# Patient Record
Sex: MALE | Race: BLACK OR AFRICAN AMERICAN | NOT HISPANIC OR LATINO | Employment: OTHER | URBAN - METROPOLITAN AREA
[De-identification: names, ages, dates, MRNs, and addresses within clinical notes are randomized per-mention and may not be internally consistent; named-entity substitution may affect disease eponyms.]

---

## 2020-04-08 RX ORDER — CLONIDINE HYDROCHLORIDE 0.1 MG/1
TABLET ORAL 2 TIMES DAILY
Status: ON HOLD | COMMUNITY
Start: 2020-03-18 | End: 2020-08-13

## 2020-04-08 RX ORDER — BUTYROSPERMUM PARKII(SHEA BUTTER), SIMMONDSIA CHINENSIS (JOJOBA) SEED OIL, ALOE BARBADENSIS LEAF EXTRACT .01; 1; 3.5 G/100G; G/100G; G/100G
250 LIQUID TOPICAL DAILY
Status: ON HOLD | COMMUNITY
End: 2020-08-13

## 2020-04-08 RX ORDER — DILTIAZEM HYDROCHLORIDE 240 MG/1
240 CAPSULE, COATED, EXTENDED RELEASE ORAL DAILY
COMMUNITY
Start: 2020-03-18

## 2020-04-08 RX ORDER — FUROSEMIDE 20 MG/1
20 TABLET ORAL DAILY PRN
Status: ON HOLD | COMMUNITY
Start: 2020-02-12 | End: 2020-08-13

## 2020-04-08 RX ORDER — MULTIVITAMIN
1 TABLET ORAL DAILY
COMMUNITY

## 2020-04-08 RX ORDER — LANOLIN ALCOHOL/MO/W.PET/CERES
100 CREAM (GRAM) TOPICAL
COMMUNITY

## 2020-04-08 RX ORDER — ASCORBIC ACID 125 MG
TABLET,CHEWABLE ORAL DAILY
COMMUNITY
Start: 2016-10-25

## 2020-04-08 RX ORDER — LEVOTHYROXINE SODIUM 50 UG/1
50 TABLET ORAL DAILY
COMMUNITY
Start: 2020-03-18 | End: 2020-10-20

## 2020-05-19 ENCOUNTER — OFFICE VISIT (OUTPATIENT)
Dept: CARDIOLOGY | Facility: CLINIC | Age: 65
End: 2020-05-19
Payer: MEDICARE

## 2020-05-19 VITALS — SYSTOLIC BLOOD PRESSURE: 124 MMHG | WEIGHT: 246.94 LBS | HEART RATE: 75 BPM | DIASTOLIC BLOOD PRESSURE: 76 MMHG

## 2020-05-19 DIAGNOSIS — I10 ESSENTIAL (PRIMARY) HYPERTENSION: ICD-10-CM

## 2020-05-19 DIAGNOSIS — I25.10 CORONARY ARTERY DISEASE INVOLVING NATIVE CORONARY ARTERY OF NATIVE HEART WITHOUT ANGINA PECTORIS: ICD-10-CM

## 2020-05-19 DIAGNOSIS — I25.10 ATHEROSCLEROSIS OF NATIVE CORONARY ARTERY OF NATIVE HEART WITHOUT ANGINA PECTORIS: ICD-10-CM

## 2020-05-19 DIAGNOSIS — I48.0 PAROXYSMAL ATRIAL FIBRILLATION: Primary | ICD-10-CM

## 2020-05-19 PROBLEM — I63.9 CVA (CEREBRAL VASCULAR ACCIDENT): Status: ACTIVE | Noted: 2018-10-30

## 2020-05-19 PROCEDURE — 93005 ELECTROCARDIOGRAM TRACING: CPT

## 2020-05-19 PROCEDURE — 93000 PR ELECTROCARDIOGRAM, COMPLETE: ICD-10-PCS | Mod: ,,, | Performed by: INTERNAL MEDICINE

## 2020-05-19 PROCEDURE — 99213 OFFICE O/P EST LOW 20 MIN: CPT | Mod: PBBFAC | Performed by: INTERNAL MEDICINE

## 2020-05-19 PROCEDURE — 99999 PR PBB SHADOW E&M-EST. PATIENT-LVL III: CPT | Mod: PBBFAC,,, | Performed by: INTERNAL MEDICINE

## 2020-05-19 PROCEDURE — 93010 ELECTROCARDIOGRAM REPORT: CPT | Mod: ,,, | Performed by: INTERNAL MEDICINE

## 2020-05-19 PROCEDURE — 99214 PR OFFICE/OUTPT VISIT, EST, LEVL IV, 30-39 MIN: ICD-10-PCS | Mod: 25,,, | Performed by: INTERNAL MEDICINE

## 2020-05-19 PROCEDURE — 93000 ELECTROCARDIOGRAM COMPLETE: CPT | Mod: ,,, | Performed by: INTERNAL MEDICINE

## 2020-05-19 PROCEDURE — 93010 EKG 12-LEAD: ICD-10-PCS | Mod: ,,, | Performed by: INTERNAL MEDICINE

## 2020-05-19 PROCEDURE — 99999 PR PBB SHADOW E&M-EST. PATIENT-LVL III: ICD-10-PCS | Mod: PBBFAC,,, | Performed by: INTERNAL MEDICINE

## 2020-05-19 PROCEDURE — 99214 OFFICE O/P EST MOD 30 MIN: CPT | Mod: 25,,, | Performed by: INTERNAL MEDICINE

## 2020-05-19 RX ORDER — FERROUS SULFATE 325(65) MG
325 TABLET ORAL
COMMUNITY

## 2020-05-19 NOTE — PROGRESS NOTES
Cardiology    Problem list  Patient Active Problem List   Diagnosis    Atherosclerotic heart disease of native coronary artery without angina pectoris    CVA (cerebral vascular accident)    Essential (primary) hypertension    Paroxysmal atrial fibrillation    Pure hypercholesterolemia       CC:  HTN, PAF    HPI:  He is doing very well.  He lost 15 lb.  Use last seen September 2019.  Since that visit his Xarelto was stopped by his oncologist.  He cannot remember dates Xarelto was stopped.  He denies any chest pain, shortness of breath, TIA.    Medications  Current Outpatient Medications   Medication Sig Dispense Refill    cholecalciferol, vitamin D3, 25 mcg (1,000 unit) Chew once daily.      cloNIDine (CATAPRES) 0.1 MG tablet 2 (two) times daily.      cyanocobalamin (VITAMIN B-12) 1000 MCG tablet Take 100 mcg by mouth.      diltiaZEM (CARDIZEM CD) 240 MG 24 hr capsule Take 240 mg by mouth once daily.      ferrous sulfate (FEOSOL) 325 mg (65 mg iron) Tab tablet Take 325 mg by mouth daily with breakfast.      furosemide (LASIX) 20 MG tablet Take 20 mg by mouth daily as needed.      levothyroxine (SYNTHROID) 50 MCG tablet Take 50 mcg by mouth once daily.      multivitamin (THERAGRAN) per tablet Take 1 tablet by mouth once daily.      Saccharomyces boulardii (FLORASTOR) 250 mg capsule Take 250 mg by mouth once daily.       No current facility-administered medications for this visit.       Prior to Admission medications    Medication Sig Start Date End Date Taking? Authorizing Provider   cholecalciferol, vitamin D3, 25 mcg (1,000 unit) Chew once daily. 10/25/16  Yes Historical Provider, MD   cloNIDine (CATAPRES) 0.1 MG tablet 2 (two) times daily. 3/18/20  Yes Historical Provider, MD   cyanocobalamin (VITAMIN B-12) 1000 MCG tablet Take 100 mcg by mouth.   Yes Historical Provider, MD   diltiaZEM (CARDIZEM CD) 240 MG 24 hr capsule Take 240 mg by mouth once daily. 3/18/20  Yes Historical Provider, MD    ferrous sulfate (FEOSOL) 325 mg (65 mg iron) Tab tablet Take 325 mg by mouth daily with breakfast.   Yes Historical Provider, MD   furosemide (LASIX) 20 MG tablet Take 20 mg by mouth daily as needed. 2/12/20 2/11/21 Yes Historical Provider, MD   levothyroxine (SYNTHROID) 50 MCG tablet Take 50 mcg by mouth once daily. 3/18/20 3/18/21 Yes Historical Provider, MD   multivitamin (THERAGRAN) per tablet Take 1 tablet by mouth once daily.   Yes Historical Provider, MD   Saccharomyces boulardii (FLORASTOR) 250 mg capsule Take 250 mg by mouth once daily.   Yes Historical Provider, MD   rivaroxaban (XARELTO) 15 mg Tab Take 15 mg by mouth once daily. 3/27/19 5/19/20  Historical Provider, MD         History  No past medical history on file.  No past surgical history on file.  Social History     Socioeconomic History    Marital status:      Spouse name: Not on file    Number of children: Not on file    Years of education: Not on file    Highest education level: Not on file   Occupational History    Not on file   Social Needs    Financial resource strain: Not on file    Food insecurity:     Worry: Not on file     Inability: Not on file    Transportation needs:     Medical: Not on file     Non-medical: Not on file   Tobacco Use    Smoking status: Former Smoker     Types: Cigarettes   Substance and Sexual Activity    Alcohol use: Yes     Frequency: Monthly or less    Drug use: Not on file    Sexual activity: Not on file   Lifestyle    Physical activity:     Days per week: Not on file     Minutes per session: Not on file    Stress: Not on file   Relationships    Social connections:     Talks on phone: Not on file     Gets together: Not on file     Attends Gnosticism service: Not on file     Active member of club or organization: Not on file     Attends meetings of clubs or organizations: Not on file     Relationship status: Not on file   Other Topics Concern    Not on file   Social History Narrative    Not  on file         Allergies  Review of patient's allergies indicates:   Allergen Reactions    Zolpidem Other (See Comments)     Causes pt to hallucinate           Review of Systems   Review of Systems   Constitution: Negative for decreased appetite, fever and weight loss.   HENT: Negative for congestion and nosebleeds.    Eyes: Negative for double vision, vision loss in left eye, vision loss in right eye and visual disturbance.   Cardiovascular: Negative for chest pain, claudication, cyanosis, dyspnea on exertion, irregular heartbeat, leg swelling, near-syncope, orthopnea, palpitations, paroxysmal nocturnal dyspnea and syncope.   Respiratory: Negative for cough, hemoptysis, shortness of breath, sleep disturbances due to breathing, snoring, sputum production and wheezing.    Endocrine: Negative for cold intolerance and heat intolerance.   Skin: Negative for nail changes and rash.   Musculoskeletal: Negative for joint pain, muscle cramps, muscle weakness and myalgias.   Gastrointestinal: Negative for change in bowel habit, heartburn, hematemesis, hematochezia, hemorrhoids and melena.   Neurological: Negative for dizziness, focal weakness and headaches.         Physical Exam  Wt Readings from Last 1 Encounters:   05/19/20 112 kg (246 lb 14.6 oz)     BP Readings from Last 3 Encounters:   05/19/20 124/76     Pulse Readings from Last 1 Encounters:   05/19/20 75     Physical Exam   Constitutional: He is oriented to person, place, and time. He appears well-developed and well-nourished.   HENT:   Head: Atraumatic.   Eyes: EOM are normal. No scleral icterus.   Neck: Neck supple. Normal carotid pulses, no hepatojugular reflux and no JVD present. Carotid bruit is not present. No edema present.   Cardiovascular: Normal rate, regular rhythm, S1 normal, S2 normal, normal heart sounds and intact distal pulses. PMI is not displaced. Exam reveals no friction rub.   No murmur heard.  Pulses:       Carotid pulses are 2+ on the right  side, and 2+ on the left side.       Radial pulses are 2+ on the right side, and 2+ on the left side.        Dorsalis pedis pulses are 2+ on the right side, and 2+ on the left side.   Pulmonary/Chest: Effort normal and breath sounds normal. No stridor. No respiratory distress. He has no wheezes. He has no rales. He exhibits no tenderness.   Abdominal: Soft. Normal aorta and bowel sounds are normal.   Musculoskeletal: He exhibits no edema.   Neurological: He is alert and oriented to person, place, and time.   Skin: Skin is warm and dry. No cyanosis. Nails show no clubbing.   Psychiatric: He has a normal mood and affect. His behavior is normal. Thought content normal.   Vitals reviewed.                Assessment  HTN-well controlled    PAF- in sinus, OAC stopped by heme/onc.    CAD- stable      Plan and Discussion  Continue current meds.    Follow Up  3 months    Time spent evaluating and treating patient 25 minutes with >50% of this time being face-to-face.     Jarad Greene MD, F.A.C.C, F.S.C.A.I.

## 2020-08-13 ENCOUNTER — HOSPITAL ENCOUNTER (OUTPATIENT)
Facility: OTHER | Age: 65
Discharge: HOME | End: 2020-08-13
Attending: RADIOLOGY | Admitting: RADIOLOGY
Payer: MEDICARE

## 2020-08-13 VITALS
RESPIRATION RATE: 16 BRPM | TEMPERATURE: 98 F | DIASTOLIC BLOOD PRESSURE: 97 MMHG | HEIGHT: 71 IN | OXYGEN SATURATION: 100 % | WEIGHT: 260 LBS | HEART RATE: 66 BPM | BODY MASS INDEX: 36.4 KG/M2 | SYSTOLIC BLOOD PRESSURE: 154 MMHG

## 2020-08-13 DIAGNOSIS — N28.9 RENAL INSUFFICIENCY: ICD-10-CM

## 2020-08-13 DIAGNOSIS — N18.30 CHRONIC KIDNEY DISEASE, STAGE III (MODERATE): ICD-10-CM

## 2020-08-13 LAB
BASOPHILS # BLD AUTO: 0.03 K/UL (ref 0–0.2)
BASOPHILS NFR BLD: 0.7 % (ref 0–1.9)
DIFFERENTIAL METHOD: ABNORMAL
EOSINOPHIL # BLD AUTO: 0.1 K/UL (ref 0–0.5)
EOSINOPHIL NFR BLD: 1.6 % (ref 0–8)
ERYTHROCYTE [DISTWIDTH] IN BLOOD BY AUTOMATED COUNT: 14 % (ref 11.5–14.5)
HCT VFR BLD AUTO: 37.3 % (ref 40–54)
HGB BLD-MCNC: 11.7 G/DL (ref 14–18)
IMM GRANULOCYTES # BLD AUTO: 0.06 K/UL (ref 0–0.04)
IMM GRANULOCYTES NFR BLD AUTO: 1.4 % (ref 0–0.5)
INR PPP: 1 (ref 0.8–1.2)
LYMPHOCYTES # BLD AUTO: 1.4 K/UL (ref 1–4.8)
LYMPHOCYTES NFR BLD: 31.1 % (ref 18–48)
MCH RBC QN AUTO: 32.1 PG (ref 27–31)
MCHC RBC AUTO-ENTMCNC: 31.4 G/DL (ref 32–36)
MCV RBC AUTO: 102 FL (ref 82–98)
MONOCYTES # BLD AUTO: 0.3 K/UL (ref 0.3–1)
MONOCYTES NFR BLD: 7.7 % (ref 4–15)
NEUTROPHILS # BLD AUTO: 2.5 K/UL (ref 1.8–7.7)
NEUTROPHILS NFR BLD: 57.5 % (ref 38–73)
NRBC BLD-RTO: 0 /100 WBC
PLATELET # BLD AUTO: 134 K/UL (ref 150–350)
PMV BLD AUTO: 9.4 FL (ref 9.2–12.9)
POCT GLUCOSE: 264 MG/DL (ref 70–110)
PROTHROMBIN TIME: 10.6 SEC (ref 9–12.5)
RBC # BLD AUTO: 3.65 M/UL (ref 4.6–6.2)
WBC # BLD AUTO: 4.41 K/UL (ref 3.9–12.7)

## 2020-08-13 PROCEDURE — 63600175 PHARM REV CODE 636 W HCPCS: Performed by: RADIOLOGY

## 2020-08-13 PROCEDURE — 85610 PROTHROMBIN TIME: CPT

## 2020-08-13 PROCEDURE — 82962 GLUCOSE BLOOD TEST: CPT

## 2020-08-13 PROCEDURE — 99152 MOD SED SAME PHYS/QHP 5/>YRS: CPT | Performed by: RADIOLOGY

## 2020-08-13 PROCEDURE — 85025 COMPLETE CBC W/AUTO DIFF WBC: CPT

## 2020-08-13 PROCEDURE — 36415 COLL VENOUS BLD VENIPUNCTURE: CPT

## 2020-08-13 RX ORDER — CARVEDILOL 3.12 MG/1
3.12 TABLET ORAL 2 TIMES DAILY WITH MEALS
COMMUNITY
End: 2021-08-17

## 2020-08-13 RX ORDER — FENTANYL CITRATE 50 UG/ML
INJECTION, SOLUTION INTRAMUSCULAR; INTRAVENOUS
Status: DISCONTINUED | OUTPATIENT
Start: 2020-08-13 | End: 2020-08-13 | Stop reason: HOSPADM

## 2020-08-13 RX ORDER — MIDAZOLAM HYDROCHLORIDE 1 MG/ML
INJECTION INTRAMUSCULAR; INTRAVENOUS
Status: DISCONTINUED | OUTPATIENT
Start: 2020-08-13 | End: 2020-08-13 | Stop reason: HOSPADM

## 2020-08-13 RX ORDER — HYDRALAZINE HYDROCHLORIDE 20 MG/ML
INJECTION INTRAMUSCULAR; INTRAVENOUS
Status: DISCONTINUED | OUTPATIENT
Start: 2020-08-13 | End: 2020-08-13 | Stop reason: HOSPADM

## 2020-08-13 RX ORDER — HYDROCODONE BITARTRATE AND ACETAMINOPHEN 5; 325 MG/1; MG/1
1 TABLET ORAL EVERY 4 HOURS PRN
Status: DISCONTINUED | OUTPATIENT
Start: 2020-08-13 | End: 2020-08-13 | Stop reason: HOSPADM

## 2020-08-13 NOTE — PLAN OF CARE
Arthur ALDO Menjivar has met all discharge criteria from Phase II. Vital Signs are stable, ambulating  without difficulty. Discharge instructions given, patient verbalized understanding. Discharged from facility via wheelchair in stable condition.

## 2020-08-13 NOTE — DISCHARGE SUMMARY
Radiology Discharge Summary      Hospital Course: No complications    Admit Date: 8/13/2020  Discharge Date: 08/13/2020     Instructions Given to Patient: Yes  Diet: Resume prior diet  Activity: activity as tolerated and no driving for today    Description of Condition on Discharge: Stable  Vital Signs (Most Recent): Temp: 98.4 °F (36.9 °C) (08/13/20 0955)  Pulse: 79 (08/13/20 1140)  Resp: (!) 24 (08/13/20 1140)  BP: (!) 161/90 (08/13/20 1140)  SpO2: 98 % (08/13/20 1140)    Discharge Disposition: Home    Discharge Diagnosis: renal insufficiency     Follow-up: per nephrology    @SIG@

## 2020-08-13 NOTE — H&P
Consult/H&P Note  Interventional Radiology    Consult Requested By: nephrology    Reason for Consult: kidney dysfunction    SUBJECTIVE:     Chief Complaint: CKD    History of Present Illness: 66 yo M with renal insufficiency.    Past Medical History:   Diagnosis Date    Cancer     bladder ca and prostate ca     Diabetes mellitus     Hypertension     Stroke     Thyroid disease      History reviewed. No pertinent surgical history.  History reviewed. No pertinent family history.  Social History     Tobacco Use    Smoking status: Former Smoker     Types: Cigarettes   Substance Use Topics    Alcohol use: Yes     Frequency: Monthly or less    Drug use: Not on file       Review of Systems:  Constitutional/General:No fever, chills, change in appetite or weight loss.  Hematological/Immuno: no known coagulopathies  Respiratory: no shortness of breath  Cardiovascular: no chest pain  Gastrointestinal: no abdominal pain  Genito-Urinary: no dysuria  Musculoskeletal: negative  Skin: Negative for rash, itching, pigmentation changes, nail or hair changes.  Neurological: no TIA or stroke symptoms  Psychiatric: normal mood/affect, good insight/judgement      OBJECTIVE:     Vital Signs Range (Last 24H):  Temp:  [98.4 °F (36.9 °C)]   Pulse:  [85]   Resp:  [18]   BP: (158-163)/()   SpO2:  [98 %]     Physical Exam:  General- Patient alert and oriented x3 in NAD  ENT- PERRLA,  Neck- No masses  CV- Regular rate and rhythm  Resp-  No increased WOB  GI- Non tender/non-distended  Extrem- No cyanosis, clubbing, edema.   Derm- No rashes, masses, or lesions noted  Neuro-  No focal deficits noted.     Physical Exam  Body mass index is 36.26 kg/m².    Scheduled Meds:   Continuous Infusions:   PRN Meds:    Allergies:   Review of patient's allergies indicates:   Allergen Reactions    Zolpidem Other (See Comments)     Causes pt to hallucinate         Labs:  Recent Labs   Lab 08/13/20  1015   INR 1.0       Recent Labs   Lab  08/13/20  1015   WBC 4.41   HGB 11.7*   HCT 37.3*   *   *    No results for input(s): GLU, NA, K, CL, CO2, BUN, CREATININE, CALCIUM, MG, ALT, AST, ALBUMIN, BILITOT, BILIDIR in the last 168 hours.    Vitals (Most Recent):  Temp: 98.4 °F (36.9 °C) (08/13/20 0955)  Pulse: 85 (08/13/20 0955)  Resp: 18 (08/13/20 0955)  BP: (!) 163/92 (08/13/20 1041)  SpO2: 98 % (08/13/20 0955)    ASA: 2  Mallampati: 2    Consent obtained    ASSESSMENT/PLAN:     Kidney biopsy.  Moderate sedation.    He should see his primary care physician in regards to his elevated blood sugars.    Active Hospital Problems    Diagnosis  POA    Renal insufficiency [N28.9]  Yes      Resolved Hospital Problems   No resolved problems to display.           Arthur Coronado MD

## 2020-08-13 NOTE — DISCHARGE INSTRUCTIONS
Discharge Instructions for Kidney Biopsy  You had a procedure called a kidney biopsy. Your healthcare provider used a special needle to remove a small piece of tissue from your kidney to examine it for signs of damage and disease. A kidney biopsy is ordered after other tests have shown that there may be a problem with your kidney. Kidney biopsies are also performed when kidney disease is suspected and to rule out cancer.  Home care  · Rest for 24 hours to 48 hours. Get up only to use the bathroom.  · Dont drive for 24 hours to 48 hours after the procedure.  · Dont shower for 24 hours after the biopsy. If you wish, you may wash yourself with a sponge or washcloth. When you are able to shower, dont scrub the site. Gently wash the area and pat it dry.  · Remove the bandage covering the biopsy site 24 hours to 48 hours after the procedure.  · Dont lift anything heavier than 10 pounds for 3 days to 4 days after the procedure.  · Ask your healthcare provider when you can return to work. Be sure to tell your healthcare provider if your job involves heavy lifting.  · If you normally take blood thinner medicines (anticoagulants or antiplatelet medicines) and you stopped taking them a few days before your procedure, ask your healthcare provider when to start taking them again.  When to seek medical care  Call your healthcare provider right away if you have any of the following:  · Blood in your urine  · Exhaustion or extreme weakness  · Dizziness or lightheadedness  · Sudden or increased shortness of breath  · Sudden chest pain  · Fever of 100.4°F (38°C) or higher, or chills  · Increasing redness, tenderness, or swelling at the biopsy site  · Opening of or drainage or bleeding from the biopsy site  · Increasing pain, with or without activity   Date Last Reviewed: 2/1/2017  © 9409-7037 [employer]. [address] [place] [address], [place] [address]. All rights reserved. This information is not intended as a  substitute for professional medical care. Always follow your healthcare professional's instructions.      Anesthesia: Monitored Anesthesia Care (MAC)    Anesthesia Safety  · Have an adult family member or friend drive you home after the procedure.  · For the first 24 hours after your surgery:  ¨ Do not drive or use heavy equipment.  ¨ Do not make important decisions or sign documents.  ¨ Avoid alcohol.  ¨ Have someone stay with you, if possible. They can watch for problems and help keep you safe.    Please follow any additional instructions from Dr. Coronado

## 2020-08-13 NOTE — PROCEDURES
Radiology Post-Procedure Note    Pre Op Diagnosis: renal insufficiency  Post Op Diagnosis: Same    Procedure: kidney biopsy    Procedure performed by: Arthur Coronado MD    Written Informed Consent Obtained: Yes  Specimen Removed: YES 3 18 g cores  Estimated Blood Loss: Minimal    Findings:   Successful kidney biopsy.    Patient tolerated procedure well.    @SIG@

## 2020-08-18 LAB
ADEQUACY: NORMAL
FINAL PATHOLOGIC DIAGNOSIS: NORMAL
GROSS: NORMAL

## 2020-10-20 ENCOUNTER — OFFICE VISIT (OUTPATIENT)
Dept: CARDIOLOGY | Facility: CLINIC | Age: 65
End: 2020-10-20
Payer: MEDICARE

## 2020-10-20 VITALS
BODY MASS INDEX: 36.11 KG/M2 | DIASTOLIC BLOOD PRESSURE: 74 MMHG | SYSTOLIC BLOOD PRESSURE: 116 MMHG | WEIGHT: 257.94 LBS | OXYGEN SATURATION: 99 % | HEART RATE: 72 BPM | HEIGHT: 71 IN

## 2020-10-20 DIAGNOSIS — I48.0 PAROXYSMAL ATRIAL FIBRILLATION: Primary | ICD-10-CM

## 2020-10-20 DIAGNOSIS — I63.9 CEREBROVASCULAR ACCIDENT (CVA), UNSPECIFIED MECHANISM: ICD-10-CM

## 2020-10-20 DIAGNOSIS — I48.0 PAF (PAROXYSMAL ATRIAL FIBRILLATION): ICD-10-CM

## 2020-10-20 DIAGNOSIS — I25.10 ATHEROSCLEROSIS OF NATIVE CORONARY ARTERY OF NATIVE HEART WITHOUT ANGINA PECTORIS: ICD-10-CM

## 2020-10-20 DIAGNOSIS — I10 ESSENTIAL (PRIMARY) HYPERTENSION: ICD-10-CM

## 2020-10-20 PROCEDURE — 99999 PR PBB SHADOW E&M-EST. PATIENT-LVL III: ICD-10-PCS | Mod: PBBFAC,,, | Performed by: INTERNAL MEDICINE

## 2020-10-20 PROCEDURE — 99214 PR OFFICE/OUTPT VISIT, EST, LEVL IV, 30-39 MIN: ICD-10-PCS | Mod: S$PBB,,, | Performed by: INTERNAL MEDICINE

## 2020-10-20 PROCEDURE — 99999 PR PBB SHADOW E&M-EST. PATIENT-LVL III: CPT | Mod: PBBFAC,,, | Performed by: INTERNAL MEDICINE

## 2020-10-20 PROCEDURE — 99214 OFFICE O/P EST MOD 30 MIN: CPT | Mod: S$PBB,,, | Performed by: INTERNAL MEDICINE

## 2020-10-20 PROCEDURE — 99213 OFFICE O/P EST LOW 20 MIN: CPT | Mod: PBBFAC,PN | Performed by: INTERNAL MEDICINE

## 2020-10-20 RX ORDER — LEVOTHYROXINE SODIUM 75 UG/1
75 TABLET ORAL DAILY
COMMUNITY
Start: 2020-08-31 | End: 2021-01-19 | Stop reason: DRUGHIGH

## 2020-10-20 RX ORDER — OMEGA-3 FATTY ACIDS 1000 MG
2 CAPSULE ORAL
COMMUNITY
End: 2020-11-03

## 2020-10-20 RX ORDER — INSULIN GLARGINE 300 U/ML
INJECTION, SOLUTION SUBCUTANEOUS
COMMUNITY
Start: 2020-10-07 | End: 2020-11-03 | Stop reason: ALTCHOICE

## 2020-10-20 RX ORDER — GARLIC 100 MG
TABLET ORAL
COMMUNITY
End: 2023-07-14 | Stop reason: CLARIF

## 2020-10-20 NOTE — PROGRESS NOTES
Cardiology    10/20/2020  9:35 AM    Problem list  Patient Active Problem List   Diagnosis    Atherosclerotic heart disease of native coronary artery without angina pectoris    CVA (cerebral vascular accident)    Essential (primary) hypertension    Pure hypercholesterolemia    Renal insufficiency    PAF (paroxysmal atrial fibrillation)       CC:  Shortness of breath    HPI:  He complains of more shortness of breath and dyspnea on exertion with minimal activity.  He denies any chest pain.  He denies any bleeding.    Medications  Current Outpatient Medications   Medication Sig Dispense Refill    carvediloL (COREG) 3.125 MG tablet Take 3.125 mg by mouth 2 (two) times daily with meals.      cholecalciferol, vitamin D3, 25 mcg (1,000 unit) Chew once daily.      cyanocobalamin (VITAMIN B-12) 1000 MCG tablet Take 100 mcg by mouth.      diltiaZEM (CARDIZEM CD) 240 MG 24 hr capsule Take 240 mg by mouth once daily.      ferrous sulfate (FEOSOL) 325 mg (65 mg iron) Tab tablet Take 325 mg by mouth daily with breakfast.      garlic 100 mg Tab Take by mouth.      levothyroxine (SYNTHROID) 75 MCG tablet 75 mcg once daily.      multivitamin (THERAGRAN) per tablet Take 1 tablet by mouth once daily.      omega-3 fatty acids 1,000 mg Cap Take 2 g by mouth.      TOUJEO SOLOSTAR U-300 INSULIN 300 unit/mL (1.5 mL) InPn pen INJECT 20 UNITS SC D       No current facility-administered medications for this visit.       Prior to Admission medications    Medication Sig Start Date End Date Taking? Authorizing Provider   carvediloL (COREG) 3.125 MG tablet Take 3.125 mg by mouth 2 (two) times daily with meals.   Yes Historical Provider   cholecalciferol, vitamin D3, 25 mcg (1,000 unit) Chew once daily. 10/25/16  Yes Historical Provider   cyanocobalamin (VITAMIN B-12) 1000 MCG tablet Take 100 mcg by mouth.   Yes Historical Provider   diltiaZEM (CARDIZEM CD) 240 MG 24 hr capsule Take 240 mg by mouth once daily. 3/18/20  Yes  Historical Provider   ferrous sulfate (FEOSOL) 325 mg (65 mg iron) Tab tablet Take 325 mg by mouth daily with breakfast.   Yes Historical Provider   garlic 100 mg Tab Take by mouth.   Yes Historical Provider   levothyroxine (SYNTHROID) 75 MCG tablet 75 mcg once daily. 8/31/20  Yes Historical Provider   multivitamin (THERAGRAN) per tablet Take 1 tablet by mouth once daily.   Yes Historical Provider   omega-3 fatty acids 1,000 mg Cap Take 2 g by mouth.   Yes Historical Provider   TOUJEO SOLOSTAR U-300 INSULIN 300 unit/mL (1.5 mL) InPn pen INJECT 20 UNITS SC D 10/7/20  Yes Historical Provider   levothyroxine (SYNTHROID) 50 MCG tablet Take 50 mcg by mouth once daily. 3/18/20 10/20/20  Historical Provider         History  Past Medical History:   Diagnosis Date    Cancer     bladder ca and prostate ca     Diabetes mellitus     Hypertension     Stroke     Thyroid disease      No past surgical history on file.  Social History     Socioeconomic History    Marital status:      Spouse name: Not on file    Number of children: Not on file    Years of education: Not on file    Highest education level: Not on file   Occupational History    Not on file   Social Needs    Financial resource strain: Not on file    Food insecurity     Worry: Not on file     Inability: Not on file    Transportation needs     Medical: Not on file     Non-medical: Not on file   Tobacco Use    Smoking status: Former Smoker     Types: Cigarettes   Substance and Sexual Activity    Alcohol use: Yes     Frequency: Monthly or less    Drug use: Not on file    Sexual activity: Not on file   Lifestyle    Physical activity     Days per week: Not on file     Minutes per session: Not on file    Stress: Not on file   Relationships    Social connections     Talks on phone: Not on file     Gets together: Not on file     Attends Jain service: Not on file     Active member of club or organization: Not on file     Attends meetings of clubs  or organizations: Not on file     Relationship status: Not on file   Other Topics Concern    Not on file   Social History Narrative    Not on file         Allergies  Review of patient's allergies indicates:   Allergen Reactions    Irbesartan      hyperkalemia    Zolpidem Other (See Comments)     Causes pt to hallucinate           Review of Systems   Review of Systems   Constitution: Negative for decreased appetite, fever and weight loss.   HENT: Negative for congestion and nosebleeds.    Eyes: Negative for double vision, vision loss in left eye, vision loss in right eye and visual disturbance.   Cardiovascular: Positive for dyspnea on exertion. Negative for chest pain, claudication, cyanosis, irregular heartbeat, leg swelling, near-syncope, orthopnea, palpitations, paroxysmal nocturnal dyspnea and syncope.   Respiratory: Positive for shortness of breath. Negative for cough, hemoptysis, sleep disturbances due to breathing, snoring, sputum production and wheezing.    Endocrine: Negative for cold intolerance and heat intolerance.   Skin: Negative for nail changes and rash.   Musculoskeletal: Negative for joint pain, muscle cramps, muscle weakness and myalgias.   Gastrointestinal: Negative for change in bowel habit, heartburn, hematemesis, hematochezia, hemorrhoids and melena.   Neurological: Negative for dizziness, focal weakness and headaches.         Physical Exam  Wt Readings from Last 1 Encounters:   10/20/20 117 kg (257 lb 15 oz)     BP Readings from Last 3 Encounters:   10/20/20 116/74   08/13/20 (!) 154/97   05/19/20 124/76     Pulse Readings from Last 1 Encounters:   10/20/20 72     Body mass index is 35.98 kg/m².    Physical Exam   Constitutional: He is oriented to person, place, and time. He appears well-developed and well-nourished.   HENT:   Head: Atraumatic.   Eyes: EOM are normal. No scleral icterus.   Neck: Neck supple. Normal carotid pulses, no hepatojugular reflux and no JVD present. Carotid bruit  is not present. No edema present.   Cardiovascular: Normal rate, regular rhythm, S1 normal, S2 normal, normal heart sounds and intact distal pulses. PMI is not displaced. Exam reveals no friction rub.   No murmur heard.  Pulses:       Carotid pulses are 2+ on the right side and 2+ on the left side.       Radial pulses are 2+ on the right side and 2+ on the left side.        Dorsalis pedis pulses are 2+ on the right side and 2+ on the left side.   Pulmonary/Chest: Effort normal and breath sounds normal. No stridor. No respiratory distress. He has no wheezes. He has no rales. He exhibits no tenderness.   Abdominal: Soft. Normal aorta and bowel sounds are normal.   Musculoskeletal:         General: No edema.   Neurological: He is alert and oriented to person, place, and time.   Skin: Skin is warm and dry. No cyanosis. Nails show no clubbing.   Psychiatric: He has a normal mood and affect. His behavior is normal. Thought content normal.   Vitals reviewed.                Assessment    1. Essential (primary) hypertension  Stable    2. Atherosclerosis of native coronary artery of native heart without angina pectoris  Being evaluated    3. Cerebrovascular accident (CVA), unspecified mechanism  Stable    4. DVT  -completed Xarelto treatment    5.  PAF  -CHADSVasc = 4        Plan and Discussion  Restart NOAC.  Patient will let us know if Xarelto or Eliquis is on his formulary.  Will get a Lexiscan nuclear stress test, echocardiogram Holter monitoring to monitor for AFib.    Follow Up  1 month.    Time spent evaluating and treating patient 20 minutes with >50% of this time being face-to-face.     Jarad Greene MD, F.A.C.C, F.S.C.A.I.

## 2020-10-22 ENCOUNTER — HOSPITAL ENCOUNTER (OUTPATIENT)
Dept: CARDIOLOGY | Facility: OTHER | Age: 65
Discharge: HOME | End: 2020-10-22
Attending: INTERNAL MEDICINE
Payer: MEDICARE

## 2020-10-22 ENCOUNTER — HOSPITAL ENCOUNTER (OUTPATIENT)
Dept: RADIOLOGY | Facility: OTHER | Age: 65
Discharge: HOME | End: 2020-10-22
Attending: INTERNAL MEDICINE
Payer: MEDICARE

## 2020-10-22 VITALS
BODY MASS INDEX: 35.98 KG/M2 | HEART RATE: 58 BPM | SYSTOLIC BLOOD PRESSURE: 124 MMHG | HEIGHT: 71 IN | WEIGHT: 257 LBS | DIASTOLIC BLOOD PRESSURE: 81 MMHG

## 2020-10-22 DIAGNOSIS — I10 ESSENTIAL (PRIMARY) HYPERTENSION: ICD-10-CM

## 2020-10-22 DIAGNOSIS — I48.0 PAROXYSMAL ATRIAL FIBRILLATION: ICD-10-CM

## 2020-10-22 DIAGNOSIS — I25.10 ATHEROSCLEROSIS OF NATIVE CORONARY ARTERY OF NATIVE HEART WITHOUT ANGINA PECTORIS: ICD-10-CM

## 2020-10-22 DIAGNOSIS — I63.9 CEREBROVASCULAR ACCIDENT (CVA), UNSPECIFIED MECHANISM: ICD-10-CM

## 2020-10-22 LAB
ASCENDING AORTA: 3.51 CM
AV INDEX (PROSTH): 1.1
AV MEAN GRADIENT: 5 MMHG
AV PEAK GRADIENT: 11 MMHG
AV VALVE AREA: 4.5 CM2
AV VELOCITY RATIO: 0.88
BSA FOR ECHO PROCEDURE: 2.42 M2
CV ECHO LV RWT: 0.58 CM
CV STRESS BASE HR: 58 BPM
DIASTOLIC BLOOD PRESSURE: 81 MMHG
DOP CALC AO PEAK VEL: 1.66 M/S
DOP CALC AO VTI: 30.32 CM
DOP CALC LVOT AREA: 4.1 CM2
DOP CALC LVOT DIAMETER: 2.28 CM
DOP CALC LVOT PEAK VEL: 1.46 M/S
DOP CALC LVOT STROKE VOLUME: 136.58 CM3
DOP CALCLVOT PEAK VEL VTI: 33.47 CM
E WAVE DECELERATION TIME: 292.99 MSEC
E/A RATIO: 1.09
E/E' RATIO: 9.76 M/S
ECHO LV POSTERIOR WALL: 1.33 CM (ref 0.6–1.1)
FRACTIONAL SHORTENING: 35 % (ref 28–44)
INTERVENTRICULAR SEPTUM: 1.27 CM (ref 0.6–1.1)
IVRT: 92.27 MSEC
LA MAJOR: 6.22 CM
LA MINOR: 5.83 CM
LA WIDTH: 4.43 CM
LEFT ATRIUM SIZE: 3.43 CM
LEFT ATRIUM VOLUME INDEX MOD: 32.4 ML/M2
LEFT ATRIUM VOLUME INDEX: 33.1 ML/M2
LEFT ATRIUM VOLUME MOD: 76 CM3
LEFT ATRIUM VOLUME: 77.74 CM3
LEFT INTERNAL DIMENSION IN SYSTOLE: 3.02 CM (ref 2.1–4)
LEFT VENTRICLE DIASTOLIC VOLUME INDEX: 42 ML/M2
LEFT VENTRICLE DIASTOLIC VOLUME: 98.53 ML
LEFT VENTRICLE MASS INDEX: 99 G/M2
LEFT VENTRICLE SYSTOLIC VOLUME INDEX: 15.1 ML/M2
LEFT VENTRICLE SYSTOLIC VOLUME: 35.48 ML
LEFT VENTRICULAR INTERNAL DIMENSION IN DIASTOLE: 4.62 CM (ref 3.5–6)
LEFT VENTRICULAR MASS: 231.69 G
LV LATERAL E/E' RATIO: 10.38 M/S
LV SEPTAL E/E' RATIO: 9.22 M/S
MV PEAK A VEL: 0.76 M/S
MV PEAK E VEL: 0.83 M/S
MV STENOSIS PRESSURE HALF TIME: 84.97 MS
MV VALVE AREA P 1/2 METHOD: 2.59 CM2
NUC STRESS EJECTION FRACTION: 58 %
OHS CV CPX 85 PERCENT MAX PREDICTED HEART RATE MALE: 131
OHS CV CPX MAX PREDICTED HEART RATE: 154
OHS CV CPX PATIENT IS FEMALE: 0
OHS CV CPX PATIENT IS MALE: 1
OHS CV CPX PEAK DIASTOLIC BLOOD PRESSURE: 83 MMHG
OHS CV CPX PEAK HEAR RATE: 60 BPM
OHS CV CPX PEAK RATE PRESSURE PRODUCT: 8100
OHS CV CPX PEAK SYSTOLIC BLOOD PRESSURE: 135 MMHG
OHS CV CPX PERCENT MAX PREDICTED HEART RATE ACHIEVED: 39
OHS CV CPX RATE PRESSURE PRODUCT PRESENTING: 7192
PISA MRMAX VEL: 0.02 M/S
PISA TR MAX VEL: 2.03 M/S
PULM VEIN S/D RATIO: 1.19
PV PEAK D VEL: 0.43 M/S
PV PEAK S VEL: 0.51 M/S
PV PEAK VELOCITY: 1.27 CM/S
RA MAJOR: 4.99 CM
RIGHT VENTRICULAR END-DIASTOLIC DIMENSION: 4.17 CM
SINUS: 3.75 CM
STJ: 3.13 CM
SYSTOLIC BLOOD PRESSURE: 124 MMHG
TDI LATERAL: 0.08 M/S
TDI SEPTAL: 0.09 M/S
TDI: 0.09 M/S
TR MAX PG: 16 MMHG
TRICUSPID ANNULAR PLANE SYSTOLIC EXCURSION: 2.04 CM

## 2020-10-22 PROCEDURE — 93016 STRESS TEST WITH MYOCARDIAL PERFUSION (CUPID ONLY): ICD-10-PCS | Mod: ,,, | Performed by: INTERNAL MEDICINE

## 2020-10-22 PROCEDURE — 78452 STRESS TEST WITH MYOCARDIAL PERFUSION (CUPID ONLY): ICD-10-PCS | Mod: 26,,, | Performed by: INTERNAL MEDICINE

## 2020-10-22 PROCEDURE — 93016 CV STRESS TEST SUPVJ ONLY: CPT | Mod: ,,, | Performed by: INTERNAL MEDICINE

## 2020-10-22 PROCEDURE — 93018 STRESS TEST WITH MYOCARDIAL PERFUSION (CUPID ONLY): ICD-10-PCS | Mod: ,,, | Performed by: INTERNAL MEDICINE

## 2020-10-22 PROCEDURE — 93306 ECHO (CUPID ONLY): ICD-10-PCS | Mod: 26,,, | Performed by: INTERNAL MEDICINE

## 2020-10-22 PROCEDURE — 93227 XTRNL ECG REC<48 HR R&I: CPT | Mod: ,,, | Performed by: INTERNAL MEDICINE

## 2020-10-22 PROCEDURE — 78452 HT MUSCLE IMAGE SPECT MULT: CPT | Mod: 26,,, | Performed by: INTERNAL MEDICINE

## 2020-10-22 PROCEDURE — 93227 HOLTER MONITOR - 24 HOUR (CUPID ONLY): ICD-10-PCS | Mod: ,,, | Performed by: INTERNAL MEDICINE

## 2020-10-22 PROCEDURE — 93306 TTE W/DOPPLER COMPLETE: CPT | Mod: 26,,, | Performed by: INTERNAL MEDICINE

## 2020-10-22 PROCEDURE — 93226 XTRNL ECG REC<48 HR SCAN A/R: CPT

## 2020-10-22 PROCEDURE — 93018 CV STRESS TEST I&R ONLY: CPT | Mod: ,,, | Performed by: INTERNAL MEDICINE

## 2020-10-22 PROCEDURE — 93306 TTE W/DOPPLER COMPLETE: CPT

## 2020-10-26 ENCOUNTER — TELEPHONE (OUTPATIENT)
Dept: CARDIOLOGY | Facility: CLINIC | Age: 65
End: 2020-10-26

## 2020-10-26 NOTE — TELEPHONE ENCOUNTER
----- Message from Makenna Valdez sent at 10/26/2020 12:19 PM CDT -----  Type: Patient Call Back    Who called: spouse/ Betzy    What is the request in detail: spouse asking for a call back to f/u on test results he completed on Thursday.    Can the clinic reply by [facility]? No     Would the patient rather a call back or a response via [facility]? Call back     Best call back number: [phone]  or [phone]    Additional Information:

## 2020-11-03 ENCOUNTER — OFFICE VISIT (OUTPATIENT)
Dept: CARDIOLOGY | Facility: CLINIC | Age: 65
End: 2020-11-03
Payer: MEDICARE

## 2020-11-03 VITALS
HEIGHT: 71 IN | SYSTOLIC BLOOD PRESSURE: 118 MMHG | HEART RATE: 66 BPM | BODY MASS INDEX: 35.88 KG/M2 | DIASTOLIC BLOOD PRESSURE: 70 MMHG | WEIGHT: 256.31 LBS

## 2020-11-03 DIAGNOSIS — I48.0 PAROXYSMAL ATRIAL FIBRILLATION: Primary | ICD-10-CM

## 2020-11-03 PROCEDURE — 99999 PR PBB SHADOW E&M-EST. PATIENT-LVL III: ICD-10-PCS | Mod: PBBFAC,,, | Performed by: INTERNAL MEDICINE

## 2020-11-03 PROCEDURE — 99213 OFFICE O/P EST LOW 20 MIN: CPT | Mod: PBBFAC,PN | Performed by: INTERNAL MEDICINE

## 2020-11-03 PROCEDURE — 99999 PR PBB SHADOW E&M-EST. PATIENT-LVL III: CPT | Mod: PBBFAC,,, | Performed by: INTERNAL MEDICINE

## 2020-11-03 PROCEDURE — 99214 PR OFFICE/OUTPT VISIT, EST, LEVL IV, 30-39 MIN: ICD-10-PCS | Mod: S$PBB,,, | Performed by: INTERNAL MEDICINE

## 2020-11-03 PROCEDURE — 99214 OFFICE O/P EST MOD 30 MIN: CPT | Mod: S$PBB,,, | Performed by: INTERNAL MEDICINE

## 2020-11-03 RX ORDER — SODIUM BICARBONATE 650 MG/1
650 TABLET ORAL 3 TIMES DAILY
COMMUNITY
Start: 2020-08-31

## 2020-11-03 NOTE — PROGRESS NOTES
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 Baker yesterday and told her that he has been having diarrhea for the past 6 weeks which he has not told anybody.  We discussed his echo and Lexiscan stress results which show good.  His Holter monitor showed 3 episodes of atrial fibrillation which he did not feel.    Medications  Current Outpatient Medications   Medication Sig Dispense Refill    carvediloL (COREG) 3.125 MG tablet Take 3.125 mg by mouth 2 (two) times daily with meals.      cholecalciferol, vitamin D3, 25 mcg (1,000 unit) Chew once daily.      cyanocobalamin (VITAMIN B-12) 1000 MCG tablet Take 100 mcg by mouth.      diltiaZEM (CARDIZEM CD) 240 MG 24 hr capsule Take 240 mg by mouth once daily.      ferrous sulfate (FEOSOL) 325 mg (65 mg iron) Tab tablet Take 325 mg by mouth daily with breakfast.      garlic 100 mg Tab Take by mouth.      insulin glargine-lixisenatide (SOLIQUA 100/33) 100 unit-33 mcg/mL InPn Inject 60 Units into the skin.      levothyroxine (SYNTHROID) 75 MCG tablet 75 mcg once daily.      multivitamin (THERAGRAN) per tablet Take 1 tablet by mouth once daily.      sodium bicarbonate 650 MG tablet 1,300 mg 2 (two) times a day.       No current facility-administered medications for this visit.       Prior to Admission medications    Medication Sig Start Date End Date Taking? Authorizing Provider   carvediloL (COREG) 3.125 MG tablet Take 3.125 mg by mouth 2 (two) times daily with meals.   Yes Historical Provider   cholecalciferol, vitamin D3, 25 mcg (1,000 unit) Chew once daily. 10/25/16  Yes Historical Provider   cyanocobalamin (VITAMIN B-12)  1000 MCG tablet Take 100 mcg by mouth.   Yes Historical Provider   diltiaZEM (CARDIZEM CD) 240 MG 24 hr capsule Take 240 mg by mouth once daily. 3/18/20  Yes Historical Provider   ferrous sulfate (FEOSOL) 325 mg (65 mg iron) Tab tablet Take 325 mg by mouth daily with breakfast.   Yes Historical Provider   garlic 100 mg Tab Take by mouth.   Yes Historical Provider   insulin glargine-lixisenatide (SOLIQUA 100/33) 100 unit-33 mcg/mL InPn Inject 60 Units into the skin. 10/27/20  Yes Historical Provider   levothyroxine (SYNTHROID) 75 MCG tablet 75 mcg once daily. 8/31/20  Yes Historical Provider   multivitamin (THERAGRAN) per tablet Take 1 tablet by mouth once daily.   Yes Historical Provider   sodium bicarbonate 650 MG tablet 1,300 mg 2 (two) times a day. 8/31/20  Yes Historical Provider   TOUJEO SOLOSTAR U-300 INSULIN 300 unit/mL (1.5 mL) InPn pen INJECT 20 UNITS SC D 10/7/20 11/3/20 Yes Historical Provider   omega-3 fatty acids 1,000 mg Cap Take 2 g by mouth.  11/3/20  Historical Provider         History  Past Medical History:   Diagnosis Date    Cancer     bladder ca and prostate ca     Diabetes mellitus     Hypertension     Stroke     Thyroid disease      No past surgical history on file.  Social History     Socioeconomic History    Marital status:      Spouse name: Not on file    Number of children: Not on file    Years of education: Not on file    Highest education level: Not on file   Occupational History    Not on file   Social Needs    Financial resource strain: Not on file    Food insecurity     Worry: Not on file     Inability: Not on file    Transportation needs     Medical: Not on file     Non-medical: Not on file   Tobacco Use    Smoking status: Former Smoker     Types: Cigarettes   Substance and Sexual Activity    Alcohol use: Yes     Frequency: Monthly or less    Drug use: Not on file    Sexual activity: Not on file   Lifestyle    Physical activity     Days per week: Not on file      Minutes per session: Not on file    Stress: Not on file   Relationships    Social connections     Talks on phone: Not on file     Gets together: Not on file     Attends Confucianist service: Not on file     Active member of club or organization: Not on file     Attends meetings of clubs or organizations: Not on file     Relationship status: Not on file   Other Topics Concern    Not on file   Social History Narrative    Not on file         Allergies  Review of patient's allergies indicates:   Allergen Reactions    Irbesartan      hyperkalemia    Zolpidem Other (See Comments)     Causes pt to hallucinate           Review of Systems   Review of Systems   Constitution: Negative for decreased appetite, fever and weight loss.   HENT: Negative for congestion and nosebleeds.    Eyes: Negative for double vision, vision loss in left eye, vision loss in right eye and visual disturbance.   Cardiovascular: Positive for dyspnea on exertion. Negative for chest pain, claudication, cyanosis, irregular heartbeat, leg swelling, near-syncope, orthopnea, palpitations, paroxysmal nocturnal dyspnea and syncope.   Respiratory: Positive for shortness of breath. Negative for cough, hemoptysis, sleep disturbances due to breathing, snoring, sputum production and wheezing.    Endocrine: Negative for cold intolerance and heat intolerance.   Skin: Negative for nail changes and rash.   Musculoskeletal: Negative for joint pain, muscle cramps, muscle weakness and myalgias.   Gastrointestinal: Negative for change in bowel habit, heartburn, hematemesis, hematochezia, hemorrhoids and melena.   Neurological: Negative for dizziness, focal weakness and headaches.         Physical Exam  Wt Readings from Last 1 Encounters:   11/03/20 116.3 kg (256 lb 4.6 oz)     BP Readings from Last 3 Encounters:   11/03/20 118/70   10/22/20 124/81   10/20/20 116/74     Pulse Readings from Last 1 Encounters:   11/03/20 66     Body mass index is 35.74  kg/m².    Physical Exam   Constitutional: He is oriented to person, place, and time. He appears well-developed and well-nourished.   HENT:   Head: Atraumatic.   Eyes: EOM are normal. No scleral icterus.   Neck: Neck supple. Normal carotid pulses, no hepatojugular reflux and no JVD present. Carotid bruit is not present. No edema present.   Cardiovascular: Normal rate, regular rhythm, S1 normal, S2 normal, normal heart sounds and intact distal pulses. PMI is not displaced. Exam reveals no friction rub.   No murmur heard.  Pulses:       Carotid pulses are 2+ on the right side and 2+ on the left side.       Radial pulses are 2+ on the right side and 2+ on the left side.        Dorsalis pedis pulses are 2+ on the right side and 2+ on the left side.   Pulmonary/Chest: Effort normal and breath sounds normal. No stridor. No respiratory distress. He has no wheezes. He has no rales. He exhibits no tenderness.   Abdominal: Soft. Normal aorta and bowel sounds are normal.   Musculoskeletal:         General: No edema.   Neurological: He is alert and oriented to person, place, and time.   Skin: Skin is warm and dry. No cyanosis. Nails show no clubbing.   Psychiatric: He has a normal mood and affect. His behavior is normal. Thought content normal.   Vitals reviewed.                Assessment  1. Paroxysmal atrial fibrillation  3 episodes seen on Holter monitor.  CHADSVAsc = 5.     2. H/o CVA    3.  Diarrhea  Being evaluated by Dr Bakre    4.  HTN  stable        Plan and Discussion  Discussed his echocardiogram, Lexiscan and Holter monitor.  Discussed that his Holter monitor showed 3 episodes of atrial fibrillation.  Discussed OAC and they agreed.  Estimated CrCl 50 so will start Xarelto 15mg qd w meals.    Follow Up  1 month    Time spent evaluating and treating patient 20 minutes with >50% of this time being face-to-face.     Jarad Greene MD, F.A.C.C, F.S.C.A.I.

## 2020-12-01 ENCOUNTER — OFFICE VISIT (OUTPATIENT)
Dept: CARDIOLOGY | Facility: CLINIC | Age: 65
End: 2020-12-01
Payer: MEDICARE

## 2020-12-01 VITALS
BODY MASS INDEX: 36.42 KG/M2 | OXYGEN SATURATION: 98 % | DIASTOLIC BLOOD PRESSURE: 80 MMHG | HEIGHT: 71 IN | WEIGHT: 260.13 LBS | HEART RATE: 60 BPM | SYSTOLIC BLOOD PRESSURE: 130 MMHG

## 2020-12-01 DIAGNOSIS — I25.10 ATHEROSCLEROSIS OF NATIVE CORONARY ARTERY OF NATIVE HEART WITHOUT ANGINA PECTORIS: Primary | ICD-10-CM

## 2020-12-01 DIAGNOSIS — E78.2 MIXED HYPERLIPIDEMIA: ICD-10-CM

## 2020-12-01 PROCEDURE — 99999 PR PBB SHADOW E&M-EST. PATIENT-LVL IV: CPT | Mod: PBBFAC,,, | Performed by: INTERNAL MEDICINE

## 2020-12-01 PROCEDURE — 99214 OFFICE O/P EST MOD 30 MIN: CPT | Mod: S$PBB,,, | Performed by: INTERNAL MEDICINE

## 2020-12-01 PROCEDURE — 99214 PR OFFICE/OUTPT VISIT, EST, LEVL IV, 30-39 MIN: ICD-10-PCS | Mod: S$PBB,,, | Performed by: INTERNAL MEDICINE

## 2020-12-01 PROCEDURE — 99214 OFFICE O/P EST MOD 30 MIN: CPT | Mod: PBBFAC,PN | Performed by: INTERNAL MEDICINE

## 2020-12-01 PROCEDURE — 99999 PR PBB SHADOW E&M-EST. PATIENT-LVL IV: ICD-10-PCS | Mod: PBBFAC,,, | Performed by: INTERNAL MEDICINE

## 2020-12-01 RX ORDER — ATORVASTATIN CALCIUM 40 MG/1
40 TABLET, FILM COATED ORAL DAILY
Qty: 90 TABLET | Refills: 3 | Status: SHIPPED | OUTPATIENT
Start: 2020-12-01 | End: 2021-08-17

## 2020-12-01 RX ORDER — FUROSEMIDE 20 MG/1
20 TABLET ORAL DAILY PRN
COMMUNITY

## 2020-12-01 NOTE — PROGRESS NOTES
Cardiology    12/1/2020  2:11 PM    Problem list  Patient Active Problem List   Diagnosis    Atherosclerotic heart disease of native coronary artery without angina pectoris    CVA (cerebral vascular accident)    Essential (primary) hypertension    Pure hypercholesterolemia    Renal insufficiency    PAF (paroxysmal atrial fibrillation)       CC:  afib    HPI:  At last visit, Xarelto 15mg qd (renal dosed) was started for Afib.  He is doing fine.  His diarrhea has improved since stopping the MCT oil and started probiotics.  He denies any chest pain.  His shortness of breath has improved with Lasix which he was cleared by his nephrologist to take 3 times a week.  He denies any bleeding.    Medications  Current Outpatient Medications   Medication Sig Dispense Refill    carvediloL (COREG) 3.125 MG tablet Take 3.125 mg by mouth 2 (two) times daily with meals.      cholecalciferol, vitamin D3, 25 mcg (1,000 unit) Chew once daily.      cyanocobalamin (VITAMIN B-12) 1000 MCG tablet Take 100 mcg by mouth.      diltiaZEM (CARDIZEM CD) 240 MG 24 hr capsule Take 240 mg by mouth once daily.      ferrous sulfate (FEOSOL) 325 mg (65 mg iron) Tab tablet Take 325 mg by mouth daily with breakfast.      furosemide (LASIX) 20 MG tablet Take 20 mg by mouth 3 (three) times a week. 3 times per week      garlic 100 mg Tab Take by mouth.      insulin glargine-lixisenatide (SOLIQUA 100/33) 100 unit-33 mcg/mL InPn Inject 60 Units into the skin.      Lactobacillus rhamnosus GG (CULTURELLE) 10 billion cell capsule Take 1 capsule by mouth once daily.      levothyroxine (SYNTHROID) 75 MCG tablet 75 mcg once daily.      multivitamin (THERAGRAN) per tablet Take 1 tablet by mouth once daily.      rivaroxaban (XARELTO) 15 mg Tab Take 1 tablet (15 mg total) by mouth daily with dinner or evening meal. 90 tablet 3    sodium bicarbonate 650 MG tablet 1,300 mg 2 (two) times a day.       No current facility-administered medications  for this visit.       Prior to Admission medications    Medication Sig Start Date End Date Taking? Authorizing Provider   carvediloL (COREG) 3.125 MG tablet Take 3.125 mg by mouth 2 (two) times daily with meals.   Yes Historical Provider   cholecalciferol, vitamin D3, 25 mcg (1,000 unit) Chew once daily. 10/25/16  Yes Historical Provider   cyanocobalamin (VITAMIN B-12) 1000 MCG tablet Take 100 mcg by mouth.   Yes Historical Provider   diltiaZEM (CARDIZEM CD) 240 MG 24 hr capsule Take 240 mg by mouth once daily. 3/18/20  Yes Historical Provider   ferrous sulfate (FEOSOL) 325 mg (65 mg iron) Tab tablet Take 325 mg by mouth daily with breakfast.   Yes Historical Provider   furosemide (LASIX) 20 MG tablet Take 20 mg by mouth 3 (three) times a week. 3 times per week   Yes Historical Provider   garlic 100 mg Tab Take by mouth.   Yes Historical Provider   insulin glargine-lixisenatide (SOLIQUA 100/33) 100 unit-33 mcg/mL InPn Inject 60 Units into the skin. 10/27/20  Yes Historical Provider   Lactobacillus rhamnosus GG (CULTURELLE) 10 billion cell capsule Take 1 capsule by mouth once daily.   Yes Historical Provider   levothyroxine (SYNTHROID) 75 MCG tablet 75 mcg once daily. 8/31/20  Yes Historical Provider   multivitamin (THERAGRAN) per tablet Take 1 tablet by mouth once daily.   Yes Historical Provider   rivaroxaban (XARELTO) 15 mg Tab Take 1 tablet (15 mg total) by mouth daily with dinner or evening meal. 11/3/20  Yes Jarad Greene MD   sodium bicarbonate 650 MG tablet 1,300 mg 2 (two) times a day. 8/31/20  Yes Historical Provider         History  Past Medical History:   Diagnosis Date    Cancer     bladder ca and prostate ca     Diabetes mellitus     Hypertension     Stroke     Thyroid disease      No past surgical history on file.  Social History     Socioeconomic History    Marital status:      Spouse name: Not on file    Number of children: Not on file    Years of education: Not on file    Highest  education level: Not on file   Occupational History    Not on file   Social Needs    Financial resource strain: Not on file    Food insecurity     Worry: Not on file     Inability: Not on file    Transportation needs     Medical: Not on file     Non-medical: Not on file   Tobacco Use    Smoking status: Former Smoker     Types: Cigarettes   Substance and Sexual Activity    Alcohol use: Yes     Frequency: Monthly or less    Drug use: Not on file    Sexual activity: Not on file   Lifestyle    Physical activity     Days per week: Not on file     Minutes per session: Not on file    Stress: Not on file   Relationships    Social connections     Talks on phone: Not on file     Gets together: Not on file     Attends Worship service: Not on file     Active member of club or organization: Not on file     Attends meetings of clubs or organizations: Not on file     Relationship status: Not on file   Other Topics Concern    Not on file   Social History Narrative    Not on file         Allergies  Review of patient's allergies indicates:   Allergen Reactions    Irbesartan      hyperkalemia    Zolpidem Other (See Comments)     Causes pt to hallucinate           Review of Systems   Review of Systems   Constitution: Negative for decreased appetite, fever and weight loss.   HENT: Negative for congestion and nosebleeds.    Eyes: Negative for double vision, vision loss in left eye, vision loss in right eye and visual disturbance.   Cardiovascular: Negative for chest pain, claudication, cyanosis, dyspnea on exertion, irregular heartbeat, leg swelling, near-syncope, orthopnea, palpitations, paroxysmal nocturnal dyspnea and syncope.   Respiratory: Positive for shortness of breath. Negative for cough, hemoptysis, sleep disturbances due to breathing, snoring, sputum production and wheezing.    Endocrine: Negative for cold intolerance and heat intolerance.   Skin: Negative for nail changes and rash.   Musculoskeletal: Negative  for joint pain, muscle cramps, muscle weakness and myalgias.   Gastrointestinal: Negative for change in bowel habit, heartburn, hematemesis, hematochezia, hemorrhoids and melena.   Neurological: Negative for dizziness, focal weakness and headaches.         Physical Exam  Wt Readings from Last 1 Encounters:   12/01/20 118 kg (260 lb 2.3 oz)     BP Readings from Last 3 Encounters:   12/01/20 130/80   11/03/20 118/70   10/22/20 124/81     Pulse Readings from Last 1 Encounters:   12/01/20 60     Body mass index is 36.28 kg/m².    Physical Exam   Constitutional: He is oriented to person, place, and time. He appears well-developed and well-nourished.   HENT:   Head: Atraumatic.   Eyes: EOM are normal. No scleral icterus.   Neck: Neck supple. Normal carotid pulses, no hepatojugular reflux and no JVD present. Carotid bruit is not present. No edema present.   Cardiovascular: Normal rate, regular rhythm, S1 normal, S2 normal, normal heart sounds and intact distal pulses. PMI is not displaced. Exam reveals no friction rub.   No murmur heard.  Pulses:       Carotid pulses are 2+ on the right side and 2+ on the left side.       Radial pulses are 2+ on the right side and 2+ on the left side.        Dorsalis pedis pulses are 2+ on the right side and 2+ on the left side.   Pulmonary/Chest: Effort normal and breath sounds normal. No stridor. No respiratory distress. He has no wheezes. He has no rales. He exhibits no tenderness.   Abdominal: Soft. Normal aorta and bowel sounds are normal.   Musculoskeletal:         General: No edema.   Neurological: He is alert and oriented to person, place, and time.   Skin: Skin is warm and dry. No cyanosis. Nails show no clubbing.   Psychiatric: He has a normal mood and affect. His behavior is normal. Thought content normal.   Vitals reviewed.                Assessment  1.  PAF  -on Xarelto    2. HTN  -stable    3.  HLP  -.  Not on statin      Plan and Discussion  Continue current  medications.  Start atorvastatin 40mg qd.    Follow Up  3 months with CMP and lipids.    Time spent evaluating and treating patient 20 minutes with >50% of this time being face-to-face.     Jarad Greene MD, F.A.C.C, F.S.C.A.I.

## 2021-01-19 ENCOUNTER — OFFICE VISIT (OUTPATIENT)
Dept: CARDIOLOGY | Facility: CLINIC | Age: 66
End: 2021-01-19
Payer: MEDICARE

## 2021-01-19 VITALS
OXYGEN SATURATION: 98 % | DIASTOLIC BLOOD PRESSURE: 74 MMHG | HEIGHT: 71 IN | WEIGHT: 255.75 LBS | HEART RATE: 75 BPM | BODY MASS INDEX: 35.81 KG/M2 | SYSTOLIC BLOOD PRESSURE: 126 MMHG

## 2021-01-19 DIAGNOSIS — I63.9 CEREBROVASCULAR ACCIDENT (CVA), UNSPECIFIED MECHANISM: ICD-10-CM

## 2021-01-19 DIAGNOSIS — E78.00 PURE HYPERCHOLESTEROLEMIA: ICD-10-CM

## 2021-01-19 DIAGNOSIS — I10 ESSENTIAL (PRIMARY) HYPERTENSION: ICD-10-CM

## 2021-01-19 DIAGNOSIS — E78.00 PURE HYPERCHOLESTEROLEMIA: Primary | ICD-10-CM

## 2021-01-19 DIAGNOSIS — I25.10 ATHEROSCLEROSIS OF NATIVE CORONARY ARTERY OF NATIVE HEART WITHOUT ANGINA PECTORIS: ICD-10-CM

## 2021-01-19 DIAGNOSIS — I63.9 CEREBROVASCULAR ACCIDENT (CVA), UNSPECIFIED MECHANISM: Primary | ICD-10-CM

## 2021-01-19 DIAGNOSIS — I48.0 PAF (PAROXYSMAL ATRIAL FIBRILLATION): ICD-10-CM

## 2021-01-19 PROCEDURE — 99214 OFFICE O/P EST MOD 30 MIN: CPT | Mod: S$PBB,,, | Performed by: INTERNAL MEDICINE

## 2021-01-19 PROCEDURE — 99999 PR PBB SHADOW E&M-EST. PATIENT-LVL III: ICD-10-PCS | Mod: PBBFAC,,, | Performed by: INTERNAL MEDICINE

## 2021-01-19 PROCEDURE — 99214 PR OFFICE/OUTPT VISIT, EST, LEVL IV, 30-39 MIN: ICD-10-PCS | Mod: S$PBB,,, | Performed by: INTERNAL MEDICINE

## 2021-01-19 PROCEDURE — 99213 OFFICE O/P EST LOW 20 MIN: CPT | Mod: PBBFAC,PN | Performed by: INTERNAL MEDICINE

## 2021-01-19 PROCEDURE — 99999 PR PBB SHADOW E&M-EST. PATIENT-LVL III: CPT | Mod: PBBFAC,,, | Performed by: INTERNAL MEDICINE

## 2021-01-19 RX ORDER — LEVOTHYROXINE SODIUM 50 UG/1
50 TABLET ORAL DAILY
COMMUNITY
Start: 2018-03-31 | End: 2021-08-17

## 2021-01-22 LAB
CHOLEST SERPL-MCNC: 192 MG/DL
CHOLEST/HDLC SERPL: 3.6 (CALC)
HDLC SERPL-MCNC: 53 MG/DL
LDLC SERPL CALC-MCNC: 119 MG/DL (CALC)
NONHDLC SERPL-MCNC: 139 MG/DL (CALC)
TRIGL SERPL-MCNC: 95 MG/DL

## 2021-01-29 ENCOUNTER — TELEPHONE (OUTPATIENT)
Dept: CARDIOLOGY | Facility: CLINIC | Age: 66
End: 2021-01-29

## 2021-01-29 DIAGNOSIS — E78.00 PURE HYPERCHOLESTEROLEMIA: Primary | ICD-10-CM

## 2021-04-16 LAB
CHOLEST SERPL-MCNC: 182 MG/DL
CHOLEST/HDLC SERPL: 3.7 (CALC)
HDLC SERPL-MCNC: 49 MG/DL
LDLC SERPL CALC-MCNC: 111 MG/DL (CALC)
NONHDLC SERPL-MCNC: 133 MG/DL (CALC)
TRIGL SERPL-MCNC: 114 MG/DL

## 2021-04-20 ENCOUNTER — OFFICE VISIT (OUTPATIENT)
Dept: CARDIOLOGY | Facility: CLINIC | Age: 66
End: 2021-04-20
Payer: MEDICARE

## 2021-04-20 VITALS
SYSTOLIC BLOOD PRESSURE: 106 MMHG | WEIGHT: 236.56 LBS | DIASTOLIC BLOOD PRESSURE: 70 MMHG | HEIGHT: 71 IN | OXYGEN SATURATION: 97 % | HEART RATE: 65 BPM | BODY MASS INDEX: 33.12 KG/M2

## 2021-04-20 DIAGNOSIS — E78.00 PURE HYPERCHOLESTEROLEMIA: ICD-10-CM

## 2021-04-20 DIAGNOSIS — I10 ESSENTIAL (PRIMARY) HYPERTENSION: ICD-10-CM

## 2021-04-20 DIAGNOSIS — I48.0 PAF (PAROXYSMAL ATRIAL FIBRILLATION): Primary | ICD-10-CM

## 2021-04-20 DIAGNOSIS — I25.10 ATHEROSCLEROSIS OF NATIVE CORONARY ARTERY OF NATIVE HEART WITHOUT ANGINA PECTORIS: ICD-10-CM

## 2021-04-20 PROCEDURE — 99999 PR PBB SHADOW E&M-EST. PATIENT-LVL III: ICD-10-PCS | Mod: PBBFAC,,, | Performed by: INTERNAL MEDICINE

## 2021-04-20 PROCEDURE — 99214 PR OFFICE/OUTPT VISIT, EST, LEVL IV, 30-39 MIN: ICD-10-PCS | Mod: S$PBB,,, | Performed by: INTERNAL MEDICINE

## 2021-04-20 PROCEDURE — 99999 PR PBB SHADOW E&M-EST. PATIENT-LVL III: CPT | Mod: PBBFAC,,, | Performed by: INTERNAL MEDICINE

## 2021-04-20 PROCEDURE — 99213 OFFICE O/P EST LOW 20 MIN: CPT | Mod: PBBFAC,PN | Performed by: INTERNAL MEDICINE

## 2021-04-20 PROCEDURE — 99214 OFFICE O/P EST MOD 30 MIN: CPT | Mod: S$PBB,,, | Performed by: INTERNAL MEDICINE

## 2021-04-20 RX ORDER — DAPAGLIFLOZIN 5 MG/1
5 TABLET, FILM COATED ORAL DAILY
COMMUNITY
Start: 2021-04-12 | End: 2022-04-12

## 2021-04-20 RX ORDER — ALLOPURINOL 100 MG/1
100 TABLET ORAL DAILY
COMMUNITY
Start: 2021-03-23 | End: 2021-08-17

## 2021-08-17 ENCOUNTER — OFFICE VISIT (OUTPATIENT)
Dept: CARDIOLOGY | Facility: CLINIC | Age: 66
End: 2021-08-17
Payer: MEDICARE

## 2021-08-17 VITALS
OXYGEN SATURATION: 97 % | DIASTOLIC BLOOD PRESSURE: 100 MMHG | BODY MASS INDEX: 30.92 KG/M2 | HEART RATE: 79 BPM | HEIGHT: 71 IN | WEIGHT: 220.88 LBS | SYSTOLIC BLOOD PRESSURE: 156 MMHG

## 2021-08-17 DIAGNOSIS — I10 ESSENTIAL (PRIMARY) HYPERTENSION: ICD-10-CM

## 2021-08-17 DIAGNOSIS — I25.10 ATHEROSCLEROSIS OF NATIVE CORONARY ARTERY OF NATIVE HEART WITHOUT ANGINA PECTORIS: ICD-10-CM

## 2021-08-17 DIAGNOSIS — I48.0 PAF (PAROXYSMAL ATRIAL FIBRILLATION): Primary | ICD-10-CM

## 2021-08-17 PROCEDURE — 99214 PR OFFICE/OUTPT VISIT, EST, LEVL IV, 30-39 MIN: ICD-10-PCS | Mod: S$PBB,,, | Performed by: INTERNAL MEDICINE

## 2021-08-17 PROCEDURE — 99999 PR PBB SHADOW E&M-EST. PATIENT-LVL III: CPT | Mod: PBBFAC,,, | Performed by: INTERNAL MEDICINE

## 2021-08-17 PROCEDURE — 99214 OFFICE O/P EST MOD 30 MIN: CPT | Mod: S$PBB,,, | Performed by: INTERNAL MEDICINE

## 2021-08-17 PROCEDURE — 99213 OFFICE O/P EST LOW 20 MIN: CPT | Mod: PBBFAC,PN | Performed by: INTERNAL MEDICINE

## 2021-08-17 PROCEDURE — 99999 PR PBB SHADOW E&M-EST. PATIENT-LVL III: ICD-10-PCS | Mod: PBBFAC,,, | Performed by: INTERNAL MEDICINE

## 2021-08-17 RX ORDER — ROSUVASTATIN CALCIUM 40 MG/1
40 TABLET, COATED ORAL DAILY
COMMUNITY
Start: 2021-08-12 | End: 2022-08-02

## 2021-08-17 RX ORDER — HYDRALAZINE HYDROCHLORIDE 50 MG/1
50 TABLET, FILM COATED ORAL 3 TIMES DAILY
COMMUNITY
Start: 2021-08-13 | End: 2023-10-31

## 2021-08-17 RX ORDER — LEVOTHYROXINE SODIUM 75 UG/1
75 TABLET ORAL DAILY
COMMUNITY
Start: 2021-08-12

## 2022-02-08 ENCOUNTER — TELEPHONE (OUTPATIENT)
Dept: CARDIOLOGY | Facility: CLINIC | Age: 67
End: 2022-02-08
Payer: MEDICARE

## 2022-02-08 NOTE — TELEPHONE ENCOUNTER
----- Message from Kim Altamirano sent at 2/8/2022 11:27 AM CST -----  Regarding: questions  Name of Who is Calling: Pat           What is the request in detail: Patient is requesting a call back to find out if he need blood work done before his appointment.           Can the clinic reply by [facility]: No           What Number to Call Back if not in [facility]: [phone]

## 2022-02-15 ENCOUNTER — OFFICE VISIT (OUTPATIENT)
Dept: CARDIOLOGY | Facility: CLINIC | Age: 67
End: 2022-02-15
Payer: MEDICARE

## 2022-02-15 VITALS
SYSTOLIC BLOOD PRESSURE: 144 MMHG | BODY MASS INDEX: 31.67 KG/M2 | HEIGHT: 71 IN | OXYGEN SATURATION: 99 % | HEART RATE: 79 BPM | DIASTOLIC BLOOD PRESSURE: 82 MMHG | WEIGHT: 226.19 LBS

## 2022-02-15 DIAGNOSIS — I10 ESSENTIAL (PRIMARY) HYPERTENSION: ICD-10-CM

## 2022-02-15 DIAGNOSIS — I48.0 PAF (PAROXYSMAL ATRIAL FIBRILLATION): Primary | ICD-10-CM

## 2022-02-15 DIAGNOSIS — I25.10 ATHEROSCLEROSIS OF NATIVE CORONARY ARTERY OF NATIVE HEART WITHOUT ANGINA PECTORIS: ICD-10-CM

## 2022-02-15 PROCEDURE — 93010 ELECTROCARDIOGRAM REPORT: CPT | Mod: S$PBB,,, | Performed by: INTERNAL MEDICINE

## 2022-02-15 PROCEDURE — 93005 ELECTROCARDIOGRAM TRACING: CPT | Mod: PBBFAC,PN | Performed by: INTERNAL MEDICINE

## 2022-02-15 PROCEDURE — 99999 PR PBB SHADOW E&M-EST. PATIENT-LVL III: CPT | Mod: PBBFAC,,, | Performed by: INTERNAL MEDICINE

## 2022-02-15 PROCEDURE — 99999 PR PBB SHADOW E&M-EST. PATIENT-LVL III: ICD-10-PCS | Mod: PBBFAC,,, | Performed by: INTERNAL MEDICINE

## 2022-02-15 PROCEDURE — 99213 OFFICE O/P EST LOW 20 MIN: CPT | Mod: PBBFAC,PN | Performed by: INTERNAL MEDICINE

## 2022-02-15 PROCEDURE — 99214 PR OFFICE/OUTPT VISIT, EST, LEVL IV, 30-39 MIN: ICD-10-PCS | Mod: S$PBB,25,, | Performed by: INTERNAL MEDICINE

## 2022-02-15 PROCEDURE — 99214 OFFICE O/P EST MOD 30 MIN: CPT | Mod: S$PBB,25,, | Performed by: INTERNAL MEDICINE

## 2022-02-15 PROCEDURE — 93010 EKG 12-LEAD: ICD-10-PCS | Mod: S$PBB,,, | Performed by: INTERNAL MEDICINE

## 2022-02-15 RX ORDER — EZETIMIBE 10 MG/1
10 TABLET ORAL
COMMUNITY
Start: 2021-12-07 | End: 2022-08-02

## 2022-02-15 NOTE — PROGRESS NOTES
Cardiology    2/15/2022  9:30 AM    Problem list  Patient Active Problem List   Diagnosis    Atherosclerotic heart disease of native coronary artery without angina pectoris    CVA (cerebral vascular accident)    Essential (primary) hypertension    Pure hypercholesterolemia    Renal insufficiency    PAF (paroxysmal atrial fibrillation)       CC:  No complaints    HPI:  Is doing well.  He denies any chest pain shortness of breath.  He has occasional palpitation which she states last for a few seconds but it is different than when he has his atrial fibrillation.  He came in a a little weight during the holidays but lost it again.  His weight is the same today as it was 6 months ago.    Medications  Current Outpatient Medications   Medication Sig Dispense Refill    cholecalciferol, vitamin D3, 25 mcg (1,000 unit) Chew once daily.      cyanocobalamin (VITAMIN B-12) 1000 MCG tablet Take 100 mcg by mouth.      dapagliflozin (FARXIGA) 5 mg Tab tablet Take 5 mg by mouth once daily.      diltiaZEM (CARDIZEM CD) 240 MG 24 hr capsule Take 240 mg by mouth once daily.      ezetimibe (ZETIA) 10 mg tablet Take 10 mg by mouth once daily.      ferrous sulfate (FEOSOL) 325 mg (65 mg iron) Tab tablet Take 325 mg by mouth daily with breakfast.      furosemide (LASIX) 20 MG tablet Take 20 mg by mouth daily as needed. 1/2 tab twice/week      garlic 100 mg Tab Take by mouth.      hydrALAZINE (APRESOLINE) 50 MG tablet Take 50 mg by mouth 3 (three) times daily.      levothyroxine (SYNTHROID) 75 MCG tablet Take 75 mcg by mouth once daily.      multivitamin (THERAGRAN) per tablet Take 1 tablet by mouth once daily.      rosuvastatin (CRESTOR) 40 MG Tab Take 40 mg by mouth once daily.      sodium bicarbonate 650 MG tablet 650 mg 2 (two) times a day.        No current facility-administered medications for this visit.      Prior to Admission medications    Medication Sig Start Date End Date Taking? Authorizing Provider    cholecalciferol, vitamin D3, 25 mcg (1,000 unit) Chew once daily. 10/25/16   Historical Provider   cyanocobalamin (VITAMIN B-12) 1000 MCG tablet Take 100 mcg by mouth.    Historical Provider   dapagliflozin (FARXIGA) 5 mg Tab tablet Take 5 mg by mouth once daily. 4/12/21 4/12/22  Historical Provider   diltiaZEM (CARDIZEM CD) 240 MG 24 hr capsule Take 240 mg by mouth once daily. 3/18/20   Historical Provider   ferrous sulfate (FEOSOL) 325 mg (65 mg iron) Tab tablet Take 325 mg by mouth daily with breakfast.    Historical Provider   furosemide (LASIX) 20 MG tablet Take 20 mg by mouth daily as needed. 1/2 tab twice/week    Historical Provider   garlic 100 mg Tab Take by mouth.    Historical Provider   hydrALAZINE (APRESOLINE) 50 MG tablet Take 50 mg by mouth 3 (three) times daily. 8/13/21   Historical Provider   levothyroxine (SYNTHROID) 75 MCG tablet Take 75 mcg by mouth once daily. 8/12/21   Historical Provider   multivitamin (THERAGRAN) per tablet Take 1 tablet by mouth once daily.    Historical Provider   rosuvastatin (CRESTOR) 40 MG Tab Take 40 mg by mouth once daily. 8/12/21   Historical Provider   sodium bicarbonate 650 MG tablet 650 mg 2 (two) times a day.  8/31/20   Historical Provider         History  Past Medical History:   Diagnosis Date    Cancer     bladder ca and prostate ca     Diabetes mellitus     Hypertension     Stroke     Thyroid disease      No past surgical history on file.  Social History     Socioeconomic History    Marital status:    Tobacco Use    Smoking status: Former Smoker     Types: Cigarettes    Smokeless tobacco: Never Used   Substance and Sexual Activity    Alcohol use: Yes         Allergies  Review of patient's allergies indicates:   Allergen Reactions    Irbesartan      hyperkalemia    Zolpidem Other (See Comments)     Causes pt to hallucinate           Review of Systems   Review of Systems   Constitutional: Negative for decreased appetite, fever and weight loss.    HENT: Negative for congestion and nosebleeds.    Eyes: Negative for double vision, vision loss in left eye, vision loss in right eye and visual disturbance.   Cardiovascular: Negative for chest pain, claudication, cyanosis, dyspnea on exertion, irregular heartbeat, leg swelling, near-syncope, orthopnea, palpitations, paroxysmal nocturnal dyspnea and syncope.   Respiratory: Negative for cough, hemoptysis, shortness of breath, sleep disturbances due to breathing, snoring, sputum production and wheezing.    Endocrine: Negative for cold intolerance and heat intolerance.   Skin: Negative for nail changes and rash.   Musculoskeletal: Negative for joint pain, muscle cramps, muscle weakness and myalgias.   Gastrointestinal: Negative for change in bowel habit, heartburn, hematemesis, hematochezia, hemorrhoids and melena.   Neurological: Negative for dizziness, focal weakness and headaches.         Physical Exam  Wt Readings from Last 1 Encounters:   02/15/22 102.6 kg (226 lb 3.1 oz)     BP Readings from Last 3 Encounters:   02/15/22 (!) 144/82   08/17/21 (!) 156/100   04/20/21 106/70     Pulse Readings from Last 1 Encounters:   02/15/22 79     Body mass index is 31.55 kg/m².    Physical Exam  Vitals reviewed.   Constitutional:       Appearance: He is well-developed.   HENT:      Head: Atraumatic.   Eyes:      General: No scleral icterus.  Neck:      Vascular: Normal carotid pulses. No carotid bruit, hepatojugular reflux or JVD.   Cardiovascular:      Rate and Rhythm: Normal rate and regular rhythm.      Chest Wall: PMI is not displaced.      Pulses: Intact distal pulses.           Carotid pulses are 2+ on the right side and 2+ on the left side.       Radial pulses are 2+ on the right side and 2+ on the left side.        Dorsalis pedis pulses are 2+ on the right side and 2+ on the left side.      Heart sounds: Normal heart sounds, S1 normal and S2 normal. No murmur heard.  No friction rub.   Pulmonary:      Effort:  Pulmonary effort is normal. No respiratory distress.      Breath sounds: Normal breath sounds. No stridor. No wheezing or rales.   Chest:      Chest wall: No tenderness.   Abdominal:      General: Bowel sounds are normal.      Palpations: Abdomen is soft.   Musculoskeletal:      Cervical back: Neck supple. No edema.   Skin:     General: Skin is warm and dry.      Nails: There is no clubbing.   Neurological:      Mental Status: He is alert and oriented to person, place, and time.   Psychiatric:         Behavior: Behavior normal.         Thought Content: Thought content normal.                   Assessment  1. PAF (paroxysmal atrial fibrillation)  In sinus rhythm today    2. Essential (primary) hypertension  Control on medications    3. Atherosclerosis of native coronary artery of native heart without angina pectoris  Stable        Plan and Discussion  Continue current medications.  Instructed him to let us know if his palpitations occur longer and more frequent and will proceed with a Holter monitor.    Follow Up  Six months      Jarad Greene MD, F.A.C.C, F.S.C.A.I.

## 2022-08-02 ENCOUNTER — OFFICE VISIT (OUTPATIENT)
Dept: CARDIOLOGY | Facility: CLINIC | Age: 67
End: 2022-08-02
Payer: MEDICARE

## 2022-08-02 VITALS
WEIGHT: 234.81 LBS | HEART RATE: 84 BPM | SYSTOLIC BLOOD PRESSURE: 132 MMHG | HEIGHT: 71 IN | BODY MASS INDEX: 32.87 KG/M2 | OXYGEN SATURATION: 98 % | DIASTOLIC BLOOD PRESSURE: 84 MMHG

## 2022-08-02 DIAGNOSIS — E78.00 PURE HYPERCHOLESTEROLEMIA: ICD-10-CM

## 2022-08-02 DIAGNOSIS — I48.0 PAF (PAROXYSMAL ATRIAL FIBRILLATION): ICD-10-CM

## 2022-08-02 DIAGNOSIS — I10 ESSENTIAL (PRIMARY) HYPERTENSION: ICD-10-CM

## 2022-08-02 DIAGNOSIS — I63.9 CEREBROVASCULAR ACCIDENT (CVA), UNSPECIFIED MECHANISM: Primary | ICD-10-CM

## 2022-08-02 DIAGNOSIS — I25.10 ATHEROSCLEROSIS OF NATIVE CORONARY ARTERY OF NATIVE HEART WITHOUT ANGINA PECTORIS: ICD-10-CM

## 2022-08-02 PROCEDURE — 99214 PR OFFICE/OUTPT VISIT, EST, LEVL IV, 30-39 MIN: ICD-10-PCS | Mod: S$PBB,,, | Performed by: INTERNAL MEDICINE

## 2022-08-02 PROCEDURE — 99999 PR PBB SHADOW E&M-EST. PATIENT-LVL III: ICD-10-PCS | Mod: PBBFAC,,, | Performed by: INTERNAL MEDICINE

## 2022-08-02 PROCEDURE — 99214 OFFICE O/P EST MOD 30 MIN: CPT | Mod: S$PBB,,, | Performed by: INTERNAL MEDICINE

## 2022-08-02 PROCEDURE — 93010 ELECTROCARDIOGRAM REPORT: CPT | Mod: S$PBB,,, | Performed by: INTERNAL MEDICINE

## 2022-08-02 PROCEDURE — 99213 OFFICE O/P EST LOW 20 MIN: CPT | Mod: PBBFAC,PN | Performed by: INTERNAL MEDICINE

## 2022-08-02 PROCEDURE — 93010 EKG 12-LEAD: ICD-10-PCS | Mod: S$PBB,,, | Performed by: INTERNAL MEDICINE

## 2022-08-02 PROCEDURE — 93005 ELECTROCARDIOGRAM TRACING: CPT | Mod: PBBFAC,PN | Performed by: INTERNAL MEDICINE

## 2022-08-02 PROCEDURE — 99999 PR PBB SHADOW E&M-EST. PATIENT-LVL III: CPT | Mod: PBBFAC,,, | Performed by: INTERNAL MEDICINE

## 2022-08-02 RX ORDER — EVOLOCUMAB 140 MG/ML
140 INJECTION, SOLUTION SUBCUTANEOUS
COMMUNITY
Start: 2022-06-23 | End: 2024-01-30 | Stop reason: SDUPTHER

## 2022-08-02 RX ORDER — ROSUVASTATIN CALCIUM 40 MG/1
10 TABLET, COATED ORAL NIGHTLY
COMMUNITY

## 2022-08-02 NOTE — PROGRESS NOTES
Cardiology    8/2/2022  10:06 AM    Problem list  Patient Active Problem List   Diagnosis    Atherosclerotic heart disease of native coronary artery without angina pectoris    CVA (cerebral vascular accident)    Essential (primary) hypertension    Pure hypercholesterolemia    Renal insufficiency    PAF (paroxysmal atrial fibrillation)       CC:  No complaints    HPI:  He is doing well.  He denies any angina, shortness of breath, dyspnea exertion, TIA or CVA.  He exercises on a stationary bike 40 minutes each time.  His nephrologist started Repatha and his last cholesterol done in June showed improvement in his cholesterol profile.    Medications  Current Outpatient Medications   Medication Sig Dispense Refill    cholecalciferol, vitamin D3, 25 mcg (1,000 unit) Chew once daily.      cyanocobalamin (VITAMIN B-12) 1000 MCG tablet Take 100 mcg by mouth.      diltiaZEM (CARDIZEM CD) 240 MG 24 hr capsule Take 240 mg by mouth once daily.      ferrous sulfate (FEOSOL) 325 mg (65 mg iron) Tab tablet Take 325 mg by mouth daily with breakfast.      furosemide (LASIX) 20 MG tablet Take 20 mg by mouth daily as needed. 1 tablet 3x/ week      garlic 100 mg Tab Take by mouth.      hydrALAZINE (APRESOLINE) 50 MG tablet Take 50 mg by mouth 3 (three) times daily. 50mg qam , at noon and 100mg at bedtime      levothyroxine (SYNTHROID) 75 MCG tablet Take 75 mcg by mouth once daily.      multivitamin (THERAGRAN) per tablet Take 1 tablet by mouth once daily.      REPATHA SYRINGE 140 mg/mL Syrg Inject 140 mg into the skin.      rosuvastatin (CRESTOR) 40 MG Tab Take 10 mg by mouth every evening.      sodium bicarbonate 650 MG tablet 650 mg 3 (three) times daily.       No current facility-administered medications for this visit.      Prior to Admission medications    Medication Sig Start Date End Date Taking? Authorizing Provider   cholecalciferol, vitamin D3, 25 mcg (1,000 unit) Chew once daily. 10/25/16  Yes  Historical Provider   cyanocobalamin (VITAMIN B-12) 1000 MCG tablet Take 100 mcg by mouth.   Yes Historical Provider   diltiaZEM (CARDIZEM CD) 240 MG 24 hr capsule Take 240 mg by mouth once daily. 3/18/20  Yes Historical Provider   ferrous sulfate (FEOSOL) 325 mg (65 mg iron) Tab tablet Take 325 mg by mouth daily with breakfast.   Yes Historical Provider   furosemide (LASIX) 20 MG tablet Take 20 mg by mouth daily as needed. 1 tablet 3x/ week   Yes Historical Provider   garlic 100 mg Tab Take by mouth.   Yes Historical Provider   hydrALAZINE (APRESOLINE) 50 MG tablet Take 50 mg by mouth 3 (three) times daily. 50mg qam , at noon and 100mg at bedtime 8/13/21  Yes Historical Provider   levothyroxine (SYNTHROID) 75 MCG tablet Take 75 mcg by mouth once daily. 8/12/21  Yes Historical Provider   multivitamin (THERAGRAN) per tablet Take 1 tablet by mouth once daily.   Yes Historical Provider   REPATHA SYRINGE 140 mg/mL Syrg Inject 140 mg into the skin. 6/23/22  Yes Historical Provider   rosuvastatin (CRESTOR) 40 MG Tab Take 10 mg by mouth every evening.   Yes Historical Provider   sodium bicarbonate 650 MG tablet 650 mg 3 (three) times daily. 8/31/20  Yes Historical Provider   ezetimibe (ZETIA) 10 mg tablet Take 10 mg by mouth. 12/7/21 8/2/22  Historical Provider   rosuvastatin (CRESTOR) 40 MG Tab Take 40 mg by mouth once daily. 8/12/21 8/2/22  Historical Provider         History  Past Medical History:   Diagnosis Date    Cancer     bladder ca and prostate ca     Diabetes mellitus     Hypertension     Stroke     Thyroid disease      No past surgical history on file.  Social History     Socioeconomic History    Marital status:    Tobacco Use    Smoking status: Former Smoker     Types: Cigarettes    Smokeless tobacco: Never Used   Substance and Sexual Activity    Alcohol use: Yes         Allergies  Review of patient's allergies indicates:   Allergen Reactions    Irbesartan      hyperkalemia    Zolpidem Other  (See Comments)     Causes pt to hallucinate           Review of Systems   Review of Systems   Constitutional: Negative for decreased appetite, fever and weight loss.   HENT: Negative for congestion and nosebleeds.    Eyes: Negative for double vision, vision loss in left eye, vision loss in right eye and visual disturbance.   Cardiovascular: Negative for chest pain, claudication, cyanosis, dyspnea on exertion, irregular heartbeat, leg swelling, near-syncope, orthopnea, palpitations, paroxysmal nocturnal dyspnea and syncope.   Respiratory: Negative for cough, hemoptysis, shortness of breath, sleep disturbances due to breathing, snoring, sputum production and wheezing.    Endocrine: Negative for cold intolerance and heat intolerance.   Skin: Negative for nail changes and rash.   Musculoskeletal: Negative for joint pain, muscle cramps, muscle weakness and myalgias.   Gastrointestinal: Negative for change in bowel habit, heartburn, hematemesis, hematochezia, hemorrhoids and melena.   Neurological: Negative for dizziness, focal weakness and headaches.         Physical Exam  Wt Readings from Last 1 Encounters:   08/02/22 106.5 kg (234 lb 12.6 oz)     BP Readings from Last 3 Encounters:   08/02/22 132/84   02/15/22 (!) 144/82   08/17/21 (!) 156/100     Pulse Readings from Last 1 Encounters:   08/02/22 84     Body mass index is 32.75 kg/m².    Physical Exam  Vitals reviewed.   Constitutional:       Appearance: He is well-developed.   HENT:      Head: Atraumatic.   Eyes:      General: No scleral icterus.  Neck:      Vascular: Normal carotid pulses. No carotid bruit, hepatojugular reflux or JVD.   Cardiovascular:      Rate and Rhythm: Normal rate and regular rhythm.      Chest Wall: PMI is not displaced.      Pulses: Intact distal pulses.           Carotid pulses are 2+ on the right side and 2+ on the left side.       Radial pulses are 2+ on the right side and 2+ on the left side.        Dorsalis pedis pulses are 2+ on the  right side and 2+ on the left side.      Heart sounds: Normal heart sounds, S1 normal and S2 normal. No murmur heard.    No friction rub.   Pulmonary:      Effort: Pulmonary effort is normal. No respiratory distress.      Breath sounds: Normal breath sounds. No stridor. No wheezing or rales.   Chest:      Chest wall: No tenderness.   Abdominal:      General: Bowel sounds are normal.      Palpations: Abdomen is soft.   Musculoskeletal:      Cervical back: Neck supple. No edema.   Skin:     General: Skin is warm and dry.      Nails: There is no clubbing.   Neurological:      Mental Status: He is alert and oriented to person, place, and time.   Psychiatric:         Behavior: Behavior normal.         Thought Content: Thought content normal.         EKG sinus rhythm rate of 80    Assessment  1. Cerebrovascular accident (CVA), unspecified mechanism  Stable    2. PAF (paroxysmal atrial fibrillation)  Sinus    3. Essential (primary) hypertension  Controlled    4. Atherosclerosis of native coronary artery of native heart without angina pectoris  Stable    5. Pure hypercholesterolemia  Stable        Plan and Discussion  Continue current medication.  Continue exercise regimen.    Follow Up  6 months      Jarad Greene MD, F.A.C.C, F.S.C.A.I.

## 2023-02-01 ENCOUNTER — OFFICE VISIT (OUTPATIENT)
Dept: CARDIOLOGY | Facility: CLINIC | Age: 68
End: 2023-02-01
Payer: MEDICARE

## 2023-02-01 VITALS
SYSTOLIC BLOOD PRESSURE: 134 MMHG | HEART RATE: 75 BPM | BODY MASS INDEX: 33.78 KG/M2 | WEIGHT: 241.31 LBS | OXYGEN SATURATION: 98 % | DIASTOLIC BLOOD PRESSURE: 86 MMHG | HEIGHT: 71 IN

## 2023-02-01 DIAGNOSIS — I48.0 PAF (PAROXYSMAL ATRIAL FIBRILLATION): ICD-10-CM

## 2023-02-01 DIAGNOSIS — I45.10 RBBB: ICD-10-CM

## 2023-02-01 DIAGNOSIS — I25.10 ATHEROSCLEROSIS OF NATIVE CORONARY ARTERY OF NATIVE HEART WITHOUT ANGINA PECTORIS: ICD-10-CM

## 2023-02-01 DIAGNOSIS — I10 ESSENTIAL (PRIMARY) HYPERTENSION: Primary | ICD-10-CM

## 2023-02-01 PROCEDURE — 93010 ELECTROCARDIOGRAM REPORT: CPT | Mod: S$PBB,,, | Performed by: INTERNAL MEDICINE

## 2023-02-01 PROCEDURE — 99213 OFFICE O/P EST LOW 20 MIN: CPT | Mod: PBBFAC,PN | Performed by: INTERNAL MEDICINE

## 2023-02-01 PROCEDURE — 99214 OFFICE O/P EST MOD 30 MIN: CPT | Mod: S$PBB,,, | Performed by: INTERNAL MEDICINE

## 2023-02-01 PROCEDURE — 99999 PR PBB SHADOW E&M-EST. PATIENT-LVL III: ICD-10-PCS | Mod: PBBFAC,,, | Performed by: INTERNAL MEDICINE

## 2023-02-01 PROCEDURE — 99214 PR OFFICE/OUTPT VISIT, EST, LEVL IV, 30-39 MIN: ICD-10-PCS | Mod: S$PBB,,, | Performed by: INTERNAL MEDICINE

## 2023-02-01 PROCEDURE — 93010 EKG 12-LEAD: ICD-10-PCS | Mod: S$PBB,,, | Performed by: INTERNAL MEDICINE

## 2023-02-01 PROCEDURE — 93005 ELECTROCARDIOGRAM TRACING: CPT | Mod: PBBFAC,PN | Performed by: INTERNAL MEDICINE

## 2023-02-01 PROCEDURE — 99999 PR PBB SHADOW E&M-EST. PATIENT-LVL III: CPT | Mod: PBBFAC,,, | Performed by: INTERNAL MEDICINE

## 2023-02-01 RX ORDER — CARVEDILOL 3.12 MG/1
3.12 TABLET ORAL 2 TIMES DAILY
COMMUNITY
Start: 2022-12-13

## 2023-02-01 RX ORDER — DAPAGLIFLOZIN 10 MG/1
10 TABLET, FILM COATED ORAL
COMMUNITY
Start: 2023-01-07 | End: 2023-07-14 | Stop reason: CLARIF

## 2023-02-01 NOTE — PROGRESS NOTES
Cardiology    2/1/2023  10:01 AM    Problem list  Patient Active Problem List   Diagnosis    Atherosclerotic heart disease of native coronary artery without angina pectoris    CVA (cerebral vascular accident)    Essential (primary) hypertension    Pure hypercholesterolemia    Renal insufficiency    PAF (paroxysmal atrial fibrillation)    RBBB       CC:  F/u    HPI:  He is doing well.  He denies any chest pain or shortness of breath.  He gained some weight.  He is trying to exercise more.    Medications  Current Outpatient Medications   Medication Sig Dispense Refill    carvediloL (COREG) 3.125 MG tablet Take 3.125 mg by mouth 2 (two) times daily.      cholecalciferol, vitamin D3, 25 mcg (1,000 unit) Chew once daily.      cyanocobalamin (VITAMIN B-12) 1000 MCG tablet Take 100 mcg by mouth.      diltiaZEM (CARDIZEM CD) 240 MG 24 hr capsule Take 240 mg by mouth once daily.      FARXIGA 10 mg tablet Take 10 mg by mouth.      ferrous sulfate (FEOSOL) 325 mg (65 mg iron) Tab tablet Take 325 mg by mouth daily with breakfast.      furosemide (LASIX) 20 MG tablet Take 20 mg by mouth daily as needed. 1 tablet 3x/ week      garlic 100 mg Tab Take by mouth.      hydrALAZINE (APRESOLINE) 50 MG tablet Take 50 mg by mouth 3 (three) times daily. 50mg qam , at noon and 100mg at bedtime      levothyroxine (SYNTHROID) 75 MCG tablet Take 75 mcg by mouth once daily.      multivitamin (THERAGRAN) per tablet Take 1 tablet by mouth once daily.      REPATHA SYRINGE 140 mg/mL Syrg Inject 140 mg into the skin.      rosuvastatin (CRESTOR) 40 MG Tab Take 10 mg by mouth every evening.      sodium bicarbonate 650 MG tablet 650 mg 3 (three) times daily.       No current facility-administered medications for this visit.      Prior to Admission medications    Medication Sig Start Date End Date Taking? Authorizing Provider   carvediloL (COREG) 3.125 MG tablet Take 3.125 mg by mouth 2 (two) times daily. 12/13/22  Yes Historical Provider    cholecalciferol, vitamin D3, 25 mcg (1,000 unit) Chew once daily. 10/25/16  Yes Historical Provider   cyanocobalamin (VITAMIN B-12) 1000 MCG tablet Take 100 mcg by mouth.   Yes Historical Provider   diltiaZEM (CARDIZEM CD) 240 MG 24 hr capsule Take 240 mg by mouth once daily. 3/18/20  Yes Historical Provider   FARXIGA 10 mg tablet Take 10 mg by mouth. 1/7/23  Yes Historical Provider   ferrous sulfate (FEOSOL) 325 mg (65 mg iron) Tab tablet Take 325 mg by mouth daily with breakfast.   Yes Historical Provider   furosemide (LASIX) 20 MG tablet Take 20 mg by mouth daily as needed. 1 tablet 3x/ week   Yes Historical Provider   garlic 100 mg Tab Take by mouth.   Yes Historical Provider   hydrALAZINE (APRESOLINE) 50 MG tablet Take 50 mg by mouth 3 (three) times daily. 50mg qam , at noon and 100mg at bedtime 8/13/21  Yes Historical Provider   levothyroxine (SYNTHROID) 75 MCG tablet Take 75 mcg by mouth once daily. 8/12/21  Yes Historical Provider   multivitamin (THERAGRAN) per tablet Take 1 tablet by mouth once daily.   Yes Historical Provider   REPATHA SYRINGE 140 mg/mL Syrg Inject 140 mg into the skin. 6/23/22  Yes Historical Provider   rosuvastatin (CRESTOR) 40 MG Tab Take 10 mg by mouth every evening.   Yes Historical Provider   sodium bicarbonate 650 MG tablet 650 mg 3 (three) times daily. 8/31/20  Yes Historical Provider         History  Past Medical History:   Diagnosis Date    Cancer     bladder ca and prostate ca     Diabetes mellitus     Hypertension     Stroke     Thyroid disease      No past surgical history on file.  Social History     Socioeconomic History    Marital status:    Tobacco Use    Smoking status: Former     Types: Cigarettes    Smokeless tobacco: Never   Substance and Sexual Activity    Alcohol use: Yes         Allergies  Review of patient's allergies indicates:   Allergen Reactions    Irbesartan      hyperkalemia    Zolpidem Other (See Comments)     Causes pt to hallucinate            Review of Systems   Review of Systems   Constitutional: Negative for decreased appetite, fever and weight loss.   HENT:  Negative for congestion and nosebleeds.    Eyes:  Negative for double vision, vision loss in left eye, vision loss in right eye and visual disturbance.   Cardiovascular:  Negative for chest pain, claudication, cyanosis, dyspnea on exertion, irregular heartbeat, leg swelling, near-syncope, orthopnea, palpitations, paroxysmal nocturnal dyspnea and syncope.   Respiratory:  Negative for cough, hemoptysis, shortness of breath, sleep disturbances due to breathing, snoring, sputum production and wheezing.    Endocrine: Negative for cold intolerance and heat intolerance.   Skin:  Negative for nail changes and rash.   Musculoskeletal:  Negative for joint pain, muscle cramps, muscle weakness and myalgias.   Gastrointestinal:  Negative for change in bowel habit, heartburn, hematemesis, hematochezia, hemorrhoids and melena.   Neurological:  Negative for dizziness, focal weakness and headaches.       Physical Exam  Wt Readings from Last 1 Encounters:   02/01/23 109.5 kg (241 lb 4.7 oz)     BP Readings from Last 3 Encounters:   02/01/23 134/86   08/02/22 132/84   02/15/22 (!) 144/82     Pulse Readings from Last 1 Encounters:   02/01/23 75     Body mass index is 33.65 kg/m².    Physical Exam  Vitals reviewed.   Constitutional:       Appearance: He is well-developed.   HENT:      Head: Atraumatic.   Eyes:      General: No scleral icterus.  Neck:      Vascular: Normal carotid pulses. No carotid bruit, hepatojugular reflux or JVD.   Cardiovascular:      Rate and Rhythm: Normal rate and regular rhythm.      Chest Wall: PMI is not displaced.      Pulses: Intact distal pulses.           Carotid pulses are 2+ on the right side and 2+ on the left side.       Radial pulses are 2+ on the right side and 2+ on the left side.        Dorsalis pedis pulses are 2+ on the right side and 2+ on the left side.      Heart  sounds: Normal heart sounds, S1 normal and S2 normal. No murmur heard.    No friction rub.   Pulmonary:      Effort: Pulmonary effort is normal. No respiratory distress.      Breath sounds: Normal breath sounds. No stridor. No wheezing or rales.   Chest:      Chest wall: No tenderness.   Abdominal:      General: Bowel sounds are normal.      Palpations: Abdomen is soft.   Musculoskeletal:      Cervical back: Neck supple. No edema.   Skin:     General: Skin is warm and dry.      Nails: There is no clubbing.   Neurological:      Mental Status: He is alert and oriented to person, place, and time.   Psychiatric:         Behavior: Behavior normal.         Thought Content: Thought content normal.           Assessment  1. PAF (paroxysmal atrial fibrillation)  stable    2. Essential (primary) hypertension  Well controlled on meds, continue to monitor    3. Atherosclerosis of native coronary artery of native heart without angina pectoris  Stable, no angina.  - EKG 12-lead  - Lipid Panel; Future    4. RBBB  stable        Plan and Discussion  Discussed his EKG shows sinus rhythm rate of 70 with right bundle-branch block. Continue current medications.  Will get lipid profile since his last lipid profile was 8 months ago.    Follow Up  6 months      Jarad Greene MD, F.A.C.C, F.S.C.A.I.        35 minutes were spent in chart review, documentation and review of results, and evaluation, treatment, and counseling of patient on the same day of service.    Disclaimer: This document was created using voice recognition software (Simplify). Although it may be edited, this document may contain errors related to incorrect recognition of the spoken word. Please call the physician if clarification is needed.

## 2023-06-15 ENCOUNTER — TELEPHONE (OUTPATIENT)
Dept: TRANSPLANT | Facility: CLINIC | Age: 68
End: 2023-06-15
Payer: MEDICARE

## 2023-06-15 NOTE — LETTER
Juli 15, 2023    Gosia Baker  [address] [address]  Phone: [phone]  Fax: [phone]    Dear Dr. Gosia Baker:    Patient: Arthur Menjivar  MR Number [id number]  Date of Birth 1955    Thank you for your referral of Arthur Menjivar to our transplant program.      Your patient's information will be screened by our Referral Nurse Coordinators to determine initial medical candidacy and if any further records are required. We will contact you if further information is needed to process the referral.     Once all needed information is received it will be forwarded to our Transplant Financial Coordinators who will obtain insurance authorization. Upon insurance approval, your patient will be contacted by our  to schedule their appointments with the transplant team. When appointments are made you will receive a copy of the appointment letter that your patient will receive.     This process may take two to six weeks to complete.     In the event your patient is not a candidate a copy of our transplant programs opinion including reasons will be returned to you to comply with your yearly review regulations. A copy of that letter will also be sent to the patient.     The following information is needed to process a referral:  Medicare form 2728 for all patients on dialysis.  Dental clearance is required if your patient has primary Medicaid.  Current immunization record.  This information can be faxed to [phone].  Current Dietician evaluation can be faxed to [phone].    Thank you again for your trust in our program and the care of your patient.  If there is anything we can do for you or your staff, please feel free to contact us at [phone].    Sincerely,   [facility]  [address] [place], [place] [id number] [phone]

## 2023-06-15 NOTE — TELEPHONE ENCOUNTER
Contacted patient to review initial intake information. Patient reports the followin. Can you walk up a flight of stairs without getting short of breath or stopping? Yes   2. Can you walk one block without getting short of breath or having to stop? No   3. Do you use oxygen? No   4. Do you use a cane, walker, or wheel chair to assist in mobility? No   5. Have you been hospitalized or had recent surgery in the last 6 months? No    A. Stroke   B. Heart surgery or heart catheterization   C. Broken bone  6. Do you have any cuts, open sores (ulcers), or wounds anywhere on your body? Yes foot wound on left foot size 1.5  7. Do you go to dialysis? No   8. Preferred appointment day? Anyday   9. Caregiver? Wife (Betzy)    Patient is aware the next steps will include completing records and compliance verification. Patient is aware once provider review and insurance authorization is received we will contact patient to schedule initial visit. Patient is aware that initial visit will begin prior to / at 7 am and will conclude at approximately 3 pm on date of appointment. All questions answered at this time.

## 2023-06-16 ENCOUNTER — TELEPHONE (OUTPATIENT)
Dept: CARDIOLOGY | Facility: CLINIC | Age: 68
End: 2023-06-16
Payer: MEDICARE

## 2023-06-16 NOTE — TELEPHONE ENCOUNTER
Spoke to pt wife. Scheduled to see Dr. Arcos on June 26 to schedule AVF creation. Mrs. Menjivar will call the office once the pt is scheduled for surgery to schedule a cardiac clearance appointment.

## 2023-06-16 NOTE — TELEPHONE ENCOUNTER
----- Message from Elizabet Lopez sent at 6/16/2023 11:16 AM CDT -----  Name of Who is Calling:  Zander on behalf of pt  BUZZ CAVAZOS [[id number]]                What is the request in detail: Pt wife is calling because pt may need dialysis and need to apeak with about some thing.Please call back to further assist.                 Can the clinic reply by [facility]: No                What Number to [facility] Back [facility] not [facility]: [phone]

## 2023-06-20 ENCOUNTER — DOCUMENTATION ONLY (OUTPATIENT)
Dept: TRANSPLANT | Facility: CLINIC | Age: 68
End: 2023-06-20
Payer: MEDICARE

## 2023-06-26 ENCOUNTER — OFFICE VISIT (OUTPATIENT)
Dept: SURGERY | Facility: CLINIC | Age: 68
End: 2023-06-26
Attending: SPECIALIST
Payer: MEDICARE

## 2023-06-26 VITALS
OXYGEN SATURATION: 99 % | HEIGHT: 71 IN | DIASTOLIC BLOOD PRESSURE: 82 MMHG | WEIGHT: 220 LBS | BODY MASS INDEX: 30.8 KG/M2 | SYSTOLIC BLOOD PRESSURE: 148 MMHG | HEART RATE: 77 BPM

## 2023-06-26 DIAGNOSIS — N28.9 RENAL INSUFFICIENCY: Primary | ICD-10-CM

## 2023-06-26 PROCEDURE — 99204 PR OFFICE/OUTPT VISIT, NEW, LEVL IV, 45-59 MIN: ICD-10-PCS | Mod: S$PBB,,, | Performed by: SPECIALIST

## 2023-06-26 PROCEDURE — 99999 PR PBB SHADOW E&M-EST. PATIENT-LVL III: ICD-10-PCS | Mod: PBBFAC,,, | Performed by: SPECIALIST

## 2023-06-26 PROCEDURE — 99213 OFFICE O/P EST LOW 20 MIN: CPT | Mod: PBBFAC | Performed by: SPECIALIST

## 2023-06-26 PROCEDURE — 99999 PR PBB SHADOW E&M-EST. PATIENT-LVL III: CPT | Mod: PBBFAC,,, | Performed by: SPECIALIST

## 2023-06-26 PROCEDURE — 99204 OFFICE O/P NEW MOD 45 MIN: CPT | Mod: S$PBB,,, | Performed by: SPECIALIST

## 2023-06-26 NOTE — PROGRESS NOTES
History & Physical    SUBJECTIVE:     History of Present Illness:  Patient is a 67 y.o. male presents with end-stage renal disease in need of permanent access.  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  Vascular intact.  History of diabetes mellitus and hypertension.  History of right-sided PowerPort x2.  Carcinoma of the bladder and prostate.  Right bundle branch block, atherosclerotic cardiovascular disease.  Paroxysmal atrial fibrillation    Chief Complaint   Patient presents with    Other     access       Review of patient's allergies indicates:   Allergen Reactions    Irbesartan      hyperkalemia    Zolpidem Other (See Comments)     Causes pt to hallucinate         Current Outpatient Medications   Medication Sig Dispense Refill    carvediloL (COREG) 3.125 MG tablet Take 3.125 mg by mouth 2 (two) times daily.      cholecalciferol, vitamin D3, 25 mcg (1,000 unit) Chew once daily.      cyanocobalamin (VITAMIN B-12) 1000 MCG tablet Take 100 mcg by mouth.      diltiaZEM (CARDIZEM CD) 240 MG 24 hr capsule Take 240 mg by mouth once daily.      FARXIGA 10 mg tablet Take 10 mg by mouth.      ferrous sulfate (FEOSOL) 325 mg (65 mg iron) Tab tablet Take 325 mg by mouth daily with breakfast.      furosemide (LASIX) 20 MG tablet Take 20 mg by mouth daily as needed. 1 tablet 3x/ week      garlic 100 mg Tab Take by mouth.      hydrALAZINE (APRESOLINE) 50 MG tablet Take 50 mg by mouth 3 (three) times daily. 50mg qam , at noon and 100mg at bedtime      levothyroxine (SYNTHROID) 75 MCG tablet Take 75 mcg by mouth once daily.      multivitamin (THERAGRAN) per tablet Take 1 tablet by mouth once daily.      REPATHA SYRINGE 140 mg/mL Syrg Inject 140 mg into the skin.      rosuvastatin (CRESTOR) 40 MG Tab Take 10 mg by mouth every evening.      sodium bicarbonate 650 MG tablet 650 mg 3 (three) times daily.       No current facility-administered medications for this visit.       Past Medical History:  "  Diagnosis Date    Cancer     bladder ca and prostate ca     Diabetes mellitus     Hypertension     Stroke     Thyroid disease      History reviewed. No pertinent surgical history.  History reviewed. No pertinent family history.  Social History     Tobacco Use    Smoking status: Former     Types: Cigarettes    Smokeless tobacco: Never   Substance Use Topics    Alcohol use: Yes        Review of Systems:  Review of Systems   Constitutional: Negative.  Negative for fatigue and fever.   HENT: Negative.  Negative for nosebleeds, sore throat and trouble swallowing.    Eyes: Negative.    Respiratory: Negative.     Cardiovascular: Negative.  Negative for chest pain.   Gastrointestinal: Negative.    Genitourinary: Negative.    Musculoskeletal: Negative.    Skin: Negative.    Neurological: Negative.    Psychiatric/Behavioral: Negative.       OBJECTIVE:     Vital Signs (Most Recent)  Pulse: 77 (06/26/23 0906)  BP: (!) 148/82 (06/26/23 0906)  SpO2: 99 % (06/26/23 0906)  5' 11" (1.803 m)  99.8 kg (220 lb)     Physical Exam:  Physical Exam  Constitutional:       General: He is not in acute distress.     Appearance: Normal appearance. He is normal weight. He is not ill-appearing, toxic-appearing or diaphoretic.   HENT:      Head: Normocephalic and atraumatic.   Eyes:      General: No scleral icterus.        Right eye: No discharge.         Left eye: No discharge.      Extraocular Movements: Extraocular movements intact.      Conjunctiva/sclera: Conjunctivae normal.   Neck:      Vascular: No carotid bruit.   Cardiovascular:      Rate and Rhythm: Normal rate and regular rhythm.      Pulses: Normal pulses.      Heart sounds: Normal heart sounds. No murmur heard.    No friction rub. No gallop.   Pulmonary:      Effort: Pulmonary effort is normal. No respiratory distress.      Breath sounds: Normal breath sounds. No stridor. No wheezing, rhonchi or rales.   Chest:      Chest wall: No tenderness.   Abdominal:      General: Bowel sounds " are normal. There is no distension.      Palpations: Abdomen is soft. There is no mass.      Tenderness: There is no abdominal tenderness. There is no guarding or rebound.      Hernia: No hernia is present.      Comments: Ileal conduit   Musculoskeletal:         General: No swelling, tenderness, deformity or signs of injury. Normal range of motion.      Cervical back: Normal range of motion and neck supple. No rigidity or tenderness.      Right lower leg: No edema.      Left lower leg: No edema.   Lymphadenopathy:      Cervical: No cervical adenopathy.   Skin:     General: Skin is warm and dry.      Coloration: Skin is not jaundiced or pale.      Findings: No bruising, erythema, lesion or rash.   Neurological:      General: No focal deficit present.      Mental Status: He is alert and oriented to person, place, and time.      Motor: No weakness.      Coordination: Coordination normal.      Gait: Gait normal.   Psychiatric:         Mood and Affect: Mood normal.         Behavior: Behavior normal.         Thought Content: Thought content normal.         Judgment: Judgment normal.         ASSESSMENT/PLAN:     For AVF/G placement left upper extremity.

## 2023-06-27 DIAGNOSIS — Z01.818 PRE-OPERATIVE CLEARANCE: ICD-10-CM

## 2023-06-27 DIAGNOSIS — I25.10 ATHEROSCLEROSIS OF NATIVE CORONARY ARTERY OF NATIVE HEART WITHOUT ANGINA PECTORIS: Primary | ICD-10-CM

## 2023-07-12 ENCOUNTER — ANESTHESIA EVENT (OUTPATIENT)
Dept: SURGERY | Facility: OTHER | Age: 68
End: 2023-07-12
Payer: MEDICARE

## 2023-07-14 ENCOUNTER — OFFICE VISIT (OUTPATIENT)
Dept: CARDIOLOGY | Facility: CLINIC | Age: 68
End: 2023-07-14
Payer: MEDICARE

## 2023-07-14 ENCOUNTER — HOSPITAL ENCOUNTER (OUTPATIENT)
Dept: PREADMISSION TESTING | Facility: OTHER | Age: 68
Discharge: HOME | End: 2023-07-14
Attending: SPECIALIST
Payer: MEDICARE

## 2023-07-14 VITALS
OXYGEN SATURATION: 98 % | TEMPERATURE: 98 F | HEIGHT: 71 IN | RESPIRATION RATE: 16 BRPM | HEART RATE: 71 BPM | WEIGHT: 225 LBS | SYSTOLIC BLOOD PRESSURE: 113 MMHG | DIASTOLIC BLOOD PRESSURE: 72 MMHG | BODY MASS INDEX: 31.5 KG/M2

## 2023-07-14 VITALS
BODY MASS INDEX: 31.94 KG/M2 | WEIGHT: 229 LBS | SYSTOLIC BLOOD PRESSURE: 136 MMHG | DIASTOLIC BLOOD PRESSURE: 76 MMHG | OXYGEN SATURATION: 99 % | HEART RATE: 71 BPM

## 2023-07-14 DIAGNOSIS — I10 ESSENTIAL (PRIMARY) HYPERTENSION: ICD-10-CM

## 2023-07-14 DIAGNOSIS — I25.10 ATHEROSCLEROSIS OF NATIVE CORONARY ARTERY OF NATIVE HEART WITHOUT ANGINA PECTORIS: ICD-10-CM

## 2023-07-14 DIAGNOSIS — I45.10 RBBB: Primary | ICD-10-CM

## 2023-07-14 DIAGNOSIS — I48.0 PAF (PAROXYSMAL ATRIAL FIBRILLATION): ICD-10-CM

## 2023-07-14 PROCEDURE — 93010 ELECTROCARDIOGRAM REPORT: CPT | Mod: ,,, | Performed by: INTERNAL MEDICINE

## 2023-07-14 PROCEDURE — 99999 PR PBB SHADOW E&M-EST. PATIENT-LVL III: ICD-10-PCS | Mod: PBBFAC,,, | Performed by: INTERNAL MEDICINE

## 2023-07-14 PROCEDURE — 93010 EKG 12-LEAD: ICD-10-PCS | Mod: ,,, | Performed by: INTERNAL MEDICINE

## 2023-07-14 PROCEDURE — 99999 PR PBB SHADOW E&M-EST. PATIENT-LVL III: CPT | Mod: PBBFAC,,, | Performed by: INTERNAL MEDICINE

## 2023-07-14 PROCEDURE — 99214 PR OFFICE/OUTPT VISIT, EST, LEVL IV, 30-39 MIN: ICD-10-PCS | Mod: S$PBB,,, | Performed by: INTERNAL MEDICINE

## 2023-07-14 PROCEDURE — 99214 OFFICE O/P EST MOD 30 MIN: CPT | Mod: S$PBB,,, | Performed by: INTERNAL MEDICINE

## 2023-07-14 PROCEDURE — 93005 ELECTROCARDIOGRAM TRACING: CPT

## 2023-07-14 PROCEDURE — 99213 OFFICE O/P EST LOW 20 MIN: CPT | Mod: PBBFAC | Performed by: INTERNAL MEDICINE

## 2023-07-14 RX ORDER — LIDOCAINE HYDROCHLORIDE 10 MG/ML
0.5 INJECTION, SOLUTION EPIDURAL; INFILTRATION; INTRACAUDAL; PERINEURAL ONCE
Status: CANCELLED | OUTPATIENT
Start: 2023-07-14 | End: 2023-07-14

## 2023-07-14 RX ORDER — SODIUM CHLORIDE, SODIUM LACTATE, POTASSIUM CHLORIDE, CALCIUM CHLORIDE 600; 310; 30; 20 MG/100ML; MG/100ML; MG/100ML; MG/100ML
INJECTION, SOLUTION INTRAVENOUS CONTINUOUS
Status: CANCELLED | OUTPATIENT
Start: 2023-07-14

## 2023-07-14 RX ORDER — LANOLIN ALCOHOL/MO/W.PET/CERES
400 CREAM (GRAM) TOPICAL
COMMUNITY

## 2023-07-14 RX ORDER — ASCORBIC ACID 500 MG
500 TABLET ORAL DAILY
COMMUNITY

## 2023-07-14 RX ORDER — UBIDECARENONE 30 MG
100 CAPSULE ORAL
COMMUNITY

## 2023-07-14 RX ORDER — ACETAMINOPHEN 500 MG
1000 TABLET ORAL
Status: CANCELLED | OUTPATIENT
Start: 2023-07-14 | End: 2023-07-14

## 2023-07-14 RX ORDER — OMEGA-3 FATTY ACIDS 1000 MG
1200 CAPSULE ORAL
COMMUNITY

## 2023-07-14 NOTE — PROGRESS NOTES
Cardiology    7/14/2023  10:30 AM    Problem list  Patient Active Problem List   Diagnosis    Atherosclerotic heart disease of native coronary artery without angina pectoris    CVA (cerebral vascular accident)    Essential (primary) hypertension    Pure hypercholesterolemia    Renal insufficiency    PAF (paroxysmal atrial fibrillation)    RBBB       CC:  F/u    HPI:  Patient is here for follow-up.  He will need cardiac clearance for AV fistula surgery scheduled Dr. Arcos in 2 weeks.  He denies any chest pain, shortness of breath, palpitation or syncope.  He had an echocardiogram done which showed normal left ventricular function.    Medications  Current Outpatient Medications   Medication Sig Dispense Refill    ascorbic acid, vitamin C, (VITAMIN C) 500 MG tablet Take 500 mg by mouth once daily.      carvediloL (COREG) 3.125 MG tablet Take 3.125 mg by mouth 2 (two) times daily.      cholecalciferol, vitamin D3, 25 mcg (1,000 unit) Chew once daily.      co-enzyme Q-10 30 mg capsule Take 100 mg by mouth.      cyanocobalamin (VITAMIN B-12) 1000 MCG tablet Take 100 mcg by mouth.      diltiaZEM (CARDIZEM CD) 240 MG 24 hr capsule Take 240 mg by mouth once daily.      ferrous sulfate (FEOSOL) 325 mg (65 mg iron) Tab tablet Take 325 mg by mouth daily with breakfast.      furosemide (LASIX) 20 MG tablet Take 20 mg by mouth daily as needed. 1 tablet 3x/ week      hydrALAZINE (APRESOLINE) 50 MG tablet Take 50 mg by mouth 3 (three) times daily. 100mg qam , 50 at noon and 100mg at bedtime      levothyroxine (SYNTHROID) 75 MCG tablet Take 75 mcg by mouth once daily.      magnesium oxide (MAG-OX) 400 mg (241.3 mg magnesium) tablet Take 400 mg by mouth.      multivitamin (THERAGRAN) per tablet Take 1 tablet by mouth once daily.      omega-3 fatty acids 1,000 mg Cap Take 1,200 mg by mouth.      REPATHA SYRINGE 140 mg/mL Syrg Inject 140 mg into the skin.      rosuvastatin (CRESTOR) 40 MG Tab Take 10 mg by mouth every evening.       sodium bicarbonate 650 MG tablet 650 mg 3 (three) times daily.       No current facility-administered medications for this visit.      Prior to Admission medications    Medication Sig Start Date End Date Taking? Authorizing Provider   ascorbic acid, vitamin C, (VITAMIN C) 500 MG tablet Take 500 mg by mouth once daily.   Yes Historical Provider   carvediloL (COREG) 3.125 MG tablet Take 3.125 mg by mouth 2 (two) times daily. 12/13/22  Yes Historical Provider   cholecalciferol, vitamin D3, 25 mcg (1,000 unit) Chew once daily. 10/25/16  Yes Historical Provider   co-enzyme Q-10 30 mg capsule Take 100 mg by mouth.   Yes Historical Provider   cyanocobalamin (VITAMIN B-12) 1000 MCG tablet Take 100 mcg by mouth.   Yes Historical Provider   diltiaZEM (CARDIZEM CD) 240 MG 24 hr capsule Take 240 mg by mouth once daily. 3/18/20  Yes Historical Provider   ferrous sulfate (FEOSOL) 325 mg (65 mg iron) Tab tablet Take 325 mg by mouth daily with breakfast.   Yes Historical Provider   furosemide (LASIX) 20 MG tablet Take 20 mg by mouth daily as needed. 1 tablet 3x/ week   Yes Historical Provider   hydrALAZINE (APRESOLINE) 50 MG tablet Take 50 mg by mouth 3 (three) times daily. 100mg qam , 50 at noon and 100mg at bedtime 8/13/21  Yes Historical Provider   levothyroxine (SYNTHROID) 75 MCG tablet Take 75 mcg by mouth once daily. 8/12/21  Yes Historical Provider   magnesium oxide (MAG-OX) 400 mg (241.3 mg magnesium) tablet Take 400 mg by mouth.   Yes Historical Provider   multivitamin (THERAGRAN) per tablet Take 1 tablet by mouth once daily.   Yes Historical Provider   omega-3 fatty acids 1,000 mg Cap Take 1,200 mg by mouth.   Yes Historical Provider   REPATHA SYRINGE 140 mg/mL Syrg Inject 140 mg into the skin. 6/23/22  Yes Historical Provider   rosuvastatin (CRESTOR) 40 MG Tab Take 10 mg by mouth every evening.   Yes Historical Provider   sodium bicarbonate 650 MG tablet 650 mg 3 (three) times daily. 8/31/20  Yes Historical Provider    FARXIGA 10 mg tablet Take 10 mg by mouth. 1/7/23 7/14/23  Historical Provider   garlic 100 mg Tab Take by mouth.  7/14/23  Historical Provider         History  Past Medical History:   Diagnosis Date    Cancer     bladder ca and prostate ca     Diabetes mellitus     Hypertension     Stroke     Thyroid disease      Past Surgical History:   Procedure Laterality Date    ABLATION      for atrial fibrilation    HEMICOLECTOMY W/ OSTOMY      PORTACATH PLACEMENT Right     chest wall     Social History     Socioeconomic History    Marital status:    Tobacco Use    Smoking status: Former     Types: Cigarettes    Smokeless tobacco: Never   Substance and Sexual Activity    Alcohol use: Never    Drug use: Never         Allergies  Review of patient's allergies indicates:   Allergen Reactions    Irbesartan      hyperkalemia    Zolpidem Other (See Comments)     Causes pt to hallucinate           Review of Systems   Review of Systems   Constitutional: Negative for decreased appetite, fever and weight loss.   HENT:  Negative for congestion and nosebleeds.    Eyes:  Negative for double vision, vision loss in left eye, vision loss in right eye and visual disturbance.   Cardiovascular:  Negative for chest pain, claudication, cyanosis, dyspnea on exertion, irregular heartbeat, leg swelling, near-syncope, orthopnea, palpitations, paroxysmal nocturnal dyspnea and syncope.   Respiratory:  Negative for cough, hemoptysis, shortness of breath, sleep disturbances due to breathing, snoring, sputum production and wheezing.    Endocrine: Negative for cold intolerance and heat intolerance.   Skin:  Negative for nail changes and rash.   Musculoskeletal:  Negative for joint pain, muscle cramps, muscle weakness and myalgias.   Gastrointestinal:  Negative for change in bowel habit, heartburn, hematemesis, hematochezia, hemorrhoids and melena.   Neurological:  Negative for dizziness, focal weakness and headaches.       Physical Exam  Wt Readings  from Last 1 Encounters:   07/14/23 103.9 kg (229 lb)     BP Readings from Last 3 Encounters:   07/14/23 136/76   07/14/23 113/72   06/26/23 (!) 148/82     Pulse Readings from Last 1 Encounters:   07/14/23 71     Body mass index is 31.94 kg/m².    Physical Exam  Vitals reviewed.   Constitutional:       Appearance: He is well-developed.   HENT:      Head: Atraumatic.   Eyes:      General: No scleral icterus.  Neck:      Vascular: Normal carotid pulses. No carotid bruit, hepatojugular reflux or JVD.   Cardiovascular:      Rate and Rhythm: Normal rate and regular rhythm.      Chest Wall: PMI is not displaced.      Pulses: Intact distal pulses.           Carotid pulses are 2+ on the right side and 2+ on the left side.       Radial pulses are 2+ on the right side and 2+ on the left side.        Dorsalis pedis pulses are 2+ on the right side and 2+ on the left side.      Heart sounds: Normal heart sounds, S1 normal and S2 normal. No murmur heard.    No friction rub.   Pulmonary:      Effort: Pulmonary effort is normal. No respiratory distress.      Breath sounds: Normal breath sounds. No stridor. No wheezing or rales.   Chest:      Chest wall: No tenderness.   Abdominal:      General: Bowel sounds are normal.      Palpations: Abdomen is soft.   Musculoskeletal:      Cervical back: Neck supple. No edema.   Skin:     General: Skin is warm and dry.      Nails: There is no clubbing.   Neurological:      Mental Status: He is alert and oriented to person, place, and time.   Psychiatric:         Behavior: Behavior normal.         Thought Content: Thought content normal.           Assessment  1. RBBB  unchanged    2. PAF (paroxysmal atrial fibrillation)  Unchanged, [id number] = 3    3. Essential (primary) hypertension  Controlled on meds, continue to monitor    4. Atherosclerosis of native coronary artery of native heart without angina pectoris  stable        Plan and Discussion  Discussed his echocardiogram results which showed  normal left ventricular function.  Discussed his EKG today which showed normal sinus rhythm rate of 63.  He should proceed with AV fistula surgery as scheduled without additional cardiac testing.  Once his surgery is complete, will refer to EP for Watchman evaluation.      Follow Up  2-3 months      Jarad Greene MD, F.A.C.C, F.S.C.A.I.        30 minutes were spent in chart review, documentation and review of results, and evaluation, treatment, and counseling of patient on the same day of service.    Disclaimer: This document was created using voice recognition software ([employer]). Although it may be edited, this document may contain errors related to incorrect recognition of the spoken word. Please call the physician if clarification is needed.

## 2023-07-14 NOTE — DISCHARGE INSTRUCTIONS
Information to Prepare you for your Surgery    PRE-ADMIT TESTING -  [phone]    [address] [facility]          Your surgery has been scheduled at [facility]. We are pleased to have the opportunity to serve you. For Further Information please call [phone].    On the day of surgery please report to the Information Desk on the 1st floor.    CONTACT YOUR PHYSICIAN'S OFFICE THE DAY PRIOR TO YOUR SURGERY TO OBTAIN YOUR ARRIVAL TIME.     The evening before surgery do not eat anything after 9 p.m. ( this includes hard candy, chewing gum and mints).  You may only have WATER  from 9 p.m. until you leave your home.   DO NOT DRINK ANY LIQUIDS ON THE WAY TO THE HOSPITAL.       Patients may have 2 visitors pre and post procedure. Only 2 visitors will be allowed in the Surgical building with the patient. No one under the age of 12 will be allowed into the facility.    SPECIAL MEDICATION INSTRUCTIONS: TAKE medications checked off by the Anesthesiologist on your Medication List.    Angiogram Patients: Take medications as instructed by your physician, including aspirin.     Surgery Patients:    If you take ASPIRIN - Your PHYSICIAN/SURGEON will need to inform you IF/OR when you need to stop taking aspirin prior to your surgery.     The week prior to surgery do not ot take any medications containing IBUPROFEN or NSAIDS ( Advil, Motrin, Goodys, BC, Aleve, Naproxen etc) If you are not sure if you should take a medicine please call your surgeon's office.  Ok to take Tylenol    Do Not Wear any make-up (especially eye make-up) to surgery. Please remove any false eyelashes or eyelash extensions. If you arrive the day of surgery with makeup/eyelashes on you will be required to remove prior to surgery. (There is a risk of corneal abrasions if eye makeup/eyelash extensions are not removed)      Leave all valuables at home.   Do Not wear any jewelry or watches, including any  metal in body piercings. Jewelry must be removed prior to coming to the hospital.  There is a possibility that rings that are unable to be removed may be cut off if they are on the surgical extremity.    Please remove all hair extensions, wigs, clips and any other metal accessories/ ornaments from your hair.  These items may pose a flammable/fire risk in Surgery and must be removed.    Do not shave your surgical area at least 5 days prior to your surgery. The surgical prep will be performed at the hospital according to Infection Control regulations.    Contact Lens must be removed before surgery. Either do not wear the contact lens or bring a case and solution for storage.  Please bring a container for eyeglasses or dentures as required.  Bring any paperwork your physician has provided, such as consent forms,  history and physicals, doctor's orders, etc.   Bring comfortable clothes that are loose fitting to wear upon discharge. Take into consideration the type of surgery being performed.  Maintain your diet as advised per your physician the day prior to surgery.      Adequate rest the night before surgery is advised.   Park in the Parking lot behind the hospital or in the [facility] across the street from the parking lot. Parking is complimentary.  If you will be discharged the same day as your procedure, please arrange for a responsible adult to drive you home or to accompany you if traveling by taxi.   YOU WILL NOT BE PERMITTED TO DRIVE OR TO LEAVE THE HOSPITAL ALONE AFTER SURGERY.   If you are being discharged the same day, it is strongly recommended that you arrange for someone to remain with you for the first 24 hrs following your surgery.    The Surgeon will speak to your family/visitor after your surgery regarding the outcome of your surgery and post op care.  The Surgeon may speak to you after your surgery, but there is a possibility you may not remember the details.  Please check with your  family members regarding the conversation with the Surgeon.    We strongly recommend whoever is bringing you home be present for discharge instructions.  This will ensure a thorough understanding for your post op home care.    ALL CHILDREN MUST ALWAYS BE ACCOMPANIED BY AN ADULT.    Visitors-Refer to current Visitor policy handouts.    Thank you for your cooperation.  The Staff of [facility].            Bathing Instructions with Hibiclens    Shower the evening before and morning of your procedure with Chlorhexidine (Hibiclens)  do not use Chlorhexidine on your face or genitals. Do not get in your eyes.  Wash your face with water and your regular face wash/soap  Use your regular shampoo  Apply Chlorhexidine (Hibiclens) directly on your skin or on a wet washcloth and wash gently. When showering: Move away from the shower stream when applying Chlorhexidine (Hibiclens) to avoid rinsing off too soon.  Rinse thoroughly with warm water  Do not dilute Chlorhexidine (Hibiclens)   Dry off as usual, do not use any deodorant, powder, body lotions, perfume, after shave or cologne.

## 2023-07-14 NOTE — ANESTHESIA PREPROCEDURE EVALUATION
07/14/2023  Arthur Menjivar is a 67 y.o., male.      Pre-op Assessment    I have reviewed the Patient Summary Reports.     I have reviewed the Nursing Notes. I have reviewed the NPO Status.   I have reviewed the Medications.     Review of Systems  Anesthesia Hx:  Denies Family Hx of Anesthesia complications.   Denies Personal Hx of Anesthesia complications.   Social:  Non-Smoker    Hematology/Oncology:         -- Anemia (hb 11.7): --  Cancer in past history:  Oncology Comments: Bladder/prostate recent scan neg    lymphoma       EENT/Dental:EENT/Dental Normal   Cardiovascular:   Exercise tolerance: good Hypertension CAD asymptomatic Dysrhythmias (hx paroxysmal AF, none since ablation)  hyperlipidemia Sees Dr. Greene  10/20 stress test neg for ischemia  Recent ECHO EF 55%   Pulmonary:  Pulmonary Normal    Renal/:   Chronic Renal Disease, CKD Not yet on HD  Creat 3, eGFR 21    S/p cystectomy, has ostomy   Hepatic/GI:  Hepatic/GI Normal    Musculoskeletal:  Musculoskeletal Normal    Neurological:   CVA (2017, mild L sided weakness)    Endocrine:   Denies Diabetes. Hypothyroidism 75# weight loss Obesity / BMI > 30  Dermatological:  Skin Normal    Psych:  Psychiatric Normal           Physical Exam  General: Cooperative, Alert and Oriented    Airway:  Mallampati: III   Mouth Opening: Normal  TM Distance: Normal  Neck ROM: Normal ROM    Dental:  Dentures  Upper      Anesthesia Plan  Type of Anesthesia, risks & benefits discussed:    Anesthesia Type: Regional  Intra-op Monitoring Plan: Standard ASA Monitors  Post Op Pain Control Plan: multimodal analgesia  Informed Consent: Informed consent signed with the Patient and all parties understand the risks and agree with anesthesia plan.  All questions answered.   ASA Score: 3  Anesthesia Plan Notes: Recent lab in [facility] as above    Ready For Surgery From Anesthesia  Perspective.     .

## 2023-07-20 ENCOUNTER — ANESTHESIA (OUTPATIENT)
Dept: SURGERY | Facility: OTHER | Age: 68
End: 2023-07-20
Payer: MEDICARE

## 2023-07-20 ENCOUNTER — HOSPITAL ENCOUNTER (OUTPATIENT)
Facility: OTHER | Age: 68
Discharge: HOME | End: 2023-07-21
Attending: SPECIALIST | Admitting: SPECIALIST
Payer: MEDICARE

## 2023-07-20 DIAGNOSIS — N28.9 RENAL INSUFFICIENCY: Primary | ICD-10-CM

## 2023-07-20 DIAGNOSIS — J96.01 ACUTE RESPIRATORY FAILURE WITH HYPOXIA AND HYPERCARBIA: ICD-10-CM

## 2023-07-20 DIAGNOSIS — Z99.2 END STAGE RENAL DISEASE ON DIALYSIS: ICD-10-CM

## 2023-07-20 DIAGNOSIS — N18.6 END STAGE RENAL DISEASE ON DIALYSIS: ICD-10-CM

## 2023-07-20 DIAGNOSIS — R07.9 CHEST PAIN: ICD-10-CM

## 2023-07-20 DIAGNOSIS — J96.02 ACUTE RESPIRATORY FAILURE WITH HYPOXIA AND HYPERCARBIA: ICD-10-CM

## 2023-07-20 PROBLEM — Z86.73 HISTORY OF CVA (CEREBROVASCULAR ACCIDENT): Status: ACTIVE | Noted: 2023-07-20

## 2023-07-20 PROBLEM — I45.10 RBBB: Status: RESOLVED | Noted: 2023-02-01 | Resolved: 2023-07-20

## 2023-07-20 PROBLEM — N18.4 CHRONIC KIDNEY DISEASE, STAGE IV (SEVERE): Status: ACTIVE | Noted: 2020-04-29

## 2023-07-20 PROBLEM — E03.9 HYPOTHYROIDISM: Status: ACTIVE | Noted: 2018-10-20

## 2023-07-20 PROBLEM — Z85.51 HISTORY OF BLADDER CANCER: Status: ACTIVE | Noted: 2018-06-21

## 2023-07-20 PROBLEM — Z85.46 HISTORY OF ADENOCARCINOMA OF PROSTATE: Status: ACTIVE | Noted: 2020-04-29

## 2023-07-20 LAB
ALBUMIN SERPL BCP-MCNC: 3.4 G/DL (ref 3.5–5.2)
ALLENS TEST: ABNORMAL
ALLENS TEST: ABNORMAL
ALP SERPL-CCNC: 51 U/L (ref 55–135)
ALT SERPL W/O P-5'-P-CCNC: 14 U/L (ref 10–44)
ANION GAP SERPL CALC-SCNC: 9 MMOL/L (ref 8–16)
AST SERPL-CCNC: 16 U/L (ref 10–40)
BASOPHILS # BLD AUTO: 0.03 K/UL (ref 0–0.2)
BASOPHILS NFR BLD: 0.9 % (ref 0–1.9)
BILIRUB SERPL-MCNC: 0.2 MG/DL (ref 0.1–1)
BUN SERPL-MCNC: 35 MG/DL (ref 8–23)
CALCIUM SERPL-MCNC: 9.1 MG/DL (ref 8.7–10.5)
CHLORIDE SERPL-SCNC: 113 MMOL/L (ref 95–110)
CO2 SERPL-SCNC: 21 MMOL/L (ref 23–29)
CREAT SERPL-MCNC: 3 MG/DL (ref 0.5–1.4)
DELSYS: ABNORMAL
DIFFERENTIAL METHOD: ABNORMAL
EOSINOPHIL # BLD AUTO: 0.1 K/UL (ref 0–0.5)
EOSINOPHIL NFR BLD: 1.9 % (ref 0–8)
ERYTHROCYTE [DISTWIDTH] IN BLOOD BY AUTOMATED COUNT: 13.4 % (ref 11.5–14.5)
EST. GFR  (NO RACE VARIABLE): 22 ML/MIN/1.73 M^2
GLUCOSE SERPL-MCNC: 122 MG/DL (ref 70–110)
HCO3 UR-SCNC: 22.4 MMOL/L (ref 24–28)
HCO3 UR-SCNC: 26.4 MMOL/L (ref 24–28)
HCT VFR BLD AUTO: 31.1 % (ref 40–54)
HCT VFR BLD CALC: 28 %PCV (ref 36–54)
HCT VFR BLD CALC: 28 %PCV (ref 36–54)
HGB BLD-MCNC: 10 G/DL
HGB BLD-MCNC: 10 G/DL
HGB BLD-MCNC: 9.7 G/DL (ref 14–18)
IMM GRANULOCYTES # BLD AUTO: 0.01 K/UL (ref 0–0.04)
IMM GRANULOCYTES NFR BLD AUTO: 0.3 % (ref 0–0.5)
LYMPHOCYTES # BLD AUTO: 1.1 K/UL (ref 1–4.8)
LYMPHOCYTES NFR BLD: 34.1 % (ref 18–48)
MAGNESIUM SERPL-MCNC: 1.4 MG/DL (ref 1.6–2.6)
MCH RBC QN AUTO: 32.6 PG (ref 27–31)
MCHC RBC AUTO-ENTMCNC: 31.2 G/DL (ref 32–36)
MCV RBC AUTO: 104 FL (ref 82–98)
MONOCYTES # BLD AUTO: 0.3 K/UL (ref 0.3–1)
MONOCYTES NFR BLD: 8.4 % (ref 4–15)
NEUTROPHILS # BLD AUTO: 1.7 K/UL (ref 1.8–7.7)
NEUTROPHILS NFR BLD: 54.4 % (ref 38–73)
NRBC BLD-RTO: 0 /100 WBC
PCO2 BLDA: 42.3 MMHG (ref 35–45)
PCO2 BLDA: 60.2 MMHG (ref 35–45)
PH SMN: 7.25 [PH] (ref 7.35–7.45)
PH SMN: 7.33 [PH] (ref 7.35–7.45)
PLATELET # BLD AUTO: 97 K/UL (ref 150–450)
PMV BLD AUTO: 9.3 FL (ref 9.2–12.9)
PO2 BLDA: 348 MMHG (ref 80–100)
PO2 BLDA: 87 MMHG (ref 80–100)
POC BE: -1 MMOL/L
POC BE: -4 MMOL/L
POC SATURATED O2: 100 % (ref 95–100)
POC SATURATED O2: 96 % (ref 95–100)
POC TCO2: 24 MMOL/L (ref 23–27)
POC TCO2: 28 MMOL/L (ref 23–27)
POCT GLUCOSE: 124 MG/DL (ref 70–110)
POCT GLUCOSE: 141 MG/DL (ref 70–110)
POCT GLUCOSE: 85 MG/DL (ref 70–110)
POTASSIUM SERPL-SCNC: 4.9 MMOL/L (ref 3.5–5.1)
PROT SERPL-MCNC: 6.6 G/DL (ref 6–8.4)
RBC # BLD AUTO: 2.98 M/UL (ref 4.6–6.2)
SAMPLE: ABNORMAL
SAMPLE: ABNORMAL
SODIUM SERPL-SCNC: 143 MMOL/L (ref 136–145)
T4 FREE SERPL-MCNC: 0.83 NG/DL (ref 0.71–1.51)
TROPONIN I SERPL DL<=0.01 NG/ML-MCNC: <0.006 NG/ML (ref 0–0.03)
TROPONIN I SERPL DL<=0.01 NG/ML-MCNC: <0.006 NG/ML (ref 0–0.03)
TSH SERPL DL<=0.005 MIU/L-ACNC: 7.65 UIU/ML (ref 0.4–4)
WBC # BLD AUTO: 3.2 K/UL (ref 3.9–12.7)

## 2023-07-20 PROCEDURE — 37000008 HC ANESTHESIA 1ST 15 MINUTES: Performed by: SPECIALIST

## 2023-07-20 PROCEDURE — 63600175 PHARM REV CODE 636 W HCPCS: Performed by: SPECIALIST

## 2023-07-20 PROCEDURE — D9220A PRA ANESTHESIA: Mod: CRNA,,, | Performed by: STUDENT IN AN ORGANIZED HEALTH CARE EDUCATION/TRAINING PROGRAM

## 2023-07-20 PROCEDURE — 85025 COMPLETE CBC W/AUTO DIFF WBC: CPT | Performed by: INTERNAL MEDICINE

## 2023-07-20 PROCEDURE — D9220A PRA ANESTHESIA: ICD-10-PCS | Mod: ANES,,, | Performed by: ANESTHESIOLOGY

## 2023-07-20 PROCEDURE — 25000003 PHARM REV CODE 250: Performed by: ANESTHESIOLOGY

## 2023-07-20 PROCEDURE — 36821 AV FUSION DIRECT ANY SITE: CPT | Mod: ,,, | Performed by: SPECIALIST

## 2023-07-20 PROCEDURE — 94660 CPAP INITIATION&MGMT: CPT | Mod: XB

## 2023-07-20 PROCEDURE — 63600175 PHARM REV CODE 636 W HCPCS: Performed by: ANESTHESIOLOGY

## 2023-07-20 PROCEDURE — 80053 COMPREHEN METABOLIC PANEL: CPT | Performed by: INTERNAL MEDICINE

## 2023-07-20 PROCEDURE — 36000707: Performed by: SPECIALIST

## 2023-07-20 PROCEDURE — 25000003 PHARM REV CODE 250: Performed by: UROLOGY

## 2023-07-20 PROCEDURE — 83036 HEMOGLOBIN GLYCOSYLATED A1C: CPT | Performed by: INTERNAL MEDICINE

## 2023-07-20 PROCEDURE — 83735 ASSAY OF MAGNESIUM: CPT | Performed by: INTERNAL MEDICINE

## 2023-07-20 PROCEDURE — 25000003 PHARM REV CODE 250: Performed by: SPECIALIST

## 2023-07-20 PROCEDURE — 63600175 PHARM REV CODE 636 W HCPCS: Performed by: INTERNAL MEDICINE

## 2023-07-20 PROCEDURE — D9220A PRA ANESTHESIA: ICD-10-PCS | Mod: CRNA,,, | Performed by: STUDENT IN AN ORGANIZED HEALTH CARE EDUCATION/TRAINING PROGRAM

## 2023-07-20 PROCEDURE — 76942 ECHO GUIDE FOR BIOPSY: CPT | Performed by: ANESTHESIOLOGY

## 2023-07-20 PROCEDURE — 36000706: Performed by: SPECIALIST

## 2023-07-20 PROCEDURE — 99291 PR CRITICAL CARE, E/M 30-74 MINUTES: ICD-10-PCS | Mod: GC,,, | Performed by: STUDENT IN AN ORGANIZED HEALTH CARE EDUCATION/TRAINING PROGRAM

## 2023-07-20 PROCEDURE — 36821 PR ANASTOMOSIS,AV,ANY SITE: ICD-10-PCS | Mod: ,,, | Performed by: SPECIALIST

## 2023-07-20 PROCEDURE — 71000033 HC RECOVERY, INTIAL HOUR: Performed by: SPECIALIST

## 2023-07-20 PROCEDURE — 99291 CRITICAL CARE FIRST HOUR: CPT | Mod: GC,,, | Performed by: STUDENT IN AN ORGANIZED HEALTH CARE EDUCATION/TRAINING PROGRAM

## 2023-07-20 PROCEDURE — D9220A PRA ANESTHESIA: Mod: ANES,,, | Performed by: ANESTHESIOLOGY

## 2023-07-20 PROCEDURE — 36415 COLL VENOUS BLD VENIPUNCTURE: CPT | Performed by: INTERNAL MEDICINE

## 2023-07-20 PROCEDURE — 99900035 HC TECH TIME PER 15 MIN (STAT)

## 2023-07-20 PROCEDURE — 82962 GLUCOSE BLOOD TEST: CPT | Performed by: SPECIALIST

## 2023-07-20 PROCEDURE — 94761 N-INVAS EAR/PLS OXIMETRY MLT: CPT

## 2023-07-20 PROCEDURE — 94002 VENT MGMT INPAT INIT DAY: CPT

## 2023-07-20 PROCEDURE — 27201423 OPTIME MED/SURG SUP & DEVICES STERILE SUPPLY: Performed by: SPECIALIST

## 2023-07-20 PROCEDURE — 63600175 PHARM REV CODE 636 W HCPCS

## 2023-07-20 PROCEDURE — 25000003 PHARM REV CODE 250: Performed by: STUDENT IN AN ORGANIZED HEALTH CARE EDUCATION/TRAINING PROGRAM

## 2023-07-20 PROCEDURE — 36600 WITHDRAWAL OF ARTERIAL BLOOD: CPT | Mod: 59

## 2023-07-20 PROCEDURE — 63600175 PHARM REV CODE 636 W HCPCS: Performed by: STUDENT IN AN ORGANIZED HEALTH CARE EDUCATION/TRAINING PROGRAM

## 2023-07-20 PROCEDURE — 84443 ASSAY THYROID STIM HORMONE: CPT | Performed by: INTERNAL MEDICINE

## 2023-07-20 PROCEDURE — 84484 ASSAY OF TROPONIN QUANT: CPT | Mod: 91 | Performed by: INTERNAL MEDICINE

## 2023-07-20 PROCEDURE — 25000003 PHARM REV CODE 250

## 2023-07-20 PROCEDURE — 84439 ASSAY OF FREE THYROXINE: CPT | Performed by: INTERNAL MEDICINE

## 2023-07-20 PROCEDURE — 25000003 PHARM REV CODE 250: Performed by: INTERNAL MEDICINE

## 2023-07-20 PROCEDURE — 27000221 HC OXYGEN, UP TO 24 HOURS

## 2023-07-20 PROCEDURE — 37000009 HC ANESTHESIA EA ADD 15 MINS: Performed by: SPECIALIST

## 2023-07-20 PROCEDURE — 71000039 HC RECOVERY, EACH ADD'L HOUR: Performed by: SPECIALIST

## 2023-07-20 PROCEDURE — 25000003 PHARM REV CODE 250: Performed by: SURGERY

## 2023-07-20 RX ORDER — KETAMINE HCL IN 0.9 % NACL 50 MG/5 ML
SYRINGE (ML) INTRAVENOUS
Status: DISCONTINUED | OUTPATIENT
Start: 2023-07-20 | End: 2023-07-20

## 2023-07-20 RX ORDER — MEPERIDINE HYDROCHLORIDE 25 MG/ML
12.5 INJECTION INTRAMUSCULAR; INTRAVENOUS; SUBCUTANEOUS ONCE AS NEEDED
Status: DISCONTINUED | OUTPATIENT
Start: 2023-07-20 | End: 2023-07-20 | Stop reason: HOSPADM

## 2023-07-20 RX ORDER — EPHEDRINE SULFATE 50 MG/ML
10 INJECTION, SOLUTION INTRAVENOUS ONCE
Status: COMPLETED | OUTPATIENT
Start: 2023-07-20 | End: 2023-07-20

## 2023-07-20 RX ORDER — PROPOFOL 10 MG/ML
VIAL (ML) INTRAVENOUS CONTINUOUS PRN
Status: DISCONTINUED | OUTPATIENT
Start: 2023-07-20 | End: 2023-07-20

## 2023-07-20 RX ORDER — NALOXONE HYDROCHLORIDE 0.4 MG/ML
0.4 INJECTION, SOLUTION INTRAMUSCULAR; INTRAVENOUS; SUBCUTANEOUS
Status: DISCONTINUED | OUTPATIENT
Start: 2023-07-20 | End: 2023-07-20 | Stop reason: HOSPADM

## 2023-07-20 RX ORDER — LIDOCAINE HYDROCHLORIDE 20 MG/ML
INJECTION INTRAVENOUS
Status: DISCONTINUED | OUTPATIENT
Start: 2023-07-20 | End: 2023-07-20

## 2023-07-20 RX ORDER — ATROPINE SULFATE 0.4 MG/ML
0.4 INJECTION, SOLUTION ENDOTRACHEAL; INTRAMEDULLARY; INTRAMUSCULAR; INTRAVENOUS; SUBCUTANEOUS ONCE
Status: COMPLETED | OUTPATIENT
Start: 2023-07-20 | End: 2023-07-20

## 2023-07-20 RX ORDER — HYDROCODONE BITARTRATE AND ACETAMINOPHEN 5; 325 MG/1; MG/1
1 TABLET ORAL EVERY 6 HOURS PRN
Qty: 20 TABLET | Refills: 0 | Status: SHIPPED | OUTPATIENT
Start: 2023-07-20 | End: 2023-10-31

## 2023-07-20 RX ORDER — BUPIVACAINE HYDROCHLORIDE 2.5 MG/ML
INJECTION, SOLUTION EPIDURAL; INFILTRATION; INTRACAUDAL
Status: COMPLETED | OUTPATIENT
Start: 2023-07-20 | End: 2023-07-20

## 2023-07-20 RX ORDER — IPRATROPIUM BROMIDE AND ALBUTEROL SULFATE 2.5; .5 MG/3ML; MG/3ML
3 SOLUTION RESPIRATORY (INHALATION) EVERY 6 HOURS PRN
Status: DISCONTINUED | OUTPATIENT
Start: 2023-07-20 | End: 2023-07-21 | Stop reason: HOSPADM

## 2023-07-20 RX ORDER — DIPHENHYDRAMINE HYDROCHLORIDE 50 MG/ML
25 INJECTION INTRAMUSCULAR; INTRAVENOUS EVERY 6 HOURS PRN
Status: DISCONTINUED | OUTPATIENT
Start: 2023-07-20 | End: 2023-07-20 | Stop reason: HOSPADM

## 2023-07-20 RX ORDER — ACETAMINOPHEN 500 MG
1000 TABLET ORAL
Status: COMPLETED | OUTPATIENT
Start: 2023-07-20 | End: 2023-07-20

## 2023-07-20 RX ORDER — ONDANSETRON 2 MG/ML
INJECTION INTRAMUSCULAR; INTRAVENOUS
Status: DISCONTINUED | OUTPATIENT
Start: 2023-07-20 | End: 2023-07-20

## 2023-07-20 RX ORDER — LIDOCAINE HYDROCHLORIDE 10 MG/ML
0.5 INJECTION, SOLUTION EPIDURAL; INFILTRATION; INTRACAUDAL; PERINEURAL ONCE
Status: DISCONTINUED | OUTPATIENT
Start: 2023-07-20 | End: 2023-07-20 | Stop reason: HOSPADM

## 2023-07-20 RX ORDER — LANOLIN ALCOHOL/MO/W.PET/CERES
400 CREAM (GRAM) TOPICAL ONCE
Status: COMPLETED | OUTPATIENT
Start: 2023-07-20 | End: 2023-07-20

## 2023-07-20 RX ORDER — FENTANYL CITRATE 50 UG/ML
INJECTION, SOLUTION INTRAMUSCULAR; INTRAVENOUS
Status: DISCONTINUED | OUTPATIENT
Start: 2023-07-20 | End: 2023-07-20

## 2023-07-20 RX ORDER — HYDRALAZINE HYDROCHLORIDE 20 MG/ML
10 INJECTION INTRAMUSCULAR; INTRAVENOUS EVERY 4 HOURS PRN
Status: DISCONTINUED | OUTPATIENT
Start: 2023-07-20 | End: 2023-07-21 | Stop reason: HOSPADM

## 2023-07-20 RX ORDER — IBUPROFEN 200 MG
24 TABLET ORAL
Status: DISCONTINUED | OUTPATIENT
Start: 2023-07-20 | End: 2023-07-21 | Stop reason: HOSPADM

## 2023-07-20 RX ORDER — HEPARIN SODIUM 5000 [USP'U]/ML
5000 INJECTION, SOLUTION INTRAVENOUS; SUBCUTANEOUS EVERY 8 HOURS
Status: DISCONTINUED | OUTPATIENT
Start: 2023-07-20 | End: 2023-07-21 | Stop reason: HOSPADM

## 2023-07-20 RX ORDER — FLUMAZENIL 0.1 MG/ML
0.2 INJECTION INTRAVENOUS ONCE
Status: COMPLETED | OUTPATIENT
Start: 2023-07-20 | End: 2023-07-20

## 2023-07-20 RX ORDER — LABETALOL HYDROCHLORIDE 5 MG/ML
20 INJECTION, SOLUTION INTRAVENOUS EVERY 4 HOURS PRN
Status: DISCONTINUED | OUTPATIENT
Start: 2023-07-20 | End: 2023-07-20

## 2023-07-20 RX ORDER — NALOXONE HCL 0.4 MG/ML
VIAL (ML) INJECTION
Status: COMPLETED
Start: 2023-07-20 | End: 2023-07-20

## 2023-07-20 RX ORDER — ROPIVACAINE HYDROCHLORIDE 5 MG/ML
INJECTION, SOLUTION EPIDURAL; INFILTRATION; PERINEURAL
Status: COMPLETED | OUTPATIENT
Start: 2023-07-20 | End: 2023-07-20

## 2023-07-20 RX ORDER — HEPARIN SODIUM 10000 [USP'U]/ML
INJECTION, SOLUTION INTRAVENOUS; SUBCUTANEOUS
Status: DISCONTINUED | OUTPATIENT
Start: 2023-07-20 | End: 2023-07-20 | Stop reason: HOSPADM

## 2023-07-20 RX ORDER — NALOXONE HYDROCHLORIDE 0.4 MG/ML
0.4 INJECTION, SOLUTION INTRAMUSCULAR; INTRAVENOUS; SUBCUTANEOUS ONCE
Status: COMPLETED | OUTPATIENT
Start: 2023-07-20 | End: 2023-07-20

## 2023-07-20 RX ORDER — LEVOTHYROXINE SODIUM 75 UG/1
75 TABLET ORAL DAILY
Status: DISCONTINUED | OUTPATIENT
Start: 2023-07-20 | End: 2023-07-21 | Stop reason: HOSPADM

## 2023-07-20 RX ORDER — FLUMAZENIL 0.1 MG/ML
0.1 INJECTION INTRAVENOUS ONCE
Status: DISCONTINUED | OUTPATIENT
Start: 2023-07-20 | End: 2023-07-20 | Stop reason: HOSPADM

## 2023-07-20 RX ORDER — ONDANSETRON 2 MG/ML
4 INJECTION INTRAMUSCULAR; INTRAVENOUS EVERY 8 HOURS PRN
Status: DISCONTINUED | OUTPATIENT
Start: 2023-07-20 | End: 2023-07-21 | Stop reason: HOSPADM

## 2023-07-20 RX ORDER — LIDOCAINE HYDROCHLORIDE 10 MG/ML
1 INJECTION, SOLUTION EPIDURAL; INFILTRATION; INTRACAUDAL; PERINEURAL ONCE
Status: DISCONTINUED | OUTPATIENT
Start: 2023-07-20 | End: 2023-07-21 | Stop reason: HOSPADM

## 2023-07-20 RX ORDER — ACETAMINOPHEN 325 MG/1
650 TABLET ORAL EVERY 4 HOURS PRN
Status: DISCONTINUED | OUTPATIENT
Start: 2023-07-20 | End: 2023-07-21 | Stop reason: HOSPADM

## 2023-07-20 RX ORDER — HYDROMORPHONE HYDROCHLORIDE 2 MG/ML
0.4 INJECTION, SOLUTION INTRAMUSCULAR; INTRAVENOUS; SUBCUTANEOUS EVERY 5 MIN PRN
Status: DISCONTINUED | OUTPATIENT
Start: 2023-07-20 | End: 2023-07-20 | Stop reason: HOSPADM

## 2023-07-20 RX ORDER — SODIUM CHLORIDE 0.9 % (FLUSH) 0.9 %
10 SYRINGE (ML) INJECTION EVERY 8 HOURS PRN
Status: DISCONTINUED | OUTPATIENT
Start: 2023-07-20 | End: 2023-07-21 | Stop reason: HOSPADM

## 2023-07-20 RX ORDER — INSULIN ASPART 100 [IU]/ML
0-5 INJECTION, SOLUTION INTRAVENOUS; SUBCUTANEOUS EVERY 6 HOURS PRN
Status: DISCONTINUED | OUTPATIENT
Start: 2023-07-20 | End: 2023-07-21 | Stop reason: HOSPADM

## 2023-07-20 RX ORDER — SODIUM BICARBONATE 650 MG/1
650 TABLET ORAL 3 TIMES DAILY
Status: DISCONTINUED | OUTPATIENT
Start: 2023-07-20 | End: 2023-07-21 | Stop reason: HOSPADM

## 2023-07-20 RX ORDER — OXYCODONE HYDROCHLORIDE 5 MG/1
5 TABLET ORAL
Status: DISCONTINUED | OUTPATIENT
Start: 2023-07-20 | End: 2023-07-20 | Stop reason: HOSPADM

## 2023-07-20 RX ORDER — SODIUM CHLORIDE 9 MG/ML
INJECTION, SOLUTION INTRAVENOUS CONTINUOUS
Status: DISCONTINUED | OUTPATIENT
Start: 2023-07-20 | End: 2023-07-21 | Stop reason: HOSPADM

## 2023-07-20 RX ORDER — ATORVASTATIN CALCIUM 20 MG/1
80 TABLET, FILM COATED ORAL DAILY
Status: DISCONTINUED | OUTPATIENT
Start: 2023-07-20 | End: 2023-07-21 | Stop reason: HOSPADM

## 2023-07-20 RX ORDER — PROPOFOL 10 MG/ML
VIAL (ML) INTRAVENOUS
Status: DISCONTINUED | OUTPATIENT
Start: 2023-07-20 | End: 2023-07-20

## 2023-07-20 RX ORDER — EPHEDRINE SULFATE 50 MG/ML
10 INJECTION, SOLUTION INTRAVENOUS ONCE
Status: DISCONTINUED | OUTPATIENT
Start: 2023-07-20 | End: 2023-07-20

## 2023-07-20 RX ORDER — PROCHLORPERAZINE EDISYLATE 5 MG/ML
5 INJECTION INTRAMUSCULAR; INTRAVENOUS EVERY 30 MIN PRN
Status: DISCONTINUED | OUTPATIENT
Start: 2023-07-20 | End: 2023-07-20 | Stop reason: HOSPADM

## 2023-07-20 RX ORDER — CEFAZOLIN SODIUM 1 G/3ML
2 INJECTION, POWDER, FOR SOLUTION INTRAMUSCULAR; INTRAVENOUS
Status: COMPLETED | OUTPATIENT
Start: 2023-07-20 | End: 2023-07-20

## 2023-07-20 RX ORDER — SODIUM CHLORIDE 0.9 % (FLUSH) 0.9 %
3 SYRINGE (ML) INJECTION
Status: DISCONTINUED | OUTPATIENT
Start: 2023-07-20 | End: 2023-07-20 | Stop reason: HOSPADM

## 2023-07-20 RX ORDER — LANOLIN ALCOHOL/MO/W.PET/CERES
400 CREAM (GRAM) TOPICAL ONCE
Status: DISCONTINUED | OUTPATIENT
Start: 2023-07-20 | End: 2023-07-20

## 2023-07-20 RX ORDER — SODIUM CHLORIDE, SODIUM LACTATE, POTASSIUM CHLORIDE, CALCIUM CHLORIDE 600; 310; 30; 20 MG/100ML; MG/100ML; MG/100ML; MG/100ML
INJECTION, SOLUTION INTRAVENOUS CONTINUOUS
Status: DISCONTINUED | OUTPATIENT
Start: 2023-07-20 | End: 2023-07-21 | Stop reason: HOSPADM

## 2023-07-20 RX ORDER — IBUPROFEN 200 MG
16 TABLET ORAL
Status: DISCONTINUED | OUTPATIENT
Start: 2023-07-20 | End: 2023-07-21 | Stop reason: HOSPADM

## 2023-07-20 RX ORDER — FLUMAZENIL 0.1 MG/ML
INJECTION INTRAVENOUS
Status: COMPLETED
Start: 2023-07-20 | End: 2023-07-20

## 2023-07-20 RX ORDER — GLUCAGON 1 MG
1 KIT INJECTION
Status: DISCONTINUED | OUTPATIENT
Start: 2023-07-20 | End: 2023-07-21 | Stop reason: HOSPADM

## 2023-07-20 RX ADMIN — EPHEDRINE SULFATE 10 MG: 50 INJECTION INTRAVENOUS at 11:07

## 2023-07-20 RX ADMIN — GLYCOPYRROLATE 0.2 MG: 0.2 INJECTION, SOLUTION INTRAMUSCULAR; INTRAVITREAL at 10:07

## 2023-07-20 RX ADMIN — FLUMAZENIL 0.2 MG: 0.1 INJECTION, SOLUTION INTRAVENOUS at 11:07

## 2023-07-20 RX ADMIN — PROPOFOL 50 MG: 10 INJECTION, EMULSION INTRAVENOUS at 08:07

## 2023-07-20 RX ADMIN — FLUMAZENIL 0.1 MG: 0.1 INJECTION, SOLUTION INTRAVENOUS at 11:07

## 2023-07-20 RX ADMIN — LIDOCAINE HYDROCHLORIDE 100 MG: 20 INJECTION, SOLUTION INTRAVENOUS at 08:07

## 2023-07-20 RX ADMIN — Medication 400 MG: at 08:07

## 2023-07-20 RX ADMIN — Medication 30 MG: at 09:07

## 2023-07-20 RX ADMIN — NALXONE HYDROCHLORIDE 0.4 MG: 0.4 INJECTION INTRAMUSCULAR; INTRAVENOUS; SUBCUTANEOUS at 10:07

## 2023-07-20 RX ADMIN — ACETAMINOPHEN 1000 MG: 500 TABLET ORAL at 07:07

## 2023-07-20 RX ADMIN — NALOXONE HYDROCHLORIDE 0.4 MG: 0.4 INJECTION, SOLUTION INTRAMUSCULAR; INTRAVENOUS; SUBCUTANEOUS at 10:07

## 2023-07-20 RX ADMIN — CEFAZOLIN 2 G: 330 INJECTION, POWDER, FOR SOLUTION INTRAMUSCULAR; INTRAVENOUS at 08:07

## 2023-07-20 RX ADMIN — ATROPINE SULFATE 0.4 MG: 0.4 INJECTION, SOLUTION INTRAMUSCULAR; INTRAVENOUS; SUBCUTANEOUS at 11:07

## 2023-07-20 RX ADMIN — PROPOFOL 150 MCG/KG/MIN: 10 INJECTION, EMULSION INTRAVENOUS at 08:07

## 2023-07-20 RX ADMIN — BUPIVACAINE HYDROCHLORIDE 20 ML: 2.5 INJECTION, SOLUTION EPIDURAL; INFILTRATION; INTRACAUDAL; PERINEURAL at 08:07

## 2023-07-20 RX ADMIN — FLUMAZENIL 0.2 MG: 0.1 INJECTION INTRAVENOUS at 11:07

## 2023-07-20 RX ADMIN — GLYCOPYRROLATE 0.1 MG: 0.2 INJECTION, SOLUTION INTRAMUSCULAR; INTRAVITREAL at 09:07

## 2023-07-20 RX ADMIN — FENTANYL CITRATE 100 MCG: 50 INJECTION, SOLUTION INTRAMUSCULAR; INTRAVENOUS at 07:07

## 2023-07-20 RX ADMIN — ROPIVACAINE HYDROCHLORIDE 30 ML: 5 INJECTION, SOLUTION EPIDURAL; INFILTRATION; PERINEURAL at 08:07

## 2023-07-20 RX ADMIN — SODIUM CHLORIDE: 0.9 INJECTION, SOLUTION INTRAVENOUS at 07:07

## 2023-07-20 RX ADMIN — Medication 40 MG: at 08:07

## 2023-07-20 RX ADMIN — HEPARIN SODIUM 5000 UNITS: 5000 INJECTION INTRAVENOUS; SUBCUTANEOUS at 09:07

## 2023-07-20 RX ADMIN — SODIUM BICARBONATE 650 MG TABLET 650 MG: at 08:07

## 2023-07-20 RX ADMIN — ONDANSETRON HYDROCHLORIDE 4 MG: 2 INJECTION INTRAMUSCULAR; INTRAVENOUS at 08:07

## 2023-07-20 NOTE — ASSESSMENT & PLAN NOTE
Followed by [facility], Dr. Baker.  Admitted for vascular access given progression to ESRD.  -Consult Dr. Baker  -Continue with Na bicarb  -Renal dose medications and avoid nephrotoxins

## 2023-07-20 NOTE — ASSESSMENT & PLAN NOTE
Mildly hypotensive post-op  Home Meds:  Diltiazem CD 240mg, Hydralazine 50mg tid, Carvedilol 3.125mg bid  -Hold home meds for now to allow for permissive hypertension until stroke ruled out  -IV Hydralazine prn SBP >220 - HR too low for Labetalol

## 2023-07-20 NOTE — SUBJECTIVE & OBJECTIVE
Past Medical History:   Diagnosis Date    Cancer     bladder ca and prostate ca     Diabetes mellitus     Hypertension     Stroke     Thyroid disease        Past Surgical History:   Procedure Laterality Date    ABLATION      for atrial fibrilation    HEMICOLECTOMY W/ OSTOMY      PORTACATH PLACEMENT Right     chest wall       Review of patient's allergies indicates:   Allergen Reactions    Irbesartan      hyperkalemia    Zolpidem Other (See Comments)     Causes pt to hallucinate         No current facility-administered medications on file prior to encounter.     Current Outpatient Medications on File Prior to Encounter   Medication Sig    carvediloL (COREG) 3.125 MG tablet Take 3.125 mg by mouth 2 (two) times daily.    cholecalciferol, vitamin D3, 25 mcg (1,000 unit) Chew once daily.    cyanocobalamin (VITAMIN B-12) 1000 MCG tablet Take 100 mcg by mouth.    diltiaZEM (CARDIZEM CD) 240 MG 24 hr capsule Take 240 mg by mouth once daily.    ferrous sulfate (FEOSOL) 325 mg (65 mg iron) Tab tablet Take 325 mg by mouth daily with breakfast.    furosemide (LASIX) 20 MG tablet Take 20 mg by mouth daily as needed. 1 tablet 3x/ week    hydrALAZINE (APRESOLINE) 50 MG tablet Take 50 mg by mouth 3 (three) times daily. 100mg qam , 50 at noon and 100mg at bedtime    levothyroxine (SYNTHROID) 75 MCG tablet Take 75 mcg by mouth once daily.    multivitamin (THERAGRAN) per tablet Take 1 tablet by mouth once daily.    REPATHA SYRINGE 140 mg/mL Syrg Inject 140 mg into the skin.    rosuvastatin (CRESTOR) 40 MG Tab Take 10 mg by mouth every evening.    sodium bicarbonate 650 MG tablet 650 mg 3 (three) times daily.     Family History    None       Tobacco Use    Smoking status: Former     Types: Cigarettes    Smokeless tobacco: Never   Substance and Sexual Activity    Alcohol use: Never    Drug use: Never    Sexual activity: Not on file     Review of Systems   Unable to perform ROS: Intubated   Objective:     Vital Signs (Most  Recent):  Temp: 97 °F (36.1 °C) (07/20/23 1037)  Pulse: (!) 59 (07/20/23 1312)  Resp: 10 (07/20/23 1312)  BP: 134/61 (07/20/23 1312)  SpO2: 98 % (07/20/23 1312) Vital Signs (24h Range):  Temp:  [97 °F (36.1 °C)-98.1 °F (36.7 °C)] 97 °F (36.1 °C)  Pulse:  [41-69] 59  Resp:  [10-16] 10  SpO2:  [87 %-100 %] 98 %  BP: ()/(49-79) 134/61        There is no height or weight on file to calculate BMI.     Physical Exam  Constitutional:       General: He is not in acute distress.     Appearance: He is obese. He is not ill-appearing or toxic-appearing.   HENT:      Head: Normocephalic and atraumatic.      Right Ear: External ear normal.      Left Ear: External ear normal.      Nose: Nose normal.      Mouth/Throat:      Mouth: Mucous membranes are moist.      Pharynx: Oropharynx is clear.   Eyes:      General: No scleral icterus.     Extraocular Movements: Extraocular movements intact.      Pupils: Pupils are equal, round, and reactive to light.   Cardiovascular:      Rate and Rhythm: Normal rate and regular rhythm.      Pulses: Normal pulses.      Heart sounds: Normal heart sounds. No murmur heard.  Pulmonary:      Effort: Pulmonary effort is normal.      Breath sounds: Normal breath sounds. No wheezing or rales.      Comments: Intubated  Abdominal:      General: Bowel sounds are normal. There is no distension.      Palpations: Abdomen is soft.      Tenderness: There is no abdominal tenderness.   Genitourinary:     Comments: Ileal Conduit  Musculoskeletal:      Cervical back: Normal range of motion and neck supple. No rigidity.      Right lower leg: No edema.      Left lower leg: No edema.   Skin:     General: Skin is warm and dry.      Findings: No rash.   Neurological:      Mental Status: He is alert.      Comments: Eyes open, follows commands, nods to questions   Psychiatric:      Comments: Unable to fully assess        Significant Labs: All pertinent labs within the past 24 hours have been reviewed.    Significant  Imaging: I have reviewed all pertinent imaging results/findings within the past 24 hours.

## 2023-07-20 NOTE — PROGRESS NOTES
Pt was placed on BiPAP per Dr Sharan Quick at 1135 . Pt was taken off BiPAP and intubated at 1155 with and 8.0 Ett tube secured at 22 cm at the lips. Pt was placed on ventilator  with alarms on and functioning. Ambu bag and mask at head of bed.Will continue to monitor.

## 2023-07-20 NOTE — HPI
Patient is a 67 year old male with a PMH significant for CVA with residual left weakness (2015), HTN, HLD, pAfib, RBBB, T2D with Neuropathy, CAD, Bladder Cancer s/p resection with Ileal Conduit, Diabetic Foot, CKD4 approaching ESRD, Hypothyroidism, LE DVT, Prostate Cancer s/p Prostatectomy, Obesity who was admitted for Left Isabell fistula, side of cephalic vein to radial artery at the wrist.  Patient stable initially post-op in PACU, but became unresponsive with periods of apnea and Pox dropping to 80s with bradycardia in 40s.  ABG done with pH of 7.250 with pCO2 of 60.2.  Patient placed initially on BiPAP, but ultimately intubate.  Code Stroke called and CTH showed no convincing acute intracranial abnormalities noting sequela of chronic microvascular ischemic change and senescent change.  Evaluated by Tele-Stroke and recommended for MRA of H/N.  Patient awoke about 2 hours post-event following commands and Vitals Stable.  Remains intubated.

## 2023-07-20 NOTE — ANESTHESIA POSTPROCEDURE EVALUATION
Anesthesia Post Evaluation    Patient: Arthur Menjivar    Procedure(s) Performed: Procedure(s) (LRB):  [id number] / LEFT UPPER EXTROMITY (Left)    Final Anesthesia Type: regional      Patient location during evaluation: ICU  Patient participation: No - Unable to Participate, Intubation  Level of consciousness: obtunded/minimal responses  Post-procedure vital signs: reviewed and stable  Pain management: adequate  Airway patency: patent    PONV status at discharge: No PONV  Anesthetic complications: yes  Perioperative Events: unplanned ICU admission      Yusra-operative Events Comments: Pt had to be intubated for unarousable state in PACU after a regional anesthetic for Isabell fistula. Reversal agents of Narcan and Romazicon unsuccessful- Placed on BiPap after ABG showed CO2 of 60- Pt remained obtunded-Intubated and placed on Vent- multiple brain/head scans all negative-unclear at this time etiology of lethargy. Will follow up in ICU.  Cardiovascular status: blood pressure returned to baseline  Respiratory status: intubated  Hydration status: euvolemic  Follow-up not needed.          Vitals Value Taken Time   /81 07/20/23 1615   Temp 36.1 °C (97 °F) 07/20/23 1037   Pulse 67 07/20/23 1712   Resp 18 07/20/23 1712   SpO2 100 % 07/20/23 1712   Vitals shown include unvalidated device data.      Event Time   Out of Recovery 12:50:00         Pain/Alverto Score: Pain Rating Prior to Med Admin: 0 (7/20/2023  7:15 AM)  Alverto Score: 4 (7/20/2023 12:30 PM)

## 2023-07-20 NOTE — ANESTHESIA PROCEDURE NOTES
Left costoclavicular    Patient location during procedure: holding area    Reason for block: primary anesthetic    Diagnosis: renal failure   Timeout: 7/20/2023 7:55 AM   End time: 7/20/2023 8:00 AM    Staffing  Authorizing Provider: Sharan Quick MD  Performing Provider: Sharan Quick MD    Staffing  Other anesthesia staff: Sharan Quick MD  Preanesthetic Checklist  Completed: patient identified, IV checked, site marked, risks and benefits discussed, surgical consent, monitors and equipment checked, pre-op evaluation and timeout performed  Peripheral Block  Patient position: supine  Prep: ChloraPrep  Patient monitoring: heart rate, cardiac monitor, continuous pulse ox and frequent blood pressure checks  Block type: infraclavicular  Laterality: left  Injection technique: single shot  Needle  Needle type: Echogenic   Needle gauge: 20 G  Needle length: 4 in  Needle localization: ultrasound guidance and anatomical landmarks   -ultrasound image captured on disc.  Assessment  Injection assessment: negative aspiration, negative parasthesia and local visualized surrounding nerve  Paresthesia pain: none  Heart rate change: no  Slow fractionated injection: yes  Pain Tolerance: comfortable throughout block and no complaints  Medications:    Medications: ropivacaine (NAROPIN) injection 0.5% - Perineural   30 mL - 7/20/2023 8:00:00 AM    Additional Notes  Costoclavicular approach to infraclavicular block

## 2023-07-20 NOTE — H&P
History & Physical     SUBJECTIVE:      History of Present Illness:  Patient is a 67 y.o. male presents with end-stage renal disease in need of permanent access.  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  Vascular intact.  History of diabetes mellitus and hypertension.  History of right-sided PowerPort x2.  Carcinoma of the bladder and prostate.  Right bundle branch block, atherosclerotic cardiovascular disease.  Paroxysmal atrial fibrillation          Chief Complaint   Patient presents with    Other       access               Review of patient's allergies indicates:   Allergen Reactions    Irbesartan         hyperkalemia    Zolpidem Other (See Comments)       Causes pt to hallucinate                   Current Outpatient Medications   Medication Sig Dispense Refill    carvediloL (COREG) 3.125 MG tablet Take 3.125 mg by mouth 2 (two) times daily.        cholecalciferol, vitamin D3, 25 mcg (1,000 unit) Chew once daily.        cyanocobalamin (VITAMIN B-12) 1000 MCG tablet Take 100 mcg by mouth.        diltiaZEM (CARDIZEM CD) 240 MG 24 hr capsule Take 240 mg by mouth once daily.        FARXIGA 10 mg tablet Take 10 mg by mouth.        ferrous sulfate (FEOSOL) 325 mg (65 mg iron) Tab tablet Take 325 mg by mouth daily with breakfast.        furosemide (LASIX) 20 MG tablet Take 20 mg by mouth daily as needed. 1 tablet 3x/ week        garlic 100 mg Tab Take by mouth.        hydrALAZINE (APRESOLINE) 50 MG tablet Take 50 mg by mouth 3 (three) times daily. 50mg qam , at noon and 100mg at bedtime        levothyroxine (SYNTHROID) 75 MCG tablet Take 75 mcg by mouth once daily.        multivitamin (THERAGRAN) per tablet Take 1 tablet by mouth once daily.        REPATHA SYRINGE 140 mg/mL Syrg Inject 140 mg into the skin.        rosuvastatin (CRESTOR) 40 MG Tab Take 10 mg by mouth every evening.        sodium bicarbonate 650 MG tablet 650 mg 3 (three) times daily.          No current  "facility-administered medications for this visit.              Past Medical History:   Diagnosis Date    Cancer       bladder ca and prostate ca     Diabetes mellitus      Hypertension      Stroke      Thyroid disease        History reviewed. No pertinent surgical history.  History reviewed. No pertinent family history.  Social History            Tobacco Use    Smoking status: Former       Types: Cigarettes    Smokeless tobacco: Never   Substance Use Topics    Alcohol use: Yes         Review of Systems:  Review of Systems   Constitutional: Negative.  Negative for fatigue and fever.   HENT: Negative.  Negative for nosebleeds, sore throat and trouble swallowing.    Eyes: Negative.    Respiratory: Negative.     Cardiovascular: Negative.  Negative for chest pain.   Gastrointestinal: Negative.    Genitourinary: Negative.    Musculoskeletal: Negative.    Skin: Negative.    Neurological: Negative.    Psychiatric/Behavioral: Negative.        OBJECTIVE:      Vital Signs (Most Recent)  Pulse: 77 (06/26/23 0906)  BP: (!) 148/82 (06/26/23 0906)  SpO2: 99 % (06/26/23 0906)  5' 11" (1.803 m)  99.8 kg (220 lb)      Physical Exam:  Physical Exam  Constitutional:       General: He is not in acute distress.     Appearance: Normal appearance. He is normal weight. He is not ill-appearing, toxic-appearing or diaphoretic.   HENT:      Head: Normocephalic and atraumatic.   Eyes:      General: No scleral icterus.        Right eye: No discharge.         Left eye: No discharge.      Extraocular Movements: Extraocular movements intact.      Conjunctiva/sclera: Conjunctivae normal.   Neck:      Vascular: No carotid bruit.   Cardiovascular:      Rate and Rhythm: Normal rate and regular rhythm.      Pulses: Normal pulses.      Heart sounds: Normal heart sounds. No murmur heard.    No friction rub. No gallop.   Pulmonary:      Effort: Pulmonary effort is normal. No respiratory distress.      Breath sounds: Normal breath sounds. No stridor. No " wheezing, rhonchi or rales.   Chest:      Chest wall: No tenderness.   Abdominal:      General: Bowel sounds are normal. There is no distension.      Palpations: Abdomen is soft. There is no mass.      Tenderness: There is no abdominal tenderness. There is no guarding or rebound.      Hernia: No hernia is present.      Comments: Ileal conduit   Musculoskeletal:         General: No swelling, tenderness, deformity or signs of injury. Normal range of motion.      Cervical back: Normal range of motion and neck supple. No rigidity or tenderness.      Right lower leg: No edema.      Left lower leg: No edema.   Lymphadenopathy:      Cervical: No cervical adenopathy.   Skin:     General: Skin is warm and dry.      Coloration: Skin is not jaundiced or pale.      Findings: No bruising, erythema, lesion or rash.   Neurological:      General: No focal deficit present.      Mental Status: He is alert and oriented to person, place, and time.      Motor: No weakness.      Coordination: Coordination normal.      Gait: Gait normal.   Psychiatric:         Mood and Affect: Mood normal.         Behavior: Behavior normal.         Thought Content: Thought content normal.         Judgment: Judgment normal.            ASSESSMENT/PLAN:      For AVF/G placement left upper extremity.

## 2023-07-20 NOTE — ASSESSMENT & PLAN NOTE
Patient with Hypoxic Respiratory failure which is Acute on chronic.  he is not on home oxygen. Supplemental oxygen was provided and noted- Vent Mode: A/C  Oxygen Concentration (%):  [30] 30  Resp Rate Total:  [14 br/min-19 br/min] 19 br/min  Vt Set:  [500 mL] 500 mL  PEEP/CPAP:  [5 cmH20] 5 cmH20  Mean Airway Pressure:  [8.4 cmH20] 8.4 cmH20    .   Signs/symptoms of respiratory failure include- lethargy. Contributing diagnoses includes - ESRD Labs and images were reviewed. Patient Has recent ABG, which has been reviewed. Will treat underlying causes and adjust management of respiratory failure as follows:    It appears patient may have inadvertently received a depot of anesthesia with a non-functioning IV after his surgery in the PACU area causing his altered mental status. The patient came back to his baseline within 2 hours and was successfully extubated around 1800. He will be watching in the ICU overnight for any further signs of altered mentation.  - close monitoring in the ICU

## 2023-07-20 NOTE — ANESTHESIA PROCEDURE NOTES
Left serratus plane block    Patient location during procedure: holding area    Reason for block: primary anesthetic    Diagnosis: renal failure   Start time: 7/20/2023 8:01 AM  Timeout: 7/20/2023 7:55 AM   End time: 7/20/2023 8:05 AM    Staffing  Authorizing Provider: Sharan Quick MD  Performing Provider: Sharan Quick MD    Preanesthetic Checklist  Completed: patient identified, IV checked, site marked, risks and benefits discussed, surgical consent, monitors and equipment checked, pre-op evaluation and timeout performed  Peripheral Block  Patient position: supine  Prep: ChloraPrep and site prepped and draped  Patient monitoring: heart rate and continuous pulse ox  Block type: Serratus Plane  Laterality: left  Injection technique: single shot  Needle  Needle type: Echogenic   Needle gauge: 20 G  Needle length: 4 in  Needle localization: anatomical landmarks and ultrasound guidance   -ultrasound image captured on disc.  Assessment  Injection assessment: negative aspiration, negative parasthesia and local visualized surrounding nerve  Paresthesia pain: none  Heart rate change: no  Slow fractionated injection: yes  Pain Tolerance: comfortable throughout block and no complaints  Medications:    Medications: bupivacaine (pf) (MARCAINE) injection 0.25% - Perineural   20 mL - 7/20/2023 8:05:00 AM    Additional Notes  Local visualized to spread between internal oblique and transversus abdominis  Each side injected with 20 cc 0.25% Marcaine plus 10 cc Exparel

## 2023-07-20 NOTE — ASSESSMENT & PLAN NOTE
Patient admitted for Left Isabell fistula, side of cephalic vein to radial artery at the wrist.  Patient stable initially post-op in PACU, but became unresponsive with periods of apnea and Pox dropping to 80s with bradycardia in 40s.  ABG done with pH of 7.250 with pCO2 of 60.2.  Patient placed initially on BiPAP, but ultimately intubate.  Code Stroke called and CTH showed no convincing acute intracranial abnormalities noting sequela of chronic microvascular ischemic change and senescent change.  Evaluated by Tele-Stroke and recommended for MRA of H/N.  Patient awoke about 2 hours post-event following commands and Vitals Stable.  Remains intubated.  Possibly over-sedated.      Plan:  Pulmonary-CC consulted for vent management.  Follow up with MRA of H/N  DuoNebs prn  Check CXR  CMP, CBC, TSH, Trend Trops

## 2023-07-20 NOTE — PLAN OF CARE
Problem: Adult Inpatient Plan of Care  Goal: Plan of Care Review  Outcome: Ongoing, Progressing  Flowsheets (Taken 7/20/2023 1733)  Plan of Care Reviewed With:   patient   spouse     Problem: Fall Injury Risk  Goal: Absence of Fall and Fall-Related Injury  Outcome: Ongoing, Progressing  Intervention: Identify and Manage Contributors  Flowsheets (Taken 7/20/2023 1733)  Medication Review/Management: medications reviewed     Problem: Restraint, Nonbehavioral (Nonviolent)  Goal: Absence of Harm or Injury  Outcome: Ongoing, Progressing  Intervention: Implement Least Restrictive Safety Strategies  Flowsheets (Taken 7/20/2023 1733)  Less Restrictive Alternative:   1:1 observation maintained   bed alarm in use   surveillance provided  De-Escalation Techniques: 1:1 observation initiated     Problem: Skin Injury Risk Increased  Goal: Skin Health and Integrity  Outcome: Ongoing, Progressing  Intervention: Optimize Skin Protection  Flowsheets (Taken 7/20/2023 1733)  Skin Protection:   adhesive use limited   incontinence pads utilized   tubing/devices free from skin contact   transparent dressing maintained  Head of Bed (HOB) Positioning: HOB elevated

## 2023-07-20 NOTE — OR NURSING
Patient arrived in PACU at 1037. Patient aroused to pain, O2 sats 99% on room air. Unable to answer questions. Patient IV infiltrated, new PIV started by RN.     Approximately 1045, patient became unresponsive, with periods of apnea and desaturations into low 80s, bradycardic (HR 43). Dr. Quick at bedside. New orders noted, see MAR for medications administered. Respiratory at bedside, ABG drawn, patient placed on BiPap.     1125 patient normotensive (118/56), HR 63, satting 100% on 30% Fi02 bipap. Still difficult to arouse. Dr. Arcos at bedside and updated family on plan of care.     Patient on bipap from 1125 to approximately 1150 with no change in neuro assessment. Decision made to intubate patient at 1152 per anesthesia. Dr. Arcos and Dr. Quick at bedside. ETT secured at 22cm at the lips.     1237 pulmonary critical care Dr. Bishop at bedside. Code stroke activated by RN due to patient condition not improving. Pupils responsive, gag reflex present, but unable to adequately assess for neuro deficits. Code stroke team at bedside, patient transported to CT.

## 2023-07-20 NOTE — CONSULTS
Telestroke Consultation converted to phone consult due to  patient intubated, unable to accurately assess neurologic exam . Discussed w/ Dr. Arcos @ 1302.    67M with a PMHx significant for ESRD, prior stroke with residual LUE weakness presenting for creation of LUE AV fistula. Was given local anesthesia. Arrived in PACU unresponsive, arousable to pain, unable to answer questions. Then developed apnea and desat to 80s. /56. Placed on BiPAP and later intubated. No focal findings, no unilateral weakness (other than LUE weakness from anesthetic block), no facial droop, gaze deviation. Brainstem reflexes intact per primary team.     NCCTH reviewed with no acute intracranial hemorrhage or evidence of new infarction. Plan to defer IV thrombolysis at this time given no lateralizing symptoms.    - Recommend an expedited CTA head/neck to evaluate for potential symptomatic stenosis/occlusive lesion that might significantly increase risk of recurrent or worsening signs/symptoms.   - If the CTA h/n is negative for large vessel occlusion, recommend follow-up non-urgent MRI brain without contrast to evaluate for acute ischemia.  - If above studies are abnormal, please load images to teleneurology imaging system and contact us to review.    Please contact us with any further questions or concerns or if patient has any acute neurological changes (new symptoms, worsening deficits).    Total time spent on encounter including chart review, discussing of imaging, triage & evaluation and planning for diagnostics and management ~ 10-15 minutes, >50% of this time was spent communicating with consulting provider.    Sandhya Mcfadden MD, PhD  Vascular Neurology

## 2023-07-20 NOTE — HPI
67 year old male with a PMH significant for CVA with residual left weakness (2015), HTN, HLD, pAfib, RBBB, T2D with Neuropathy, CAD, Bladder Cancer s/p resection with Ileal Conduit, Diabetic Foot, CKD4 approaching ESRD, Hypothyroidism, LE DVT, Prostate Cancer s/p Prostatectomy, Obesity who was admitted for Left Isabell fistula, side of cephalic vein to radial artery at the wrist.  Patient stable initially post-op in PACU, but became unresponsive with periods of apnea and Pox dropping to 80s with bradycardia in 40s.  ABG done with pH of 7.250 with pCO2 of 60.2.  Patient placed initially on BiPAP, but ultimately intubate.  Code Stroke called and CTH showed no convincing acute intracranial abnormalities noting sequela of chronic microvascular ischemic change and senescent change.  Evaluated by Tele-Stroke and recommended for MRA of H/N.      ICU was consulted for ventilator and airway management.

## 2023-07-20 NOTE — PROGRESS NOTES
67-year-old male with CKD/ESRD not yet on hemodialysis.  Past medical history of cerebrovascular accident and partial loss of use left hand.  History of diabetes mellitus, hypertension, carcinoma of the bladder and prostate.  Right bundle branch block and paroxysmal atrial fibrillation.  Patient underwent construction of a semi no fistula of the left wrist under regional anesthesia.  Patient with parent normal neurologic exam at the end of the procedure.  Talking and responding appropriately to questions.  In no apparent distress.  Patient became minimally responsive with apnea and desaturation resulting in use of BiPAP and subsequent intubation.  Hospital medicine/critical Care Medicine consulted and code stroke activation begun.  Patient to CT scan.  I have spoken to patient's family to update his status.

## 2023-07-20 NOTE — TRANSFER OF CARE
Anesthesia Transfer of Care Note    Patient: Arthur Menjivar    Procedure(s) Performed: Procedure(s) (LRB):  [id number] / LEFT UPPER EXTROMITY (Left)    Patient location: PACU    Anesthesia Type: general    Transport from OR: Transported from OR on room air with adequate spontaneous ventilation    Post pain: adequate analgesia    Post assessment: no apparent anesthetic complications and tolerated procedure well    Post vital signs: stable    Level of consciousness: awake and responds to stimulation    Nausea/Vomiting: no nausea/vomiting    Complications: none    Transfer of care protocol was followed      Last vitals:   Visit Vitals  BP (!) 148/79 (BP Location: Right arm, Patient Position: Sitting)   Pulse 69   Temp 36.1 °C (97 °F) (Temporal)   Resp 16   SpO2 97%

## 2023-07-20 NOTE — OP NOTE
[facility] - Surgery ([place])  Operative Note    SUMMARY     Surgery Date: 7/20/2023     Surgeon(s) and Role:     * Roque Arcos Jr., MD - Primary    Assisting Surgeon: None    Pre-op Diagnosis:  End stage renal disease [N18.6]  Encounter for extracorporeal dialysis [Z99.2]    Post-op Diagnosis:  Post-Op Diagnosis Codes:     * End stage renal disease [N18.6]     * Encounter for extracorporeal dialysis [Z99.2]    Procedure(s) (LRB):  [id number] / LEFT UPPER EXTROMITY (Left)Isabell fistula, side of cephalic vein to radial artery at the wrist    Anesthesia: Regional    Description of Procedure:  The patient was brought to the operating room and placed in supine position.  The left upper extremity prepped and draped in a sterile fashion.  A longitudinal incision made along the distal radius between the cephalic vein in the radial artery.  Using sharp dissection was carried down through the subcutaneous tissues and the radial artery and cephalic vein identified and isolated.  Branches ligated with 3-0 Vicryl ties and hemoclips.  The distal vein was ligated and then transected.  The vein irrigated without evidence of increased resistance.  A longitudinal incision made in the radial artery and the vein cut to an appropriate length and configuration.  The vein and sewn end-to-side to the artery with running 6. [place] sutures.  After hemostasis been obtained the wounds were closed in layers.  The subcutaneous tissue with interrupted 3-0 Vicryl and the skin with running 4-0 Monocryl.  The wound sterilely dressed.  The patient tolerated the procedure well and left the operating room in good condition.  At the end of the procedure all sponge lap and instrument counts were correct.  Estimated blood loss-minimal    Estimated Blood Loss:  Minimal         Specimens:   Specimen (24h ago, onward)      None                SUMMARY     Admit Date:     Discharge Date and Time:  07/20/2023 10:39 AM    Hospital Course (synopsis  of major diagnoses, care, treatment, and services provided during the course of the hospital stay):  Benign     Final Diagnosis: Post-Op Diagnosis Codes:     * End stage renal disease [N18.6]     * Encounter for extracorporeal dialysis [Z99.2]    Disposition: Home or Self Care    Follow Up/Patient Instructions:     Medications:  Reconciled Home Medications:      Medication List        START taking these medications      HYDROcodone-acetaminophen 5-325 mg per tablet  Commonly known as: NORCO  Take 1 tablet by mouth every 6 (six) hours as needed for Pain.            CONTINUE taking these medications      ascorbic acid (vitamin C) 500 MG tablet  Commonly known as: VITAMIN C  Take 500 mg by mouth once daily.     carvediloL 3.125 MG tablet  Commonly known as: COREG  Take 3.125 mg by mouth 2 (two) times daily.     cholecalciferol (vitamin D3) 25 mcg (1,000 unit) Chew  once daily.     co-enzyme Q-10 30 mg capsule  Take 100 mg by mouth.     cyanocobalamin 1000 MCG tablet  Commonly known as: VITAMIN B-12  Take 100 mcg by mouth.     diltiaZEM 240 MG 24 hr capsule  Commonly known as: CARDIZEM CD  Take 240 mg by mouth once daily.     ferrous sulfate 325 mg (65 mg iron) Tab tablet  Commonly known as: FEOSOL  Take 325 mg by mouth daily with breakfast.     furosemide 20 MG tablet  Commonly known as: LASIX  Take 20 mg by mouth daily as needed. 1 tablet 3x/ week     hydrALAZINE 50 MG tablet  Commonly known as: APRESOLINE  Take 50 mg by mouth 3 (three) times daily. 100mg qam , 50 at noon and 100mg at bedtime     levothyroxine 75 MCG tablet  Commonly known as: SYNTHROID  Take 75 mcg by mouth once daily.     magnesium oxide 400 mg (241.3 mg magnesium) tablet  Commonly known as: MAG-OX  Take 400 mg by mouth.     multivitamin per tablet  Commonly known as: THERAGRAN  Take 1 tablet by mouth once daily.     omega-3 fatty acids 1,000 mg Cap  Take 1,200 mg by mouth.     REPATHA SYRINGE 140 mg/mL Syrg  Generic drug: evolocumab  Inject 140 mg  into the skin.     rosuvastatin 40 MG Tab  Commonly known as: CRESTOR  Take 10 mg by mouth every evening.     sodium bicarbonate 650 MG tablet  650 mg 3 (three) times daily.            Discharge Procedure Orders   Diet general     Call MD for:  temperature >100.4     Call MD for:  persistent nausea and vomiting     Call MD for:  severe uncontrolled pain     Call MD for:  difficulty breathing, headache or visual disturbances     Call MD for:  redness, tenderness, or signs of infection (pain, swelling, redness, odor or green/yellow discharge around incision site)     Sponge bath only until clinic visit     Other restrictions (specify):   Order Comments: Use sling until block wears off     Leave dressing on - Keep it clean, dry, and intact until clinic visit   Order Comments: Leave steri strips intact.     Activity as tolerated

## 2023-07-20 NOTE — ASSESSMENT & PLAN NOTE
TSH on admission 7.647 with fT4 0.83   -Continue Levothyroxine  -Follow up with PCP to discuss dosing

## 2023-07-20 NOTE — PROGRESS NOTES
Afebrile, vital signs stable, patient extubated and breathing easily.  Moves all extremities and answers questions appropriately.

## 2023-07-20 NOTE — PLAN OF CARE
Nurses Note -- 4 Eyes      7/20/2023   6:44 PM      Skin assessed during: Admit      [x] No Altered Skin Integrity Present    [x]Prevention Measures Documented      [] Yes- Altered Skin Integrity Present or Discovered   [] LDA Added if Not in Epic (Describe Wound)   [] New Altered Skin Integrity was Present on Admit and Documented in LDA   [] Wound Image Taken    Wound Care Consulted? No    Attending Nurse:  Myalique S Collette, RN     Second RN/Staff Member:  Radha [facility] RN    +

## 2023-07-20 NOTE — CONSULTS
[facility] Intensive [facility] Medicine  Consult Note    Patient Name: Arthur Menjivar  MRN: [id number]  Admission Date: 7/20/2023  Hospital Length of Stay: 0 days  Attending Physician: Roque Arcos Jr., MD   Primary Care Provider: Demetri Whitley MD           Patient information was obtained from patient, relative(s), past medical records and ER records.     Consults  Subjective:     Principal Problem: Acute respiratory failure with hypoxia and hypercarbia    Chief Complaint: No chief complaint on file.       HPI: Patient is a 67 year old male with a PMH significant for CVA with residual left weakness (2015), HTN, HLD, pAfib, RBBB, T2D with Neuropathy, CAD, Bladder Cancer s/p resection with Ileal Conduit, Diabetic Foot, CKD4 approaching ESRD, Hypothyroidism, LE DVT, Prostate Cancer s/p Prostatectomy, Obesity who was admitted for Left Isabell fistula, side of cephalic vein to radial artery at the wrist.  Patient stable initially post-op in PACU, but became unresponsive with periods of apnea and Pox dropping to 80s with bradycardia in 40s.  ABG done with pH of 7.250 with pCO2 of 60.2.  Patient placed initially on BiPAP, but ultimately intubate.  Code Stroke called and CTH showed no convincing acute intracranial abnormalities noting sequela of chronic microvascular ischemic change and senescent change.  Evaluated by Tele-Stroke and recommended for MRA of H/N.  Patient awoke about 2 hours post-event following commands and Vitals Stable.  Remains intubated.      Past Medical History:   Diagnosis Date    Cancer     bladder ca and prostate ca     Diabetes mellitus     Hypertension     Stroke     Thyroid disease        Past Surgical History:   Procedure Laterality Date    ABLATION      for atrial fibrilation    HEMICOLECTOMY W/ OSTOMY      PORTACATH PLACEMENT Right     chest wall       Review of patient's allergies indicates:   Allergen Reactions    Irbesartan      hyperkalemia    Zolpidem Other  (See Comments)     Causes pt to hallucinate         No current facility-administered medications on file prior to encounter.     Current Outpatient Medications on File Prior to Encounter   Medication Sig    carvediloL (COREG) 3.125 MG tablet Take 3.125 mg by mouth 2 (two) times daily.    cholecalciferol, vitamin D3, 25 mcg (1,000 unit) Chew once daily.    cyanocobalamin (VITAMIN B-12) 1000 MCG tablet Take 100 mcg by mouth.    diltiaZEM (CARDIZEM CD) 240 MG 24 hr capsule Take 240 mg by mouth once daily.    ferrous sulfate (FEOSOL) 325 mg (65 mg iron) Tab tablet Take 325 mg by mouth daily with breakfast.    furosemide (LASIX) 20 MG tablet Take 20 mg by mouth daily as needed. 1 tablet 3x/ week    hydrALAZINE (APRESOLINE) 50 MG tablet Take 50 mg by mouth 3 (three) times daily. 100mg qam , 50 at noon and 100mg at bedtime    levothyroxine (SYNTHROID) 75 MCG tablet Take 75 mcg by mouth once daily.    multivitamin (THERAGRAN) per tablet Take 1 tablet by mouth once daily.    REPATHA SYRINGE 140 mg/mL Syrg Inject 140 mg into the skin.    rosuvastatin (CRESTOR) 40 MG Tab Take 10 mg by mouth every evening.    sodium bicarbonate 650 MG tablet 650 mg 3 (three) times daily.     Family History    None       Tobacco Use    Smoking status: Former     Types: Cigarettes    Smokeless tobacco: Never   Substance and Sexual Activity    Alcohol use: Never    Drug use: Never    Sexual activity: Not on file     Review of Systems   Unable to perform ROS: Intubated   Objective:     Vital Signs (Most Recent):  Temp: 97 °F (36.1 °C) (07/20/23 1037)  Pulse: (!) 59 (07/20/23 1312)  Resp: 10 (07/20/23 1312)  BP: 134/61 (07/20/23 1312)  SpO2: 98 % (07/20/23 1312) Vital Signs (24h Range):  Temp:  [97 °F (36.1 °C)-98.1 °F (36.7 °C)] 97 °F (36.1 °C)  Pulse:  [41-69] 59  Resp:  [10-16] 10  SpO2:  [87 %-100 %] 98 %  BP: ()/(49-79) 134/61        There is no height or weight on file to calculate BMI.     Physical Exam  Constitutional:        General: He is not in acute distress.     Appearance: He is obese. He is not ill-appearing or toxic-appearing.   HENT:      Head: Normocephalic and atraumatic.      Right Ear: External ear normal.      Left Ear: External ear normal.      Nose: Nose normal.      Mouth/Throat:      Mouth: Mucous membranes are moist.      Pharynx: Oropharynx is clear.   Eyes:      General: No scleral icterus.     Extraocular Movements: Extraocular movements intact.      Pupils: Pupils are equal, round, and reactive to light.   Cardiovascular:      Rate and Rhythm: Normal rate and regular rhythm.      Pulses: Normal pulses.      Heart sounds: Normal heart sounds. No murmur heard.  Pulmonary:      Effort: Pulmonary effort is normal.      Breath sounds: Normal breath sounds. No wheezing or rales.      Comments: Intubated  Abdominal:      General: Bowel sounds are normal. There is no distension.      Palpations: Abdomen is soft.      Tenderness: There is no abdominal tenderness.   Genitourinary:     Comments: Ileal Conduit  Musculoskeletal:      Cervical back: Normal range of motion and neck supple. No rigidity.      Right lower leg: No edema.      Left lower leg: No edema.   Skin:     General: Skin is warm and dry.      Findings: No rash.   Neurological:      Mental Status: He is alert.      Comments: Eyes open, follows commands, nods to questions   Psychiatric:      Comments: Unable to fully assess        Significant Labs: All pertinent labs within the past 24 hours have been reviewed.    Significant Imaging: I have reviewed all pertinent imaging results/findings within the past 24 hours.    Assessment/Plan:     * Acute respiratory failure with hypoxia and hypercarbia  Patient admitted for Left Isabell fistula, side of cephalic vein to radial artery at the wrist.  Patient stable initially post-op in PACU, but became unresponsive with periods of apnea and Pox dropping to 80s with bradycardia in 40s.  ABG done with pH of 7.250 with  pCO2 of 60.2.  Patient placed initially on BiPAP, but ultimately intubate.  Code Stroke called and CTH showed no convincing acute intracranial abnormalities noting sequela of chronic microvascular ischemic change and senescent change.  Evaluated by Tele-Stroke and recommended for MRA of H/N.  Patient awoke about 2 hours post-event following commands and Vitals Stable.  Remains intubated.  Possibly over-sedated.      Plan:  Pulmonary-CC consulted for vent management.  Follow up with MRA of H/N  DuoNebs prn  Check CXR  CMP, CBC, TSH, Trend Trops      Chronic kidney disease, stage IV (severe)  Followed by [facility], Dr. Baker.  Admitted for vascular access given progression to ESRD.  -Consult Dr. Baker  -Continue with Na bicarb  -Renal dose medications and avoid nephrotoxins      History of adenocarcinoma of prostate  S/p Prostatectomy      Hypothyroidism  TSH on admission 7.647 with fT4 0.83   -Continue Levothyroxine  -Follow up with PCP to discuss dosing      History of CVA (cerebrovascular accident)  2015 with left-sided weakness      History of bladder cancer  S/p Cystectomy in 10/2017 with ileal conduit.  Completed Chemotherapy 2018.      PAF (paroxysmal atrial fibrillation)  Now NSR on admission.  Not on anticoagulation  -Follow on Telemetry    Pure hypercholesterolemia  Repatha nonformulary  -Continue Statin      Essential (primary) hypertension  Mildly hypotensive post-op  Home Meds:  Diltiazem CD 240mg, Hydralazine 50mg tid, Carvedilol 3.125mg bid  -Hold home meds for now to allow for permissive hypertension until stroke ruled out  -IV Hydralazine prn SBP >220 - HR too low for Labetalol      Atherosclerotic heart disease of native coronary artery without angina pectoris  Followed by Dr. Greene.  Not on ASA or Plavix.  -Continue Statin  -Trend Trops as above          VTE Risk Mitigation (From admission, onward)         Ordered     heparin (porcine) injection 5,000 Units  Every 8 hours          07/20/23 1401     IP VTE HIGH RISK PATIENT  Once         07/20/23 1401     Place sequential compression device  Until discontinued         07/20/23 1401                    Thank you for your consult. I will follow-up with patient. Please contact us if you have any additional questions.    Romaine Ro MD  Department of Hospital Medicine   [facility] - Intensive Care ([place])

## 2023-07-20 NOTE — CONSULTS
[facility] - Intensive Care ([place])  Pulmonology  Consult Note    Patient Name: Arthur Menjivar  MRN: [id number]  Admission Date: 7/20/2023  Hospital Length of Stay: 0 days  Code Status: Full Code  Attending Physician: Roque Arcos Jr., MD  Primary Care Provider: Demetri Whitley MD   Principal Problem: Acute respiratory failure with hypoxia and hypercarbia    Inpatient consult to Pulmonary Critical Care  Consult performed by: Justin Ellerman, MD  Consult ordered by: Romaine Ro MD        Subjective:     HPI:  67 year old male with a PMH significant for CVA with residual left weakness (2015), HTN, HLD, pAfib, RBBB, T2D with Neuropathy, CAD, Bladder Cancer s/p resection with Ileal Conduit, Diabetic Foot, CKD4 approaching ESRD, Hypothyroidism, LE DVT, Prostate Cancer s/p Prostatectomy, Obesity who was admitted for Left Isabell fistula, side of cephalic vein to radial artery at the wrist.  Patient stable initially post-op in PACU, but became unresponsive with periods of apnea and Pox dropping to 80s with bradycardia in 40s.  ABG done with pH of 7.250 with pCO2 of 60.2.  Patient placed initially on BiPAP, but ultimately intubate.  Code Stroke called and CTH showed no convincing acute intracranial abnormalities noting sequela of chronic microvascular ischemic change and senescent change.  Evaluated by Tele-Stroke and recommended for MRA of H/N.      ICU was consulted for ventilator and airway management.       Past Medical History:   Diagnosis Date    Cancer     bladder ca and prostate ca     Diabetes mellitus     Hypertension     Stroke     Thyroid disease        Past Surgical History:   Procedure Laterality Date    ABLATION      for atrial fibrilation    HEMICOLECTOMY W/ OSTOMY      PORTACATH PLACEMENT Right     chest wall       Review of patient's allergies indicates:   Allergen Reactions    Irbesartan      hyperkalemia    Zolpidem Other (See Comments)     Causes pt to hallucinate         Family  History    None       Tobacco Use    Smoking status: Former     Types: Cigarettes    Smokeless tobacco: Never   Substance and Sexual Activity    Alcohol use: Never    Drug use: Never    Sexual activity: Not on file         Review of Systems   Unable to perform ROS: Intubated   Objective:     Vital Signs (Most Recent):  Temp: 97 °F (36.1 °C) (07/20/23 1037)  Pulse: 69 (07/20/23 1733)  Resp: 20 (07/20/23 1733)  BP: (!) 183/78 (07/20/23 1733)  SpO2: 99 % (07/20/23 1733) Vital Signs (24h Range):  Temp:  [97 °F (36.1 °C)-98.1 °F (36.7 °C)] 97 °F (36.1 °C)  Pulse:  [41-69] 69  Resp:  [10-20] 20  SpO2:  [87 %-100 %] 99 %  BP: ()/(49-79) 183/78        There is no height or weight on file to calculate BMI.      Intake/Output Summary (Last 24 hours) at 7/20/2023 1803  Last data filed at 7/20/2023 1730  Gross per 24 hour   Intake 100 ml   Output 550 ml   Net -450 ml        Physical Exam  Vitals and nursing note reviewed.   Constitutional:       General: He is not in acute distress.     Appearance: He is obese. He is not ill-appearing.      Comments: Intubated, not on sedation   HENT:      Head: Normocephalic.   Eyes:      General: No scleral icterus.     Conjunctiva/sclera: Conjunctivae normal.   Cardiovascular:      Rate and Rhythm: Regular rhythm. Tachycardia present.      Pulses: Normal pulses.      Heart sounds: No murmur heard.  Pulmonary:      Breath sounds: Normal breath sounds. No stridor. No wheezing or rales.   Abdominal:      General: Abdomen is flat.      Palpations: Abdomen is soft.   Musculoskeletal:         General: No swelling.      Comments: L arm in sling with dressings that are c/d/i   Skin:     General: Skin is warm and dry.      Capillary Refill: Capillary refill takes less than 2 seconds.   Neurological:      General: No focal deficit present.      Mental Status: He is alert.      Comments: Pt intubated, off sedation, but following all commands appropriately        Vents:  Vent Mode: A/C  (ac/vc) (07/20/23 1155)  Set Rate: 14 BPM (07/20/23 1155)  Vt Set: 500 mL (07/20/23 1155)  PEEP/CPAP: 5 cmH20 (07/20/23 1155)  Oxygen Concentration (%): 30 (07/20/23 1733)  Peak Airway Pressure: 22 cmH20 (07/20/23 1155)    Lines/Drains/Airways       Drain  Duration                  Urostomy 08/13/20 0955 ureterostomy, right RLQ 1071 days              Peripheral Intravenous Line  Duration                  External Jugular IV -- days                    Significant Labs:    CBC/Anemia Profile:  Recent Labs   Lab 07/20/23  1124 07/20/23  1233 07/20/23  1319   WBC  --   --  3.20*   HGB  --   --  9.7*   HCT 28* 28* 31.1*   PLT  --   --  97*   MCV  --   --  104*   RDW  --   --  13.4        Chemistries:  Recent Labs   Lab 07/20/23  1319      K 4.9   *   CO2 21*   BUN 35*   CREATININE 3.0*   CALCIUM 9.1   ALBUMIN 3.4*   PROT 6.6   BILITOT 0.2   ALKPHOS 51*   ALT 14   AST 16   MG 1.4*       All pertinent labs within the past 24 hours have been reviewed.    Significant Imaging:   I have reviewed all pertinent imaging results/findings within the past 24 hours.    Assessment/Plan:     Pulmonary  * Acute respiratory failure with hypoxia and hypercarbia  Patient with Hypoxic Respiratory failure which is Acute on chronic.  he is not on home oxygen. Supplemental oxygen was provided and noted- Vent Mode: A/C  Oxygen Concentration (%):  [30] 30  Resp Rate Total:  [14 br/min-19 br/min] 19 br/min  Vt Set:  [500 mL] 500 mL  PEEP/CPAP:  [5 cmH20] 5 cmH20  Mean Airway Pressure:  [8.4 cmH20] 8.4 cmH20    .   Signs/symptoms of respiratory failure include- lethargy. Contributing diagnoses includes - ESRD Labs and images were reviewed. Patient Has recent ABG, which has been reviewed. Will treat underlying causes and adjust management of respiratory failure as follows:    It appears patient may have inadvertently received a depot of anesthesia with a non-functioning IV after his surgery in the PACU area causing his altered mental  status. The patient came back to his baseline within 2 hours and was successfully extubated around 1800. He will be watching in the ICU overnight for any further signs of altered mentation.  - close monitoring in the ICU          Thank you for your consult. I will sign off. Please contact us if you have any additional questions.     Justin Ellerman, MD  Pulmonology  [facility] - Intensive Care ([place])

## 2023-07-20 NOTE — SUBJECTIVE & OBJECTIVE
Past Medical History:   Diagnosis Date    Cancer     bladder ca and prostate ca     Diabetes mellitus     Hypertension     Stroke     Thyroid disease        Past Surgical History:   Procedure Laterality Date    ABLATION      for atrial fibrilation    HEMICOLECTOMY W/ OSTOMY      PORTACATH PLACEMENT Right     chest wall       Review of patient's allergies indicates:   Allergen Reactions    Irbesartan      hyperkalemia    Zolpidem Other (See Comments)     Causes pt to hallucinate         Family History    None       Tobacco Use    Smoking status: Former     Types: Cigarettes    Smokeless tobacco: Never   Substance and Sexual Activity    Alcohol use: Never    Drug use: Never    Sexual activity: Not on file         Review of Systems   Unable to perform ROS: Intubated   Objective:     Vital Signs (Most Recent):  Temp: 97 °F (36.1 °C) (07/20/23 1037)  Pulse: 69 (07/20/23 1733)  Resp: 20 (07/20/23 1733)  BP: (!) 183/78 (07/20/23 1733)  SpO2: 99 % (07/20/23 1733) Vital Signs (24h Range):  Temp:  [97 °F (36.1 °C)-98.1 °F (36.7 °C)] 97 °F (36.1 °C)  Pulse:  [41-69] 69  Resp:  [10-20] 20  SpO2:  [87 %-100 %] 99 %  BP: ()/(49-79) 183/78        There is no height or weight on file to calculate BMI.      Intake/Output Summary (Last 24 hours) at 7/20/2023 1803  Last data filed at 7/20/2023 1730  Gross per 24 hour   Intake 100 ml   Output 550 ml   Net -450 ml        Physical Exam  Vitals and nursing note reviewed.   Constitutional:       General: He is not in acute distress.     Appearance: He is obese. He is not ill-appearing.      Comments: Intubated, not on sedation   HENT:      Head: Normocephalic.   Eyes:      General: No scleral icterus.     Conjunctiva/sclera: Conjunctivae normal.   Cardiovascular:      Rate and Rhythm: Regular rhythm. Tachycardia present.      Pulses: Normal pulses.      Heart sounds: No murmur heard.  Pulmonary:      Breath sounds: Normal breath sounds. No stridor. No wheezing or rales.   Abdominal:       General: Abdomen is flat.      Palpations: Abdomen is soft.   Musculoskeletal:         General: No swelling.      Comments: L arm in sling with dressings that are c/d/i   Skin:     General: Skin is warm and dry.      Capillary Refill: Capillary refill takes less than 2 seconds.   Neurological:      General: No focal deficit present.      Mental Status: He is alert.      Comments: Pt intubated, off sedation, but following all commands appropriately        Vents:  Vent Mode: A/C (ac/vc) (07/20/23 1155)  Set Rate: 14 BPM (07/20/23 1155)  Vt Set: 500 mL (07/20/23 1155)  PEEP/CPAP: 5 cmH20 (07/20/23 1155)  Oxygen Concentration (%): 30 (07/20/23 1733)  Peak Airway Pressure: 22 cmH20 (07/20/23 1155)    Lines/Drains/Airways       Drain  Duration                  Urostomy 08/13/20 0955 ureterostomy, right RLQ 1071 days              Peripheral Intravenous Line  Duration                  External Jugular IV -- days                    Significant Labs:    CBC/Anemia Profile:  Recent Labs   Lab 07/20/23  1124 07/20/23  1233 07/20/23  1319   WBC  --   --  3.20*   HGB  --   --  9.7*   HCT 28* 28* 31.1*   PLT  --   --  97*   MCV  --   --  104*   RDW  --   --  13.4        Chemistries:  Recent Labs   Lab 07/20/23  1319      K 4.9   *   CO2 21*   BUN 35*   CREATININE 3.0*   CALCIUM 9.1   ALBUMIN 3.4*   PROT 6.6   BILITOT 0.2   ALKPHOS 51*   ALT 14   AST 16   MG 1.4*       All pertinent labs within the past 24 hours have been reviewed.    Significant Imaging:   I have reviewed all pertinent imaging results/findings within the past 24 hours.

## 2023-07-21 VITALS
SYSTOLIC BLOOD PRESSURE: 127 MMHG | RESPIRATION RATE: 19 BRPM | OXYGEN SATURATION: 92 % | WEIGHT: 238.75 LBS | HEIGHT: 74 IN | DIASTOLIC BLOOD PRESSURE: 61 MMHG | HEART RATE: 74 BPM | BODY MASS INDEX: 30.64 KG/M2 | TEMPERATURE: 100 F

## 2023-07-21 LAB
ALBUMIN SERPL BCP-MCNC: 3.3 G/DL (ref 3.5–5.2)
ALP SERPL-CCNC: 48 U/L (ref 55–135)
ALT SERPL W/O P-5'-P-CCNC: 13 U/L (ref 10–44)
ANION GAP SERPL CALC-SCNC: 8 MMOL/L (ref 8–16)
AST SERPL-CCNC: 23 U/L (ref 10–40)
BASOPHILS # BLD AUTO: 0.01 K/UL (ref 0–0.2)
BASOPHILS NFR BLD: 0.2 % (ref 0–1.9)
BILIRUB SERPL-MCNC: 0.3 MG/DL (ref 0.1–1)
BUN SERPL-MCNC: 39 MG/DL (ref 8–23)
CALCIUM SERPL-MCNC: 9 MG/DL (ref 8.7–10.5)
CHLORIDE SERPL-SCNC: 113 MMOL/L (ref 95–110)
CO2 SERPL-SCNC: 21 MMOL/L (ref 23–29)
CREAT SERPL-MCNC: 3.2 MG/DL (ref 0.5–1.4)
DIFFERENTIAL METHOD: ABNORMAL
EOSINOPHIL # BLD AUTO: 0 K/UL (ref 0–0.5)
EOSINOPHIL NFR BLD: 0.6 % (ref 0–8)
ERYTHROCYTE [DISTWIDTH] IN BLOOD BY AUTOMATED COUNT: 13.5 % (ref 11.5–14.5)
EST. GFR  (NO RACE VARIABLE): 20 ML/MIN/1.73 M^2
ESTIMATED AVG GLUCOSE: 111 MG/DL (ref 68–131)
GLUCOSE SERPL-MCNC: 91 MG/DL (ref 70–110)
HBA1C MFR BLD: 5.5 % (ref 4–5.6)
HCT VFR BLD AUTO: 30.6 % (ref 40–54)
HGB BLD-MCNC: 9.7 G/DL (ref 14–18)
IMM GRANULOCYTES # BLD AUTO: 0.01 K/UL (ref 0–0.04)
IMM GRANULOCYTES NFR BLD AUTO: 0.2 % (ref 0–0.5)
LYMPHOCYTES # BLD AUTO: 1.5 K/UL (ref 1–4.8)
LYMPHOCYTES NFR BLD: 29.1 % (ref 18–48)
MCH RBC QN AUTO: 33 PG (ref 27–31)
MCHC RBC AUTO-ENTMCNC: 31.7 G/DL (ref 32–36)
MCV RBC AUTO: 104 FL (ref 82–98)
MONOCYTES # BLD AUTO: 0.5 K/UL (ref 0.3–1)
MONOCYTES NFR BLD: 9.1 % (ref 4–15)
NEUTROPHILS # BLD AUTO: 3.2 K/UL (ref 1.8–7.7)
NEUTROPHILS NFR BLD: 60.8 % (ref 38–73)
NRBC BLD-RTO: 0 /100 WBC
PHOSPHATE SERPL-MCNC: 5.1 MG/DL (ref 2.7–4.5)
PLATELET # BLD AUTO: 95 K/UL (ref 150–450)
PMV BLD AUTO: 9.2 FL (ref 9.2–12.9)
POCT GLUCOSE: 80 MG/DL (ref 70–110)
POCT GLUCOSE: 83 MG/DL (ref 70–110)
POTASSIUM SERPL-SCNC: 4.6 MMOL/L (ref 3.5–5.1)
PROT SERPL-MCNC: 6.4 G/DL (ref 6–8.4)
RBC # BLD AUTO: 2.94 M/UL (ref 4.6–6.2)
SODIUM SERPL-SCNC: 142 MMOL/L (ref 136–145)
WBC # BLD AUTO: 5.19 K/UL (ref 3.9–12.7)

## 2023-07-21 PROCEDURE — 99233 SBSQ HOSP IP/OBS HIGH 50: CPT | Mod: ,,, | Performed by: INTERNAL MEDICINE

## 2023-07-21 PROCEDURE — 36415 COLL VENOUS BLD VENIPUNCTURE: CPT | Performed by: INTERNAL MEDICINE

## 2023-07-21 PROCEDURE — 80053 COMPREHEN METABOLIC PANEL: CPT | Performed by: INTERNAL MEDICINE

## 2023-07-21 PROCEDURE — 63600175 PHARM REV CODE 636 W HCPCS: Performed by: INTERNAL MEDICINE

## 2023-07-21 PROCEDURE — 99233 PR SUBSEQUENT HOSPITAL CARE,LEVL III: ICD-10-PCS | Mod: ,,, | Performed by: INTERNAL MEDICINE

## 2023-07-21 PROCEDURE — 25000003 PHARM REV CODE 250: Performed by: INTERNAL MEDICINE

## 2023-07-21 PROCEDURE — 84100 ASSAY OF PHOSPHORUS: CPT | Performed by: INTERNAL MEDICINE

## 2023-07-21 PROCEDURE — 85025 COMPLETE CBC W/AUTO DIFF WBC: CPT | Performed by: INTERNAL MEDICINE

## 2023-07-21 RX ADMIN — HEPARIN SODIUM 5000 UNITS: 5000 INJECTION INTRAVENOUS; SUBCUTANEOUS at 02:07

## 2023-07-21 RX ADMIN — HEPARIN SODIUM 5000 UNITS: 5000 INJECTION INTRAVENOUS; SUBCUTANEOUS at 05:07

## 2023-07-21 RX ADMIN — SODIUM BICARBONATE 650 MG TABLET 650 MG: at 02:07

## 2023-07-21 RX ADMIN — LEVOTHYROXINE SODIUM 75 MCG: 75 TABLET ORAL at 05:07

## 2023-07-21 RX ADMIN — ATORVASTATIN CALCIUM 80 MG: 20 TABLET, FILM COATED ORAL at 08:07

## 2023-07-21 RX ADMIN — SODIUM BICARBONATE 650 MG TABLET 650 MG: at 08:07

## 2023-07-21 NOTE — PROGRESS NOTES
[facility]ensive [facility]  Progress Note    Patient Name: Arthur Menjivar  MRN: [id number]  Patient Class: OP- Outpatient Recovery   Admission Date: 7/20/2023  Length of Stay: 0 days  Attending Physician: Roque Arcos Jr., MD  Primary Care Provider: Demetri Whitley MD        Subjective:     Principal Problem:Acute respiratory failure with hypoxia and hypercarbia      HPI:  Patient is a 67 year old male with a PMH significant for CVA with residual left weakness (2015), HTN, HLD, pAfib, RBBB, T2D with Neuropathy, CAD, Bladder Cancer s/p resection with Ileal Conduit, Diabetic Foot, CKD4 approaching ESRD, Hypothyroidism, LE DVT, Prostate Cancer s/p Prostatectomy, Obesity who was admitted for Left Isabell fistula, side of cephalic vein to radial artery at the wrist.  Patient stable initially post-op in PACU, but became unresponsive with periods of apnea and Pox dropping to 80s with bradycardia in 40s.  ABG done with pH of 7.250 with pCO2 of 60.2.  Patient placed initially on BiPAP, but ultimately intubate.  Code Stroke called and CTH showed no convincing acute intracranial abnormalities noting sequela of chronic microvascular ischemic change and senescent change.  Evaluated by Tele-Stroke and recommended for MRA of H/N.  Patient awoke about 2 hours post-event following commands and Vitals Stable.  Remains intubated.      Overview/Hospital Course:  See below      Interval History: Patient is awake and alert this morning.  Patient successfully extubated last night.  No chest pain or SOB.  No abdominal pain.      Review of Systems   Constitutional:  Negative for chills and fever.   HENT:  Negative for ear pain.    Eyes:  Negative for pain.   Respiratory:  Negative for cough, shortness of breath and wheezing.    Cardiovascular:  Negative for chest pain, palpitations and leg swelling.   Gastrointestinal:  Negative for abdominal pain, nausea and vomiting.   Endocrine: Negative for polydipsia,  polyphagia and polyuria.   Genitourinary:         Ileal Conduit   Musculoskeletal:  Negative for neck pain.   Skin:  Negative for rash.   Neurological:  Negative for dizziness and headaches.   Psychiatric/Behavioral:  Negative for agitation, behavioral problems and confusion.    Objective:     Vital Signs (Most Recent):  Temp: 98.8 °F (37.1 °C) (07/21/23 0715)  Pulse: 70 (07/21/23 1000)  Resp: 16 (07/21/23 1000)  BP: (!) 111/52 (07/21/23 1000)  SpO2: (!) 93 % (07/21/23 1000) Vital Signs (24h Range):  Temp:  [97 °F (36.1 °C)-98.9 °F (37.2 °C)] 98.8 °F (37.1 °C)  Pulse:  [57-89] 70  Resp:  [10-34] 16  SpO2:  [90 %-100 %] 93 %  BP: ()/(50-78) 111/52     Weight: 108.3 kg (238 lb 12.1 oz)  Body mass index is 30.65 kg/m².    Intake/Output Summary (Last 24 hours) at 7/21/2023 1127  Last data filed at 7/21/2023 0937  Gross per 24 hour   Intake 770 ml   Output 1225 ml   Net -455 ml         Physical Exam  Constitutional:       General: He is not in acute distress.     Appearance: He is obese. He is not ill-appearing or toxic-appearing.   HENT:      Head: Normocephalic and atraumatic.      Right Ear: External ear normal.      Left Ear: External ear normal.      Nose: Nose normal.      Mouth/Throat:      Mouth: Mucous membranes are moist.      Pharynx: Oropharynx is clear.   Eyes:      General: No scleral icterus.     Extraocular Movements: Extraocular movements intact.      Pupils: Pupils are equal, round, and reactive to light.   Cardiovascular:      Rate and Rhythm: Normal rate and regular rhythm.      Pulses: Normal pulses.      Heart sounds: Normal heart sounds. No murmur heard.  Pulmonary:      Effort: Pulmonary effort is normal.      Breath sounds: Normal breath sounds. No wheezing or rales.   Abdominal:      General: Bowel sounds are normal. There is no distension.      Palpations: Abdomen is soft.      Tenderness: There is no abdominal tenderness.      Comments: Ileal Conduit   Musculoskeletal:         General:  Normal range of motion.      Cervical back: Normal range of motion and neck supple. No rigidity.      Right lower leg: No edema.      Left lower leg: No edema.   Skin:     General: Skin is warm and dry.      Findings: No rash.   Neurological:      General: No focal deficit present.      Mental Status: He is alert and oriented to person, place, and time. Mental status is at baseline.   Psychiatric:         Mood and Affect: Mood normal.         Behavior: Behavior normal.           Significant Labs: All pertinent labs within the past 24 hours have been reviewed.    Significant Imaging: I have reviewed all pertinent imaging results/findings within the past 24 hours.      Assessment/Plan:      * Acute respiratory failure with hypoxia and hypercarbia  Patient admitted for Left Isabell fistula, side of cephalic vein to radial artery at the wrist.  Patient stable initially post-op in PACU, but became unresponsive with periods of apnea and Pox dropping to 80s with bradycardia in 40s.  ABG done with pH of 7.250 with pCO2 of 60.2.  Patient placed initially on BiPAP, but ultimately intubate.  Code Stroke called and CTH wo on 7/20/2023 showed no convincing acute intracranial abnormalities noting sequela of chronic microvascular ischemic change and senescent change.  Evaluated by Tele-Stroke and recommended for MRA of H/N which were both negative for significant stenosis or occlusion.  CXR unremarkable.  Patient awoke about 2 hours post-event following commands and Vitals Stable.  Extubated in the early evening yesterday.  Possibly over-sedated - IV infiltrated and possible cause.  Now awake and alert and sating well on RA.  Lungs clear.  TSH elevated, but fT4 normal.  Trops negative x 2.    Plan:  DuoNebs prn        Chronic kidney disease, stage IV (severe)  Followed by [facility], Dr. Baker.  Admitted for vascular access given progression to ESRD.  Creatinine stable at 3.2.  -Follow up with Nephrology  recommendations  -Continue with Na bicarb  -Renal dose medications and avoid nephrotoxins      History of adenocarcinoma of prostate  S/p Prostatectomy      Hypothyroidism  TSH on admission 7.647 with fT4 0.83   -Continue Levothyroxine  -Follow up with PCP to discuss dosing      History of CVA (cerebrovascular accident)  2015 with left-sided weakness      History of bladder cancer  S/p Cystectomy in 10/2017 with ileal conduit.  Completed Chemotherapy 2018.      PAF (paroxysmal atrial fibrillation)  Now NSR on admission.  Not on anticoagulation  -Follow on Telemetry    Pure hypercholesterolemia  Repatha nonformulary  -Continue Statin      Essential (primary) hypertension  Mildly hypotensive post-op and remains a little low.  Home Meds:  Diltiazem CD 240mg, Hydralazine 50mg tid, Carvedilol 3.125mg bid  -Hold home meds for now and resume on discharge        Atherosclerotic heart disease of native coronary artery without angina pectoris  Followed by Dr. Greene.  Not on ASA or Plavix.  -Continue Statin  -Trend Trops as above          VTE Risk Mitigation (From admission, onward)         Ordered     heparin (porcine) injection 5,000 Units  Every 8 hours         07/20/23 1401     IP VTE HIGH RISK PATIENT  Once         07/20/23 1401     Place sequential compression device  Until discontinued         07/20/23 1401                Discharge Planning   JOELLE:      Code Status: Full Code   Is the patient medically ready for discharge?:     Reason for patient still in hospital (select all that apply): Patient trending condition, Treatment and Consult recommendations                     Romaine Ro MD  Department of Hospital Medicine   [facility] Intensive Care ([place])

## 2023-07-21 NOTE — PLAN OF CARE
Pt appts have been scheduled and placed on his AVS.  Pt family will help transport at discharge.   07/21/23 1542   Final Note   Assessment Type Final Discharge Note   Anticipated Discharge Disposition Home   Hospital Resources/Appts/Education Provided Provided patient/caregiver with written discharge plan information;Appointments scheduled and added to AVS;Post-Acute resouces added to AVS;Provided education on problems/symptoms using teachback   Post-Acute Status   Discharge Delays None known at this time     [facility] - Int[facility] Care ([place])  Discharge Final Note    Primary Care Provider: Demetri Whitley MD    Expected Discharge Date: 7/21/2023    Final Discharge Note (most recent)       Final Note - 07/21/23 1542          Final Note    Assessment Type Final Discharge Note (P)      Anticipated Discharge Disposition Home or Self Care (P)      Hospital Resources/Appts/Education Provided Provided patient/caregiver with written discharge plan information;Appointments scheduled and added to AVS;Post-Acute resouces added to AVS;Provided education on problems/symptoms using teachback (P)         Post-Acute Status    Discharge Delays None known at this time (P)                      Important Message from Medicare             Contact Info       Demetri Whitley MD   Specialty: Internal Medicine   Relationship: PCP - General    [address]  [address]  [place] [address]   Phone: [phone]       Next Steps: Follow up    Instructions: Appointment is scheduled for July 25th at 1:45 pm

## 2023-07-21 NOTE — PLAN OF CARE
Problem: Adult Inpatient Plan of Care  Goal: Plan of Care Review  Outcome: Ongoing, Progressing  Goal: Patient-Specific Goal (Individualized)  Outcome: Ongoing, Progressing  Goal: Absence of Hospital-Acquired Illness or Injury  Outcome: Ongoing, Progressing  Goal: Optimal Comfort and Wellbeing  Outcome: Ongoing, Progressing  Goal: Readiness for Transition of Care  Outcome: Ongoing, Progressing     Problem: Fall Injury Risk  Goal: Absence of Fall and Fall-Related Injury  Outcome: Ongoing, Progressing      Problem: Noninvasive Ventilation Acute  Goal: Effective Unassisted Ventilation and Oxygenation  Outcome: Ongoing, Progressing     Problem: Skin Injury Risk Increased  Goal: Skin Health and Integrity  Outcome: Ongoing, Progressing

## 2023-07-21 NOTE — ASSESSMENT & PLAN NOTE
Mildly hypotensive post-op and remains a little low.  Home Meds:  Diltiazem CD 240mg, Hydralazine 50mg tid, Carvedilol 3.125mg bid  -Hold home meds for now and resume on discharge

## 2023-07-21 NOTE — PLAN OF CARE
Patient is alert and oriented with no communication barriers.  Patient verified address and PCP is correct on face sheet.  Patient pharmacy of choice is [facility] on [address].  Patient uses a cane for DME use.  Patient family will help transport at discharge.   07/21/23 1435   Discharge Assessment   Assessment Type Discharge Planning Assessment   Confirmed/corrected address, phone number and insurance Yes   Confirmed Demographics Correct on Facesheet   Source of Information patient   People in Home spouse;child(everette), adult   Do you expect to return to your current living situation? Yes   Do you have help at home or someone to help you manage your care at home? No   Prior to hospitilization cognitive status: Alert/Oriented   Current cognitive status: Alert/Oriented   Equipment Currently Used at Home cane, straight   Readmission within 30 days? No   Patient currently being followed by outpatient case management? No   Do you currently have service(s) that help you manage your care at home? No   Do you take prescription medications? Yes   Do you have prescription coverage? Yes   Coverage Medicare A&B and [facility] Medicare   Do you have any problems affording any of your prescribed medications? No   Is the patient taking medications as prescribed? yes   Who is going to help you get home at discharge? Wife   How do you get to doctors appointments? car, drives self;family or friend will provide   Are you on dialysis? No   Do you take coumadin? No   Discharge Plan A Home   DME Needed Upon Discharge  none   Transition of Care Barriers None   Physical Activity   On average, how many days per week do you engage in moderate to strenuous exercise (like a brisk walk)? 0 days   On average, how many minutes do you engage in exercise at this level? 0 min   Financial Resource Strain   How hard is it for you to pay for the very basics like food, housing, medical care, and heating? Not hard   Housing Stability   In the last 12  months, was there a time when you were not able to pay the mortgage or rent on time? N   In the last 12 months, was there a time when you did not have a steady place to sleep or slept in a shelter (including now)? N   Transportation Needs   In the past 12 months, has lack of transportation kept you from medical appointments or from getting medications? no   In the past 12 months, has lack of transportation kept you from meetings, work, or from getting things needed for daily living? No   Food Insecurity   Within the past 12 months, you worried that your food would run out before you got the money to buy more. Never true   Within the past 12 months, the food you bought just didn't last and you didn't have money to get more. Never true   Stress   Do you feel stress - tense, restless, nervous, or anxious, or unable to sleep at night because your mind is troubled all the time - these days? Not at all   Social Connections   In a typical week, how many times do you talk on the phone with family, friends, or neighbors? More than 3   How often do you get together with friends or relatives? Once   How often do you attend Yazidi or Episcopalian services? More than 4   Do you belong to any clubs or organizations such as Yazidi groups, unions, fraternal or athletic groups, or school groups? Yes   How often do you attend meetings of the clubs or organizations you belong to? More than 4   Are you , , , , never , or living with a partner?    Alcohol Use   Q1: How often do you have a drink containing alcohol? Never   Q2: How many drinks containing alcohol do you have on a typical day when you are drinking? None   Q3: How often do you have six or more drinks on one occasion? Never   OTHER   Name(s) of People in Home Betzy Menjivar     [facility] - Intensive Care ([place])  Initial Discharge Assessment       Primary Care Provider: Demetri Whitley MD    Admission Diagnosis: End stage renal  disease [N18.6]  Encounter for extracorporeal dialysis [Z99.2]  End stage renal disease on dialysis [N18.6, Z99.2]    Admission Date: 7/20/2023  Expected Discharge Date: 7/21/2023    Transition of Care Barriers: (P) None    Payor: MEDICARE / Plan: MEDICARE PART A & B / Product Type: Government /     Extended Emergency Contact Information  Primary Emergency Contact: Betzy Menjivar  Mobile Phone: [phone]  Relation: Spouse  Preferred language: English   needed? No    Discharge Plan A: (P) Home         [id number] - [place], [place] - [address] [address] AT [address]  [address] [address] [address]  Phone: [phone] Fax: [phone]      Initial Assessment (most recent)       Adult Discharge Assessment - 07/21/23 1435          Discharge Assessment    Assessment Type Discharge Planning Assessment (P)      Confirmed/corrected address, phone number and insurance Yes (P)      Confirmed Demographics Correct on Facesheet (P)      Source of Information patient (P)      People in Home spouse;child(everette), adult (P)      Do you expect to return to your current living situation? Yes (P)      Do you have help at home or someone to help you manage your care at home? No (P)      Prior to hospitilization cognitive status: Alert/Oriented (P)      Current cognitive status: Alert/Oriented (P)      Equipment Currently Used at Home cane, straight (P)      Readmission within 30 days? No (P)      Patient currently being followed by outpatient case management? No (P)      Do you currently have service(s) that help you manage your care at home? No (P)      Do you take prescription medications? Yes (P)      Do you have prescription coverage? Yes (P)      Coverage Medicare A&B and [facility] Medicare (P)      Do you have any problems affording any of your prescribed medications? No (P)      Is the patient taking medications as prescribed? yes (P)      Who is going  to help you get home at discharge? Wife (P)      How do you get to doctors appointments? car, drives self;family or friend will provide (P)      Are you on dialysis? No (P)      Do you take coumadin? No (P)      Discharge Plan A Home (P)      DME Needed Upon Discharge  none (P)      Transition of Care Barriers None (P)         Physical Activity    On average, how many days per week do you engage in moderate to strenuous exercise (like a brisk walk)? 0 days (P)      On average, how many minutes do you engage in exercise at this level? 0 min (P)         Financial Resource Strain    How hard is it for you to pay for the very basics like food, housing, medical care, and heating? Not hard at all (P)         Housing Stability    In the last 12 months, was there a time when you were not able to pay the mortgage or rent on time? No (P)      In the last 12 months, was there a time when you did not have a steady place to sleep or slept in a shelter (including now)? No (P)         Transportation Needs    In the past 12 months, has lack of transportation kept you from medical appointments or from getting medications? No (P)      In the past 12 months, has lack of transportation kept you from meetings, work, or from getting things needed for daily living? No (P)         Food Insecurity    Within the past 12 months, you worried that your food would run out before you got the money to buy more. Never true (P)      Within the past 12 months, the food you bought just didn't last and you didn't have money to get more. Never true (P)         Stress    Do you feel stress - tense, restless, nervous, or anxious, or unable to sleep at night because your mind is troubled all the time - these days? Not at all (P)         Social Connections    In a typical week, how many times do you talk on the phone with family, friends, or neighbors? More than three times a week (P)      How often do you get together with friends or relatives? Once a week  (P)      How often do you attend Episcopal or Zoroastrian services? More than 4 times per year (P)      Do you belong to any clubs or organizations such as Episcopal groups, unions, fraternal or athletic groups, or school groups? Yes (P)      How often do you attend meetings of the clubs or organizations you belong to? More than 4 times per year (P)      Are you , , , , never , or living with a partner?  (P)         Alcohol Use    Q1: How often do you have a drink containing alcohol? Never (P)      Q2: How many drinks containing alcohol do you have on a typical day when you are drinking? Patient does not drink (P)      Q3: How often do you have six or more drinks on one occasion? Never (P)         OTHER    Name(s) of People in Home Betzy Menjivar (P)

## 2023-07-21 NOTE — ASSESSMENT & PLAN NOTE
Patient admitted for Left Isabell fistula, side of cephalic vein to radial artery at the wrist.  Patient stable initially post-op in PACU, but became unresponsive with periods of apnea and Pox dropping to 80s with bradycardia in 40s.  ABG done with pH of 7.250 with pCO2 of 60.2.  Patient placed initially on BiPAP, but ultimately intubate.  Code Stroke called and CTH wo on 7/20/2023 showed no convincing acute intracranial abnormalities noting sequela of chronic microvascular ischemic change and senescent change.  Evaluated by Tele-Stroke and recommended for MRA of H/N which were both negative for significant stenosis or occlusion.  CXR unremarkable.  Patient awoke about 2 hours post-event following commands and Vitals Stable.  Extubated in the early evening yesterday.  Possibly over-sedated - IV infiltrated and possible cause.  Now awake and alert and sating well on RA.  Lungs clear.  TSH elevated, but fT4 normal.  Trops negative x 2.    Plan:  Yulisa bhattin

## 2023-07-21 NOTE — PLAN OF CARE
Problem: Restraint, Nonbehavioral (Nonviolent)  Goal: Absence of Harm or Injury  7/21/2023 0358 by Wellington Min RN  Outcome: Met  7/21/2023 0353 by Wellington Min RN  Outcome: Ongoing, Progressing     Problem: Communication Impairment (Mechanical Ventilation, Invasive)  Goal: Effective Communication  7/21/2023 0358 by Wellington Min RN  Outcome: Met  7/21/2023 0353 by Wellington Min RN  Outcome: Ongoing, Progressing     Problem: Device-Related Complication Risk (Mechanical Ventilation, Invasive)  Goal: Optimal Device Function  7/21/2023 0358 by Wellington Min RN  Outcome: Met  7/21/2023 0353 by Wellington Min RN  Outcome: Ongoing, Progressing     Problem: Inability to Wean (Mechanical Ventilation, Invasive)  Goal: Mechanical Ventilation Liberation  7/21/2023 0358 by Wellington Min RN  Outcome: Met  7/21/2023 0353 by Wellington Min RN  Outcome: Ongoing, Progressing     Problem: Nutrition Impairment (Mechanical Ventilation, Invasive)  Goal: Optimal Nutrition Delivery  7/21/2023 0358 by Wellington Min RN  Outcome: Met  7/21/2023 0353 by Wellington Min RN  Outcome: Ongoing, Progressing     Problem: Skin and Tissue Injury (Mechanical Ventilation, Invasive)  Goal: Absence of Device-Related Skin and Tissue Injury  7/21/2023 0358 by Wellington Min RN  Outcome: Met  7/21/2023 0353 by Wellington Min RN  Outcome: Ongoing, Progressing     Problem: Ventilator-Induced Lung Injury (Mechanical Ventilation, Invasive)  Goal: Absence of Ventilator-Induced Lung Injury  7/21/2023 0358 by Wellington Min RN  Outcome: Met  7/21/2023 0353 by Wellington Min RN  Outcome: Ongoing, Progressing     Problem: Communication Impairment (Artificial Airway)  Goal: Effective Communication  7/21/2023 0358 by Wellington Min RN  Outcome: Met  7/21/2023 0353 by Wellington Min RN  Outcome: Ongoing, Progressing     Problem: Device-Related Complication Risk (Artificial Airway)  Goal: Optimal Device  Function  7/21/2023 0358 by Wellington Min RN  Outcome: Met  7/21/2023 0353 by Wellington Min RN  Outcome: Ongoing, Progressing     Problem: Skin and Tissue Injury (Artificial Airway)  Goal: Absence of Device-Related Skin or Tissue Injury  7/21/2023 0358 by Wellington Min RN  Outcome: Met  7/21/2023 0353 by Wellington Min RN  Outcome: Ongoing, Progressing

## 2023-07-21 NOTE — DISCHARGE SUMMARY
The patient is a 67-year-old male with end-stage renal disease who was admitted for placement of a permanent hemodialysis access.  The patient underwent placement of a semi no fistula in the left wrist under regional anesthesia.  Postoperatively the patient had hypercapnia and decreased level of responsiveness.  The patient was intubated and placed on a ventilator.  Due to the fact that it is stroke could not be ruled out the stroke protocol was initiated.  Approximately 4 hours later the patient was awake and moving and appropriately responsive.  The patient was extubated and he resumed his preoperative neurologic status.  The patient is being discharged to be followed by me as well as his nephrologist and primary care.  He has been instructed the appropriate limitations of his activity and diet as well as wound care.  Medications as outlined on the med reconciliation sheet.

## 2023-07-21 NOTE — ASSESSMENT & PLAN NOTE
Followed by [facility], Dr. Baker.  Admitted for vascular access given progression to ESRD.  Creatinine stable at 3.2.  -Follow up with Nephrology recommendations  -Continue with Na bicarb  -Renal dose medications and avoid nephrotoxins

## 2023-07-21 NOTE — PLAN OF CARE
AVS summary printed. Discharge instruction and education provided. Patient voices understanding. IV site removed cath tip intact. Telemetry discontinued without adverse reaction. Patient shows no acute distress. Meds delivered at bedside. Wife at bedside to discharge pt home. Pt escorted to entrance via wheelchair. Pt assisted into vehicle safely by me.    no

## 2023-07-21 NOTE — SUBJECTIVE & OBJECTIVE
Interval History: Patient is awake and alert this morning.  Patient successfully extubated last night.  No chest pain or SOB.  No abdominal pain.      Review of Systems   Constitutional:  Negative for chills and fever.   HENT:  Negative for ear pain.    Eyes:  Negative for pain.   Respiratory:  Negative for cough, shortness of breath and wheezing.    Cardiovascular:  Negative for chest pain, palpitations and leg swelling.   Gastrointestinal:  Negative for abdominal pain, nausea and vomiting.   Endocrine: Negative for polydipsia, polyphagia and polyuria.   Genitourinary:         Ileal Conduit   Musculoskeletal:  Negative for neck pain.   Skin:  Negative for rash.   Neurological:  Negative for dizziness and headaches.   Psychiatric/Behavioral:  Negative for agitation, behavioral problems and confusion.    Objective:     Vital Signs (Most Recent):  Temp: 98.8 °F (37.1 °C) (07/21/23 0715)  Pulse: 70 (07/21/23 1000)  Resp: 16 (07/21/23 1000)  BP: (!) 111/52 (07/21/23 1000)  SpO2: (!) 93 % (07/21/23 1000) Vital Signs (24h Range):  Temp:  [97 °F (36.1 °C)-98.9 °F (37.2 °C)] 98.8 °F (37.1 °C)  Pulse:  [57-89] 70  Resp:  [10-34] 16  SpO2:  [90 %-100 %] 93 %  BP: ()/(50-78) 111/52     Weight: 108.3 kg (238 lb 12.1 oz)  Body mass index is 30.65 kg/m².    Intake/Output Summary (Last 24 hours) at 7/21/2023 1127  Last data filed at 7/21/2023 0937  Gross per 24 hour   Intake 770 ml   Output 1225 ml   Net -455 ml         Physical Exam  Constitutional:       General: He is not in acute distress.     Appearance: He is obese. He is not ill-appearing or toxic-appearing.   HENT:      Head: Normocephalic and atraumatic.      Right Ear: External ear normal.      Left Ear: External ear normal.      Nose: Nose normal.      Mouth/Throat:      Mouth: Mucous membranes are moist.      Pharynx: Oropharynx is clear.   Eyes:      General: No scleral icterus.     Extraocular Movements: Extraocular movements intact.      Pupils: Pupils are  equal, round, and reactive to light.   Cardiovascular:      Rate and Rhythm: Normal rate and regular rhythm.      Pulses: Normal pulses.      Heart sounds: Normal heart sounds. No murmur heard.  Pulmonary:      Effort: Pulmonary effort is normal.      Breath sounds: Normal breath sounds. No wheezing or rales.   Abdominal:      General: Bowel sounds are normal. There is no distension.      Palpations: Abdomen is soft.      Tenderness: There is no abdominal tenderness.      Comments: Ileal Conduit   Musculoskeletal:         General: Normal range of motion.      Cervical back: Normal range of motion and neck supple. No rigidity.      Right lower leg: No edema.      Left lower leg: No edema.   Skin:     General: Skin is warm and dry.      Findings: No rash.   Neurological:      General: No focal deficit present.      Mental Status: He is alert and oriented to person, place, and time. Mental status is at baseline.   Psychiatric:         Mood and Affect: Mood normal.         Behavior: Behavior normal.           Significant Labs: All pertinent labs within the past 24 hours have been reviewed.    Significant Imaging: I have reviewed all pertinent imaging results/findings within the past 24 hours.

## 2023-07-21 NOTE — CONSULTS
Consult Note  Nephrology    Consult Requested By: Roque Arcos Jr., MD  Reason for Consult: CKD    SUBJECTIVE:     History of Present Illness:  Patient is a 67 y.o. male presents with scheduled AVF placement and developed resp failure post op in PACU.  Intubated and sent to ICU for monitoring.  Code stroke called and images negative for CVA.  Seen by pulm and extubated to nasal cannula.  Monitored overnight in ICU w/o issues.  Pt known to our group and follows Dr. Baker closely for CKD.  Consulted to evaluate.  Pt seen and examined with Dr. Ro at bedside.  No c/o this am except sore throat.  AVF+.  Labs stable.  Anticipated DC later today.      Epic reviewed.     Past Medical History:   Diagnosis Date    Cancer     bladder ca and prostate ca     CKD (chronic kidney disease), stage IV     Diabetes mellitus     Hypertension     Stroke     Thyroid disease      Past Surgical History:   Procedure Laterality Date    ABLATION      for atrial fibrilation    HEMICOLECTOMY W/ OSTOMY      PORTACATH PLACEMENT Right     chest wall     History reviewed. No pertinent family history.  Social History     Tobacco Use    Smoking status: Former     Types: Cigarettes    Smokeless tobacco: Never   Substance Use Topics    Alcohol use: Never    Drug use: Never       Review of patient's allergies indicates:   Allergen Reactions    Irbesartan      hyperkalemia    Zolpidem Other (See Comments)     Causes pt to hallucinate          Review of Systems:  Constitutional: No fever or chills  Respiratory: No cough or shortness of breath  Cardiovascular: No chest pain or palpitations  Gastrointestinal: No nausea or vomiting  Neurological: No confusion or weakness    OBJECTIVE:     Vital Signs (Most Recent)  Temp: 98.8 °F (37.1 °C) (07/21/23 0715)  Pulse: 75 (07/21/23 0715)  Resp: (!) 22 (07/21/23 0715)  BP: (!) 140/63 (07/21/23 0701)  SpO2: (!) 94 % (07/21/23 0715)    Vital Signs Range (Last 24H):  Temp:  [97 °F (36.1 °C)-98.9 °F (37.2 °C)]    Pulse:  [41-89]   Resp:  [10-34]   BP: ()/(49-78)   SpO2:  [87 %-100 %]       Intake/Output Summary (Last 24 hours) at 7/21/2023 0748  Last data filed at 7/21/2023 0500  Gross per 24 hour   Intake 390 ml   Output 1000 ml   Net -610 ml       Physical Exam:  General appearance: Well developed, well nourished  Eyes:  Conjunctivae/corneas clear. PERRL.  Lungs: Normal respiratory effort,   clear to auscultation bilaterally   Heart: Regular rate and rhythm, S1, S2 normal, no murmur, rub or ernst.  Abdomen: Soft, non-tender non-distended; bowel sounds normal; no masses,  no organomegaly, obese, urostomy  Extremities: No cyanosis or clubbing. No edema.    Skin: Skin color, texture, turgor normal. No rashes or lesions  Neurologic: Normal strength and tone. No focal numbness or weakness   Left wrist AVF+    Laboratory:  Recent Labs   Lab 07/21/23 0333   WBC 5.19   RBC 2.94*   HGB 9.7*   HCT 30.6*   PLT 95*   *   MCH 33.0*   MCHC 31.7*     BMP:   Recent Labs   Lab 07/20/23  1319 07/21/23  0333   * 91    142   K 4.9 4.6   * 113*   CO2 21* 21*   BUN 35* 39*   CREATININE 3.0* 3.2*   CALCIUM 9.1 9.0   MG 1.4*  --      Lab Results   Component Value Date    CALCIUM 9.0 07/21/2023    PHOS 5.1 (H) 07/21/2023     BNP  No results for input(s): BNP, BNPTRIAGEBLO in the last 168 hours.No results found for: URICACIDNo results found for: IRON, TIBC, FERRITIN, SATURATEDIRO  Lab Results   Component Value Date    CALCIUM 9.0 07/21/2023    PHOS 5.1 (H) 07/21/2023       Diagnostic Results:  MRA Neck without contrast   Final Result      No significant arterial abnormalities.         Electronically signed by: Johnna Parker   Date:    07/20/2023   Time:    16:08      MRA Brain without contrast   Final Result      No evidence for hemodynamically significant stenosis or occlusion.         Electronically signed by: Johnna Parker   Date:    07/20/2023   Time:    16:05      CT Head Without Contrast   Final Result     "  1. Allowing for extensive motion artifact, no convincing acute intracranial abnormalities noting sequela of chronic microvascular ischemic change and senescent change.         Electronically signed by: Cedrick Manuel MD   Date:    07/20/2023   Time:    13:32      X-Ray Chest AP Portable    (Results Pending)       ASSESSMENT/PLAN:                       Resolved Resp failure likely from oversedation post/inta-op:   -appreciate input from pulm   -defer   Stable    CARLYN on CKD3/ Hx of Hydronephrosis/ NAGMA  -6/14/2023:  -dicussed labs of 6/7/23 Cr 3.7 and CO2 Of 16 with patient and he told me that he actually has been out of his NaHCO3 tables "for some time." I then sent refill to his pharmacy and he was to resume this medication asap.   -I stpped Farxiga completely and asked that he increase fluid intake.   -Repeat Cr today improved at 3.07 And CO2 now 29.  -Will reduce NHCO3 To 650mg bid and otherwise stress need for increased fluid intake.  -Discussed that may need to start preparation for dialysis in very near future. Will refer to transplant Dr. Arcos. Will need to place AVF in left arm.  -Will also refer to transplant as well.  7/20:  AVF placed.  Creat stable 3-3.2 currently.  Cont bicarb.  Renally dose meds, avoid nephrotoxins, and monitor I/O's closely.      History of bladder cancer s/p cystectomy with cutaneous ureterostomies due to previous XRT, now with ileal conduit. History of multiple admissions for UTI/pyelo at that time.  -No UTIs since in remission.  -Last seen by oncology 6/6/2023 [facility] and remains in remission.  -Follows with Hem/ Onc at [facility] -Dr. Vargas    Anemia and thrombocytopenia/ MGUS-IgG kappa  -S/P dose Epo 20K 1/19/19 given Hgb <10 and GFR remains low with CARLYN, none since and not sure he should get give cancer history.  -Hgb 12.6>11.2>12.0> 11.3> 12.0>11.7  -recheck Fe studies nl 12/2022> 5/2023 TSAT 28 ferritn 314  -Has remote h/o GIB currently on pepcid previously, none now.  - TSH see " "below  - IgG kappa 0.7 with SFLC 1.7 7/2020  - 6/2022 SFLC 1.87, SPIEP pend  - 6/7/23 > SLFC 2.57 , SPIEP pend  -Follows with heme/ Onc.    HTN/ Chronic diastolic CHF.CAD/ Hx CVA/ Hx A.fib  -stable.    Hx of Hyperkalemia  -discussed need for low K+ diet and gave diet sheet.  -Off ARB  -K wnl       Hypothyroidism  -recheck TSH, FT4 is normal 12/2021, recheck 4.56, essentially in upper limit of range.       DM  -since 2000, no Hx of DM retinopathy    Vit D Defic  -Vit D 33 , iPTH 87 6/2022 > 27 12/2022  -Increase to 2000 units MWF and 1,000 units all other days> Vit D level 51 and iPTH improved at 59  -conitroma      Hyperuricemia  -Started Allopurinol 100mg daily > 8.1 3/2021, but didn't really.  -discussed risks and benefits, KATHARINE including but not limited to DRESS, SJS. He understands and accepts risks. He understands to call/ seek medical attention immediately should he develop and itching or rash at anytime while he is taking this medication.  -Repeat 7.2 actually now off Allopurinol and on plant based diet.    Hx LE DVT  -Now Off of Xarelto as completed.    Hypercholesterolemia  -Severely elevated LDL which may be part of nephrotic syndrome?  -repeat 9/2020 <334 Since it was mildly elevated 7/2020.  -Now back on Lipitor 40 mg daily with improvement but not goal  others in goal.  -recheck CPK, mild elevation in AST, needs to be watched.  -Patient is also expressing some concern about "effect of Lipitor on brain". I asked him to engage in a discussion with Dr. Greene at upcoming OV.   -Lipids 8/2021: close but still not goal on Lipitor 40 mg.  -change to Crestor 40 mg and Improved but not goal.  -Added Zetia 10 mg daily and not much of change in Lipids, thus will change to Repatha 140 mg subcutaneous q 2weeks and continue Crestor but stop Zetia  -recheck CPK, LFT are normal and last Lipids greatly improved.  -Has repeat ordered by Dr. Greene  -Shira as now.    Obesity  -Currently 240#> 255#> 260# > " "255# > 264# since off of weight loss meds  -Advised against Phentermine and he has since stopped  -He tells me he also took something called "liver cleanse" from [facility].  -Discussed weight loss methods, diet, walking-really needs the later  -weight down to 252# and had gotten down to 228 > 222#> 227> 228, but now up a bit at 235> 241> 223#.  -Discussed resuming plant based diet as he was doing before    Recurrent Left Dm foot ulcer  -Has custom diabetic shoes.  -Seeing Dr. Perez at [facility] wound care center.        Thanks for consult  See above  Will follow along.   Ok to DC from Renal  F/up [facility] Dr. Baker as scheduled.         "

## 2023-07-21 NOTE — CARE UPDATE
07/20/23 2117   Patient Assessment/Suction   Level of Consciousness (AVPU) responds to voice   Respiratory Effort Normal;Unlabored   Expansion/Accessory Muscles/Retractions no use of accessory muscles;no retractions   PRE-TX-O2   Device (Oxygen Therapy) room air   SpO2 (!) 94 %   Pulse Oximetry Type Continuous   $ Pulse Oximetry - Multiple Charge Pulse Oximetry - Multiple   Pulse 82   Resp 18

## 2023-07-26 ENCOUNTER — OFFICE VISIT (OUTPATIENT)
Dept: SURGERY | Facility: CLINIC | Age: 68
End: 2023-07-26
Attending: SPECIALIST
Payer: MEDICARE

## 2023-07-26 VITALS — SYSTOLIC BLOOD PRESSURE: 131 MMHG | DIASTOLIC BLOOD PRESSURE: 65 MMHG | OXYGEN SATURATION: 98 % | HEART RATE: 53 BPM

## 2023-07-26 DIAGNOSIS — N28.9 RENAL INSUFFICIENCY: Primary | ICD-10-CM

## 2023-07-26 PROCEDURE — 99499 UNLISTED E&M SERVICE: CPT | Mod: S$PBB,,, | Performed by: SPECIALIST

## 2023-07-26 PROCEDURE — 99499 NO LOS: ICD-10-PCS | Mod: S$PBB,,, | Performed by: SPECIALIST

## 2023-07-26 PROCEDURE — 99999 PR PBB SHADOW E&M-EST. PATIENT-LVL II: CPT | Mod: PBBFAC,,, | Performed by: SPECIALIST

## 2023-07-26 PROCEDURE — 99212 OFFICE O/P EST SF 10 MIN: CPT | Mod: PBBFAC | Performed by: SPECIALIST

## 2023-07-26 PROCEDURE — 99999 PR PBB SHADOW E&M-EST. PATIENT-LVL II: ICD-10-PCS | Mod: PBBFAC,,, | Performed by: SPECIALIST

## 2023-07-26 NOTE — PROGRESS NOTES
Status post Isabell fistula left wrist on 07/20/2023    Subjective   Some paresthesias over extensor surface thumb    PE   Neurovascular intact   Positive thrill  Incisions healing without evidence of infection    Impression/plan  Surgically stable   RTC 3 weeks

## 2023-08-16 ENCOUNTER — OFFICE VISIT (OUTPATIENT)
Dept: SURGERY | Facility: CLINIC | Age: 68
End: 2023-08-16
Attending: SPECIALIST
Payer: MEDICARE

## 2023-08-16 VITALS — SYSTOLIC BLOOD PRESSURE: 163 MMHG | HEART RATE: 74 BPM | OXYGEN SATURATION: 100 % | DIASTOLIC BLOOD PRESSURE: 77 MMHG

## 2023-08-16 DIAGNOSIS — N28.9 RENAL INSUFFICIENCY: Primary | ICD-10-CM

## 2023-08-16 PROCEDURE — 99999 PR PBB SHADOW E&M-EST. PATIENT-LVL III: ICD-10-PCS | Mod: PBBFAC,,, | Performed by: SPECIALIST

## 2023-08-16 PROCEDURE — 99999 PR PBB SHADOW E&M-EST. PATIENT-LVL III: CPT | Mod: PBBFAC,,, | Performed by: SPECIALIST

## 2023-08-16 PROCEDURE — 99499 NO LOS: ICD-10-PCS | Mod: S$PBB,,, | Performed by: SPECIALIST

## 2023-08-16 PROCEDURE — 99213 OFFICE O/P EST LOW 20 MIN: CPT | Mod: PBBFAC | Performed by: SPECIALIST

## 2023-08-16 PROCEDURE — 99499 UNLISTED E&M SERVICE: CPT | Mod: S$PBB,,, | Performed by: SPECIALIST

## 2023-08-16 NOTE — PROGRESS NOTES
Status post Isabell fistula left wrist 07/20/2023     Subjective   No complaints     PE   Left upper extremity-neurovascular intact, positive thrill, AVF maturing    Impression/plan  Surgically stable   RTC 6 weeks

## 2023-09-27 ENCOUNTER — OFFICE VISIT (OUTPATIENT)
Dept: SURGERY | Facility: CLINIC | Age: 68
End: 2023-09-27
Attending: SPECIALIST
Payer: MEDICARE

## 2023-09-27 VITALS — SYSTOLIC BLOOD PRESSURE: 155 MMHG | OXYGEN SATURATION: 98 % | DIASTOLIC BLOOD PRESSURE: 74 MMHG | HEART RATE: 72 BPM

## 2023-09-27 DIAGNOSIS — N18.4 CHRONIC KIDNEY DISEASE, STAGE IV (SEVERE): Primary | ICD-10-CM

## 2023-09-27 PROCEDURE — 99024 PR POST-OP FOLLOW-UP VISIT: ICD-10-PCS | Mod: POP,,, | Performed by: SPECIALIST

## 2023-09-27 PROCEDURE — 99999 PR PBB SHADOW E&M-EST. PATIENT-LVL III: ICD-10-PCS | Mod: PBBFAC,,, | Performed by: SPECIALIST

## 2023-09-27 PROCEDURE — 99024 POSTOP FOLLOW-UP VISIT: CPT | Mod: POP,,, | Performed by: SPECIALIST

## 2023-09-27 PROCEDURE — 99213 OFFICE O/P EST LOW 20 MIN: CPT | Mod: PBBFAC | Performed by: SPECIALIST

## 2023-09-27 PROCEDURE — 99999 PR PBB SHADOW E&M-EST. PATIENT-LVL III: CPT | Mod: PBBFAC,,, | Performed by: SPECIALIST

## 2023-09-27 NOTE — PROGRESS NOTES
Status post construction of Isabell fistula left wrist 07/20/2023  end cephalic vein to radial artery     Subjective   No complaints    PE   Left upper extremity-neurovascular intact, positive thrill, fistula maturing without issue, incisions healed    Impression/plan  Surgically stable   RTC 6 weeks

## 2023-10-23 PROBLEM — J96.02 ACUTE RESPIRATORY FAILURE WITH HYPOXIA AND HYPERCARBIA: Status: RESOLVED | Noted: 2023-07-20 | Resolved: 2023-10-23

## 2023-10-23 PROBLEM — J96.01 ACUTE RESPIRATORY FAILURE WITH HYPOXIA AND HYPERCARBIA: Status: RESOLVED | Noted: 2023-07-20 | Resolved: 2023-10-23

## 2023-10-23 PROBLEM — I63.9 CVA (CEREBRAL VASCULAR ACCIDENT): Status: RESOLVED | Noted: 2018-10-30 | Resolved: 2023-10-23

## 2023-10-31 ENCOUNTER — OFFICE VISIT (OUTPATIENT)
Dept: CARDIOLOGY | Facility: CLINIC | Age: 68
End: 2023-10-31
Payer: MEDICARE

## 2023-10-31 VITALS
WEIGHT: 240.44 LBS | HEART RATE: 66 BPM | BODY MASS INDEX: 30.87 KG/M2 | DIASTOLIC BLOOD PRESSURE: 84 MMHG | OXYGEN SATURATION: 99 % | SYSTOLIC BLOOD PRESSURE: 164 MMHG

## 2023-10-31 DIAGNOSIS — I25.10 ATHEROSCLEROSIS OF NATIVE CORONARY ARTERY OF NATIVE HEART WITHOUT ANGINA PECTORIS: ICD-10-CM

## 2023-10-31 DIAGNOSIS — I10 ESSENTIAL (PRIMARY) HYPERTENSION: ICD-10-CM

## 2023-10-31 DIAGNOSIS — I48.0 PAF (PAROXYSMAL ATRIAL FIBRILLATION): Primary | ICD-10-CM

## 2023-10-31 PROCEDURE — 99213 OFFICE O/P EST LOW 20 MIN: CPT | Mod: PBBFAC,PN | Performed by: INTERNAL MEDICINE

## 2023-10-31 PROCEDURE — 99999 PR PBB SHADOW E&M-EST. PATIENT-LVL III: CPT | Mod: PBBFAC,,, | Performed by: INTERNAL MEDICINE

## 2023-10-31 PROCEDURE — 99214 PR OFFICE/OUTPT VISIT, EST, LEVL IV, 30-39 MIN: ICD-10-PCS | Mod: S$PBB,,, | Performed by: INTERNAL MEDICINE

## 2023-10-31 PROCEDURE — 99214 OFFICE O/P EST MOD 30 MIN: CPT | Mod: S$PBB,,, | Performed by: INTERNAL MEDICINE

## 2023-10-31 PROCEDURE — 99999 PR PBB SHADOW E&M-EST. PATIENT-LVL III: ICD-10-PCS | Mod: PBBFAC,,, | Performed by: INTERNAL MEDICINE

## 2023-10-31 RX ORDER — HYDRALAZINE HYDROCHLORIDE 100 MG/1
100 TABLET, FILM COATED ORAL EVERY 8 HOURS
Qty: 270 TABLET | Refills: 3 | Status: SHIPPED | OUTPATIENT
Start: 2023-10-31 | End: 2024-10-30

## 2023-10-31 RX ORDER — CLONIDINE HYDROCHLORIDE 0.1 MG/1
0.1 TABLET ORAL EVERY 6 HOURS PRN
Qty: 90 TABLET | Refills: 11 | Status: SHIPPED | OUTPATIENT
Start: 2023-10-31 | End: 2024-10-30

## 2023-10-31 NOTE — PROGRESS NOTES
Cardiology    10/31/2023  9:15 AM    Problem list  Patient Active Problem List   Diagnosis    Atherosclerotic heart disease of native coronary artery without angina pectoris    Essential (primary) hypertension    Pure hypercholesterolemia    Renal insufficiency    PAF (paroxysmal atrial fibrillation)    Chronic kidney disease, stage IV (severe)    History of bladder cancer    History of CVA (cerebrovascular accident)    Hypothyroidism    History of adenocarcinoma of prostate       CC:  F/u    HPI:  He is here for follow-up.  Since our last visit, Dr Baker increase his carvedilol to 6.25 mg twice daily.  However, he states that he feels sluggish because his heart rate is in the 50s.  He also can not achieve his target heart rate while he is exercising.  He is asking to decrease back his carvedilol to 3.125 mg twice daily.  His blood pressure is not at goal.  He denies any chest pain or shortness of breath.  He has consider what we discussed at the last visit and now is agreeable to discussed LAAO device.    Medications  Current Outpatient Medications   Medication Sig Dispense Refill    ascorbic acid, vitamin C, (VITAMIN C) 500 MG tablet Take 500 mg by mouth once daily.      carvediloL (COREG) 3.125 MG tablet Take 6.25 mg by mouth 2 (two) times daily.      cholecalciferol, vitamin D3, 25 mcg (1,000 unit) Chew once daily.      co-enzyme Q-10 30 mg capsule Take 100 mg by mouth.      cyanocobalamin (VITAMIN B-12) 1000 MCG tablet Take 100 mcg by mouth.      diltiaZEM (CARDIZEM CD) 240 MG 24 hr capsule Take 240 mg by mouth once daily.      ferrous sulfate (FEOSOL) 325 mg (65 mg iron) Tab tablet Take 325 mg by mouth daily with breakfast.      furosemide (LASIX) 20 MG tablet Take 20 mg by mouth daily as needed. 1 tablet 3x/ week      levothyroxine (SYNTHROID) 75 MCG tablet Take 75 mcg by mouth once daily.      magnesium oxide (MAG-OX) 400 mg (241.3 mg magnesium) tablet Take 400 mg by mouth.      multivitamin  (THERAGRAN) per tablet Take 1 tablet by mouth once daily.      omega-3 fatty acids 1,000 mg Cap Take 1,200 mg by mouth.      REPATHA SYRINGE 140 mg/mL Syrg Inject 140 mg into the skin.      rosuvastatin (CRESTOR) 40 MG Tab Take 10 mg by mouth every evening.      sodium bicarbonate 650 MG tablet 650 mg 3 (three) times daily.      cloNIDine (CATAPRES) 0.1 MG tablet Take 1 tablet (0.1 mg total) by mouth every 6 (six) hours as needed (systolic blood pressure (top number) is above 160.). 90 tablet 11    hydrALAZINE (APRESOLINE) 100 MG tablet Take 1 tablet (100 mg total) by mouth every 8 (eight) hours. 270 tablet 3     No current facility-administered medications for this visit.      Prior to Admission medications    Medication Sig Start Date End Date Taking? Authorizing Provider   ascorbic acid, vitamin C, (VITAMIN C) 500 MG tablet Take 500 mg by mouth once daily.   Yes Provider, Historical   carvediloL (COREG) 3.125 MG tablet Take 6.25 mg by mouth 2 (two) times daily. 12/13/22  Yes Provider, Historical   cholecalciferol, vitamin D3, 25 mcg (1,000 unit) Chew once daily. 10/25/16  Yes Provider, Historical   co-enzyme Q-10 30 mg capsule Take 100 mg by mouth.   Yes Provider, Historical   cyanocobalamin (VITAMIN B-12) 1000 MCG tablet Take 100 mcg by mouth.   Yes Provider, Historical   diltiaZEM (CARDIZEM CD) 240 MG 24 hr capsule Take 240 mg by mouth once daily. 3/18/20  Yes Provider, Historical   ferrous sulfate (FEOSOL) 325 mg (65 mg iron) Tab tablet Take 325 mg by mouth daily with breakfast.   Yes Provider, Historical   furosemide (LASIX) 20 MG tablet Take 20 mg by mouth daily as needed. 1 tablet 3x/ week   Yes Provider, Historical   levothyroxine (SYNTHROID) 75 MCG tablet Take 75 mcg by mouth once daily. 8/12/21  Yes Provider, Historical   magnesium oxide (MAG-OX) 400 mg (241.3 mg magnesium) tablet Take 400 mg by mouth.   Yes Provider, Historical   multivitamin (THERAGRAN) per tablet Take 1 tablet by mouth once daily.    Yes Provider, Historical   omega-3 fatty acids 1,000 mg Cap Take 1,200 mg by mouth.   Yes Provider, Historical   REPATHA SYRINGE 140 mg/mL Syrg Inject 140 mg into the skin. 6/23/22  Yes Provider, Historical   rosuvastatin (CRESTOR) 40 MG Tab Take 10 mg by mouth every evening.   Yes Provider, Historical   sodium bicarbonate 650 MG tablet 650 mg 3 (three) times daily. 8/31/20  Yes Provider, Historical   hydrALAZINE (APRESOLINE) 50 MG tablet Take 50 mg by mouth 3 (three) times daily. 100mg qam , 50 at noon and 100mg at bedtime 8/13/21 10/31/23 Yes Provider, Historical   cloNIDine (CATAPRES) 0.1 MG tablet Take 1 tablet (0.1 mg total) by mouth every 6 (six) hours as needed (systolic blood pressure (top number) is above 160.). 10/31/23 10/30/24  Jarad Greene MD   hydrALAZINE (APRESOLINE) 100 MG tablet Take 1 tablet (100 mg total) by mouth every 8 (eight) hours. 10/31/23 10/30/24  Jarad Greene MD   HYDROcodone-acetaminophen (NORCO) 5-325 mg per tablet Take 1 tablet by mouth every 6 (six) hours as needed for Pain. 7/20/23 10/31/23  Roque Arcos Jr., MD         History  Past Medical History:   Diagnosis Date    Cancer     bladder ca and prostate ca     CKD (chronic kidney disease), stage IV     Diabetes mellitus     Hypertension     Stroke     Thyroid disease      Past Surgical History:   Procedure Laterality Date    ABLATION      for atrial fibrilation    AV FISTULA PLACEMENT Left 7/20/2023    Procedure: CREATION, AV FISTULA;  Surgeon: Roque Arcos Jr., MD;  Location: [facility];  Service: Vascular;  Laterality: Left;   / LEFT UPPER EXTROMITY    HEMICOLECTOMY W/ OSTOMY      PORTACATH PLACEMENT Right     chest wall     Social History     Socioeconomic History    Marital status:    Tobacco Use    Smoking status: Former     Types: Cigarettes    Smokeless tobacco: Never   Substance and Sexual Activity    Alcohol use: Never    Drug use: Never     Social Determinants of Health     Financial Resource Strain: Low  Risk  (7/21/2023)    Overall Financial Resource Strain (CARDIA)     Difficulty of Paying Living Expenses: Not hard at all   Food Insecurity: No Food Insecurity (7/21/2023)    Hunger Vital Sign     Worried About Running Out of Food in the Last Year: Never true     Ran Out of Food in the Last Year: Never true   Transportation Needs: No Transportation Needs (7/21/2023)    PRAPARE - Transportation     Lack of Transportation (Medical): No     Lack of Transportation (Non-Medical): No   Physical Activity: Inactive (7/21/2023)    Exercise Vital Sign     Days of Exercise per Week: 0 days     Minutes of Exercise per Session: 0 min   Stress: No Stress Concern Present (7/21/2023)    [employer] - Occupational Stress Questionnaire     Feeling of Stress : Not at all   Social Connections: Socially Integrated (7/21/2023)    Social Connection and Isolation Panel [NHANES]     Frequency of Communication with Friends and Family: More than three times a week     Frequency of Social Gatherings with Friends and Family: Once a week     Attends [employer] Services: More than 4 times per year     Active Member of Clubs or Organizations: Yes     Attends Club or Organization Meetings: More than 4 times per year     Marital Status:    Housing Stability: Unknown (7/21/2023)    Housing Stability Vital Sign     Unable to Pay for Housing in the Last Year: No     Unstable Housing in the Last Year: No         Allergies  Review of patient's allergies indicates:   Allergen Reactions    Ambien [zolpidem] Other (See Comments)     Causes pt to hallucinate      Irbesartan      hyperkalemia         Review of Systems   ROS      Physical Exam  Wt Readings from Last 1 Encounters:   10/31/23 109.1 kg (240 lb 6.6 oz)     BP Readings from Last 3 Encounters:   10/31/23 (!) 164/84   09/27/23 (!) 155/74   08/16/23 (!) 163/77     Pulse Readings from Last 1 Encounters:   10/31/23 66     Body mass index is 30.87 kg/m².    Physical  Exam        Assessment  1. PAF (paroxysmal atrial fibrillation)  [id number] = 3, not candidate for OAC due to bleeding  - Ambulatory referral/consult to Electrophysiology; Future    2. Essential (primary) hypertension  Not at goal    3. Atherosclerosis of native coronary artery of native heart without angina pectoris  stable        Plan and Discussion  Discussed his blood pressure is now well controlled.  Patient is asking to decrease his carvedilol back down to 3.125 mg twice daily due to his bradycardia.  Will decrease his carvedilol to 3.125 mg twice daily.  Will increase his hydralazine to 100mg TID (currently taking 100mg, 50mg and 100mg).  Will also add carvedilol 0.1 mg every 6 hours as needed for systolic blood pressure greater than 160.  He is now agreeable to discussed LAAO device. Will refer to Dr. Miller for left atrial appendage occlusion device since he can not take anticoagulation due to bleeding.    [id number] score: 5   HAS-BLED score: 5    If you answer NO to any of the four criteria below, the patient does not meet the WATCHMAN implant eligibility requirements.     Patient has non-valvular atrial fibrillation: Yes   Patient has an increased risk for stroke and is recommended for oral anticoagulation (OAC): Yes   Patient is suitable for short-term anticoagulation therapy but deemed unable to take long-term OAC: Yes   Patient has an appropriate rationale to seek a non-pharmacological alternative to OACs. Specific factors include: History of bleeding or increased bleeding risk     Follow Up  3 months      Jarad Greene MD, F.A.C.C, F.S.C.A.I.        30 minutes were spent in chart review, documentation and review of results, and evaluation, treatment, and counseling of patient on the same day of service.    Disclaimer: This document was created using voice recognition software ([employer] Direct). Although it may be edited, this document may contain errors related to incorrect recognition of  the spoken word. Please call the physician if clarification is needed.

## 2023-11-09 ENCOUNTER — OFFICE VISIT (OUTPATIENT)
Dept: SURGERY | Facility: CLINIC | Age: 68
End: 2023-11-09
Attending: SPECIALIST
Payer: MEDICARE

## 2023-11-09 VITALS
SYSTOLIC BLOOD PRESSURE: 113 MMHG | DIASTOLIC BLOOD PRESSURE: 58 MMHG | WEIGHT: 240 LBS | HEART RATE: 58 BPM | OXYGEN SATURATION: 98 % | HEIGHT: 74 IN | BODY MASS INDEX: 30.8 KG/M2

## 2023-11-09 DIAGNOSIS — N28.9 RENAL INSUFFICIENCY: Primary | ICD-10-CM

## 2023-11-09 PROCEDURE — 99999 PR PBB SHADOW E&M-EST. PATIENT-LVL IV: CPT | Mod: PBBFAC,,, | Performed by: SPECIALIST

## 2023-11-09 PROCEDURE — 99212 PR OFFICE/OUTPT VISIT, EST, LEVL II, 10-19 MIN: ICD-10-PCS | Mod: S$PBB,,, | Performed by: SPECIALIST

## 2023-11-09 PROCEDURE — 99999 PR PBB SHADOW E&M-EST. PATIENT-LVL IV: ICD-10-PCS | Mod: PBBFAC,,, | Performed by: SPECIALIST

## 2023-11-09 PROCEDURE — 99212 OFFICE O/P EST SF 10 MIN: CPT | Mod: S$PBB,,, | Performed by: SPECIALIST

## 2023-11-09 PROCEDURE — 99214 OFFICE O/P EST MOD 30 MIN: CPT | Mod: PBBFAC | Performed by: SPECIALIST

## 2023-11-09 NOTE — PROGRESS NOTES
Status post construction of some medial fistula left wrist 07/20/2023     Subjective   Some paresthesias extensor surface of thumb     PE   AVF mature, positive thrill, incisions healed     Impression/plan   AVF surgically ready for use   RTC p.r.n.

## 2024-01-30 ENCOUNTER — OFFICE VISIT (OUTPATIENT)
Dept: CARDIOLOGY | Facility: CLINIC | Age: 69
End: 2024-01-30
Payer: MEDICARE

## 2024-01-30 VITALS
HEART RATE: 72 BPM | WEIGHT: 238.75 LBS | SYSTOLIC BLOOD PRESSURE: 174 MMHG | DIASTOLIC BLOOD PRESSURE: 82 MMHG | OXYGEN SATURATION: 97 % | BODY MASS INDEX: 30.65 KG/M2

## 2024-01-30 DIAGNOSIS — I10 ESSENTIAL (PRIMARY) HYPERTENSION: ICD-10-CM

## 2024-01-30 DIAGNOSIS — I25.10 ATHEROSCLEROSIS OF NATIVE CORONARY ARTERY OF NATIVE HEART WITHOUT ANGINA PECTORIS: ICD-10-CM

## 2024-01-30 DIAGNOSIS — I50.32 HYPERTENSIVE HEART DISEASE WITH CHRONIC DIASTOLIC CONGESTIVE HEART FAILURE: Primary | ICD-10-CM

## 2024-01-30 DIAGNOSIS — I11.0 HYPERTENSIVE HEART DISEASE WITH CHRONIC DIASTOLIC CONGESTIVE HEART FAILURE: Primary | ICD-10-CM

## 2024-01-30 DIAGNOSIS — E78.5 HYPERLIPIDEMIA LDL GOAL <70: ICD-10-CM

## 2024-01-30 DIAGNOSIS — I82.562 CHRONIC DEEP VEIN THROMBOSIS (DVT) OF CALF MUSCLE VEIN OF LEFT LOWER EXTREMITY: ICD-10-CM

## 2024-01-30 DIAGNOSIS — I48.0 PAF (PAROXYSMAL ATRIAL FIBRILLATION): ICD-10-CM

## 2024-01-30 PROCEDURE — 99214 OFFICE O/P EST MOD 30 MIN: CPT | Mod: S$PBB,,, | Performed by: INTERNAL MEDICINE

## 2024-01-30 PROCEDURE — 99213 OFFICE O/P EST LOW 20 MIN: CPT | Mod: PBBFAC,PN | Performed by: INTERNAL MEDICINE

## 2024-01-30 PROCEDURE — 99999 PR PBB SHADOW E&M-EST. PATIENT-LVL III: CPT | Mod: PBBFAC,,, | Performed by: INTERNAL MEDICINE

## 2024-01-30 RX ORDER — EVOLOCUMAB 140 MG/ML
140 INJECTION, SOLUTION SUBCUTANEOUS
Qty: 2 ML | Refills: 11 | Status: ACTIVE | OUTPATIENT
Start: 2024-01-30 | End: 2024-05-14 | Stop reason: SDUPTHER

## 2024-01-30 NOTE — PROGRESS NOTES
Cardiology    1/30/2024  9:17 AM    Problem list  Patient Active Problem List   Diagnosis    Atherosclerotic heart disease of native coronary artery without angina pectoris    Essential (primary) hypertension    Pure hypercholesterolemia    Renal insufficiency    PAF (paroxysmal atrial fibrillation)    Chronic kidney disease, stage IV (severe)    History of bladder cancer    History of CVA (cerebrovascular accident)    Hypothyroidism    History of adenocarcinoma of prostate    Hypertensive heart disease with chronic diastolic congestive heart failure    Chronic deep vein thrombosis (DVT) of calf muscle vein of left lower extremity       CC:  F/u    HPI:  He is here for follow-up.  He has no complaints.  He had labs done for his nephrologist which showed worsening creatinine of 4.4 with decreased GFR to 13.  His blood pressure is not well controlled at home.  He is taking carvedilol 3.125 mg 3 times daily, clonidine 0.1 mg 3 times daily and hydralazine 100 mg 2 times daily.  He is taking diltiazem 240 mg once daily.  He denies any chest pain or shortness of breath.    Medications  Current Outpatient Medications   Medication Sig Dispense Refill    ascorbic acid, vitamin C, (VITAMIN C) 500 MG tablet Take 500 mg by mouth once daily.      carvediloL (COREG) 3.125 MG tablet Take 3.125 mg by mouth 2 (two) times daily.      cholecalciferol, vitamin D3, 25 mcg (1,000 unit) Chew once daily.      cloNIDine (CATAPRES) 0.1 MG tablet Take 1 tablet (0.1 mg total) by mouth every 6 (six) hours as needed (systolic blood pressure (top number) is above 160.). (Patient taking differently: Take 0.1 mg by mouth 3 (three) times daily.) 90 tablet 11    cyanocobalamin (VITAMIN B-12) 1000 MCG tablet Take 100 mcg by mouth.      diltiaZEM (CARDIZEM CD) 240 MG 24 hr capsule Take 240 mg by mouth once daily.      ferrous sulfate (FEOSOL) 325 mg (65 mg iron) Tab tablet Take 325 mg by mouth daily with breakfast.      furosemide (LASIX) 20 MG  tablet Take 20 mg by mouth daily as needed. 1 tablet 3x/ week      hydrALAZINE (APRESOLINE) 100 MG tablet Take 1 tablet (100 mg total) by mouth every 8 (eight) hours. 270 tablet 3    levothyroxine (SYNTHROID) 75 MCG tablet Take 75 mcg by mouth once daily.      magnesium oxide (MAG-OX) 400 mg (241.3 mg magnesium) tablet Take 400 mg by mouth.      multivitamin (THERAGRAN) per tablet Take 1 tablet by mouth once daily.      rosuvastatin (CRESTOR) 40 MG Tab Take 10 mg by mouth every evening.      sodium bicarbonate 650 MG tablet 650 mg 3 (three) times daily.      co-enzyme Q-10 30 mg capsule Take 100 mg by mouth.      omega-3 fatty acids 1,000 mg Cap Take 1,200 mg by mouth.      REPATHA SYRINGE 140 mg/mL Syrg Inject 140 mg into the skin.       No current facility-administered medications for this visit.      Prior to Admission medications    Medication Sig Start Date End Date Taking? Authorizing Provider   ascorbic acid, vitamin C, (VITAMIN C) 500 MG tablet Take 500 mg by mouth once daily.   Yes Provider, Historical   carvediloL (COREG) 3.125 MG tablet Take 3.125 mg by mouth 2 (two) times daily. 12/13/22  Yes Provider, Historical   cholecalciferol, vitamin D3, 25 mcg (1,000 unit) Chew once daily. 10/25/16  Yes Provider, Historical   cloNIDine (CATAPRES) 0.1 MG tablet Take 1 tablet (0.1 mg total) by mouth every 6 (six) hours as needed (systolic blood pressure (top number) is above 160.).  Patient taking differently: Take 0.1 mg by mouth 3 (three) times daily. 10/31/23 10/30/24 Yes Jarad Greene MD   cyanocobalamin (VITAMIN B-12) 1000 MCG tablet Take 100 mcg by mouth.   Yes Provider, Historical   diltiaZEM (CARDIZEM CD) 240 MG 24 hr capsule Take 240 mg by mouth once daily. 3/18/20  Yes Provider, Historical   ferrous sulfate (FEOSOL) 325 mg (65 mg iron) Tab tablet Take 325 mg by mouth daily with breakfast.   Yes Provider, Historical   furosemide (LASIX) 20 MG tablet Take 20 mg by mouth daily as needed. 1 tablet 3x/ week    Yes Provider, Historical   hydrALAZINE (APRESOLINE) 100 MG tablet Take 1 tablet (100 mg total) by mouth every 8 (eight) hours. 10/31/23 10/30/24 Yes Jarad Greene MD   levothyroxine (SYNTHROID) 75 MCG tablet Take 75 mcg by mouth once daily. 8/12/21  Yes Provider, Historical   magnesium oxide (MAG-OX) 400 mg (241.3 mg magnesium) tablet Take 400 mg by mouth.   Yes Provider, Historical   multivitamin (THERAGRAN) per tablet Take 1 tablet by mouth once daily.   Yes Provider, Historical   rosuvastatin (CRESTOR) 40 MG Tab Take 10 mg by mouth every evening.   Yes Provider, Historical   sodium bicarbonate 650 MG tablet 650 mg 3 (three) times daily. 8/31/20  Yes Provider, Historical   co-enzyme Q-10 30 mg capsule Take 100 mg by mouth.    Provider, Historical   omega-3 fatty acids 1,000 mg Cap Take 1,200 mg by mouth.    Provider, Historical   REPATHA SYRINGE 140 mg/mL Syrg Inject 140 mg into the skin. 6/23/22   Provider, Historical         History  Past Medical History:   Diagnosis Date    Cancer     bladder ca and prostate ca     CKD (chronic kidney disease), stage IV     Diabetes mellitus     Hypertension     Stroke     Thyroid disease      Past Surgical History:   Procedure Laterality Date    ABLATION      for atrial fibrilation    AV FISTULA PLACEMENT Left 7/20/2023    Procedure: CREATION, AV FISTULA;  Surgeon: Roque Arcos Jr., MD;  Location: [facility];  Service: Vascular;  Laterality: Left;   / LEFT UPPER EXTROMITY    HEMICOLECTOMY W/ OSTOMY      PORTACATH PLACEMENT Right     chest wall     Social History     Socioeconomic History    Marital status:    Tobacco Use    Smoking status: Former     Types: Cigarettes    Smokeless tobacco: Never   Substance and Sexual Activity    Alcohol use: Never    Drug use: Never     Social Determinants of Health     Financial Resource Strain: Low Risk  (7/21/2023)    Overall Financial Resource Strain (CARDIA)     Difficulty of Paying Living Expenses: Not hard at all   Food  Insecurity: No Food Insecurity (7/21/2023)    Hunger Vital Sign     Worried About Running Out of Food in the Last Year: Never true     Ran Out of Food in the Last Year: Never true   Transportation Needs: No Transportation Needs (7/21/2023)    PRAPARE - Transportation     Lack of Transportation (Medical): No     Lack of Transportation (Non-Medical): No   Physical Activity: Inactive (7/21/2023)    Exercise Vital Sign     Days of Exercise per Week: 0 days     Minutes of Exercise per Session: 0 min   Stress: No Stress Concern Present (7/21/2023)    [employer] - Occupational Stress Questionnaire     Feeling of Stress : Not at all   Social Connections: Socially Integrated (7/21/2023)    Social Connection and Isolation Panel [NHANES]     Frequency of Communication with Friends and Family: More than three times a week     Frequency of Social Gatherings with Friends and Family: Once a week     Attends [employer]ian[employer] Services: More than 4 times per year     Active Member of Clubs or Organizations: Yes     Attends Club or Organization Meetings: More than 4 times per year     Marital Status:    Housing Stability: Unknown (7/21/2023)    Housing Stability Vital Sign     Unable to Pay for Housing in the Last Year: No     Unstable Housing in the Last Year: No         Allergies  Review of patient's allergies indicates:   Allergen Reactions    Ambien [zolpidem] Other (See Comments)     Causes pt to hallucinate      Irbesartan      hyperkalemia         Review of Systems   Review of Systems   Constitutional: Negative for decreased appetite, fever and weight loss.   HENT:  Negative for congestion and nosebleeds.    Eyes:  Negative for double vision, vision loss in left eye, vision loss in right eye and visual disturbance.   Cardiovascular:  Negative for chest pain, claudication, cyanosis, dyspnea on exertion, irregular heartbeat, leg swelling, near-syncope, orthopnea, palpitations, paroxysmal nocturnal  dyspnea and syncope.   Respiratory:  Negative for cough, hemoptysis, shortness of breath, sleep disturbances due to breathing, snoring, sputum production and wheezing.    Endocrine: Negative for cold intolerance and heat intolerance.   Skin:  Negative for nail changes and rash.   Musculoskeletal:  Negative for joint pain, muscle cramps, muscle weakness and myalgias.   Gastrointestinal:  Negative for change in bowel habit, heartburn, hematemesis, hematochezia, hemorrhoids and melena.   Neurological:  Negative for dizziness, focal weakness and headaches.         Physical Exam  Wt Readings from Last 1 Encounters:   01/30/24 108.3 kg (238 lb 12.1 oz)     BP Readings from Last 3 Encounters:   01/30/24 (!) 174/82   11/09/23 (!) 113/58   10/31/23 (!) 164/84     Pulse Readings from Last 1 Encounters:   01/30/24 72     Body mass index is 30.65 kg/m².    Physical Exam  Vitals reviewed.   Constitutional:       Appearance: He is well-developed.   HENT:      Head: Atraumatic.   Eyes:      General: No scleral icterus.  Neck:      Vascular: Normal carotid pulses. No carotid bruit, hepatojugular reflux or JVD.   Cardiovascular:      Rate and Rhythm: Normal rate and regular rhythm.      Chest Wall: PMI is not displaced.      Pulses: Intact distal pulses.           Carotid pulses are 2+ on the right side and 2+ on the left side.       Radial pulses are 2+ on the right side and 2+ on the left side.        Dorsalis pedis pulses are 2+ on the right side and 2+ on the left side.      Heart sounds: Normal heart sounds, S1 normal and S2 normal. No murmur heard.     No friction rub.   Pulmonary:      Effort: Pulmonary effort is normal. No respiratory distress.      Breath sounds: Normal breath sounds. No stridor. No wheezing or rales.   Chest:      Chest wall: No tenderness.   Abdominal:      General: Bowel sounds are normal.      Palpations: Abdomen is soft.   Musculoskeletal:      Cervical back: Neck supple. No edema.   Skin:     General:  Skin is warm and dry.      Nails: There is no clubbing.   Neurological:      Mental Status: He is alert and oriented to person, place, and time.   Psychiatric:         Behavior: Behavior normal.         Thought Content: Thought content normal.             Assessment  1. Hypertensive heart disease with chronic diastolic congestive heart failure  Not well controlled    2. Chronic deep vein thrombosis (DVT) of calf muscle vein of left lower extremity  Unchanged    3. PAF (paroxysmal atrial fibrillation)  In sinus,  off OAC due to bleeding.  Awaiting evaluation for Watchman with EP.    4. Atherosclerosis of native coronary artery of native heart without angina pectoris  stable    5. Essential (primary) hypertension  Not controlled        Plan and Discussion  Discussed that his blood pressure is not well controlled.  Will increase carvedilol to 6.25 mg 3 times daily.  Continue clonidine 0.1 mg 3 times daily and hydralazine 100 mg 3 times daily.  Continue with Diltiazem 240 mg once daily.  He will have a follow-up appointment with his nephrologist regarding worsening renal function.  His left wrist AV fistula has bruit and thrill.  Will send Repatha refill to our specialty pharmacy to see if any patient assistance programs available to help with the cost.  Will also contact EP for Watchman device referral.    Follow Up  2-3 months      Jarad Greene MD, F.A.C.C, F.S.C.A.I.        30 minutes were spent in chart review, documentation and review of results, and evaluation, treatment, and counseling of patient on the same day of service.    Disclaimer: This document was created using voice recognition software ([employer]). Although it may be edited, this document may contain errors related to incorrect recognition of the spoken word. Please call the physician if clarification is needed.

## 2024-01-30 NOTE — PATIENT INSTRUCTIONS
Increase carvedilol 6.25mg three times daily (take 2 tablets of the 3.125mg three times daily) and let us know.

## 2024-01-31 DIAGNOSIS — I48.0 PAF (PAROXYSMAL ATRIAL FIBRILLATION): Primary | ICD-10-CM

## 2024-02-19 NOTE — PROGRESS NOTES
Subjective:   Patient ID:  Arthur Menjivar is a 68 y.o. male who presents for evaluation of Atrial Fibrillation    Referring Cardiologist: Jarad Greene MD  Primary Care Physician: Demetri Whitley MD    HPI  I had the pleasure of seeing Mr. Menjivar today in our electrophysiology clinic in consultation for his need for stroke risk reduction related to his atrial fibrillation. As you are aware he is a pleasant 68 year-old man with hypertension, prior stroke, CKD stage IV, prior DVT, prior stroke, aortic atherosclerotic disease, paroxysmal atrial fibrillation and hematuria. He reports he was diagnosed with AF at [facility] 15-20 years ago. He is unaware of any episodes after. He was on xarelto however this was stopped in 2021 due to hematuria when he was being treated with bladder cancer. He now has a urostomy. He notes blood in the urostomy if he starts xarelto. He had a holter monitor in October of 2020 which was read as atrial fibrillation however selected strips on my review in the file show sinus rhythm with PACs and some runs of nonsustained AT. He reports rare episode of palpitations that can last an hour. He reports a stroke occurred 7-8 years ago. He has some residual left sided weakness. He reports he was not having any AF around that time. He feels it was a heat related stroke.    I reviewed available ECGs in EPIC which show sinus rhythm, at times with PACs. He has had a RBBB on ECGs since 2/2023    My interpretation of today's in-clinic ECG is sinus rhythm with RBBB.    Review of Systems   Constitutional: Negative for fever and malaise/fatigue.   HENT:  Negative for congestion and sore throat.    Eyes:  Negative for blurred vision and visual disturbance.   Cardiovascular:  Negative for chest pain, dyspnea on exertion, irregular heartbeat, near-syncope, palpitations and syncope.   Respiratory:  Negative for cough and shortness of breath.    Hematologic/Lymphatic: Negative for bleeding problem. Does not  bruise/bleed easily.   Skin: Negative.    Musculoskeletal: Negative.    Gastrointestinal:  Negative for bloating, abdominal pain, hematochezia and melena.   Neurological:  Negative for focal weakness and weakness.   Psychiatric/Behavioral: Negative.         Objective:   Physical Exam  Vitals reviewed.   Constitutional:       General: He is not in acute distress.     Appearance: He is well-developed. He is not diaphoretic.   HENT:      Head: Normocephalic and atraumatic.   Eyes:      General:         Right eye: No discharge.         Left eye: No discharge.      Conjunctiva/sclera: Conjunctivae normal.   Cardiovascular:      Rate and Rhythm: Normal rate and regular rhythm.      Heart sounds: No murmur heard.     No friction rub. No gallop.   Pulmonary:      Effort: Pulmonary effort is normal. No respiratory distress.      Breath sounds: Normal breath sounds. No wheezing or rales.   Abdominal:      General: Bowel sounds are normal. There is no distension.      Palpations: Abdomen is soft.      Tenderness: There is no abdominal tenderness.   Musculoskeletal:      Cervical back: Neck supple.   Skin:     General: Skin is warm and dry.   Neurological:      Mental Status: He is alert and oriented to person, place, and time.   Psychiatric:         Behavior: Behavior normal.         Thought Content: Thought content normal.         Judgment: Judgment normal.         Assessment:      1. PAF (paroxysmal atrial fibrillation)    2. Essential (primary) hypertension    3. Atherosclerosis of native coronary artery of native heart without angina pectoris    4. Hypertensive heart disease with chronic diastolic congestive heart failure    5. History of CVA (cerebrovascular accident)    6. Chronic kidney disease, stage IV (severe)        Plan:   In summary, Mr Menjivar is a pleasant 68 year-old man with hypertension, prior stroke, CKD stage IV, prior DVT, prior stroke, aortic atherosclerotic disease, paroxysmal atrial fibrillation and  hematuria. He reports having an ablation for atrial fibrillation prior to [place] at [facility]. Since that time he reports minimal/rare episodes of palpitations. He has been intolerant to anticoagulation since 2020 due to recurrent hematuria. He had bladder cancer at that time but now has a urostomy. He reports gross hematuria still occurs if he re-challenges himself with anticoagulation. Discussed Watchman implantation and need to be on anticoagulation for at least 6 weeks. I would be concerned regarding tolerability of this. Reasonable to first start with longer term monitoring to assess AF burden to help drive decision making/recommendations. He and his wife are agreeable. Will get an extended holter. If no AF observed would proceed with ILR for long-term rhythm monitoring. Discussed risks/benefits. They will contact their insurance regarding coverage of ILR and potential cost to them with monitoring.    Thank you for allowing me to participate in the care of this patient. Please do not hesitate to call me with any questions or concerns.    Kain Cortez MD, PhD  Cardiac Electrophysiology

## 2024-02-20 ENCOUNTER — OFFICE VISIT (OUTPATIENT)
Dept: ELECTROPHYSIOLOGY | Facility: CLINIC | Age: 69
End: 2024-02-20
Payer: MEDICARE

## 2024-02-20 ENCOUNTER — HOSPITAL ENCOUNTER (OUTPATIENT)
Dept: CARDIOLOGY | Facility: CLINIC | Age: 69
Discharge: HOME | End: 2024-02-20
Payer: MEDICARE

## 2024-02-20 ENCOUNTER — CLINICAL SUPPORT (OUTPATIENT)
Dept: CARDIOLOGY | Facility: HOSPITAL | Age: 69
End: 2024-02-20
Attending: INTERNAL MEDICINE
Payer: MEDICARE

## 2024-02-20 VITALS
WEIGHT: 233.25 LBS | HEART RATE: 58 BPM | HEIGHT: 74 IN | SYSTOLIC BLOOD PRESSURE: 150 MMHG | DIASTOLIC BLOOD PRESSURE: 80 MMHG | BODY MASS INDEX: 29.93 KG/M2

## 2024-02-20 DIAGNOSIS — Z86.73 HISTORY OF CVA (CEREBROVASCULAR ACCIDENT): ICD-10-CM

## 2024-02-20 DIAGNOSIS — I48.0 PAF (PAROXYSMAL ATRIAL FIBRILLATION): Primary | ICD-10-CM

## 2024-02-20 DIAGNOSIS — I10 ESSENTIAL (PRIMARY) HYPERTENSION: ICD-10-CM

## 2024-02-20 DIAGNOSIS — I25.10 ATHEROSCLEROSIS OF NATIVE CORONARY ARTERY OF NATIVE HEART WITHOUT ANGINA PECTORIS: ICD-10-CM

## 2024-02-20 DIAGNOSIS — I11.0 HYPERTENSIVE HEART DISEASE WITH CHRONIC DIASTOLIC CONGESTIVE HEART FAILURE: ICD-10-CM

## 2024-02-20 DIAGNOSIS — I48.0 PAF (PAROXYSMAL ATRIAL FIBRILLATION): ICD-10-CM

## 2024-02-20 DIAGNOSIS — N18.4 CHRONIC KIDNEY DISEASE, STAGE IV (SEVERE): ICD-10-CM

## 2024-02-20 DIAGNOSIS — I50.32 HYPERTENSIVE HEART DISEASE WITH CHRONIC DIASTOLIC CONGESTIVE HEART FAILURE: ICD-10-CM

## 2024-02-20 LAB
OHS QRS DURATION: 136 MS
OHS QTC CALCULATION: 445 MS

## 2024-02-20 PROCEDURE — 93010 ELECTROCARDIOGRAM REPORT: CPT | Mod: S$PBB,,, | Performed by: INTERNAL MEDICINE

## 2024-02-20 PROCEDURE — 93246 EXT ECG>7D<15D RECORDING: CPT | Mod: 59

## 2024-02-20 PROCEDURE — 99214 OFFICE O/P EST MOD 30 MIN: CPT | Mod: PBBFAC,25 | Performed by: INTERNAL MEDICINE

## 2024-02-20 PROCEDURE — 99205 OFFICE O/P NEW HI 60 MIN: CPT | Mod: S$PBB,,, | Performed by: INTERNAL MEDICINE

## 2024-02-20 PROCEDURE — 99999 PR PBB SHADOW E&M-EST. PATIENT-LVL IV: CPT | Mod: PBBFAC,,, | Performed by: INTERNAL MEDICINE

## 2024-02-20 PROCEDURE — 93248 EXT ECG>7D<15D REV&INTERPJ: CPT | Mod: ,,, | Performed by: INTERNAL MEDICINE

## 2024-02-20 PROCEDURE — 93005 ELECTROCARDIOGRAM TRACING: CPT | Mod: PBBFAC | Performed by: INTERNAL MEDICINE

## 2024-03-15 ENCOUNTER — TELEPHONE (OUTPATIENT)
Dept: ELECTROPHYSIOLOGY | Facility: CLINIC | Age: 69
End: 2024-03-15
Payer: MEDICARE

## 2024-03-15 ENCOUNTER — PATIENT MESSAGE (OUTPATIENT)
Dept: ELECTROPHYSIOLOGY | Facility: CLINIC | Age: 69
End: 2024-03-15
Payer: MEDICARE

## 2024-03-15 NOTE — TELEPHONE ENCOUNTER
Attempted to contact patient to discuss the information received from [employer] Services regarding the estimated cost of ILR implant procedure and ongoing monitoring. No answer received. Voicemail left advising that the information requested has been sent to his patient portal and if unable to access the patient portal to call the office back .

## 2024-03-21 ENCOUNTER — TELEPHONE (OUTPATIENT)
Dept: ELECTROPHYSIOLOGY | Facility: CLINIC | Age: 69
End: 2024-03-21
Payer: MEDICARE

## 2024-03-21 ENCOUNTER — TELEPHONE (OUTPATIENT)
Dept: CARDIOLOGY | Facility: CLINIC | Age: 69
End: 2024-03-21
Payer: MEDICARE

## 2024-03-21 NOTE — TELEPHONE ENCOUNTER
Second attempt to contact patient to discuss the information received from [employer] regarding the estimated cost of ILR implant procedure and ongoing monitoring. No answer received. Voicemail left advising that the information requested has been sent to his patient portal and if unable to access the patient portal to call the office back to discuss the information received regarding his estimated out of pocket cost for the procedure and monitoring.

## 2024-03-21 NOTE — TELEPHONE ENCOUNTER
Spoke to pt wife about ILR. Pt is scheduled to see Dr. Baker today. Pt wife anticipates pt will starting dialysis soon. Pt is scheduled to see Dr. Greene on 4/3 and will discuss ILR at the visit. She verbalized understanding.

## 2024-03-21 NOTE — TELEPHONE ENCOUNTER
----- Message from Angelique Rapp sent at 3/21/2024  1:11 PM CDT -----  Regarding: callback  Name of caller: loida         What is the requesting detail: requesting a call back from Melia regarding a procedure. Please advise       Can the clinic reply by [facility]:       What number to call back:[phone]

## 2024-04-03 ENCOUNTER — OFFICE VISIT (OUTPATIENT)
Dept: CARDIOLOGY | Facility: CLINIC | Age: 69
End: 2024-04-03
Payer: MEDICARE

## 2024-04-03 VITALS
OXYGEN SATURATION: 98 % | DIASTOLIC BLOOD PRESSURE: 78 MMHG | WEIGHT: 240.44 LBS | BODY MASS INDEX: 30.87 KG/M2 | HEART RATE: 60 BPM | SYSTOLIC BLOOD PRESSURE: 138 MMHG

## 2024-04-03 DIAGNOSIS — I25.10 ATHEROSCLEROSIS OF NATIVE CORONARY ARTERY OF NATIVE HEART WITHOUT ANGINA PECTORIS: Primary | ICD-10-CM

## 2024-04-03 DIAGNOSIS — I45.10 RBBB: ICD-10-CM

## 2024-04-03 DIAGNOSIS — I48.0 PAF (PAROXYSMAL ATRIAL FIBRILLATION): ICD-10-CM

## 2024-04-03 DIAGNOSIS — N18.4 CHRONIC KIDNEY DISEASE, STAGE IV (SEVERE): ICD-10-CM

## 2024-04-03 DIAGNOSIS — I10 ESSENTIAL (PRIMARY) HYPERTENSION: ICD-10-CM

## 2024-04-03 PROCEDURE — 99214 OFFICE O/P EST MOD 30 MIN: CPT | Mod: S$PBB,,, | Performed by: INTERNAL MEDICINE

## 2024-04-03 PROCEDURE — 99213 OFFICE O/P EST LOW 20 MIN: CPT | Mod: PBBFAC,PN | Performed by: INTERNAL MEDICINE

## 2024-04-03 PROCEDURE — 99999 PR PBB SHADOW E&M-EST. PATIENT-LVL III: CPT | Mod: PBBFAC,,, | Performed by: INTERNAL MEDICINE

## 2024-04-03 NOTE — PROGRESS NOTES
Cardiology    4/3/2024  11:40 AM    Problem list  Patient Active Problem List   Diagnosis    Atherosclerotic heart disease of native coronary artery without angina pectoris    Essential (primary) hypertension    Pure hypercholesterolemia    Renal insufficiency    PAF (paroxysmal atrial fibrillation)    Chronic kidney disease, stage IV (severe)    History of bladder cancer    History of CVA (cerebrovascular accident)    Hypothyroidism    History of adenocarcinoma of prostate    Hypertensive heart disease with chronic diastolic congestive heart failure    Chronic deep vein thrombosis (DVT) of calf muscle vein of left lower extremity       CC:  F/u    HPI:  Last seen in January.  Since then, he has been evaluated by Dr Cortez (EP) on 2/20 and plan for monitoring with holter and/or ILR to document AF before making a decision.  He has started on dialysis; 2 days a week.  He had a 14 day Holter monitor done which showed no atrial fibrillation but PACs.  He is asking for an opinion regarding loop recorder as recommended by Dr. Cortez.  He reports that his blood pressure is controlled at home.  He denies any chest pain on exertion.  He does feel fatigued.  His last hemoglobin was 8.2.  Hemoglobin in January was 10.0.  He getting iron.    Medications  Current Outpatient Medications   Medication Sig Dispense Refill    ascorbic acid, vitamin C, (VITAMIN C) 500 MG tablet Take 500 mg by mouth once daily.      carvediloL (COREG) 3.125 MG tablet Take 3.125 mg by mouth 2 (two) times daily.      cholecalciferol, vitamin D3, 25 mcg (1,000 unit) Chew once daily.      cloNIDine (CATAPRES) 0.1 MG tablet Take 1 tablet (0.1 mg total) by mouth every 6 (six) hours as needed (systolic blood pressure (top number) is above 160.). (Patient taking differently: Take 0.1 mg by mouth 3 (three) times daily.) 90 tablet 11    co-enzyme Q-10 30 mg capsule Take 100 mg by mouth.      cyanocobalamin (VITAMIN B-12) 1000 MCG tablet Take 100 mcg by  mouth.      diltiaZEM (CARDIZEM CD) 240 MG 24 hr capsule Take 240 mg by mouth once daily.      ferrous sulfate (FEOSOL) 325 mg (65 mg iron) Tab tablet Take 325 mg by mouth daily with breakfast.      furosemide (LASIX) 20 MG tablet Take 20 mg by mouth daily as needed. 1 tablet 2x/ week      hydrALAZINE (APRESOLINE) 100 MG tablet Take 1 tablet (100 mg total) by mouth every 8 (eight) hours. 270 tablet 3    levothyroxine (SYNTHROID) 75 MCG tablet Take 75 mcg by mouth once daily.      magnesium oxide (MAG-OX) 400 mg (241.3 mg magnesium) tablet Take 400 mg by mouth.      multivitamin (THERAGRAN) per tablet Take 1 tablet by mouth once daily.      omega-3 fatty acids 1,000 mg Cap Take 1,200 mg by mouth.      REPATHA SYRINGE 140 mg/mL Syrg Inject 140 mg into the skin every 14 (fourteen) days. 2 mL 11    rosuvastatin (CRESTOR) 40 MG Tab Take 10 mg by mouth every evening.      sodium bicarbonate 650 MG tablet 650 mg 3 (three) times daily.       No current facility-administered medications for this visit.      Prior to Admission medications    Medication Sig Start Date End Date Taking? Authorizing Provider   ascorbic acid, vitamin C, (VITAMIN C) 500 MG tablet Take 500 mg by mouth once daily.   Yes Provider, Historical   carvediloL (COREG) 3.125 MG tablet Take 3.125 mg by mouth 2 (two) times daily. 12/13/22  Yes Provider, Historical   cholecalciferol, vitamin D3, 25 mcg (1,000 unit) Chew once daily. 10/25/16  Yes Provider, Historical   cloNIDine (CATAPRES) 0.1 MG tablet Take 1 tablet (0.1 mg total) by mouth every 6 (six) hours as needed (systolic blood pressure (top number) is above 160.).  Patient taking differently: Take 0.1 mg by mouth 3 (three) times daily. 10/31/23 10/30/24 Yes Jarad Greene MD   co-enzyme Q-10 30 mg capsule Take 100 mg by mouth.   Yes Provider, Historical   cyanocobalamin (VITAMIN B-12) 1000 MCG tablet Take 100 mcg by mouth.   Yes Provider, Historical   diltiaZEM (CARDIZEM CD) 240 MG 24 hr capsule Take  240 mg by mouth once daily. 3/18/20  Yes Provider, Historical   ferrous sulfate (FEOSOL) 325 mg (65 mg iron) Tab tablet Take 325 mg by mouth daily with breakfast.   Yes Provider, Historical   furosemide (LASIX) 20 MG tablet Take 20 mg by mouth daily as needed. 1 tablet 2x/ week   Yes Provider, Historical   hydrALAZINE (APRESOLINE) 100 MG tablet Take 1 tablet (100 mg total) by mouth every 8 (eight) hours. 10/31/23 10/30/24 Yes Jarad Greene MD   levothyroxine (SYNTHROID) 75 MCG tablet Take 75 mcg by mouth once daily. 8/12/21  Yes Provider, Historical   magnesium oxide (MAG-OX) 400 mg (241.3 mg magnesium) tablet Take 400 mg by mouth.   Yes Provider, Historical   multivitamin (THERAGRAN) per tablet Take 1 tablet by mouth once daily.   Yes Provider, Historical   omega-3 fatty acids 1,000 mg Cap Take 1,200 mg by mouth.   Yes Provider, Historical   REPATHA SYRINGE 140 mg/mL Syrg Inject 140 mg into the skin every 14 (fourteen) days. 1/30/24  Yes Jarad Greene MD   rosuvastatin (CRESTOR) 40 MG Tab Take 10 mg by mouth every evening.   Yes Provider, Historical   sodium bicarbonate 650 MG tablet 650 mg 3 (three) times daily. 8/31/20  Yes Provider, Historical         History  Past Medical History:   Diagnosis Date    Cancer     bladder ca and prostate ca     CKD (chronic kidney disease), stage IV     Diabetes mellitus     Hypertension     Stroke     Thyroid disease      Past Surgical History:   Procedure Laterality Date    ABLATION      for atrial fibrilation    AV FISTULA PLACEMENT Left 7/20/2023    Procedure: CREATION, AV FISTULA;  Surgeon: Roque Arcos Jr., MD;  Location: [facility];  Service: Vascular;  Laterality: Left;   / LEFT UPPER EXTROMITY    HEMICOLECTOMY W/ OSTOMY      PORTACATH PLACEMENT Right     chest wall     Social History     Socioeconomic History    Marital status:    Tobacco Use    Smoking status: Former     Types: Cigarettes    Smokeless tobacco: Never   Substance and Sexual Activity    Alcohol  use: Never    Drug use: Never     Social Determinants of Health     Financial Resource Strain: Low Risk  (7/21/2023)    Overall Financial Resource Strain (CARDIA)     Difficulty of Paying Living Expenses: Not hard at all   Food Insecurity: No Food Insecurity (7/21/2023)    Hunger Vital Sign     Worried About Running Out of Food in the Last Year: Never true     Ran Out of Food in the Last Year: Never true   Transportation Needs: No Transportation Needs (7/21/2023)    PRAPARE - Transportation     Lack of Transportation (Medical): No     Lack of Transportation (Non-Medical): No   Physical Activity: Inactive (7/21/2023)    Exercise Vital Sign     Days of Exercise per Week: 0 days     Minutes of Exercise per Session: 0 min   Stress: No Stress Concern Present (7/21/2023)    [employer] [place] [employer] - Occupational Stress Questionnaire     Feeling of Stress : Not at all   Social Connections: Socially Integrated (7/21/2023)    Social Connection and Isolation Panel [NHANES]     Frequency of Communication with Friends and Family: More than three times a week     Frequency of Social Gatherings with Friends and Family: Once a week     Attends Sabianism Services: More than 4 times per year     Active Member of Clubs or Organizations: Yes     Attends Club or Organization Meetings: More than 4 times per year     Marital Status:    Housing Stability: Unknown (7/21/2023)    Housing Stability Vital Sign     Unable to Pay for Housing in the Last Year: No     Unstable Housing in the Last Year: No         Allergies  Review of patient's allergies indicates:   Allergen Reactions    Ambien [zolpidem] Other (See Comments)     Causes pt to hallucinate      Irbesartan      hyperkalemia         Review of Systems   Review of Systems   Constitutional: Negative for decreased appetite, fever and weight loss.   HENT:  Negative for congestion and nosebleeds.    Eyes:  Negative for double vision, vision loss in left eye, vision  loss in right eye and visual disturbance.   Cardiovascular:  Negative for chest pain, claudication, cyanosis, dyspnea on exertion, irregular heartbeat, leg swelling, near-syncope, orthopnea, palpitations, paroxysmal nocturnal dyspnea and syncope.   Respiratory:  Negative for cough, hemoptysis, shortness of breath, sleep disturbances due to breathing, snoring, sputum production and wheezing.    Endocrine: Negative for cold intolerance and heat intolerance.   Skin:  Negative for nail changes and rash.   Musculoskeletal:  Negative for joint pain, muscle cramps, muscle weakness and myalgias.   Gastrointestinal:  Negative for change in bowel habit, heartburn, hematemesis, hematochezia, hemorrhoids and melena.   Neurological:  Negative for dizziness, focal weakness and headaches.         Physical Exam  Wt Readings from Last 1 Encounters:   02/20/24 105.8 kg (233 lb 4 oz)     BP Readings from Last 3 Encounters:   02/20/24 (!) 150/80   01/30/24 (!) 174/82   11/09/23 (!) 113/58     Pulse Readings from Last 1 Encounters:   02/20/24 (!) 58     There is no height or weight on file to calculate BMI.    Physical Exam  Vitals reviewed.   Constitutional:       Appearance: He is well-developed.   HENT:      Head: Atraumatic.   Eyes:      General: No scleral icterus.  Neck:      Vascular: Normal carotid pulses. No carotid bruit, hepatojugular reflux or JVD.   Cardiovascular:      Rate and Rhythm: Normal rate and regular rhythm.      Chest Wall: PMI is not displaced.      Pulses: Intact distal pulses.           Carotid pulses are 2+ on the right side and 2+ on the left side.       Radial pulses are 2+ on the right side and 2+ on the left side.        Dorsalis pedis pulses are 2+ on the right side and 2+ on the left side.      Heart sounds: Normal heart sounds, S1 normal and S2 normal. No murmur heard.     No friction rub.   Pulmonary:      Effort: Pulmonary effort is normal. No respiratory distress.      Breath sounds: Normal breath  sounds. No stridor. No wheezing or rales.   Chest:      Chest wall: No tenderness.   Abdominal:      General: Bowel sounds are normal.      Palpations: Abdomen is soft.   Musculoskeletal:      Cervical back: Neck supple. No edema.   Skin:     General: Skin is warm and dry.      Nails: There is no clubbing.   Neurological:      Mental Status: He is alert and oriented to person, place, and time.   Psychiatric:         Behavior: Behavior normal.         Thought Content: Thought content normal.             Assessment  1. Atherosclerosis of native coronary artery of native heart without angina pectoris  Stable no angina    2. Essential (primary) hypertension  Controlled on current medications, continue medications and monitor    3. PAF (paroxysmal atrial fibrillation)  No atrial fibrillation on 14 day Holter monitor, followed by Dr Cortez    4. RBBB  stable    5. Chronic kidney disease, stage IV (severe)  On HD (Dr. Baker)        Plan and Discussion  Discussed that his fatigue and shortness breath may be due to his anemia.  Explained that with iron supplementation, his anemia will improve.  He denies any angina.  Recommend to follow up with Dr. Cortez for loop recorder insertion as recommended to monitor for any atrial fibrillation.  Continue with current medications.    Follow Up  3-4 months      Jarad Greene MD, F.A.C.C, F.S.C.A.I.      Total professional time spent for the encounter: 30 minutes  Time was spent preparing to see the patient, reviewing results of prior testing, obtaining and/or reviewing separately obtained history, performing a medically appropriate examination and interview, counseling and educating the patient/family, ordering medications/tests/procedures, referring and communicating with other health care professionals, documenting clinical information in the electronic health record, and independently interpreting results.    Disclaimer: This document was created using voice recognition  software (M*Modal Fluency Direct). Although it may be edited, this document may contain errors related to incorrect recognition of the spoken word. Please call the physician if clarification is needed.

## 2024-04-05 ENCOUNTER — TELEPHONE (OUTPATIENT)
Dept: ELECTROPHYSIOLOGY | Facility: CLINIC | Age: 69
End: 2024-04-05
Payer: MEDICARE

## 2024-04-05 NOTE — TELEPHONE ENCOUNTER
----- Message from Daphne Marks MA sent at 4/4/2024 11:29 AM CDT -----  Regarding: RE: Procedure    ----- Message -----  From: Kalina Khan  Sent: 4/4/2024  11:20 AM CDT  To: [facility] Staff  Subject: Procedure                                        Patient calling to proceed with procedure and asking to set up a date. Please call back @ [phone]. Thank you Kalina

## 2024-04-05 NOTE — TELEPHONE ENCOUNTER
Spoke with Patient . Scheduled for ILR procedure on 5/17/2024 with Dr Cortez. Procedure details reviewed and advised that pre procedure patient instructions will be sent via portal as requested. Advised to call the office for any questions or concerns prior to scheduled procedure. Understanding verbalized.

## 2024-04-10 ENCOUNTER — PATIENT MESSAGE (OUTPATIENT)
Dept: ELECTROPHYSIOLOGY | Facility: CLINIC | Age: 69
End: 2024-04-10
Payer: MEDICARE

## 2024-04-10 DIAGNOSIS — I48.0 PAF (PAROXYSMAL ATRIAL FIBRILLATION): ICD-10-CM

## 2024-04-10 DIAGNOSIS — Z86.73 HISTORY OF CVA (CEREBROVASCULAR ACCIDENT): Primary | ICD-10-CM

## 2024-04-24 ENCOUNTER — TELEPHONE (OUTPATIENT)
Dept: ELECTROPHYSIOLOGY | Facility: CLINIC | Age: 69
End: 2024-04-24
Payer: MEDICARE

## 2024-05-14 DIAGNOSIS — E78.5 HYPERLIPIDEMIA LDL GOAL <70: ICD-10-CM

## 2024-05-16 ENCOUNTER — TELEPHONE (OUTPATIENT)
Dept: ELECTROPHYSIOLOGY | Facility: CLINIC | Age: 69
End: 2024-05-16
Payer: MEDICARE

## 2024-05-16 RX ORDER — EVOLOCUMAB 140 MG/ML
140 INJECTION, SOLUTION SUBCUTANEOUS
Qty: 2 ML | Refills: 11 | Status: ACTIVE | OUTPATIENT
Start: 2024-05-16

## 2024-05-16 NOTE — TELEPHONE ENCOUNTER
Spoke to Patient .    CONFIRMED procedure arrival time of 9:30 am on     Reiterated instructions including:    -Directions to check in desk    - Confirms no new meds prescribed or med changes since scheduling -     -Pre-procedure LABS - N/A    -Confirmed absence or presence of implanted device/stimulator N/A    -Confirmed no recent or current fever, cough, or shortness of breath .    -Confirmed no redness, rash, irritation, or yeast infection to skin/ chest area.       -Bathe night prior and morning prior to procedure with Hibiclens solution or an antibacterial soap  -Reviewed current visitor policy    Patient verbalized understanding of above, denies any further questions and appreciated the call.

## 2024-05-16 NOTE — TELEPHONE ENCOUNTER
Attempted to contact patient to confirm scheduled procedure on 5/17/2024. Spoke with patient who request that I call back later due to dialysis.

## 2024-05-17 ENCOUNTER — HOSPITAL ENCOUNTER (OUTPATIENT)
Facility: HOSPITAL | Age: 69
Discharge: HOME | End: 2024-05-17
Attending: INTERNAL MEDICINE | Admitting: INTERNAL MEDICINE
Payer: MEDICARE

## 2024-05-17 VITALS
HEIGHT: 71 IN | BODY MASS INDEX: 33.6 KG/M2 | DIASTOLIC BLOOD PRESSURE: 83 MMHG | SYSTOLIC BLOOD PRESSURE: 159 MMHG | WEIGHT: 240 LBS | HEART RATE: 60 BPM | RESPIRATION RATE: 16 BRPM | TEMPERATURE: 98 F | OXYGEN SATURATION: 100 %

## 2024-05-17 DIAGNOSIS — Z86.73 HISTORY OF CVA (CEREBROVASCULAR ACCIDENT): ICD-10-CM

## 2024-05-17 DIAGNOSIS — Z95.9 CARDIAC DEVICE IN SITU: ICD-10-CM

## 2024-05-17 DIAGNOSIS — I48.0 PAF (PAROXYSMAL ATRIAL FIBRILLATION): ICD-10-CM

## 2024-05-17 LAB
OHS QRS DURATION: 132 MS
OHS QTC CALCULATION: 473 MS

## 2024-05-17 PROCEDURE — 33285 INSJ SUBQ CAR RHYTHM MNTR: CPT | Performed by: INTERNAL MEDICINE

## 2024-05-17 PROCEDURE — C1764 EVENT RECORDER, CARDIAC: HCPCS | Performed by: INTERNAL MEDICINE

## 2024-05-17 PROCEDURE — 93005 ELECTROCARDIOGRAM TRACING: CPT | Mod: 59

## 2024-05-17 PROCEDURE — 33285 INSJ SUBQ CAR RHYTHM MNTR: CPT | Mod: ,,, | Performed by: INTERNAL MEDICINE

## 2024-05-17 PROCEDURE — 93010 ELECTROCARDIOGRAM REPORT: CPT | Mod: ,,, | Performed by: INTERNAL MEDICINE

## 2024-05-17 PROCEDURE — 25000003 PHARM REV CODE 250: Performed by: STUDENT IN AN ORGANIZED HEALTH CARE EDUCATION/TRAINING PROGRAM

## 2024-05-17 PROCEDURE — 25000003 PHARM REV CODE 250: Performed by: INTERNAL MEDICINE

## 2024-05-17 PROCEDURE — 63600175 PHARM REV CODE 636 W HCPCS: Performed by: INTERNAL MEDICINE

## 2024-05-17 DEVICE — INSERTABLE CARDIAC MONITOR
Type: IMPLANTABLE DEVICE | Site: CHEST  WALL | Status: FUNCTIONAL
Brand: LUX-DX II+™

## 2024-05-17 RX ORDER — CEFAZOLIN SODIUM 1 G/3ML
INJECTION, POWDER, FOR SOLUTION INTRAMUSCULAR; INTRAVENOUS
Status: DISCONTINUED | OUTPATIENT
Start: 2024-05-17 | End: 2024-05-17 | Stop reason: HOSPADM

## 2024-05-17 RX ORDER — CLONIDINE HYDROCHLORIDE 0.1 MG/1
0.1 TABLET ORAL ONCE
Status: COMPLETED | OUTPATIENT
Start: 2024-05-17 | End: 2024-05-17

## 2024-05-17 RX ORDER — CARVEDILOL 3.12 MG/1
3.12 TABLET ORAL ONCE
Status: COMPLETED | OUTPATIENT
Start: 2024-05-17 | End: 2024-05-17

## 2024-05-17 RX ORDER — LIDOCAINE HYDROCHLORIDE AND EPINEPHRINE 10; 10 MG/ML; UG/ML
INJECTION, SOLUTION INFILTRATION; PERINEURAL
Status: DISCONTINUED | OUTPATIENT
Start: 2024-05-17 | End: 2024-05-17 | Stop reason: HOSPADM

## 2024-05-17 RX ORDER — HYDRALAZINE HYDROCHLORIDE 50 MG/1
100 TABLET, FILM COATED ORAL ONCE
Status: DISCONTINUED | OUTPATIENT
Start: 2024-05-17 | End: 2024-05-17 | Stop reason: HOSPADM

## 2024-05-17 RX ADMIN — CLONIDINE HYDROCHLORIDE 0.1 MG: 0.1 TABLET ORAL at 01:05

## 2024-05-17 RX ADMIN — CARVEDILOL 3.12 MG: 3.12 TABLET, FILM COATED ORAL at 01:05

## 2024-05-17 NOTE — H&P
[facility], [place]  H&P      Arthur Menjivar  YOB: 1955  Medical Record Number:  [id number]  Attending Physician:  Kain Cortez MD   Current Principal Problem:  <principal problem not specified>    History     Cc: ILR    HPI  68 year-old man with hypertension, prior stroke, CKD stage IV, prior DVT, prior stroke, aortic atherosclerotic disease, paroxysmal atrial fibrillation and hematuria. He reports he was diagnosed with AF at [facility] 15-20 years ago. He is unaware of any episodes after. He was on xarelto however this was stopped in 2021 due to hematuria when he was being treated with bladder cancer. He now has a urostomy. He notes blood in the urostomy if he starts xarelto. He had a holter monitor in October of 2020 which was read as atrial fibrillation however selected strips on my review in the file show sinus rhythm with PACs and some runs of nonsustained AT. He reports rare episode of palpitations that can last an hour. He reports a stroke occurred 7-8 years ago. He has some residual left sided weakness.    Pt of Dr. Cortez with last office visit on 2/20/24 where placement of a watchmand evice was discussed with pt. In the interim, decision has been for longer term monitoring of AF.     ECG: sinus bradycardia   Medications - Outpatient  Prior to Admission medications    Medication Sig Start Date End Date Taking? Authorizing Provider   ascorbic acid, vitamin C, (VITAMIN C) 500 MG tablet Take 500 mg by mouth once daily.   Yes Provider, Historical   carvediloL (COREG) 3.125 MG tablet Take 3.125 mg by mouth 2 (two) times daily. 12/13/22  Yes Provider, Historical   cholecalciferol, vitamin D3, 25 mcg (1,000 unit) Chew once daily. 10/25/16  Yes Provider, Historical   cloNIDine (CATAPRES) 0.1 MG tablet Take 1 tablet (0.1 mg total) by mouth every 6 (six) hours as needed (systolic blood pressure (top number) is above 160.).  Patient taking differently: Take 0.1 mg by mouth 3 (three)  times daily. 10/31/23 10/30/24 Yes Jarad Greene MD   diltiaZEM (CARDIZEM CD) 240 MG 24 hr capsule Take 240 mg by mouth once daily. 3/18/20  Yes Provider, Historical   ferrous sulfate (FEOSOL) 325 mg (65 mg iron) Tab tablet Take 325 mg by mouth daily with breakfast.   Yes Provider, Historical   hydrALAZINE (APRESOLINE) 100 MG tablet Take 1 tablet (100 mg total) by mouth every 8 (eight) hours. 10/31/23 10/30/24 Yes Jarad Greene MD   levothyroxine (SYNTHROID) 75 MCG tablet Take 75 mcg by mouth once daily. 8/12/21  Yes Provider, Historical   magnesium oxide (MAG-OX) 400 mg (241.3 mg magnesium) tablet Take 400 mg by mouth.   Yes Provider, Historical   multivitamin (THERAGRAN) per tablet Take 1 tablet by mouth once daily.   Yes Provider, Historical   omega-3 fatty acids 1,000 mg Cap Take 1,200 mg by mouth.   Yes Provider, Historical   rosuvastatin (CRESTOR) 40 MG Tab Take 10 mg by mouth every evening.   Yes Provider, Historical   sodium bicarbonate 650 MG tablet 650 mg 3 (three) times daily. 8/31/20  Yes Provider, Historical   co-enzyme Q-10 30 mg capsule Take 100 mg by mouth.    Provider, Historical   cyanocobalamin (VITAMIN B-12) 1000 MCG tablet Take 100 mcg by mouth.    Provider, Historical   furosemide (LASIX) 20 MG tablet Take 20 mg by mouth daily as needed. 1 tablet 2x/ week    Provider, Historical   REPATHA SYRINGE 140 mg/mL Syrg Inject 140 mg into the skin every 14 (fourteen) days. 5/16/24   Jarad Greene MD       Medications - Current  Scheduled Meds:  Continuous Infusions:    Allergies  Review of patient's allergies indicates:   Allergen Reactions    Ambien [zolpidem] Other (See Comments)     Causes pt to hallucinate      Irbesartan      hyperkalemia     Past Medical History  Past Medical History:   Diagnosis Date    Cancer     bladder ca and prostate ca     CKD (chronic kidney disease), stage IV     Diabetes mellitus     ESRD (end stage renal disease)     Hypertension     Stroke     Thyroid  "disease      Past Surgical History  Past Surgical History:   Procedure Laterality Date    ABLATION      for atrial fibrilation    AV FISTULA PLACEMENT Left 07/20/2023    Procedure: CREATION, AV FISTULA;  Surgeon: Roque Arcos Jr., MD;  Location: [facility];  Service: Vascular;  Laterality: Left;   / LEFT UPPER EXTROMITY    HEMICOLECTOMY W/ OSTOMY      PARTIAL SURGICAL REMOVAL OF BLADDER      PORTACATH PLACEMENT Right     chest wall    PROSTATECTOMY       ROS  10 point ROS performed and negative except as stated in HPI     Physical Examination   Vital Signs  Vitals  Temp: 97.9 °F (36.6 °C)  Temp Source: Temporal  Pulse: 61  Heart Rate Source: Monitor  Resp: 18  SpO2: 99 %  BP: 131/63  MAP (mmHg): 91  BP Location: Right arm  BP Method: Automatic  Patient Position: Sitting    24 Hour VS Range  Temp:  [97.9 °F (36.6 °C)]   Pulse:  [61]   Resp:  [18]   BP: (131)/(63)   SpO2:  [99 %]   No intake or output data in the 24 hours ending 05/17/24 1002      Physical Exam:   Constitutional: no acute distress  HEENT: NCAT, EOMI, no scleral icterus  Cardiovascular: Regular rate and rhythm  Pulmonary: Normal respiratory effort   Abdomen: nontender, non-distended   Neuro: alert and oriented, no focal deficits  Extremities: warm, no edema   MSK: no deformities  Skin: intact, no rashes     Data   No results for input(s): "WBC", "HGB", "HCT", "PLT" in the last 168 hours.   No results for input(s): "PROTIME", "INR" in the last 168 hours.   No results for input(s): "NA", "K", "CL", "CO2", "BUN", "CREATININE", "ANIONGAP", "CALCIUM" in the last 168 hours.   Assessment & Plan     AF  Presents to the hospital for ILR placement by Dr. Cortez.   Discussed risks/benefits in details. Pt expressed understanding.  Will cont with procedure.     Chantel Avendaño MD  [facility]   Cardiovascular Disease PGY-V    "

## 2024-05-17 NOTE — DISCHARGE INSTRUCTIONS
Post-Procedure Patient Discharge Instructions  Loop Recorders     Wound Care  You are discharged with a standard dressing over the incision, you may remove the dressing after 24 hours.   There is Dermabond (clear glue) over your incision, do not scrub it off. It acts as a barrier and will eventually disappear.  Do not get the incision wet for 48 hours following the procedure. You may sponge bath during this period, working around the incision. After 48 hours, you may shower, but you should still try to keep this area as dry as possible, and avoid direct water contact to the incision (allow the water to hit back of your shoulder rather than directly on the incision). Gently pat the incision dry if it does get wet.  You may take regular showers after 2 weeks, unless otherwise indicated at your follow-up visit.  Do not submerge the incision in a tub, pool, or body of water for 6 weeks.  If you notice unusual swelling, redness, drainage, have more device site pain, chest pain, shortness of breath, or have a fever greater than 100 degrees, call our device clinic immediately: [phone] or [phone] during normal office hours. You may call [phone] after-hours or on weekends and ask for the electrophysiologist on call.    Activity   If you were driving prior to the procedure, you may resume driving right after your procedure (as long you did not receive any sedation). If you have a history of passing out or a history of certain arrhythmias, there may be driving restrictions unrelated to the procedure. Please clarify with your physician if this is the case.  Avoid rough contact at the device site for 6 weeks.  You may participate in sexual activity unless otherwise instructed.  You may return to work the follow day unless told otherwise by your provider.    Long-Term Instructions  Metal Detectors at Airports: Metal detectors at airports do not interfere with you loop recorder.  MRI: You may have an MRI  with an implantable loop recorder. All that is required is that you send a transmission to download your information before and after you have your MRI.    Long-Term Follow-Up  Your device has the ability to transmit device information from home to the doctor's office using a home monitoring system.  This remote system takes the place of a doctor's visit. Your device will be checked from home every 31 days. Every 12 months, you will be asked to come into the office for an in-office check.  Your device should last in the range of 3 years.         Any need to reschedule or cancel procedures, or any questions regarding your procedures should be addressed directly with the Arrhythmia Department Nurses at the following phone number: [phone].

## 2024-05-17 NOTE — DISCHARGE SUMMARY
[facility] - Cardiology  Cardiac Electrophysiology  Discharge Summary      Patient Name: Arthur Menjivar  MRN: [id number]  Admission Date: 5/17/2024  Hospital Length of Stay: 0 days  Discharge Date and Time:  05/17/2024 11:57 AM  Attending Physician: Kain Cortez MD    Discharging Provider: Chantel Avendaño MD  Primary Care Physician: Demetri Whitley MD    HPI:   No notes on file    Procedure(s) (LRB):  Insertion, Implantable Loop Recorder (Left)     Indwelling Lines/Drains at time of discharge:  Lines/Drains/Airways       Central Venous Catheter Line  Duration                  Hemodialysis AV Graft Left forearm -- days              Drain  Duration                  Urostomy 08/13/20 0955 ureterostomy, right RLQ 1373 days                    Hospital Course:  No notes on file    Goals of Care Treatment Preferences:  Code Status: Full Code      Consults:     Significant Diagnostic Studies: N/A    Pending Diagnostic Studies:       None            There are no hospital problems to display for this patient.    No new Assessment & Plan notes have been filed under this hospital service since the last note was generated.  Service: Arrhythmia      Discharged Condition: stable    Disposition:     Follow Up:    Patient Instructions:   No discharge procedures on file.  Medications:  Reconciled Home Medications:      Medication List        CONTINUE taking these medications      ascorbic acid (vitamin C) 500 MG tablet  Commonly known as: VITAMIN C  Take 500 mg by mouth once daily.     carvediloL 3.125 MG tablet  Commonly known as: COREG  Take 3.125 mg by mouth 2 (two) times daily.     cholecalciferol (vitamin D3) 25 mcg (1,000 unit) Chew  once daily.     cloNIDine 0.1 MG tablet  Commonly known as: CATAPRES  Take 1 tablet (0.1 mg total) by mouth every 6 (six) hours as needed (systolic blood pressure (top number) is above 160.).     co-enzyme Q-10 30 mg capsule  Take 100 mg by mouth.     cyanocobalamin 1000 MCG  tablet  Commonly known as: VITAMIN B-12  Take 100 mcg by mouth.     diltiaZEM 240 MG 24 hr capsule  Commonly known as: CARDIZEM CD  Take 240 mg by mouth once daily.     ferrous sulfate 325 mg (65 mg iron) Tab tablet  Commonly known as: FEOSOL  Take 325 mg by mouth daily with breakfast.     furosemide 20 MG tablet  Commonly known as: LASIX  Take 20 mg by mouth daily as needed. 1 tablet 2x/ week     hydrALAZINE 100 MG tablet  Commonly known as: APRESOLINE  Take 1 tablet (100 mg total) by mouth every 8 (eight) hours.     levothyroxine 75 MCG tablet  Commonly known as: SYNTHROID  Take 75 mcg by mouth once daily.     magnesium oxide 400 mg (241.3 mg magnesium) tablet  Commonly known as: MAG-OX  Take 400 mg by mouth.     multivitamin per tablet  Commonly known as: THERAGRAN  Take 1 tablet by mouth once daily.     omega-3 fatty acids 1,000 mg Cap  Take 1,200 mg by mouth.     REPATHA SYRINGE 140 mg/mL Syrg  Generic drug: evolocumab  Inject 140 mg into the skin every 14 (fourteen) days.     rosuvastatin 40 MG Tab  Commonly known as: CRESTOR  Take 10 mg by mouth every evening.     sodium bicarbonate 650 MG tablet  650 mg 3 (three) times daily.              Time spent on the discharge of patient: 35 minutes    Chantel Avendaño MD  Cardiac Electrophysiology  [facility] - Cardiology

## 2024-05-17 NOTE — PROGRESS NOTES
05/17/24 1155 05/17/24 1210 05/17/24 1230   Vital Signs   Temp 97.8 °F (36.6 °C)  --   --    Temp Source Temporal  --   --    Pulse 60 (!) 59 (!) 59   Heart Rate Source Monitor  --   --    Resp 16  --   --    SpO2 99 %  --   --    Device (Oxygen Therapy) room air  --   --    BP (!) 194/85 (!) 191/91 (!) 183/87   MAP (mmHg) 122 130 125   BP Location Right arm  --  Right arm   BP Method Automatic  --  Automatic   Patient Position Lying  --  Sitting      05/17/24 1300 05/17/24 1312   Vital Signs   Temp  --  97.5 °F (36.4 °C)   Temp Source  --  Temporal   Pulse (!) 58 60   Heart Rate Source  --  Monitor   Resp  --   --    SpO2  --  100 %   Device (Oxygen Therapy)  --  room air   BP (!) 176/82 (!) 159/83   MAP (mmHg) 118 113   BP Location Right arm Right arm   BP Method Automatic Automatic   Patient Position Sitting Sitting     MD Avendaño notified about pt's pressure and MD ordered pt's home BP meds. Hydralazine held due to pt refusing. Pt's pressure dropped into desired range and MD Avendaño okayed pt discharge. Pt was never symptomatic. Pt given clonidine and carvedilol.      Pt and pt's wife given d/c paperwork and education reiterated. All questions and concerns addressed. Pt's left chest incision site has mepalex applied to site and site is free from s/s of bleeding. Pt able to drink, eat, and walk without difficulty. Pt's iv removed and pt waiting to be wheeled out to meet wife out front.

## 2024-05-17 NOTE — Clinical Note
A dressing was applied to the incision. To be applied in sscu 30 minutes after dermabond application

## 2024-05-17 NOTE — NURSING
Pt brought back to room 7A post Loop recorder. Procedural RN in report stated that pt's pressures with her were SBP of 186. Pt's pressure with me is 194/85 map 122. MD Avendaño notified. Pt is asymptomatic. Pt independent and AAOx4. Pt free from s/s of distress and pain. Pt's left upper chest incision site has skin glue intact and is free from s/s of bleeding. Safety measures in place.

## 2024-05-23 DIAGNOSIS — I48.0 PAF (PAROXYSMAL ATRIAL FIBRILLATION): Primary | ICD-10-CM

## 2024-05-28 ENCOUNTER — CLINICAL SUPPORT (OUTPATIENT)
Dept: CARDIOLOGY | Facility: HOSPITAL | Age: 69
End: 2024-05-28
Attending: INTERNAL MEDICINE
Payer: MEDICARE

## 2024-05-28 DIAGNOSIS — I48.0 PAF (PAROXYSMAL ATRIAL FIBRILLATION): ICD-10-CM

## 2024-05-28 DIAGNOSIS — Z86.73 HISTORY OF CVA (CEREBROVASCULAR ACCIDENT): ICD-10-CM

## 2024-05-29 DIAGNOSIS — E78.5 HYPERLIPIDEMIA LDL GOAL <70: ICD-10-CM

## 2024-05-29 RX ORDER — EVOLOCUMAB 140 MG/ML
140 INJECTION, SOLUTION SUBCUTANEOUS
Qty: 2 ML | Refills: 11 | OUTPATIENT
Start: 2024-05-29

## 2024-05-30 LAB — OHS CV DC REMOTE DEVICE TYPE: NORMAL

## 2024-06-17 ENCOUNTER — HOSPITAL ENCOUNTER (OUTPATIENT)
Facility: OTHER | Age: 69
Discharge: HOME | End: 2024-06-17
Attending: INTERNAL MEDICINE | Admitting: INTERNAL MEDICINE
Payer: MEDICARE

## 2024-06-17 ENCOUNTER — ANESTHESIA (OUTPATIENT)
Dept: SURGERY | Facility: OTHER | Age: 69
End: 2024-06-17
Payer: MEDICARE

## 2024-06-17 ENCOUNTER — ANESTHESIA EVENT (OUTPATIENT)
Dept: SURGERY | Facility: OTHER | Age: 69
End: 2024-06-17
Payer: MEDICARE

## 2024-06-17 VITALS
WEIGHT: 240.06 LBS | DIASTOLIC BLOOD PRESSURE: 66 MMHG | BODY MASS INDEX: 33.61 KG/M2 | RESPIRATION RATE: 16 BRPM | TEMPERATURE: 98 F | HEART RATE: 66 BPM | SYSTOLIC BLOOD PRESSURE: 166 MMHG | OXYGEN SATURATION: 96 % | HEIGHT: 71 IN

## 2024-06-17 DIAGNOSIS — T82.868A THROMBOSIS OF KIDNEY DIALYSIS ARTERIOVENOUS GRAFT, INITIAL ENCOUNTER: Primary | ICD-10-CM

## 2024-06-17 DIAGNOSIS — N18.6 END STAGE RENAL DISEASE: ICD-10-CM

## 2024-06-17 LAB
ALBUMIN SERPL BCP-MCNC: 3.5 G/DL (ref 3.5–5.2)
ALP SERPL-CCNC: 62 U/L (ref 55–135)
ALT SERPL W/O P-5'-P-CCNC: 10 U/L (ref 10–44)
ANION GAP SERPL CALC-SCNC: 10 MMOL/L (ref 8–16)
ANION GAP SERPL CALC-SCNC: 12 MMOL/L (ref 8–16)
AST SERPL-CCNC: 15 U/L (ref 10–40)
BASOPHILS # BLD AUTO: 0.03 K/UL (ref 0–0.2)
BASOPHILS NFR BLD: 0.7 % (ref 0–1.9)
BILIRUB SERPL-MCNC: 0.3 MG/DL (ref 0.1–1)
BUN SERPL-MCNC: 65 MG/DL (ref 8–23)
BUN SERPL-MCNC: 69 MG/DL (ref 8–23)
CALCIUM SERPL-MCNC: 9.1 MG/DL (ref 8.7–10.5)
CALCIUM SERPL-MCNC: 9.5 MG/DL (ref 8.7–10.5)
CHLORIDE SERPL-SCNC: 111 MMOL/L (ref 95–110)
CHLORIDE SERPL-SCNC: 113 MMOL/L (ref 95–110)
CO2 SERPL-SCNC: 19 MMOL/L (ref 23–29)
CO2 SERPL-SCNC: 20 MMOL/L (ref 23–29)
CREAT SERPL-MCNC: 9.5 MG/DL (ref 0.5–1.4)
CREAT SERPL-MCNC: 9.8 MG/DL (ref 0.5–1.4)
DIFFERENTIAL METHOD BLD: ABNORMAL
EOSINOPHIL # BLD AUTO: 0.1 K/UL (ref 0–0.5)
EOSINOPHIL NFR BLD: 1.9 % (ref 0–8)
ERYTHROCYTE [DISTWIDTH] IN BLOOD BY AUTOMATED COUNT: 14.1 % (ref 11.5–14.5)
EST. GFR  (NO RACE VARIABLE): 5 ML/MIN/1.73 M^2
EST. GFR  (NO RACE VARIABLE): 5 ML/MIN/1.73 M^2
GLUCOSE SERPL-MCNC: 104 MG/DL (ref 70–110)
GLUCOSE SERPL-MCNC: 150 MG/DL (ref 70–110)
HCT VFR BLD AUTO: 33.3 % (ref 40–54)
HGB BLD-MCNC: 10.4 G/DL (ref 14–18)
IMM GRANULOCYTES # BLD AUTO: 0.04 K/UL (ref 0–0.04)
IMM GRANULOCYTES NFR BLD AUTO: 1 % (ref 0–0.5)
LYMPHOCYTES # BLD AUTO: 1.4 K/UL (ref 1–4.8)
LYMPHOCYTES NFR BLD: 34 % (ref 18–48)
MCH RBC QN AUTO: 33 PG (ref 27–31)
MCHC RBC AUTO-ENTMCNC: 31.2 G/DL (ref 32–36)
MCV RBC AUTO: 106 FL (ref 82–98)
MONOCYTES # BLD AUTO: 0.4 K/UL (ref 0.3–1)
MONOCYTES NFR BLD: 9 % (ref 4–15)
NEUTROPHILS # BLD AUTO: 2.2 K/UL (ref 1.8–7.7)
NEUTROPHILS NFR BLD: 53.4 % (ref 38–73)
NRBC BLD-RTO: 0 /100 WBC
PLATELET # BLD AUTO: 127 K/UL (ref 150–450)
PMV BLD AUTO: 9.2 FL (ref 9.2–12.9)
POCT GLUCOSE: 151 MG/DL (ref 70–110)
POTASSIUM SERPL-SCNC: 5.7 MMOL/L (ref 3.5–5.1)
POTASSIUM SERPL-SCNC: 5.8 MMOL/L (ref 3.5–5.1)
PROT SERPL-MCNC: 7.3 G/DL (ref 6–8.4)
RBC # BLD AUTO: 3.15 M/UL (ref 4.6–6.2)
SODIUM SERPL-SCNC: 142 MMOL/L (ref 136–145)
SODIUM SERPL-SCNC: 143 MMOL/L (ref 136–145)
WBC # BLD AUTO: 4.12 K/UL (ref 3.9–12.7)

## 2024-06-17 PROCEDURE — 36000707: Performed by: SPECIALIST

## 2024-06-17 PROCEDURE — 36905 THRMBC/NFS DIALYSIS CIRCUIT: CPT | Mod: LT | Performed by: INTERNAL MEDICINE

## 2024-06-17 PROCEDURE — 63600175 PHARM REV CODE 636 W HCPCS: Performed by: ANESTHESIOLOGY

## 2024-06-17 PROCEDURE — C1757 CATH, THROMBECTOMY/EMBOLECT: HCPCS | Performed by: SPECIALIST

## 2024-06-17 PROCEDURE — C1887 CATHETER, GUIDING: HCPCS | Performed by: INTERNAL MEDICINE

## 2024-06-17 PROCEDURE — 25500020 PHARM REV CODE 255: Performed by: INTERNAL MEDICINE

## 2024-06-17 PROCEDURE — 76942 ECHO GUIDE FOR BIOPSY: CPT | Performed by: ANESTHESIOLOGY

## 2024-06-17 PROCEDURE — 63600175 PHARM REV CODE 636 W HCPCS: Performed by: SPECIALIST

## 2024-06-17 PROCEDURE — 99152 MOD SED SAME PHYS/QHP 5/>YRS: CPT | Performed by: INTERNAL MEDICINE

## 2024-06-17 PROCEDURE — 25000003 PHARM REV CODE 250: Performed by: NURSE PRACTITIONER

## 2024-06-17 PROCEDURE — 25000003 PHARM REV CODE 250: Performed by: STUDENT IN AN ORGANIZED HEALTH CARE EDUCATION/TRAINING PROGRAM

## 2024-06-17 PROCEDURE — 85025 COMPLETE CBC W/AUTO DIFF WBC: CPT | Performed by: INTERNAL MEDICINE

## 2024-06-17 PROCEDURE — 27201423 OPTIME MED/SURG SUP & DEVICES STERILE SUPPLY: Performed by: INTERNAL MEDICINE

## 2024-06-17 PROCEDURE — 36000706: Performed by: SPECIALIST

## 2024-06-17 PROCEDURE — C1894 INTRO/SHEATH, NON-LASER: HCPCS | Performed by: INTERNAL MEDICINE

## 2024-06-17 PROCEDURE — 25000003 PHARM REV CODE 250: Performed by: SPECIALIST

## 2024-06-17 PROCEDURE — 80048 BASIC METABOLIC PNL TOTAL CA: CPT | Mod: XB | Performed by: INTERNAL MEDICINE

## 2024-06-17 PROCEDURE — 37000009 HC ANESTHESIA EA ADD 15 MINS: Performed by: SPECIALIST

## 2024-06-17 PROCEDURE — C1750 CATH, HEMODIALYSIS,LONG-TERM: HCPCS | Performed by: SPECIALIST

## 2024-06-17 PROCEDURE — 63600175 PHARM REV CODE 636 W HCPCS: Performed by: NURSE PRACTITIONER

## 2024-06-17 PROCEDURE — 80053 COMPREHEN METABOLIC PANEL: CPT | Performed by: INTERNAL MEDICINE

## 2024-06-17 PROCEDURE — 99153 MOD SED SAME PHYS/QHP EA: CPT | Performed by: INTERNAL MEDICINE

## 2024-06-17 PROCEDURE — D9220A PRA ANESTHESIA: Mod: CRNA,,, | Performed by: STUDENT IN AN ORGANIZED HEALTH CARE EDUCATION/TRAINING PROGRAM

## 2024-06-17 PROCEDURE — C1725 CATH, TRANSLUMIN NON-LASER: HCPCS | Performed by: INTERNAL MEDICINE

## 2024-06-17 PROCEDURE — 63600175 PHARM REV CODE 636 W HCPCS: Mod: JG | Performed by: INTERNAL MEDICINE

## 2024-06-17 PROCEDURE — 36415 COLL VENOUS BLD VENIPUNCTURE: CPT | Performed by: INTERNAL MEDICINE

## 2024-06-17 PROCEDURE — 37000008 HC ANESTHESIA 1ST 15 MINUTES: Performed by: SPECIALIST

## 2024-06-17 PROCEDURE — D9220A PRA ANESTHESIA: Mod: ANES,,, | Performed by: ANESTHESIOLOGY

## 2024-06-17 PROCEDURE — 27201423 OPTIME MED/SURG SUP & DEVICES STERILE SUPPLY: Performed by: SPECIALIST

## 2024-06-17 PROCEDURE — 63600175 PHARM REV CODE 636 W HCPCS: Performed by: STUDENT IN AN ORGANIZED HEALTH CARE EDUCATION/TRAINING PROGRAM

## 2024-06-17 PROCEDURE — 71000015 HC POSTOP RECOV 1ST HR: Performed by: SPECIALIST

## 2024-06-17 PROCEDURE — 25000003 PHARM REV CODE 250: Performed by: INTERNAL MEDICINE

## 2024-06-17 PROCEDURE — 71000033 HC RECOVERY, INTIAL HOUR: Performed by: SPECIALIST

## 2024-06-17 PROCEDURE — C1769 GUIDE WIRE: HCPCS | Performed by: INTERNAL MEDICINE

## 2024-06-17 PROCEDURE — 71000039 HC RECOVERY, EACH ADD'L HOUR: Performed by: SPECIALIST

## 2024-06-17 PROCEDURE — C1757 CATH, THROMBECTOMY/EMBOLECT: HCPCS | Performed by: INTERNAL MEDICINE

## 2024-06-17 DEVICE — CATH EQUISTREAM 19CM: Type: IMPLANTABLE DEVICE | Site: NECK | Status: FUNCTIONAL

## 2024-06-17 RX ORDER — HYDROMORPHONE HYDROCHLORIDE 2 MG/ML
0.4 INJECTION, SOLUTION INTRAMUSCULAR; INTRAVENOUS; SUBCUTANEOUS EVERY 5 MIN PRN
Status: DISCONTINUED | OUTPATIENT
Start: 2024-06-17 | End: 2024-06-17 | Stop reason: HOSPADM

## 2024-06-17 RX ORDER — HEPARIN SODIUM 10000 [USP'U]/ML
INJECTION, SOLUTION INTRAVENOUS; SUBCUTANEOUS
Status: DISCONTINUED | OUTPATIENT
Start: 2024-06-17 | End: 2024-06-17 | Stop reason: HOSPADM

## 2024-06-17 RX ORDER — FUROSEMIDE 10 MG/ML
INJECTION INTRAMUSCULAR; INTRAVENOUS
Status: DISCONTINUED
Start: 2024-06-17 | End: 2024-06-17 | Stop reason: HOSPADM

## 2024-06-17 RX ORDER — PROCHLORPERAZINE EDISYLATE 5 MG/ML
5 INJECTION INTRAMUSCULAR; INTRAVENOUS EVERY 30 MIN PRN
Status: DISCONTINUED | OUTPATIENT
Start: 2024-06-17 | End: 2024-06-17 | Stop reason: HOSPADM

## 2024-06-17 RX ORDER — HEPARIN SOD,PORCINE/0.9 % NACL 1000/500ML
INTRAVENOUS SOLUTION INTRAVENOUS
Status: DISCONTINUED | OUTPATIENT
Start: 2024-06-17 | End: 2024-06-17 | Stop reason: HOSPADM

## 2024-06-17 RX ORDER — SODIUM BICARBONATE 42 MG/ML
INJECTION, SOLUTION INTRAVENOUS
Status: DISCONTINUED | OUTPATIENT
Start: 2024-06-17 | End: 2024-06-17

## 2024-06-17 RX ORDER — EPHEDRINE SULFATE 50 MG/ML
INJECTION, SOLUTION INTRAVENOUS
Status: DISCONTINUED | OUTPATIENT
Start: 2024-06-17 | End: 2024-06-17

## 2024-06-17 RX ORDER — INDOMETHACIN 25 MG/1
50 CAPSULE ORAL ONCE
Status: COMPLETED | OUTPATIENT
Start: 2024-06-17 | End: 2024-06-17

## 2024-06-17 RX ORDER — MIDAZOLAM HYDROCHLORIDE 1 MG/ML
INJECTION INTRAMUSCULAR; INTRAVENOUS
Status: DISCONTINUED | OUTPATIENT
Start: 2024-06-17 | End: 2024-06-17 | Stop reason: HOSPADM

## 2024-06-17 RX ORDER — PROPOFOL 10 MG/ML
VIAL (ML) INTRAVENOUS
Status: DISCONTINUED | OUTPATIENT
Start: 2024-06-17 | End: 2024-06-17

## 2024-06-17 RX ORDER — OXYCODONE HYDROCHLORIDE 5 MG/1
5 TABLET ORAL
Status: DISCONTINUED | OUTPATIENT
Start: 2024-06-17 | End: 2024-06-17 | Stop reason: HOSPADM

## 2024-06-17 RX ORDER — LIDOCAINE HYDROCHLORIDE 20 MG/ML
INJECTION INTRAVENOUS
Status: DISCONTINUED | OUTPATIENT
Start: 2024-06-17 | End: 2024-06-17

## 2024-06-17 RX ORDER — ONDANSETRON HYDROCHLORIDE 2 MG/ML
INJECTION, SOLUTION INTRAVENOUS
Status: DISCONTINUED | OUTPATIENT
Start: 2024-06-17 | End: 2024-06-17

## 2024-06-17 RX ORDER — MEPERIDINE HYDROCHLORIDE 25 MG/ML
12.5 INJECTION INTRAMUSCULAR; INTRAVENOUS; SUBCUTANEOUS ONCE AS NEEDED
Status: DISCONTINUED | OUTPATIENT
Start: 2024-06-17 | End: 2024-06-17 | Stop reason: HOSPADM

## 2024-06-17 RX ORDER — HYDRALAZINE HYDROCHLORIDE 20 MG/ML
10 INJECTION INTRAMUSCULAR; INTRAVENOUS ONCE
Status: COMPLETED | OUTPATIENT
Start: 2024-06-17 | End: 2024-06-17

## 2024-06-17 RX ORDER — LIDOCAINE HYDROCHLORIDE 10 MG/ML
INJECTION INFILTRATION; PERINEURAL
Status: DISCONTINUED | OUTPATIENT
Start: 2024-06-17 | End: 2024-06-17 | Stop reason: HOSPADM

## 2024-06-17 RX ORDER — PHENYLEPHRINE HYDROCHLORIDE 10 MG/ML
INJECTION INTRAVENOUS
Status: DISCONTINUED | OUTPATIENT
Start: 2024-06-17 | End: 2024-06-17

## 2024-06-17 RX ORDER — HYDROCODONE BITARTRATE AND ACETAMINOPHEN 7.5; 325 MG/1; MG/1
1 TABLET ORAL EVERY 6 HOURS PRN
Qty: 12 TABLET | Refills: 0 | Status: SHIPPED | OUTPATIENT
Start: 2024-06-17

## 2024-06-17 RX ORDER — FENTANYL CITRATE 50 UG/ML
INJECTION, SOLUTION INTRAMUSCULAR; INTRAVENOUS
Status: DISCONTINUED | OUTPATIENT
Start: 2024-06-17 | End: 2024-06-17 | Stop reason: HOSPADM

## 2024-06-17 RX ORDER — PROPOFOL 10 MG/ML
VIAL (ML) INTRAVENOUS CONTINUOUS PRN
Status: DISCONTINUED | OUTPATIENT
Start: 2024-06-17 | End: 2024-06-17

## 2024-06-17 RX ORDER — ROPIVACAINE HYDROCHLORIDE 5 MG/ML
INJECTION, SOLUTION EPIDURAL; INFILTRATION; PERINEURAL
Status: COMPLETED | OUTPATIENT
Start: 2024-06-17 | End: 2024-06-17

## 2024-06-17 RX ORDER — SODIUM CHLORIDE 0.9 % (FLUSH) 0.9 %
3 SYRINGE (ML) INJECTION
Status: DISCONTINUED | OUTPATIENT
Start: 2024-06-17 | End: 2024-06-17 | Stop reason: HOSPADM

## 2024-06-17 RX ORDER — FUROSEMIDE 10 MG/ML
80 INJECTION INTRAMUSCULAR; INTRAVENOUS ONCE
Status: COMPLETED | OUTPATIENT
Start: 2024-06-17 | End: 2024-06-17

## 2024-06-17 RX ADMIN — PROPOFOL 50 MCG/KG/MIN: 10 INJECTION, EMULSION INTRAVENOUS at 03:06

## 2024-06-17 RX ADMIN — ROPIVACAINE HYDROCHLORIDE 30 ML: 5 INJECTION, SOLUTION EPIDURAL; INFILTRATION; PERINEURAL at 02:06

## 2024-06-17 RX ADMIN — ONDANSETRON HYDROCHLORIDE 4 MG: 2 INJECTION INTRAMUSCULAR; INTRAVENOUS at 03:06

## 2024-06-17 RX ADMIN — PHENYLEPHRINE HYDROCHLORIDE 100 MCG: 10 INJECTION INTRAVENOUS at 04:06

## 2024-06-17 RX ADMIN — SODIUM BICARBONATE 50 MEQ: 84 INJECTION, SOLUTION INTRAVENOUS at 02:06

## 2024-06-17 RX ADMIN — PROPOFOL 25 MG: 10 INJECTION, EMULSION INTRAVENOUS at 03:06

## 2024-06-17 RX ADMIN — FENTANYL CITRATE 100 MCG: 50 INJECTION, SOLUTION INTRAMUSCULAR; INTRAVENOUS at 02:06

## 2024-06-17 RX ADMIN — GLYCOPYRROLATE 0.2 MG: 0.2 INJECTION, SOLUTION INTRAMUSCULAR; INTRAVITREAL at 03:06

## 2024-06-17 RX ADMIN — EPHEDRINE SULFATE 10 MG: 50 INJECTION INTRAVENOUS at 04:06

## 2024-06-17 RX ADMIN — FUROSEMIDE 80 MG: 10 INJECTION, SOLUTION INTRAMUSCULAR; INTRAVENOUS at 06:06

## 2024-06-17 RX ADMIN — EPHEDRINE SULFATE 10 MG: 50 INJECTION INTRAVENOUS at 03:06

## 2024-06-17 RX ADMIN — CEFAZOLIN 2 G: 2 INJECTION, POWDER, FOR SOLUTION INTRAMUSCULAR; INTRAVENOUS at 03:06

## 2024-06-17 RX ADMIN — HYDRALAZINE HYDROCHLORIDE 10 MG: 20 INJECTION INTRAMUSCULAR; INTRAVENOUS at 06:06

## 2024-06-17 RX ADMIN — SODIUM CHLORIDE, SODIUM LACTATE, POTASSIUM CHLORIDE, AND CALCIUM CHLORIDE: .6; .31; .03; .02 INJECTION, SOLUTION INTRAVENOUS at 02:06

## 2024-06-17 RX ADMIN — LIDOCAINE HYDROCHLORIDE 50 MG: 20 INJECTION, SOLUTION INTRAVENOUS at 03:06

## 2024-06-17 NOTE — H&P
[facility] - Cath Lab  History & Physical    Subjective:      Chief Complaint/Reason for Admission: Here for declot    Arthur Menjivar is a 68 y.o. male with ESRD (MWF at [facility] with Dr. Baker) who presents today for declot after his dialysis unit noted no flow in his access.  This access was placed in July 2023 by Dr. Arcos.      Past Medical History:   Diagnosis Date    Cancer     bladder ca and prostate ca     CKD (chronic kidney disease), stage IV     Diabetes mellitus     ESRD (end stage renal disease)     Hypertension     Stroke     Thyroid disease      Past Surgical History:   Procedure Laterality Date    ABLATION      for atrial fibrilation    AV FISTULA PLACEMENT Left 07/20/2023    Procedure: CREATION, AV FISTULA;  Surgeon: Roque Arcos Jr., MD;  Location: [facility] OR;  Service: Vascular;  Laterality: Left;   / LEFT UPPER EXTROMITY    HEMICOLECTOMY W/ OSTOMY      INSERTION OF IMPLANTABLE LOOP RECORDER Left 5/17/2024    Procedure: Insertion, Implantable Loop Recorder;  Surgeon: Kain Cortez MD;  Location: [facility] EP LAB;  Service: Cardiology;  Laterality: Left;  CVA, AF,  ILR, BSCI, Local, PA, 3 Prep ** HD pt/LUE AV fistula**    PARTIAL SURGICAL REMOVAL OF BLADDER      PORTACATH PLACEMENT Right     chest wall    PROSTATECTOMY       Social History     Tobacco Use    Smoking status: Former     Types: Cigarettes    Smokeless tobacco: Never   Substance Use Topics    Alcohol use: Never    Drug use: Never       PTA Medications   Medication Sig    ascorbic acid, vitamin C, (VITAMIN C) 500 MG tablet Take 500 mg by mouth once daily.    carvediloL (COREG) 3.125 MG tablet Take 3.125 mg by mouth 2 (two) times daily.    cholecalciferol, vitamin D3, 25 mcg (1,000 unit) Chew once daily.    cloNIDine (CATAPRES) 0.1 MG tablet Take 1 tablet (0.1 mg total) by mouth every 6 (six) hours as needed (systolic blood pressure (top number) is above 160.). (Patient taking differently: Take 0.1 mg by mouth 3 (three) times daily.)     cyanocobalamin (VITAMIN B-12) 1000 MCG tablet Take 100 mcg by mouth.    diltiaZEM (CARDIZEM CD) 240 MG 24 hr capsule Take 240 mg by mouth once daily.    ferrous sulfate (FEOSOL) 325 mg (65 mg iron) Tab tablet Take 325 mg by mouth daily with breakfast.    furosemide (LASIX) 20 MG tablet Take 20 mg by mouth daily as needed. 1 tablet 2x/ week    hydrALAZINE (APRESOLINE) 100 MG tablet Take 1 tablet (100 mg total) by mouth every 8 (eight) hours.    magnesium oxide (MAG-OX) 400 mg (241.3 mg magnesium) tablet Take 400 mg by mouth.    rosuvastatin (CRESTOR) 40 MG Tab Take 10 mg by mouth every evening.    sodium bicarbonate 650 MG tablet 650 mg 3 (three) times daily.    co-enzyme Q-10 30 mg capsule Take 100 mg by mouth.    levothyroxine (SYNTHROID) 75 MCG tablet Take 75 mcg by mouth once daily.    multivitamin (THERAGRAN) per tablet Take 1 tablet by mouth once daily.    omega-3 fatty acids 1,000 mg Cap Take 1,200 mg by mouth.    REPATHA SYRINGE 140 mg/mL Syrg Inject 140 mg into the skin every 14 (fourteen) days.     Review of patient's allergies indicates:   Allergen Reactions    Ambien [zolpidem] Other (See Comments)     Causes pt to hallucinate      Irbesartan      hyperkalemia        Review of Systems   Constitutional: Negative.    Respiratory: Negative.     Cardiovascular: Negative.        Objective:      Vital Signs (Most Recent)  Temp: 98 °F (36.7 °C) (06/17/24 0756)  Pulse: 62 (06/17/24 0756)  Resp: 20 (06/17/24 0756)  BP: (!) 164/82 (06/17/24 0757)  SpO2: 96 % (06/17/24 0756)    Vital Signs Range (Last 24H):  Temp:  [98 °F (36.7 °C)]   Pulse:  [62]   Resp:  [20]   BP: (164)/(82)   SpO2:  [96 %]     Physical Exam  Constitutional:       General: He is not in acute distress.     Appearance: He is well-developed. He is not diaphoretic.   HENT:      Head: Normocephalic and atraumatic.   Pulmonary:      Effort: Pulmonary effort is normal. No respiratory distress.   Musculoskeletal:      Cervical back: Neck supple.       Comments: LUE Radiocephalic AVF with no thrill   Skin:     General: Skin is warm and dry.   Neurological:      Mental Status: He is alert and oriented to person, place, and time.   Psychiatric:         Behavior: Behavior normal.         Assessment:      There are no hospital problems to display for this patient.      Plan:      Declot today.  Risks explained, consent signed.

## 2024-06-17 NOTE — OP NOTE
[facility] ([place])  Operative Note    SUMMARY     Surgery Date: 6/17/2024     Surgeons and Role:     * Roque Arcos Jr., MD - Primary    Assisting Surgeon: None    Pre-op Diagnosis:  End stage renal disease [N18.6]    Post-op Diagnosis:  Post-Op Diagnosis Codes:     * End stage renal disease [N18.6]    Procedure(s) (LRB):  THROMBECTOMY / LEFT (Left)  INSERTION, CATHETER, CENTRAL VENOUS, HEMODIALYSIS, TUNNELED (Right)    Anesthesia: Monitor Anesthesia Care    Description of Procedure:  Patient was brought the operating room placed in supine position.  The left upper extremity prepped and draped in a sterile fashion.  A longitudinal incision made over the seminal fistula near the wrist.  Using sharp dissection was carried down through the subcutaneous tissue and the fissure was isolated just distal to the anastomosis with the radial artery.  Small incision made in the fistula and 3. And number for for ED catheter was placed proximally and distally.  In addition 3.  Vague stat dilators were passed proximally and distally without issue.  There was excellent inflow and good back bleeding.  The fistula was instilled with heparinized saline and closed with interrupted 6. Cortex suture.  The subcutaneous tissue closed with interrupted 3-0 Vicryl and the skin with running 4-0 Monocryl.    The neck and chest were then prepped and draped in sterile fashion.  0.25% Marcaine used to achieve local anesthesia.  Ultrasound used to localize the right internal jugular vein.  Under ultrasonic guidance a 20 gauge vascular needle was inserted in the right internal jugular vein.  A guidewire inserted through the needle and into the jugular vein.  Sequential dilators were than employed.  A peel-away dilator then placed over the guidewire.  The guidewire then removed.  A 19 cm Equistream catheter then brought through a subcutaneous tissue tunnel on the right side of the chest.  The catheter tip then inserted through the  peel-away dilator and into the right internal jugular vein.  The catheter was then advanced into the superior vena cava.  The peel-away dilator removed.  All steps stone under fluoroscopy.  The catheter irrigated and aspirated without difficulty.  The catheter was then heparinized.  Skin closed with interrupted 3-0 Vicryl and the catheter was secured with 2-0 silk.  The wound sterilely dressed.  The patient tolerated the procedure well and left operating room in good condition.  At the end the procedure all sponge lap instrument counts were correct.    Estimated Blood Loss:  25 cc       Specimens:   Specimen (24h ago, onward)      None                SUMMARY     Admit Date:     Discharge Date and Time:  06/17/2024 5:53 PM    Hospital Course (synopsis of major diagnoses, care, treatment, and services provided during the course of the hospital stay):  Benign     Final Diagnosis: Post-Op Diagnosis Codes:     * End stage renal disease [N18.6]    Disposition: Home or Self Care    Follow Up/Patient Instructions:  Follow up with me next week.  Follow-up with dialysis on Wednesday at your normal time    Medications:  Reconciled Home Medications:      Medication List        CONTINUE taking these medications      ascorbic acid (vitamin C) 500 MG tablet  Commonly known as: VITAMIN C  Take 500 mg by mouth once daily.     carvediloL 3.125 MG tablet  Commonly known as: COREG  Take 3.125 mg by mouth 2 (two) times daily.     cholecalciferol (vitamin D3) 25 mcg (1,000 unit) Chew  once daily.     cloNIDine 0.1 MG tablet  Commonly known as: CATAPRES  Take 1 tablet (0.1 mg total) by mouth every 6 (six) hours as needed (systolic blood pressure (top number) is above 160.).     co-enzyme Q-10 30 mg capsule  Take 100 mg by mouth.     cyanocobalamin 1000 MCG tablet  Commonly known as: VITAMIN B-12  Take 100 mcg by mouth.     diltiaZEM 240 MG 24 hr capsule  Commonly known as: CARDIZEM CD  Take 240 mg by mouth once daily.     ferrous sulfate  325 mg (65 mg iron) Tab tablet  Commonly known as: FEOSOL  Take 325 mg by mouth daily with breakfast.     furosemide 20 MG tablet  Commonly known as: LASIX  Take 20 mg by mouth daily as needed. 1 tablet 2x/ week     hydrALAZINE 100 MG tablet  Commonly known as: APRESOLINE  Take 1 tablet (100 mg total) by mouth every 8 (eight) hours.     levothyroxine 75 MCG tablet  Commonly known as: SYNTHROID  Take 75 mcg by mouth once daily.     magnesium oxide 400 mg (241.3 mg magnesium) tablet  Commonly known as: MAG-OX  Take 400 mg by mouth.     multivitamin per tablet  Commonly known as: THERAGRAN  Take 1 tablet by mouth once daily.     omega-3 fatty acids 1,000 mg Cap  Take 1,200 mg by mouth.     REPATHA SYRINGE 140 mg/mL Syrg  Generic drug: evolocumab  Inject 140 mg into the skin every 14 (fourteen) days.     rosuvastatin 40 MG Tab  Commonly known as: CRESTOR  Take 10 mg by mouth every evening.     sodium bicarbonate 650 MG tablet  650 mg 3 (three) times daily.            Discharge Procedure Orders   Lifting restrictions   Order Comments: No heavy lifting for 24 hours     Call MD for:  temperature >100.4     Call MD for:  severe uncontrolled pain     Call MD for:  redness, tenderness, or signs of infection (pain, swelling, redness, odor or green/yellow discharge around incision site)     Call MD for:   Order Comments: Bleeding from the access site.     Activity as tolerated

## 2024-06-17 NOTE — PROCEDURES
U Interventional Nephrology Procedure Report    Date of Procedure:  06/17/2024 11:34 AM     :  Rohan Bright MD  Assistant: none     Indication for Procedure:  Thrombosed LUE radiocephalic AVF      Referring Provider:  [facility] dialysis unit and Dr. Baker     Procedures Performed:  1.  Access cannulation  2.  Second cannulation  3.  Venous administration of TPA  4.  Venous angioplasty  5.  Moderate conscious sedation     Medications Administered:  1.  2 mg of tPA IV  2.  Versed 1 mg IV  3.  Fentanyl 50 mcg IV     Blood Loss:  Approximately < 10 mL     Contrast Used:  Approximately 15 mL     Procedure in Detail:    After informed consent was obtained, the patient was prepped and draped in the usual sterile fashion.  His LUE radiocephalic AV fistula was cannulated in the venous facing direction with a micropuncture set under direct ultrasound visualization.  The microwire was confirmed to be in correct location under fluoroscopy and a 4-Croatian microsheath was established.  A glidewire was advanced easily to the central circulation and the microsheath was removed.  A 6-[place]atian sheath was established.  Then, an over-the-wire Berenstein guiding catheter was advanced to the central circulation and a central venogram was performed, which revealed patent central vasculature.  After the patient was evaluated with the physician, moderate sedation was then administered.  A pullback venogram was performed, which revealed stenosis beginning at the cephalic arch portion of the fistula.  So, the wire was reinserted and the Berenstein catheter was removed.  Then, 2 mg of TPA was injected into the venous facing sheath with pressure was held at the arterial anastomosis.  Following this, a 6 x 40 mm ultraverse PTA balloon was advanced to the cephalic arch and inflated to full effacement with waist seen on the balloon prior to full effacement.  Angioplasty was repeated back within the graft to macerate the thrombus  all the while holding pressure at the arterial anastomosis.       The balloon was then removed and a second cannulation was made in the arterial facing direction with the micropuncture set again under direct ultrasound visualization.  Numerous attempts were made to pass the microwire past the venous facing sheath and into the radial artery, but this was unsuccessful.  We then exchanged the microwire for a 6Fr sheath and glidewire which were again unable to pass to the radial artery, which was likely due to a significant stenosis in the JA region.  At this point, due to prolonged attempt to get the wire to pass without success, we ended the procedure.      The arterial and venous facing guidewire and sheath were then removed and hemostasis was achieved using a #2 Ethilon suture.  A third suture was placed during the attempts to pass the glidewire to the artery (a prior cannulation site for dialysis was open and the glidewire kept preferentially coming out of the cannulation hole.       IMPRESSION AND PLAN:  This is an unsuccessful percutaneous thrombectomy due to inability to pass a wire past the JA region.  We will discuss the case with Dr. Arcos and consider open thrombectomy vs TDC and new access placement.       Rohan Bright MD  [phone]

## 2024-06-17 NOTE — OR NURSING
Dr. Kaila tian to declot av fistula . Consulted Dr. Arcos. Dr. Arcos bringing patient to OR to declot AV fistula if not successful will place Dialysis Catheter. Patient was consented and sent to the OR for the procedure.

## 2024-06-17 NOTE — ANESTHESIA POSTPROCEDURE EVALUATION
Anesthesia Post Evaluation    Patient: Arthur Menjivar    Procedure(s) Performed: Procedure(s) (LRB):  THROMBECTOMY / LEFT (Left)  INSERTION, CATHETER, CENTRAL VENOUS, HEMODIALYSIS, TUNNELED (Right)    Final Anesthesia Type: regional      Patient location during evaluation: PACU  Patient participation: Yes- Able to Participate  Level of consciousness: awake and alert  Post-procedure vital signs: reviewed and stable  Pain management: adequate  Airway patency: patent    PONV status at discharge: No PONV  Anesthetic complications: no      Cardiovascular status: hemodynamically stable  Respiratory status: unassisted  Hydration status: euvolemic  Follow-up not needed.              Vitals Value Taken Time   /81 06/17/24 1728   Temp 36.2 °C (97.1 °F) 06/17/24 1723   Pulse 69 06/17/24 1735   Resp 16 06/17/24 1723   SpO2 96 % 06/17/24 1735   Vitals shown include unfiled device data.      No case tracking events are documented in the log.      Pain/Alverto Score: Alverto Score: 10 (6/17/2024  5:23 PM)

## 2024-06-17 NOTE — ANESTHESIA PREPROCEDURE EVALUATION
06/17/2024  Arthur Menjivar is a 68 y.o., male.      Pre-op Assessment    I have reviewed the Patient Summary Reports.     I have reviewed the Nursing Notes. I have reviewed the NPO Status.   I have reviewed the Medications.     Review of Systems  Anesthesia Hx:             Denies Family Hx of Anesthesia complications.    Denies Personal Hx of Anesthesia complications.                    Social:  Non-Smoker       Hematology/Oncology:       -- Anemia (hb 11.7):                    --  Cancer in past history:                 Oncology Comments: Bladder/prostate recent scan neg    lymphoma       EENT/Dental:  EENT/Dental Normal           Cardiovascular:  Exercise tolerance: good   Hypertension   CAD  asymptomatic  Dysrhythmias (hx paroxysmal AF, none since ablation)       hyperlipidemia    Sees Dr. Greene  10/20 stress test neg for ischemia  Recent ECHO EF 55%                         Pulmonary:  Pulmonary Normal                       Renal/:  Chronic Renal Disease, CKD   Not yet on HD  Creat 3, eGFR 21    S/p cystectomy, has ostomy             Hepatic/GI:  Hepatic/GI Normal                 Musculoskeletal:  Musculoskeletal Normal                Neurological:   CVA (2017, mild L sided weakness)                                    Endocrine:  Denies Diabetes. Hypothyroidism  75# weight loss      Obesity / BMI > 30  Dermatological:  Skin Normal    Psych:  Psychiatric Normal                    Physical Exam  General: Cooperative, Alert and Oriented    Airway:  Mallampati: III   Mouth Opening: Normal  TM Distance: Normal  Neck ROM: Normal ROM    Dental:  Dentures  Upper        Anesthesia Plan  Type of Anesthesia, risks & benefits discussed:    Anesthesia Type: Regional, Gen Natural Airway, Gen Supraglottic Airway  Intra-op Monitoring Plan: Standard ASA Monitors  Post Op Pain Control Plan: multimodal  analgesia  Informed Consent: Informed consent signed with the Patient and all parties understand the risks and agree with anesthesia plan.  All questions answered.   ASA Score: 3    Ready For Surgery From Anesthesia Perspective.     .

## 2024-06-17 NOTE — TRANSFER OF CARE
"Anesthesia Transfer of Care Note    Patient: Arthur Menjivar    Procedure(s) Performed: Procedure(s) (LRB):  THROMBECTOMY / LEFT (Left)  INSERTION, CATHETER, CENTRAL VENOUS, HEMODIALYSIS, TUNNELED (Right)    Patient location: PACU    Anesthesia Type: general    Transport from OR: Transported from OR on room air with adequate spontaneous ventilation    Post pain: adequate analgesia    Post assessment: no apparent anesthetic complications and tolerated procedure well    Post vital signs: stable    Level of consciousness: awake and alert    Nausea/Vomiting: no nausea/vomiting    Complications: none    Transfer of care protocol was followed      Last vitals: Visit Vitals  BP (!) 158/74 (BP Location: Left forearm)   Pulse (!) 54   Temp 36.7 °C (98 °F)   Resp 18   Ht 5' 11" (1.803 m)   Wt 108.9 kg (240 lb 1.3 oz)   SpO2 98%   BMI 33.48 kg/m²     "

## 2024-06-17 NOTE — BRIEF OP NOTE
[facility] - Cath Lab  Brief Operative Note    Surgery Date: 6/17/2024     Surgeons and Role:     * Rohan Bright MD - Primary    Assisting Surgeon: None    Pre-op Diagnosis:  End stage renal disease [N18.6]    Post-op Diagnosis:  Post-Op Diagnosis Codes:     * End stage renal disease [N18.6]    Procedure(s) (LRB):  THROMBECTOMY, HEMODIALYSIS GRAFT OR FISTULA (Left)    Anesthesia: 1% lidocaine, 1 mg versed, 50 mcg fentanyl    Operative Findings: Patient was prepped and draped in a sterile fashion.  This was an unsuccessful declot of his LUE radiocephalic AV fistula with angioplasty performed in the venous outflow portion of the fistula.  Unfortunately, we were unable to pass a wire past the arterial juxta-anastomosis stenosis.  Patient tolerated the procedure well and no immediate complications noted.    Estimated Blood Loss: < 10 mL         Specimens:   Specimen (24h ago, onward)      None              Discharge Note    OUTCOME:  Patient was prepped and draped in a sterile fashion.  This was an unsuccessful declot of his LUE radiocephalic AV fistula with angioplasty performed in the venous outflow portion of the fistula.  Unfortunately, we were unable to pass a wire past the arterial juxta-anastomosis stenosis.  Patient tolerated the procedure well and no immediate complications noted.    DISPOSITION: Home or Self Care    FINAL DIAGNOSIS:  <principal problem not specified>    FOLLOWUP: In clinic as needed; will discuss with Dr. Arcos open thrombectomy vs line placement    DISCHARGE INSTRUCTIONS:    Discharge Procedure Orders   Lifting restrictions   Order Comments: No heavy lifting for 24 hours     Call MD for:  temperature >100.4     Call MD for:  severe uncontrolled pain     Call MD for:  redness, tenderness, or signs of infection (pain, swelling, redness, odor or green/yellow discharge around incision site)     Call MD for:   Order Comments: Bleeding from the access site.     Activity as tolerated

## 2024-06-17 NOTE — ANESTHESIA PROCEDURE NOTES
Peripheral Block    Patient location during procedure: pre-op    Reason for block: primary anesthetic    Diagnosis: esrd, left avg    End time: 6/17/2024 2:45 PM    Staffing  Authorizing Provider: Casper Finnegan MD  Performing Provider: Casper Finnegan MD    Staffing  Performed by: Casper Finnegan MD  Authorized by: Casper Finnegan MD    Preanesthetic Checklist  Completed: patient identified, IV checked, site marked, risks and benefits discussed, surgical consent, monitors and equipment checked, pre-op evaluation and timeout performed  Peripheral Block  Patient position: supine  Prep: ChloraPrep  Patient monitoring: heart rate, cardiac monitor, continuous pulse ox, continuous capnometry and frequent blood pressure checks  Block type: axillary  Laterality: left  Injection technique: single shot  Needle  Needle type: Stimuplex   Needle gauge: 21 G  Needle length: 4 in  Needle localization: anatomical landmarks and ultrasound guidance   -ultrasound image captured on disc.  Assessment  Injection assessment: negative aspiration and negative parasthesia  Paresthesia pain: none  Heart rate change: no  Slow fractionated injection: yes  Pain Tolerance: comfortable throughout block and no complaints  Medications:    Medications: ropivacaine (NAROPIN) injection 0.5% - Perineural   30 mL - 6/17/2024 2:45:00 PM    Additional Notes  VSS.  [facility] RN monitoring vitals throughout procedure.  Patient tolerated procedure well.

## 2024-06-19 ENCOUNTER — CLINICAL SUPPORT (OUTPATIENT)
Dept: CARDIOLOGY | Facility: HOSPITAL | Age: 69
End: 2024-06-19
Attending: INTERNAL MEDICINE
Payer: MEDICARE

## 2024-06-19 DIAGNOSIS — I49.8 OTHER SPECIFIED CARDIAC ARRHYTHMIAS: ICD-10-CM

## 2024-06-19 PROCEDURE — 93298 REM INTERROG DEV EVAL SCRMS: CPT | Mod: 26,,, | Performed by: INTERNAL MEDICINE

## 2024-06-26 LAB
OHS CV AF BURDEN PERCENT: < 1
OHS CV DC REMOTE DEVICE TYPE: NORMAL

## 2024-06-27 ENCOUNTER — OFFICE VISIT (OUTPATIENT)
Dept: SURGERY | Facility: CLINIC | Age: 69
End: 2024-06-27
Attending: SPECIALIST
Payer: MEDICARE

## 2024-06-27 VITALS — SYSTOLIC BLOOD PRESSURE: 159 MMHG | HEART RATE: 76 BPM | OXYGEN SATURATION: 98 % | DIASTOLIC BLOOD PRESSURE: 80 MMHG

## 2024-06-27 DIAGNOSIS — N18.6 END STAGE RENAL DISEASE: Primary | ICD-10-CM

## 2024-06-27 PROCEDURE — 99213 OFFICE O/P EST LOW 20 MIN: CPT | Mod: PBBFAC | Performed by: SPECIALIST

## 2024-06-27 PROCEDURE — 99999 PR PBB SHADOW E&M-EST. PATIENT-LVL III: CPT | Mod: PBBFAC,,, | Performed by: SPECIALIST

## 2024-06-27 NOTE — PROGRESS NOTES
History & Physical    Subjective     History of Present Illness:  Patient is a 68 y.o. male presents with ESRD in need of permanent access.  Patient with previous semi no fistula left upper extremity.  Patient for AVF/G placement LUE.  History of CVA, hypertension, CA bladder  Chief Complaint   Patient presents with    Post-op Evaluation       Review of patient's allergies indicates:   Allergen Reactions    Ambien [zolpidem] Other (See Comments)     Causes pt to hallucinate      Irbesartan      hyperkalemia       Current Outpatient Medications   Medication Sig Dispense Refill    ascorbic acid, vitamin C, (VITAMIN C) 500 MG tablet Take 500 mg by mouth once daily.      carvediloL (COREG) 3.125 MG tablet Take 3.125 mg by mouth 2 (two) times daily.      cholecalciferol, vitamin D3, 25 mcg (1,000 unit) Chew once daily.      cloNIDine (CATAPRES) 0.1 MG tablet Take 1 tablet (0.1 mg total) by mouth every 6 (six) hours as needed (systolic blood pressure (top number) is above 160.). (Patient taking differently: Take 0.1 mg by mouth 3 (three) times daily.) 90 tablet 11    co-enzyme Q-10 30 mg capsule Take 100 mg by mouth.      cyanocobalamin (VITAMIN B-12) 1000 MCG tablet Take 100 mcg by mouth.      diltiaZEM (CARDIZEM CD) 240 MG 24 hr capsule Take 240 mg by mouth once daily.      ferrous sulfate (FEOSOL) 325 mg (65 mg iron) Tab tablet Take 325 mg by mouth daily with breakfast.      furosemide (LASIX) 20 MG tablet Take 20 mg by mouth daily as needed. 1 tablet 2x/ week      hydrALAZINE (APRESOLINE) 100 MG tablet Take 1 tablet (100 mg total) by mouth every 8 (eight) hours. 270 tablet 3    HYDROcodone-acetaminophen (NORCO) 7.5-325 mg per tablet Take 1 tablet by mouth every 6 (six) hours as needed for Pain. 12 tablet 0    levothyroxine (SYNTHROID) 75 MCG tablet Take 75 mcg by mouth once daily.      magnesium oxide (MAG-OX) 400 mg (241.3 mg magnesium) tablet Take 400 mg by mouth.      multivitamin (THERAGRAN) per tablet Take 1  tablet by mouth once daily.      omega-3 fatty acids 1,000 mg Cap Take 1,200 mg by mouth.      REPATHA SYRINGE 140 mg/mL Syrg Inject 140 mg into the skin every 14 (fourteen) days. 2 mL 11    rosuvastatin (CRESTOR) 40 MG Tab Take 10 mg by mouth every evening.      sodium bicarbonate 650 MG tablet 650 mg 3 (three) times daily.       No current facility-administered medications for this visit.       Past Medical History:   Diagnosis Date    Cancer     bladder ca and prostate ca     CKD (chronic kidney disease), stage IV     Diabetes mellitus     ESRD (end stage renal disease)     Hypertension     Stroke     Thyroid disease      Past Surgical History:   Procedure Laterality Date    ABLATION      for atrial fibrilation    AV FISTULA PLACEMENT Left 07/20/2023    Procedure: CREATION, AV FISTULA;  Surgeon: Roque Arcos Jr., MD;  Location: [facility] OR;  Service: Vascular;  Laterality: Left;   / LEFT UPPER EXTROMITY    HEMICOLECTOMY W/ OSTOMY      INSERTION OF IMPLANTABLE LOOP RECORDER Left 5/17/2024    Procedure: Insertion, Implantable Loop Recorder;  Surgeon: Kain Cortez MD;  Location: [facility] EP LAB;  Service: Cardiology;  Laterality: Left;  CVA, AF,  ILR, BSCI, Local, VA, 3 Prep ** HD pt/LUE AV fistula**    INSERTION OF TUNNELED CENTRAL VENOUS HEMODIALYSIS CATHETER Right 6/17/2024    Procedure: INSERTION, CATHETER, CENTRAL VENOUS, HEMODIALYSIS, TUNNELED;  Surgeon: Roque Arcos Jr., MD;  Location: [facility] OR;  Service: Vascular;  Laterality: Right;    PARTIAL SURGICAL REMOVAL OF BLADDER      PORTACATH PLACEMENT Right     chest wall    PROSTATECTOMY      THROMBECTOMY Left 6/17/2024    Procedure: THROMBECTOMY / LEFT;  Surgeon: Roque Arcos Jr., MD;  Location: [facility] OR;  Service: Vascular;  Laterality: Left;    THROMBECTOMY OF HEMODIALYSIS ACCESS SITE Left 6/17/2024    Procedure: THROMBECTOMY, HEMODIALYSIS GRAFT OR FISTULA;  Surgeon: Rohan Bright MD;  Location: [facility] CATH LAB;  Service: Nephrology;  Laterality: Left;     No  family history on file.  Social History     Tobacco Use    Smoking status: Former     Types: Cigarettes    Smokeless tobacco: Never   Substance Use Topics    Alcohol use: Never    Drug use: Never        Review of Systems:  Review of Systems   Constitutional: Negative.  Negative for fatigue and fever.   HENT: Negative.  Negative for nosebleeds, sore throat and trouble swallowing.    Eyes: Negative.    Respiratory: Negative.     Cardiovascular: Negative.  Negative for chest pain.   Gastrointestinal: Negative.    Genitourinary: Negative.    Musculoskeletal: Negative.    Skin: Negative.    Neurological: Negative.    Psychiatric/Behavioral: Negative.            Objective     Vital Signs (Most Recent)  Pulse: 76 (06/27/24 1302)  BP: (!) 159/80 (06/27/24 1302)  SpO2: 98 % (06/27/24 1302)           Physical Exam:  Physical Exam  Constitutional:       Appearance: He is well-developed.   HENT:      Head: Normocephalic and atraumatic.      Right Ear: External ear normal.      Left Ear: External ear normal.   Eyes:      Pupils: Pupils are equal, round, and reactive to light.   Cardiovascular:      Rate and Rhythm: Normal rate and regular rhythm.      Heart sounds: Normal heart sounds.   Pulmonary:      Breath sounds: Normal breath sounds.   Abdominal:      General: Bowel sounds are normal.      Palpations: Abdomen is soft.   Musculoskeletal:         General: Normal range of motion.      Cervical back: Neck supple.      Comments: Neurovascular intact left upper extremity   Skin:     General: Skin is warm and dry.   Neurological:      Mental Status: He is alert and oriented to person, place, and time.              Assessment and Plan   End-stage renal disease for AVF/G placement left upper extremity.  Consents reviewed with patient and signed

## 2024-06-27 NOTE — H&P (VIEW-ONLY)
History & Physical    Subjective     History of Present Illness:  Patient is a 68 y.o. male presents with ESRD in need of permanent access.  Patient with previous semi no fistula left upper extremity.  Patient for AVF/G placement LUE.  History of CVA, hypertension, CA bladder  Chief Complaint   Patient presents with    Post-op Evaluation       Review of patient's allergies indicates:   Allergen Reactions    Ambien [zolpidem] Other (See Comments)     Causes pt to hallucinate      Irbesartan      hyperkalemia       Current Outpatient Medications   Medication Sig Dispense Refill    ascorbic acid, vitamin C, (VITAMIN C) 500 MG tablet Take 500 mg by mouth once daily.      carvediloL (COREG) 3.125 MG tablet Take 3.125 mg by mouth 2 (two) times daily.      cholecalciferol, vitamin D3, 25 mcg (1,000 unit) Chew once daily.      cloNIDine (CATAPRES) 0.1 MG tablet Take 1 tablet (0.1 mg total) by mouth every 6 (six) hours as needed (systolic blood pressure (top number) is above 160.). (Patient taking differently: Take 0.1 mg by mouth 3 (three) times daily.) 90 tablet 11    co-enzyme Q-10 30 mg capsule Take 100 mg by mouth.      cyanocobalamin (VITAMIN B-12) 1000 MCG tablet Take 100 mcg by mouth.      diltiaZEM (CARDIZEM CD) 240 MG 24 hr capsule Take 240 mg by mouth once daily.      ferrous sulfate (FEOSOL) 325 mg (65 mg iron) Tab tablet Take 325 mg by mouth daily with breakfast.      furosemide (LASIX) 20 MG tablet Take 20 mg by mouth daily as needed. 1 tablet 2x/ week      hydrALAZINE (APRESOLINE) 100 MG tablet Take 1 tablet (100 mg total) by mouth every 8 (eight) hours. 270 tablet 3    HYDROcodone-acetaminophen (NORCO) 7.5-325 mg per tablet Take 1 tablet by mouth every 6 (six) hours as needed for Pain. 12 tablet 0    levothyroxine (SYNTHROID) 75 MCG tablet Take 75 mcg by mouth once daily.      magnesium oxide (MAG-OX) 400 mg (241.3 mg magnesium) tablet Take 400 mg by mouth.      multivitamin (THERAGRAN) per tablet Take 1  tablet by mouth once daily.      omega-3 fatty acids 1,000 mg Cap Take 1,200 mg by mouth.      REPATHA SYRINGE 140 mg/mL Syrg Inject 140 mg into the skin every 14 (fourteen) days. 2 mL 11    rosuvastatin (CRESTOR) 40 MG Tab Take 10 mg by mouth every evening.      sodium bicarbonate 650 MG tablet 650 mg 3 (three) times daily.       No current facility-administered medications for this visit.       Past Medical History:   Diagnosis Date    Cancer     bladder ca and prostate ca     CKD (chronic kidney disease), stage IV     Diabetes mellitus     ESRD (end stage renal disease)     Hypertension     Stroke     Thyroid disease      Past Surgical History:   Procedure Laterality Date    ABLATION      for atrial fibrilation    AV FISTULA PLACEMENT Left 07/20/2023    Procedure: CREATION, AV FISTULA;  Surgeon: Roque Arcos Jr., MD;  Location: [facility] OR;  Service: Vascular;  Laterality: Left;   / LEFT UPPER EXTROMITY    HEMICOLECTOMY W/ OSTOMY      INSERTION OF IMPLANTABLE LOOP RECORDER Left 5/17/2024    Procedure: Insertion, Implantable Loop Recorder;  Surgeon: Kain Cortez MD;  Location: [facility] EP LAB;  Service: Cardiology;  Laterality: Left;  CVA, AF,  ILR, BSCI, Local, VA, 3 Prep ** HD pt/LUE AV fistula**    INSERTION OF TUNNELED CENTRAL VENOUS HEMODIALYSIS CATHETER Right 6/17/2024    Procedure: INSERTION, CATHETER, CENTRAL VENOUS, HEMODIALYSIS, TUNNELED;  Surgeon: Roque Arcos Jr., MD;  Location: [facility] OR;  Service: Vascular;  Laterality: Right;    PARTIAL SURGICAL REMOVAL OF BLADDER      PORTACATH PLACEMENT Right     chest wall    PROSTATECTOMY      THROMBECTOMY Left 6/17/2024    Procedure: THROMBECTOMY / LEFT;  Surgeon: Roque Arcos Jr., MD;  Location: [facility] OR;  Service: Vascular;  Laterality: Left;    THROMBECTOMY OF HEMODIALYSIS ACCESS SITE Left 6/17/2024    Procedure: THROMBECTOMY, HEMODIALYSIS GRAFT OR FISTULA;  Surgeon: Rohan Bright MD;  Location: [facility] CATH LAB;  Service: Nephrology;  Laterality: Left;     No  family history on file.  Social History     Tobacco Use    Smoking status: Former     Types: Cigarettes    Smokeless tobacco: Never   Substance Use Topics    Alcohol use: Never    Drug use: Never        Review of Systems:  Review of Systems   Constitutional: Negative.  Negative for fatigue and fever.   HENT: Negative.  Negative for nosebleeds, sore throat and trouble swallowing.    Eyes: Negative.    Respiratory: Negative.     Cardiovascular: Negative.  Negative for chest pain.   Gastrointestinal: Negative.    Genitourinary: Negative.    Musculoskeletal: Negative.    Skin: Negative.    Neurological: Negative.    Psychiatric/Behavioral: Negative.            Objective     Vital Signs (Most Recent)  Pulse: 76 (06/27/24 1302)  BP: (!) 159/80 (06/27/24 1302)  SpO2: 98 % (06/27/24 1302)           Physical Exam:  Physical Exam  Constitutional:       Appearance: He is well-developed.   HENT:      Head: Normocephalic and atraumatic.      Right Ear: External ear normal.      Left Ear: External ear normal.   Eyes:      Pupils: Pupils are equal, round, and reactive to light.   Cardiovascular:      Rate and Rhythm: Normal rate and regular rhythm.      Heart sounds: Normal heart sounds.   Pulmonary:      Breath sounds: Normal breath sounds.   Abdominal:      General: Bowel sounds are normal.      Palpations: Abdomen is soft.   Musculoskeletal:         General: Normal range of motion.      Cervical back: Neck supple.      Comments: Neurovascular intact left upper extremity   Skin:     General: Skin is warm and dry.   Neurological:      Mental Status: He is alert and oriented to person, place, and time.              Assessment and Plan   End-stage renal disease for AVF/G placement left upper extremity.  Consents reviewed with patient and signed

## 2024-07-01 ENCOUNTER — ANESTHESIA EVENT (OUTPATIENT)
Dept: SURGERY | Facility: OTHER | Age: 69
End: 2024-07-01
Payer: MEDICARE

## 2024-07-01 ENCOUNTER — HOSPITAL ENCOUNTER (OUTPATIENT)
Dept: PREADMISSION TESTING | Facility: OTHER | Age: 69
Discharge: HOME | End: 2024-07-01
Attending: SPECIALIST
Payer: MEDICARE

## 2024-07-01 VITALS
HEART RATE: 62 BPM | SYSTOLIC BLOOD PRESSURE: 139 MMHG | OXYGEN SATURATION: 96 % | HEIGHT: 71 IN | BODY MASS INDEX: 33.6 KG/M2 | TEMPERATURE: 98 F | DIASTOLIC BLOOD PRESSURE: 75 MMHG | WEIGHT: 240 LBS

## 2024-07-01 DIAGNOSIS — Z01.818 PREOP TESTING: Primary | ICD-10-CM

## 2024-07-01 RX ORDER — LIDOCAINE HYDROCHLORIDE 10 MG/ML
0.5 INJECTION, SOLUTION EPIDURAL; INFILTRATION; INTRACAUDAL; PERINEURAL ONCE
Status: CANCELLED | OUTPATIENT
Start: 2024-07-01 | End: 2024-07-01

## 2024-07-01 RX ORDER — LIDOCAINE HYDROCHLORIDE 10 MG/ML
1 INJECTION, SOLUTION EPIDURAL; INFILTRATION; INTRACAUDAL; PERINEURAL ONCE
OUTPATIENT
Start: 2024-07-01 | End: 2024-07-01

## 2024-07-01 RX ORDER — SODIUM CHLORIDE, SODIUM LACTATE, POTASSIUM CHLORIDE, CALCIUM CHLORIDE 600; 310; 30; 20 MG/100ML; MG/100ML; MG/100ML; MG/100ML
INJECTION, SOLUTION INTRAVENOUS CONTINUOUS
Status: CANCELLED | OUTPATIENT
Start: 2024-07-01

## 2024-07-01 NOTE — DISCHARGE INSTRUCTIONS
Information to Prepare you for your Surgery    PRE-ADMIT TESTING -  [phone]    [address]  [facility]          Your surgery has been scheduled at [facility]. We are pleased to have the opportunity to serve you. For Further Information please call [phone].    On the day of surgery please report to the Information Desk on the 1st floor.    CONTACT YOUR PHYSICIAN'S OFFICE THE DAY PRIOR TO YOUR SURGERY TO OBTAIN YOUR ARRIVAL TIME.     The evening before surgery do not eat anything after 9 p.m. ( this includes hard candy, chewing gum and mints).  You may only have GATORADE, POWERADE AND WATER  from 9 p.m. until you leave your home.   DO NOT DRINK ANY LIQUIDS ON THE WAY TO THE HOSPITAL.      Why does your anesthesiologist allow you to drink Gatorade/Powerade before surgery?  Gatorade/Powerade helps to increase your comfort before surgery and to decrease your nausea after surgery. The carbohydrates in Gatorade/Powerade help reduce your body's stress response to surgery.  If you are a diabetic-drink only water prior to surgery.    Outpatient Surgery- May allow 2 adult (18 and older) Support Persons (1 being the designated ) for all surgical/procedural patients. A breastfeeding mother will be allowed her infant and 2 adult Support Persons. No one under the age of 18 will be allowed in the building. No swapping out of visitors in the [facility].      SPECIAL MEDICATION INSTRUCTIONS: TAKE medications checked off by the Anesthesiologist on your Medication List.    Angiogram Patients: Take medications as instructed by your physician, including aspirin.     Surgery Patients:    If you take ASPIRIN - Your PHYSICIAN/SURGEON will need to inform you IF/OR when you need to stop taking aspirin prior to your surgery.     The week prior to surgery do not ot take any medications containing IBUPROFEN or NSAIDS ( Advil, Motrin, Goodys, BC, Aleve, Naproxen etc)  If you are not sure if you should take a medicine please call your surgeon's office.  Ok to take Tylenol    Do Not Wear any make-up (especially eye make-up) to surgery. Please remove any false eyelashes or eyelash extensions. If you arrive the day of surgery with makeup/eyelashes on you will be required to remove prior to surgery. (There is a risk of corneal abrasions if eye makeup/eyelash extensions are not removed)      Leave all valuables at home.   Do Not wear any jewelry or watches, including any metal in body piercings. Jewelry must be removed prior to coming to the hospital.  There is a possibility that rings that are unable to be removed may be cut off if they are on the surgical extremity.    Please remove all hair extensions, wigs, clips and any other metal accessories/ ornaments from your hair.  These items may pose a flammable/fire risk in Surgery and must be removed.    Do not shave your surgical area at least 5 days prior to your surgery. The surgical prep will be performed at the hospital according to Infection Control regulations.    Contact Lens must be removed before surgery. Either do not wear the contact lens or bring a case and solution for storage.  Please bring a container for eyeglasses or dentures as required.  Bring any paperwork your physician has provided, such as consent forms,  history and physicals, doctor's orders, etc.   Bring comfortable clothes that are loose fitting to wear upon discharge. Take into consideration the type of surgery being performed.  Maintain your diet as advised per your physician the day prior to surgery.      Adequate rest the night before surgery is advised.   Park in the Parking lot behind the hospital or in the [facility] across the street from the parking lot. Parking is complimentary.  If you will be discharged the same day as your procedure, please arrange for a responsible adult to drive you home or to accompany you if traveling by taxi.    YOU WILL NOT BE PERMITTED TO DRIVE OR TO LEAVE THE HOSPITAL ALONE AFTER SURGERY.   If you are being discharged the same day, it is strongly recommended that you arrange for someone to remain with you for the first 24 hrs following your surgery.    The Surgeon will speak to your family/visitor after your surgery regarding the outcome of your surgery and post op care.  The Surgeon may speak to you after your surgery, but there is a possibility you may not remember the details.  Please check with your family members regarding the conversation with the Surgeon.    We strongly recommend whoever is bringing you home be present for discharge instructions.  This will ensure a thorough understanding for your post op home care.          Thank you for your cooperation.  The Staff of [facility].            Bathing Instructions with Hibiclens    Shower the evening before and morning of your procedure with Chlorhexidine (Hibiclens)  do not use Chlorhexidine on your face or genitals. Do not get in your eyes.  Wash your face with water and your regular face wash/soap  Use your regular shampoo  Apply Chlorhexidine (Hibiclens) directly on your skin or on a wet washcloth and wash gently. When showering: Move away from the shower stream when applying Chlorhexidine (Hibiclens) to avoid rinsing off too soon.  Rinse thoroughly with warm water  Do not dilute Chlorhexidine (Hibiclens)   Dry off as usual, do not use any deodorant, powder, body lotions, perfume, after shave or cologne.

## 2024-07-01 NOTE — ANESTHESIA PREPROCEDURE EVALUATION
07/01/2024  Arthur Menjivar is a 68 y.o., male.      Pre-op Assessment    I have reviewed the Patient Summary Reports.     I have reviewed the Nursing Notes. I have reviewed the NPO Status.   I have reviewed the Medications.     Review of Systems  Anesthesia Hx:   History of prior surgery of interest to airway management or planning:  Previous anesthesia: Nerve Block      for June 17 2024 [facility].    Denies Family Hx of Anesthesia complications.   Personal Hx of Anesthesia complications          Slow To Awaken/Delayed Emergence and moderate, did not delay extubation, but prolonged PACU stay          Social:  Non-Smoker       Hematology/Oncology:       -- Anemia (hb 11.7):                    --  Cancer in past history:                 Oncology Comments: Bladder/prostate  ca recent scan neg    lymphoma       EENT/Dental:  EENT/Dental Normal           Cardiovascular:  Exercise tolerance: good   Hypertension   CAD  asymptomatic  Dysrhythmias (hx paroxysmal AF, none since ablation) atrial fibrillation  CHF    hyperlipidemia   ECG has been reviewed. Sees Dr. Greene  10/20 stress test neg for ischemia  Recent ECHO EF 55%                         Pulmonary:  Pulmonary Normal                       Renal/:  Chronic Renal Disease, CKD, ESRD   Hemodialysis Mon,Fri  Creat 3, eGFR 21    S/p cystectomy, has ostomy             Hepatic/GI:  Hepatic/GI Normal                 Musculoskeletal:  Arthritis               Neurological:   CVA (2017, mild L sided weakness)                                    Endocrine:  Denies Diabetes. Hypothyroidism  75# weight loss      Obesity / BMI > 30  Dermatological:  Skin Normal    Psych:  Psychiatric Normal                    Physical Exam  General: Cooperative, Alert and Oriented    Airway:  Mallampati: III   Mouth Opening: Small, but > 3cm    Dental:  Dentures      Anesthesia  Plan  Type of Anesthesia, risks & benefits discussed:    Anesthesia Type: Regional  Intra-op Monitoring Plan: Standard ASA Monitors  Post Op Pain Control Plan: multimodal analgesia  Informed Consent: Informed consent signed with the Patient and all parties understand the risks and agree with anesthesia plan.  All questions answered.   ASA Score: 3  Anesthesia Plan Notes: Regional discussed  Labs am of surgery    Ready For Surgery From Anesthesia Perspective.     .

## 2024-07-02 ENCOUNTER — HOSPITAL ENCOUNTER (OUTPATIENT)
Facility: OTHER | Age: 69
Discharge: HOME | End: 2024-07-02
Attending: SPECIALIST | Admitting: SPECIALIST
Payer: MEDICARE

## 2024-07-02 ENCOUNTER — ANESTHESIA (OUTPATIENT)
Dept: SURGERY | Facility: OTHER | Age: 69
End: 2024-07-02
Payer: MEDICARE

## 2024-07-02 VITALS
HEART RATE: 65 BPM | RESPIRATION RATE: 16 BRPM | BODY MASS INDEX: 33.6 KG/M2 | SYSTOLIC BLOOD PRESSURE: 125 MMHG | WEIGHT: 240 LBS | HEIGHT: 71 IN | OXYGEN SATURATION: 99 % | TEMPERATURE: 98 F | DIASTOLIC BLOOD PRESSURE: 62 MMHG

## 2024-07-02 DIAGNOSIS — T82.868A THROMBOSIS OF KIDNEY DIALYSIS ARTERIOVENOUS GRAFT, INITIAL ENCOUNTER: Primary | ICD-10-CM

## 2024-07-02 DIAGNOSIS — Z01.818 PREOP TESTING: ICD-10-CM

## 2024-07-02 PROBLEM — N18.6 END STAGE RENAL DISEASE: Status: ACTIVE | Noted: 2024-07-02

## 2024-07-02 LAB
ANION GAP SERPL CALC-SCNC: 9 MMOL/L (ref 8–16)
BASOPHILS # BLD AUTO: 0.02 K/UL (ref 0–0.2)
BASOPHILS NFR BLD: 0.4 % (ref 0–1.9)
BUN SERPL-MCNC: 31 MG/DL (ref 8–23)
CALCIUM SERPL-MCNC: 8.7 MG/DL (ref 8.7–10.5)
CHLORIDE SERPL-SCNC: 103 MMOL/L (ref 95–110)
CO2 SERPL-SCNC: 27 MMOL/L (ref 23–29)
CREAT SERPL-MCNC: 6.4 MG/DL (ref 0.5–1.4)
DIFFERENTIAL METHOD BLD: ABNORMAL
EOSINOPHIL # BLD AUTO: 0.1 K/UL (ref 0–0.5)
EOSINOPHIL NFR BLD: 2.2 % (ref 0–8)
ERYTHROCYTE [DISTWIDTH] IN BLOOD BY AUTOMATED COUNT: 13.8 % (ref 11.5–14.5)
EST. GFR  (NO RACE VARIABLE): 9 ML/MIN/1.73 M^2
GLUCOSE SERPL-MCNC: 168 MG/DL (ref 70–110)
HCT VFR BLD AUTO: 33.5 % (ref 40–54)
HGB BLD-MCNC: 10.4 G/DL (ref 14–18)
IMM GRANULOCYTES # BLD AUTO: 0.04 K/UL (ref 0–0.04)
IMM GRANULOCYTES NFR BLD AUTO: 0.9 % (ref 0–0.5)
LYMPHOCYTES # BLD AUTO: 1.3 K/UL (ref 1–4.8)
LYMPHOCYTES NFR BLD: 28.9 % (ref 18–48)
MCH RBC QN AUTO: 32.7 PG (ref 27–31)
MCHC RBC AUTO-ENTMCNC: 31 G/DL (ref 32–36)
MCV RBC AUTO: 105 FL (ref 82–98)
MONOCYTES # BLD AUTO: 0.5 K/UL (ref 0.3–1)
MONOCYTES NFR BLD: 10.5 % (ref 4–15)
NEUTROPHILS # BLD AUTO: 2.5 K/UL (ref 1.8–7.7)
NEUTROPHILS NFR BLD: 57.1 % (ref 38–73)
NRBC BLD-RTO: 0 /100 WBC
PLATELET # BLD AUTO: 122 K/UL (ref 150–450)
PMV BLD AUTO: 9.2 FL (ref 9.2–12.9)
POCT GLUCOSE: 140 MG/DL (ref 70–110)
POTASSIUM SERPL-SCNC: 4.2 MMOL/L (ref 3.5–5.1)
RBC # BLD AUTO: 3.18 M/UL (ref 4.6–6.2)
SODIUM SERPL-SCNC: 139 MMOL/L (ref 136–145)
WBC # BLD AUTO: 4.46 K/UL (ref 3.9–12.7)

## 2024-07-02 PROCEDURE — 36000706: Performed by: SPECIALIST

## 2024-07-02 PROCEDURE — 37000009 HC ANESTHESIA EA ADD 15 MINS: Performed by: SPECIALIST

## 2024-07-02 PROCEDURE — 36819 AV FUSE UPPR ARM BASILIC: CPT | Mod: LT,,, | Performed by: SPECIALIST

## 2024-07-02 PROCEDURE — 36415 COLL VENOUS BLD VENIPUNCTURE: CPT | Performed by: ANESTHESIOLOGY

## 2024-07-02 PROCEDURE — 63600175 PHARM REV CODE 636 W HCPCS: Performed by: NURSE ANESTHETIST, CERTIFIED REGISTERED

## 2024-07-02 PROCEDURE — P9045 ALBUMIN (HUMAN), 5%, 250 ML: HCPCS | Mod: JZ,JG | Performed by: NURSE ANESTHETIST, CERTIFIED REGISTERED

## 2024-07-02 PROCEDURE — 71000033 HC RECOVERY, INTIAL HOUR: Performed by: SPECIALIST

## 2024-07-02 PROCEDURE — 71000016 HC POSTOP RECOV ADDL HR: Performed by: SPECIALIST

## 2024-07-02 PROCEDURE — 63600175 PHARM REV CODE 636 W HCPCS: Performed by: SPECIALIST

## 2024-07-02 PROCEDURE — 71000015 HC POSTOP RECOV 1ST HR: Performed by: SPECIALIST

## 2024-07-02 PROCEDURE — 25000003 PHARM REV CODE 250: Performed by: SPECIALIST

## 2024-07-02 PROCEDURE — 80048 BASIC METABOLIC PNL TOTAL CA: CPT | Performed by: ANESTHESIOLOGY

## 2024-07-02 PROCEDURE — 36000707: Performed by: SPECIALIST

## 2024-07-02 PROCEDURE — 27201423 OPTIME MED/SURG SUP & DEVICES STERILE SUPPLY: Performed by: SPECIALIST

## 2024-07-02 PROCEDURE — 63600175 PHARM REV CODE 636 W HCPCS: Performed by: ANESTHESIOLOGY

## 2024-07-02 PROCEDURE — 25000003 PHARM REV CODE 250: Performed by: NURSE ANESTHETIST, CERTIFIED REGISTERED

## 2024-07-02 PROCEDURE — 63600175 PHARM REV CODE 636 W HCPCS

## 2024-07-02 PROCEDURE — 71000039 HC RECOVERY, EACH ADD'L HOUR: Performed by: SPECIALIST

## 2024-07-02 PROCEDURE — 37000008 HC ANESTHESIA 1ST 15 MINUTES: Performed by: SPECIALIST

## 2024-07-02 PROCEDURE — 85025 COMPLETE CBC W/AUTO DIFF WBC: CPT | Performed by: ANESTHESIOLOGY

## 2024-07-02 PROCEDURE — 76942 ECHO GUIDE FOR BIOPSY: CPT | Performed by: ANESTHESIOLOGY

## 2024-07-02 RX ORDER — PROCHLORPERAZINE EDISYLATE 5 MG/ML
5 INJECTION INTRAMUSCULAR; INTRAVENOUS EVERY 30 MIN PRN
Status: DISCONTINUED | OUTPATIENT
Start: 2024-07-02 | End: 2024-07-02 | Stop reason: HOSPADM

## 2024-07-02 RX ORDER — MEPERIDINE HYDROCHLORIDE 25 MG/ML
12.5 INJECTION INTRAMUSCULAR; INTRAVENOUS; SUBCUTANEOUS ONCE AS NEEDED
Status: DISCONTINUED | OUTPATIENT
Start: 2024-07-02 | End: 2024-07-02 | Stop reason: HOSPADM

## 2024-07-02 RX ORDER — ROPIVACAINE HYDROCHLORIDE 5 MG/ML
INJECTION, SOLUTION EPIDURAL; INFILTRATION; PERINEURAL
Status: COMPLETED | OUTPATIENT
Start: 2024-07-02 | End: 2024-07-02

## 2024-07-02 RX ORDER — OXYCODONE HYDROCHLORIDE 5 MG/1
5 TABLET ORAL
Status: DISCONTINUED | OUTPATIENT
Start: 2024-07-02 | End: 2024-07-02 | Stop reason: HOSPADM

## 2024-07-02 RX ORDER — LIDOCAINE HYDROCHLORIDE AND EPINEPHRINE 10; 10 MG/ML; UG/ML
INJECTION, SOLUTION INFILTRATION; PERINEURAL
Status: DISCONTINUED | OUTPATIENT
Start: 2024-07-02 | End: 2024-07-02 | Stop reason: HOSPADM

## 2024-07-02 RX ORDER — LIDOCAINE HYDROCHLORIDE 20 MG/ML
INJECTION INTRAVENOUS
Status: DISCONTINUED | OUTPATIENT
Start: 2024-07-02 | End: 2024-07-02

## 2024-07-02 RX ORDER — PROPOFOL 10 MG/ML
VIAL (ML) INTRAVENOUS CONTINUOUS PRN
Status: DISCONTINUED | OUTPATIENT
Start: 2024-07-02 | End: 2024-07-02

## 2024-07-02 RX ORDER — SODIUM CHLORIDE 9 MG/ML
INJECTION, SOLUTION INTRAVENOUS CONTINUOUS
Status: DISCONTINUED | OUTPATIENT
Start: 2024-07-02 | End: 2024-07-02 | Stop reason: HOSPADM

## 2024-07-02 RX ORDER — PROPOFOL 10 MG/ML
VIAL (ML) INTRAVENOUS
Status: DISCONTINUED | OUTPATIENT
Start: 2024-07-02 | End: 2024-07-02

## 2024-07-02 RX ORDER — PHENYLEPHRINE HYDROCHLORIDE 10 MG/ML
INJECTION INTRAVENOUS
Status: DISCONTINUED | OUTPATIENT
Start: 2024-07-02 | End: 2024-07-02

## 2024-07-02 RX ORDER — LIDOCAINE HYDROCHLORIDE 10 MG/ML
0.5 INJECTION, SOLUTION EPIDURAL; INFILTRATION; INTRACAUDAL; PERINEURAL ONCE
Status: DISCONTINUED | OUTPATIENT
Start: 2024-07-02 | End: 2024-07-02 | Stop reason: HOSPADM

## 2024-07-02 RX ORDER — ALBUMIN HUMAN 50 G/1000ML
SOLUTION INTRAVENOUS
Status: DISCONTINUED | OUTPATIENT
Start: 2024-07-02 | End: 2024-07-02

## 2024-07-02 RX ORDER — LIDOCAINE AND PRILOCAINE 25; 25 MG/G; MG/G
CREAM TOPICAL
COMMUNITY

## 2024-07-02 RX ORDER — HEPARIN SODIUM 5000 [USP'U]/ML
INJECTION, SOLUTION INTRAVENOUS; SUBCUTANEOUS
Status: DISCONTINUED | OUTPATIENT
Start: 2024-07-02 | End: 2024-07-02 | Stop reason: HOSPADM

## 2024-07-02 RX ORDER — HYDROCODONE BITARTRATE AND ACETAMINOPHEN 7.5; 325 MG/1; MG/1
1 TABLET ORAL EVERY 6 HOURS PRN
Qty: 20 TABLET | Refills: 0 | Status: SHIPPED | OUTPATIENT
Start: 2024-07-02

## 2024-07-02 RX ORDER — FENTANYL CITRATE 50 UG/ML
INJECTION, SOLUTION INTRAMUSCULAR; INTRAVENOUS
Status: DISCONTINUED | OUTPATIENT
Start: 2024-07-02 | End: 2024-07-02

## 2024-07-02 RX ORDER — SODIUM CHLORIDE 0.9 % (FLUSH) 0.9 %
3 SYRINGE (ML) INJECTION
Status: DISCONTINUED | OUTPATIENT
Start: 2024-07-02 | End: 2024-07-02 | Stop reason: HOSPADM

## 2024-07-02 RX ORDER — PROCHLORPERAZINE EDISYLATE 5 MG/ML
2.5 INJECTION INTRAMUSCULAR; INTRAVENOUS ONCE
Status: COMPLETED | OUTPATIENT
Start: 2024-07-02 | End: 2024-07-02

## 2024-07-02 RX ORDER — HYDROMORPHONE HYDROCHLORIDE 2 MG/ML
0.4 INJECTION, SOLUTION INTRAMUSCULAR; INTRAVENOUS; SUBCUTANEOUS EVERY 5 MIN PRN
Status: DISCONTINUED | OUTPATIENT
Start: 2024-07-02 | End: 2024-07-02 | Stop reason: HOSPADM

## 2024-07-02 RX ORDER — PROCHLORPERAZINE EDISYLATE 5 MG/ML
INJECTION INTRAMUSCULAR; INTRAVENOUS
Status: DISCONTINUED | OUTPATIENT
Start: 2024-07-02 | End: 2024-07-02

## 2024-07-02 RX ORDER — ONDANSETRON HYDROCHLORIDE 2 MG/ML
INJECTION, SOLUTION INTRAVENOUS
Status: DISCONTINUED | OUTPATIENT
Start: 2024-07-02 | End: 2024-07-02

## 2024-07-02 RX ORDER — EPHEDRINE SULFATE 50 MG/ML
INJECTION, SOLUTION INTRAVENOUS
Status: DISCONTINUED | OUTPATIENT
Start: 2024-07-02 | End: 2024-07-02

## 2024-07-02 RX ORDER — CEFAZOLIN SODIUM 1 G/3ML
2 INJECTION, POWDER, FOR SOLUTION INTRAMUSCULAR; INTRAVENOUS
Status: COMPLETED | OUTPATIENT
Start: 2024-07-02 | End: 2024-07-02

## 2024-07-02 RX ORDER — SODIUM CHLORIDE, SODIUM LACTATE, POTASSIUM CHLORIDE, CALCIUM CHLORIDE 600; 310; 30; 20 MG/100ML; MG/100ML; MG/100ML; MG/100ML
INJECTION, SOLUTION INTRAVENOUS CONTINUOUS
Status: DISCONTINUED | OUTPATIENT
Start: 2024-07-02 | End: 2024-07-02 | Stop reason: HOSPADM

## 2024-07-02 RX ADMIN — PROCHLORPERAZINE EDISYLATE 2.5 MG: 5 INJECTION INTRAMUSCULAR; INTRAVENOUS at 08:07

## 2024-07-02 RX ADMIN — PHENYLEPHRINE HYDROCHLORIDE 100 MCG: 10 INJECTION INTRAVENOUS at 09:07

## 2024-07-02 RX ADMIN — ONDANSETRON HYDROCHLORIDE 4 MG: 2 INJECTION INTRAMUSCULAR; INTRAVENOUS at 08:07

## 2024-07-02 RX ADMIN — LIDOCAINE HYDROCHLORIDE 25 MG: 20 INJECTION, SOLUTION INTRAVENOUS at 09:07

## 2024-07-02 RX ADMIN — SODIUM CHLORIDE: 0.9 INJECTION, SOLUTION INTRAVENOUS at 07:07

## 2024-07-02 RX ADMIN — PROPOFOL 50 MG: 10 INJECTION, EMULSION INTRAVENOUS at 09:07

## 2024-07-02 RX ADMIN — GLYCOPYRROLATE 0.2 MG: 0.2 INJECTION, SOLUTION INTRAMUSCULAR; INTRAVITREAL at 09:07

## 2024-07-02 RX ADMIN — FENTANYL CITRATE 100 MCG: 50 INJECTION, SOLUTION INTRAMUSCULAR; INTRAVENOUS at 08:07

## 2024-07-02 RX ADMIN — PHENYLEPHRINE HYDROCHLORIDE 100 MCG: 10 INJECTION INTRAVENOUS at 10:07

## 2024-07-02 RX ADMIN — ROPIVACAINE HYDROCHLORIDE 30 ML: 5 INJECTION, SOLUTION EPIDURAL; INFILTRATION; PERINEURAL at 08:07

## 2024-07-02 RX ADMIN — EPHEDRINE SULFATE 10 MG: 50 INJECTION INTRAVENOUS at 09:07

## 2024-07-02 RX ADMIN — CEFAZOLIN 2 G: 330 INJECTION, POWDER, FOR SOLUTION INTRAMUSCULAR; INTRAVENOUS at 09:07

## 2024-07-02 RX ADMIN — PROPOFOL 60 MCG/KG/MIN: 10 INJECTION, EMULSION INTRAVENOUS at 09:07

## 2024-07-02 RX ADMIN — ALBUMIN (HUMAN) 250 ML: 2.5 SOLUTION INTRAVENOUS at 09:07

## 2024-07-02 NOTE — ANESTHESIA PROCEDURE NOTES
Peripheral Block    Patient location during procedure: holding area    Reason for block: primary anesthetic    Diagnosis: renal failure   Timeout: 7/2/2024 8:04 AM     Staffing  Authorizing Provider: Romaine Sweet MD  Performing Provider: Romaine Sweet MD    Staffing  Performed by: Romaine Sweet MD  Authorized by: Romaine Sweet MD    Preanesthetic Checklist  Completed: patient identified, IV checked, site marked, risks and benefits discussed, surgical consent, monitors and equipment checked, pre-op evaluation and timeout performed  Peripheral Block  Patient position: supine  Prep: ChloraPrep  Patient monitoring: heart rate, cardiac monitor, continuous pulse ox and frequent blood pressure checks  Block type: supraclavicular  Laterality: left  Injection technique: single shot  Needle  Needle type: Stimuplex   Needle gauge: 20 G  Needle length: 4 in  Needle localization: ultrasound guidance and nerve stimulator   -ultrasound image captured on disc.  Assessment  Injection assessment: negative aspiration, negative parasthesia and local visualized surrounding nerve  Paresthesia pain: none  Heart rate change: no  Slow fractionated injection: yes  Pain Tolerance: comfortable throughout block and no complaints  Medications:    Medications: ropivacaine (NAROPIN) injection 0.5% - Perineural   30 mL - 7/2/2024 8:11:00 AM

## 2024-07-02 NOTE — OR NURSING
Potassium lab still in process. Dr. Arcos notified. Dr. Arcos and Anesthesia agree okay to proceed to OR.

## 2024-07-02 NOTE — ANESTHESIA POSTPROCEDURE EVALUATION
Anesthesia Post Evaluation    Patient: Arthur Menjivar    Procedure(s) Performed: Procedure(s) (LRB):  TRANSPOSITION, VEIN, BASILIC-AVF/G LEFT UPPER EXTREMITY (Left)    Final Anesthesia Type: regional      Patient location during evaluation: PACU  Patient participation: Yes- Able to Participate  Level of consciousness: awake and alert  Post-procedure vital signs: reviewed and stable  Pain management: adequate  Airway patency: patent    PONV status at discharge: No PONV  Anesthetic complications: no      Cardiovascular status: hemodynamically stable  Respiratory status: unassisted  Hydration status: euvolemic  Follow-up not needed.              Vitals Value Taken Time   /62 07/02/24 1233   Temp 36.7 °C (98 °F) 07/02/24 1203   Pulse 65 07/02/24 1233   Resp 16 07/02/24 1233   SpO2 99 % 07/02/24 1233         Event Time   Out of Recovery 11:56:46         Pain/Alverto Score: Alverto Score: 10 (7/2/2024 12:33 PM)

## 2024-07-02 NOTE — OR NURSING
Pt continues without c/o pain at this time. No change from previous assessment. Prepared for transfer to ACU.

## 2024-07-02 NOTE — DISCHARGE INSTRUCTIONS
Anesthesia: Monitored Anesthesia Care (MAC)    Anesthesia Safety  Have an adult family member or friend drive you home after the procedure.  For the first 24 hours after your surgery:  Do not drive or use heavy equipment.  Do not make important decisions or sign documents.  Avoid alcohol.  Have someone stay with you, if possible. They can watch for problems and help keep you safe.

## 2024-07-02 NOTE — OP NOTE
[facility] ([place])  Operative Note    SUMMARY     Surgery Date: 7/2/2024     Surgeons and Role:     * Roque Arcos Jr., MD - Primary    Assisting Surgeon: None    Pre-op Diagnosis:  End stage renal disease [N18.6]  Encounter for extracorporeal dialysis [Z99.2]    Post-op Diagnosis:  Post-Op Diagnosis Codes:     * End stage renal disease [N18.6]     * Encounter for extracorporeal dialysis [Z99.2]    Procedure(s) (LRB):  TRANSPOSITION, VEIN, BASILIC-AVF/G LEFT UPPER EXTREMITY (Left)    Anesthesia: Regional    Description of Procedure:  The patient was brought to the operating room and placed in supine position.  The left upper extremity prepped and draped in a sterile fashion.  A transverse incision made over the left antecubital fossa.  Using sharp dissection was carried down through the subcutaneous tissues the cephalic vein and brachial artery identified and isolated.  Branches ligated with 3-0 Vicryl ties and hemoclips.  The vein was transected.  The vein irrigated without evidence of increased resistance.  The vein appeared to be sufficiently superficial to be usable for a hemodialysis fistula.  A longitudinal arteriotomy then made in the brachial artery at the antecubital fossa.  The vein cut to an appropriate size and configuration.  The vein then sewn end-to-side to the artery with running 6. Axson-Chase suture.  After hemostasis had been obtained the subcutaneous tissue closed with interrupted 3-0 Vicryl and the skin with running 4-0 Monocryl.  The wounds sterilely dressed.  The patient tolerated the procedure well left the operating room in good condition.  At the end of the procedure all sponge, lap and instrument counts were correct.    Estimated Blood Loss:  Minimal         Specimens:   Specimen (24h ago, onward)      None                SUMMARY     Admit Date:     Discharge Date and Time:  07/02/2024 11:36 AM    Hospital Course (synopsis of major diagnoses, care, treatment, and services provided  during the course of the hospital stay):  Benign    Final Diagnosis: Post-Op Diagnosis Codes:     * End stage renal disease [N18.6]     * Encounter for extracorporeal dialysis [Z99.2]    Disposition: Home or Self Care    Follow Up/Patient Instructions:     Medications:  Reconciled Home Medications:      Medication List        CHANGE how you take these medications      * HYDROcodone-acetaminophen 7.5-325 mg per tablet  Commonly known as: NORCO  Take 1 tablet by mouth every 6 (six) hours as needed for Pain.  What changed: Another medication with the same name was added. Make sure you understand how and when to take each.     * HYDROcodone-acetaminophen 7.5-325 mg per tablet  Commonly known as: NORCO  Take 1 tablet by mouth every 6 (six) hours as needed for Pain.  What changed: You were already taking a medication with the same name, and this prescription was added. Make sure you understand how and when to take each.           * This list has 2 medication(s) that are the same as other medications prescribed for you. Read the directions carefully, and ask your doctor or other care provider to review them with you.                CONTINUE taking these medications      ascorbic acid (vitamin C) 500 MG tablet  Commonly known as: VITAMIN C  Take 500 mg by mouth once daily.     carvediloL 3.125 MG tablet  Commonly known as: COREG  Take 3.125 mg by mouth 2 (two) times daily.     cholecalciferol (vitamin D3) 25 mcg (1,000 unit) Chew  once daily.     cloNIDine 0.1 MG tablet  Commonly known as: CATAPRES  Take 1 tablet (0.1 mg total) by mouth every 6 (six) hours as needed (systolic blood pressure (top number) is above 160.).     cyanocobalamin 1000 MCG tablet  Commonly known as: VITAMIN B-12  Take 100 mcg by mouth.     diltiaZEM 240 MG 24 hr capsule  Commonly known as: CARDIZEM CD  Take 240 mg by mouth once daily.     ferrous sulfate 325 mg (65 mg iron) Tab tablet  Commonly known as: FEOSOL  Take 325 mg by mouth daily with  breakfast.     furosemide 20 MG tablet  Commonly known as: LASIX  Take 20 mg by mouth daily as needed. 1 tablet 2x/ week     HEPARIN LOCK FLUSH (PORCINE) IV  Inject into the vein. After HD, 2 times weekly     hydrALAZINE 100 MG tablet  Commonly known as: APRESOLINE  Take 1 tablet (100 mg total) by mouth every 8 (eight) hours.     levothyroxine 75 MCG tablet  Commonly known as: SYNTHROID  Take 75 mcg by mouth once daily.     LIDOcaine-prilocaine cream  Commonly known as: EMLA  Apply topically as needed.     magnesium oxide 400 mg (241.3 mg magnesium) tablet  Commonly known as: MAG-OX  Take 400 mg by mouth.     multivitamin per tablet  Commonly known as: THERAGRAN  Take 1 tablet by mouth once daily.     omega-3 fatty acids 1,000 mg Cap  Take 1,200 mg by mouth.     REPATHA SYRINGE 140 mg/mL Syrg  Generic drug: evolocumab  Inject 140 mg into the skin every 14 (fourteen) days.     rosuvastatin 40 MG Tab  Commonly known as: CRESTOR  Take 10 mg by mouth every evening.     sodium bicarbonate 650 MG tablet  650 mg 3 (three) times daily.     UNABLE TO FIND  Daily. PREVAGEN            Discharge Procedure Orders   Diet general     Call MD for:  temperature >100.4     Call MD for:  persistent nausea and vomiting     Call MD for:  severe uncontrolled pain     Call MD for:  difficulty breathing, headache or visual disturbances     Call MD for:  redness, tenderness, or signs of infection (pain, swelling, redness, odor or green/yellow discharge around incision site)     Sponge bath only until clinic visit     Leave dressing on - Keep it clean, dry, and intact until clinic visit   Order Comments: Leave steri strips intact.

## 2024-07-02 NOTE — TRANSFER OF CARE
"Anesthesia Transfer of Care Note    Patient: Arthur Menjivar    Procedure(s) Performed: Procedure(s) (LRB):  TRANSPOSITION, VEIN, BASILIC-AVF/G LEFT UPPER EXTREMITY (Left)    Patient location: PACU    Anesthesia Type: regional and MAC    Transport from OR: Transported from OR on 2-3 L/min O2 by NC with adequate spontaneous ventilation    Post pain: adequate analgesia    Post assessment: no apparent anesthetic complications    Post vital signs: stable    Level of consciousness: awake    Nausea/Vomiting: no nausea/vomiting    Complications: none    Transfer of care protocol was followed      Last vitals: Visit Vitals  /72 (BP Location: Right arm, Patient Position: Lying)   Pulse 62   Temp 36.4 °C (97.5 °F) (Oral)   Resp 18   Ht 5' 11" (1.803 m)   Wt 108.9 kg (240 lb)   SpO2 99%   BMI 33.47 kg/m²     "

## 2024-07-09 ENCOUNTER — OFFICE VISIT (OUTPATIENT)
Dept: SURGERY | Facility: CLINIC | Age: 69
End: 2024-07-09
Attending: SPECIALIST
Payer: MEDICARE

## 2024-07-09 VITALS — OXYGEN SATURATION: 96 % | SYSTOLIC BLOOD PRESSURE: 134 MMHG | DIASTOLIC BLOOD PRESSURE: 73 MMHG | HEART RATE: 71 BPM

## 2024-07-09 DIAGNOSIS — N28.9 RENAL INSUFFICIENCY: Primary | ICD-10-CM

## 2024-07-09 PROCEDURE — 99024 POSTOP FOLLOW-UP VISIT: CPT | Mod: POP,,, | Performed by: SPECIALIST

## 2024-07-09 PROCEDURE — 99999 PR PBB SHADOW E&M-EST. PATIENT-LVL III: CPT | Mod: PBBFAC,,, | Performed by: SPECIALIST

## 2024-07-09 PROCEDURE — 99213 OFFICE O/P EST LOW 20 MIN: CPT | Mod: PBBFAC | Performed by: SPECIALIST

## 2024-07-09 NOTE — PROGRESS NOTES
68-year-old male status post construction AV fistula for dialysis on 07/02/2024   End of cephalic vein to side of brachial artery and antecubital fossa.    Subjective   No complaints     PE   Left upper extremity-wounds healing, positive thrill, neurovascular intact     Impression/plan  Surgically stable   RTC 1 month

## 2024-07-20 ENCOUNTER — CLINICAL SUPPORT (OUTPATIENT)
Dept: CARDIOLOGY | Facility: HOSPITAL | Age: 69
End: 2024-07-20
Attending: INTERNAL MEDICINE
Payer: MEDICARE

## 2024-07-20 DIAGNOSIS — I49.8 OTHER SPECIFIED CARDIAC ARRHYTHMIAS: ICD-10-CM

## 2024-07-20 PROCEDURE — 93298 REM INTERROG DEV EVAL SCRMS: CPT | Performed by: INTERNAL MEDICINE

## 2024-07-20 PROCEDURE — 93298 REM INTERROG DEV EVAL SCRMS: CPT | Mod: 26,,, | Performed by: INTERNAL MEDICINE

## 2024-07-22 ENCOUNTER — TELEPHONE (OUTPATIENT)
Dept: CARDIOLOGY | Facility: CLINIC | Age: 69
End: 2024-07-22
Payer: MEDICARE

## 2024-07-23 ENCOUNTER — OFFICE VISIT (OUTPATIENT)
Dept: ELECTROPHYSIOLOGY | Facility: CLINIC | Age: 69
End: 2024-07-23
Payer: MEDICARE

## 2024-07-23 ENCOUNTER — HOSPITAL ENCOUNTER (OUTPATIENT)
Dept: CARDIOLOGY | Facility: CLINIC | Age: 69
Discharge: HOME | End: 2024-07-23
Payer: MEDICARE

## 2024-07-23 VITALS
WEIGHT: 245.56 LBS | BODY MASS INDEX: 34.38 KG/M2 | SYSTOLIC BLOOD PRESSURE: 132 MMHG | HEIGHT: 71 IN | DIASTOLIC BLOOD PRESSURE: 72 MMHG | HEART RATE: 72 BPM

## 2024-07-23 DIAGNOSIS — I10 ESSENTIAL (PRIMARY) HYPERTENSION: ICD-10-CM

## 2024-07-23 DIAGNOSIS — N18.6 END STAGE RENAL DISEASE: ICD-10-CM

## 2024-07-23 DIAGNOSIS — I11.0 HYPERTENSIVE HEART DISEASE WITH CHRONIC DIASTOLIC CONGESTIVE HEART FAILURE: ICD-10-CM

## 2024-07-23 DIAGNOSIS — Z86.73 HISTORY OF CVA (CEREBROVASCULAR ACCIDENT): Primary | ICD-10-CM

## 2024-07-23 DIAGNOSIS — I48.0 PAF (PAROXYSMAL ATRIAL FIBRILLATION): ICD-10-CM

## 2024-07-23 DIAGNOSIS — I50.32 HYPERTENSIVE HEART DISEASE WITH CHRONIC DIASTOLIC CONGESTIVE HEART FAILURE: ICD-10-CM

## 2024-07-23 LAB
OHS QRS DURATION: 134 MS
OHS QTC CALCULATION: 468 MS

## 2024-07-23 PROCEDURE — 99214 OFFICE O/P EST MOD 30 MIN: CPT | Mod: PBBFAC,25 | Performed by: INTERNAL MEDICINE

## 2024-07-23 PROCEDURE — 99999 PR PBB SHADOW E&M-EST. PATIENT-LVL IV: CPT | Mod: PBBFAC,,, | Performed by: INTERNAL MEDICINE

## 2024-07-23 PROCEDURE — 93010 ELECTROCARDIOGRAM REPORT: CPT | Mod: S$PBB,,, | Performed by: INTERNAL MEDICINE

## 2024-07-23 PROCEDURE — 93005 ELECTROCARDIOGRAM TRACING: CPT | Mod: PBBFAC | Performed by: INTERNAL MEDICINE

## 2024-07-23 RX ORDER — CARVEDILOL 6.25 MG/1
6.25 TABLET ORAL 2 TIMES DAILY
COMMUNITY
Start: 2024-04-22

## 2024-07-23 RX ORDER — DILTIAZEM HYDROCHLORIDE 240 MG/1
1 CAPSULE, COATED, EXTENDED RELEASE ORAL DAILY
COMMUNITY
Start: 2024-04-26

## 2024-07-23 RX ORDER — SODIUM BICARBONATE 650 MG/1
2 TABLET ORAL 2 TIMES DAILY
COMMUNITY
Start: 2024-03-21

## 2024-07-23 RX ORDER — HYDRALAZINE HYDROCHLORIDE 100 MG/1
1 TABLET, FILM COATED ORAL 3 TIMES DAILY
COMMUNITY
Start: 2024-04-22

## 2024-07-23 NOTE — PROGRESS NOTES
Subjective:   Patient ID:  Arthur Menjivar is a 68 y.o. male who presents for evaluation of ILR follow-up    Referring Cardiologist: Jarad Greene MD  Primary Care Physician: Demetri Whitley MD    HPI  Prior Hx:  I had the pleasure of seeing Mr. Menjivar today in our electrophysiology clinic in consultation for his need for stroke risk reduction related to his atrial fibrillation. As you are aware he is a pleasant 68 year-old man with hypertension, prior stroke, CKD stage IV, prior DVT, prior stroke, aortic atherosclerotic disease, paroxysmal atrial fibrillation and hematuria. He reports he was diagnosed with AF at [facility] 15-20 years ago. He is unaware of any episodes after. He was on xarelto however this was stopped in 2021 due to hematuria when he was being treated with bladder cancer. He now has a urostomy. He notes blood in the urostomy if he starts xarelto. He had a holter monitor in October of 2020 which was read as atrial fibrillation however selected strips on my review in the file show sinus rhythm with PACs and some runs of nonsustained AT. He reports rare episode of palpitations that can last an hour. He reports a stroke occurred 7-8 years ago. He has some residual left sided weakness. He reports he was not having any AF around that time. He feels it was a heat related stroke. Discussed Watchman versus ILR for long-term monitoring. He elected for ILR    I reviewed available ECGs in EPIC which show sinus rhythm, at times with PACs. He has had a RBBB on ECGs since 2/2023 5/2024: ILR implant    Interim Hx:  Mr. Menjivar returns for follow-up. No AF observed on ILR to date.    My interpretation of today's in-clinic ECG is sinus rhythm with RBBB.    Review of Systems   Constitutional: Negative for fever and malaise/fatigue.   HENT:  Negative for congestion and sore throat.    Eyes:  Negative for blurred vision and visual disturbance.   Cardiovascular:  Negative for chest pain, dyspnea on exertion,  irregular heartbeat, near-syncope, palpitations and syncope.   Respiratory:  Negative for cough and shortness of breath.    Hematologic/Lymphatic: Negative for bleeding problem. Does not bruise/bleed easily.   Skin: Negative.    Musculoskeletal: Negative.    Gastrointestinal:  Negative for bloating, abdominal pain, hematochezia and melena.   Neurological:  Negative for focal weakness and weakness.   Psychiatric/Behavioral: Negative.         Objective:   Physical Exam  Vitals reviewed.   Constitutional:       General: He is not in acute distress.     Appearance: He is well-developed. He is not diaphoretic.   HENT:      Head: Normocephalic and atraumatic.   Eyes:      General:         Right eye: No discharge.         Left eye: No discharge.      Conjunctiva/sclera: Conjunctivae normal.   Cardiovascular:      Rate and Rhythm: Normal rate and regular rhythm.      Heart sounds: No murmur heard.     No friction rub. No gallop.   Pulmonary:      Effort: Pulmonary effort is normal. No respiratory distress.      Breath sounds: Normal breath sounds. No wheezing or rales.   Abdominal:      General: Bowel sounds are normal. There is no distension.      Palpations: Abdomen is soft.      Tenderness: There is no abdominal tenderness.   Musculoskeletal:      Cervical back: Neck supple.   Skin:     General: Skin is warm and dry.   Neurological:      Mental Status: He is alert and oriented to person, place, and time.   Psychiatric:         Behavior: Behavior normal.         Thought Content: Thought content normal.         Judgment: Judgment normal.         Assessment:      1. History of CVA (cerebrovascular accident)    2. PAF (paroxysmal atrial fibrillation)    3. Hypertensive heart disease with chronic diastolic congestive heart failure    4. Essential (primary) hypertension    5. End stage renal disease          Plan:   In summary, Mr Menjivar is a pleasant 68 year-old man with hypertension, prior stroke, CKD stage IV, prior DVT,  prior stroke, aortic atherosclerotic disease, paroxysmal atrial fibrillation and hematuria. He reports having an ablation for atrial fibrillation prior to [place] at [facility]. Since that time he reports no episodes of AF and is s/p ILR implantation. No AF observed to date. Should recurrent AF be observed would recommend ELINOR occlusion due to poor candidacy for long-term anticoagulation.    Thank you for allowing me to participate in the care of this patient. Please do not hesitate to call me with any questions or concerns.    Kain Cortez MD, PhD  Cardiac Electrophysiology

## 2024-07-25 LAB
OHS CV AF BURDEN PERCENT: < 1
OHS CV DC REMOTE DEVICE TYPE: NORMAL
OHS CV ICD SHOCK: NO

## 2024-08-06 ENCOUNTER — OFFICE VISIT (OUTPATIENT)
Dept: CARDIOLOGY | Facility: CLINIC | Age: 69
End: 2024-08-06
Payer: MEDICARE

## 2024-08-06 ENCOUNTER — OFFICE VISIT (OUTPATIENT)
Dept: SURGERY | Facility: CLINIC | Age: 69
End: 2024-08-06
Attending: SPECIALIST
Payer: MEDICARE

## 2024-08-06 VITALS
HEART RATE: 78 BPM | OXYGEN SATURATION: 98 % | DIASTOLIC BLOOD PRESSURE: 70 MMHG | WEIGHT: 247.25 LBS | SYSTOLIC BLOOD PRESSURE: 126 MMHG | BODY MASS INDEX: 34.48 KG/M2

## 2024-08-06 VITALS — OXYGEN SATURATION: 98 % | DIASTOLIC BLOOD PRESSURE: 71 MMHG | HEART RATE: 85 BPM | SYSTOLIC BLOOD PRESSURE: 144 MMHG

## 2024-08-06 DIAGNOSIS — N18.6 END STAGE RENAL DISEASE: Primary | ICD-10-CM

## 2024-08-06 DIAGNOSIS — I48.0 PAF (PAROXYSMAL ATRIAL FIBRILLATION): ICD-10-CM

## 2024-08-06 DIAGNOSIS — I10 ESSENTIAL (PRIMARY) HYPERTENSION: ICD-10-CM

## 2024-08-06 DIAGNOSIS — I45.10 RBBB: ICD-10-CM

## 2024-08-06 DIAGNOSIS — I25.10 ATHEROSCLEROSIS OF NATIVE CORONARY ARTERY OF NATIVE HEART WITHOUT ANGINA PECTORIS: Primary | ICD-10-CM

## 2024-08-06 PROCEDURE — 99214 OFFICE O/P EST MOD 30 MIN: CPT | Mod: PBBFAC,27 | Performed by: SPECIALIST

## 2024-08-06 PROCEDURE — 99214 OFFICE O/P EST MOD 30 MIN: CPT | Mod: S$PBB,,, | Performed by: INTERNAL MEDICINE

## 2024-08-06 PROCEDURE — 99214 OFFICE O/P EST MOD 30 MIN: CPT | Mod: PBBFAC,PN | Performed by: INTERNAL MEDICINE

## 2024-08-06 PROCEDURE — 99999 PR PBB SHADOW E&M-EST. PATIENT-LVL IV: CPT | Mod: PBBFAC,,, | Performed by: SPECIALIST

## 2024-08-06 PROCEDURE — 99499 UNLISTED E&M SERVICE: CPT | Mod: S$PBB,,, | Performed by: SPECIALIST

## 2024-08-06 PROCEDURE — 99999 PR PBB SHADOW E&M-EST. PATIENT-LVL IV: CPT | Mod: PBBFAC,,, | Performed by: INTERNAL MEDICINE

## 2024-08-28 ENCOUNTER — CLINICAL SUPPORT (OUTPATIENT)
Dept: CARDIOLOGY | Facility: HOSPITAL | Age: 69
End: 2024-08-28
Attending: INTERNAL MEDICINE
Payer: MEDICARE

## 2024-08-28 ENCOUNTER — CLINICAL SUPPORT (OUTPATIENT)
Dept: CARDIOLOGY | Facility: HOSPITAL | Age: 69
End: 2024-08-28
Payer: MEDICARE

## 2024-08-28 DIAGNOSIS — I49.8 OTHER SPECIFIED CARDIAC ARRHYTHMIAS: ICD-10-CM

## 2024-08-28 PROCEDURE — 93298 REM INTERROG DEV EVAL SCRMS: CPT | Mod: 26,,, | Performed by: INTERNAL MEDICINE

## 2024-08-29 LAB
OHS CV AF BURDEN PERCENT: < 1
OHS CV DC REMOTE DEVICE TYPE: NORMAL

## 2024-10-02 ENCOUNTER — CLINICAL SUPPORT (OUTPATIENT)
Dept: CARDIOLOGY | Facility: HOSPITAL | Age: 69
End: 2024-10-02
Attending: INTERNAL MEDICINE
Payer: MEDICARE

## 2024-10-02 ENCOUNTER — CLINICAL SUPPORT (OUTPATIENT)
Dept: CARDIOLOGY | Facility: HOSPITAL | Age: 69
End: 2024-10-02
Payer: MEDICARE

## 2024-10-02 DIAGNOSIS — I49.8 OTHER SPECIFIED CARDIAC ARRHYTHMIAS: ICD-10-CM

## 2024-10-02 PROCEDURE — 93298 REM INTERROG DEV EVAL SCRMS: CPT | Mod: 26,,, | Performed by: INTERNAL MEDICINE

## 2024-10-08 ENCOUNTER — OFFICE VISIT (OUTPATIENT)
Dept: SURGERY | Facility: CLINIC | Age: 69
End: 2024-10-08
Attending: SPECIALIST
Payer: MEDICARE

## 2024-10-08 VITALS — HEART RATE: 91 BPM | SYSTOLIC BLOOD PRESSURE: 164 MMHG | DIASTOLIC BLOOD PRESSURE: 89 MMHG | OXYGEN SATURATION: 99 %

## 2024-10-08 DIAGNOSIS — N18.6 END STAGE RENAL DISEASE: Primary | ICD-10-CM

## 2024-10-08 PROCEDURE — 99214 OFFICE O/P EST MOD 30 MIN: CPT | Mod: PBBFAC | Performed by: SPECIALIST

## 2024-10-08 PROCEDURE — 99499 UNLISTED E&M SERVICE: CPT | Mod: S$PBB,,, | Performed by: SPECIALIST

## 2024-10-08 PROCEDURE — 99999 PR PBB SHADOW E&M-EST. PATIENT-LVL IV: CPT | Mod: PBBFAC,,, | Performed by: SPECIALIST

## 2024-10-08 NOTE — PROGRESS NOTES
68-year-old male status post construction AV fistula for dialysis on 07/02/2024   End of cephalic vein to side of brachial artery at the antecubital fossa.     Subjective   No complaints     Physical exam  Neurovascular intact   Positive thrill   Wounds healed   Fistula mature     Impression/plan  Okay to begin use of AV fistula   RTC for catheter removal    
No

## 2024-10-18 LAB
OHS CV AF BURDEN PERCENT: < 1
OHS CV DC REMOTE DEVICE TYPE: NORMAL

## 2024-11-07 ENCOUNTER — CLINICAL SUPPORT (OUTPATIENT)
Dept: CARDIOLOGY | Facility: HOSPITAL | Age: 69
End: 2024-11-07
Attending: INTERNAL MEDICINE
Payer: MEDICARE

## 2024-11-07 ENCOUNTER — CLINICAL SUPPORT (OUTPATIENT)
Dept: CARDIOLOGY | Facility: HOSPITAL | Age: 69
End: 2024-11-07
Payer: MEDICARE

## 2024-11-07 DIAGNOSIS — I49.8 OTHER SPECIFIED CARDIAC ARRHYTHMIAS: ICD-10-CM

## 2024-11-07 PROCEDURE — 93298 REM INTERROG DEV EVAL SCRMS: CPT | Mod: 26,,, | Performed by: INTERNAL MEDICINE

## 2024-11-07 RX ORDER — CLONIDINE HYDROCHLORIDE 0.1 MG/1
0.1 TABLET ORAL 2 TIMES DAILY
Qty: 180 TABLET | Refills: 3 | Status: SHIPPED | OUTPATIENT
Start: 2024-11-07

## 2024-11-22 LAB
OHS CV AF BURDEN PERCENT: < 1
OHS CV DC REMOTE DEVICE TYPE: NORMAL

## 2024-11-23 ENCOUNTER — CLINICAL SUPPORT (OUTPATIENT)
Dept: CARDIOLOGY | Facility: HOSPITAL | Age: 69
End: 2024-11-23
Payer: MEDICARE

## 2024-11-23 DIAGNOSIS — I49.8 OTHER SPECIFIED CARDIAC ARRHYTHMIAS: ICD-10-CM

## 2024-12-08 ENCOUNTER — CLINICAL SUPPORT (OUTPATIENT)
Dept: CARDIOLOGY | Facility: HOSPITAL | Age: 69
End: 2024-12-08
Attending: INTERNAL MEDICINE
Payer: MEDICARE

## 2024-12-08 DIAGNOSIS — I49.8 OTHER SPECIFIED CARDIAC ARRHYTHMIAS: ICD-10-CM

## 2024-12-08 PROCEDURE — 93298 REM INTERROG DEV EVAL SCRMS: CPT | Mod: 26,,, | Performed by: INTERNAL MEDICINE

## 2024-12-12 ENCOUNTER — HOSPITAL ENCOUNTER (OUTPATIENT)
Facility: OTHER | Age: 69
Discharge: HOME | End: 2024-12-12
Attending: INTERNAL MEDICINE | Admitting: INTERNAL MEDICINE
Payer: MEDICARE

## 2024-12-12 VITALS
OXYGEN SATURATION: 100 % | WEIGHT: 246.94 LBS | DIASTOLIC BLOOD PRESSURE: 68 MMHG | BODY MASS INDEX: 34.57 KG/M2 | RESPIRATION RATE: 15 BRPM | TEMPERATURE: 98 F | SYSTOLIC BLOOD PRESSURE: 139 MMHG | HEIGHT: 71 IN | HEART RATE: 68 BPM

## 2024-12-12 DIAGNOSIS — T82.590A MECHANICAL COMPLICATION OF ARTERIOVENOUS FISTULA SURGICALLY CREATED: Primary | ICD-10-CM

## 2024-12-12 DIAGNOSIS — T82.590A MECHANICAL COMPLICATION OF ARTERIOVENOUS FISTULA SURGICALLY CREATED, INITIAL ENCOUNTER: ICD-10-CM

## 2024-12-12 DIAGNOSIS — N18.6 END STAGE RENAL DISEASE: ICD-10-CM

## 2024-12-12 LAB — POCT GLUCOSE: 98 MG/DL (ref 70–110)

## 2024-12-12 PROCEDURE — 63600175 PHARM REV CODE 636 W HCPCS: Performed by: INTERNAL MEDICINE

## 2024-12-12 PROCEDURE — 25500020 PHARM REV CODE 255: Performed by: INTERNAL MEDICINE

## 2024-12-12 RX ORDER — LIDOCAINE HYDROCHLORIDE 10 MG/ML
INJECTION, SOLUTION INFILTRATION; PERINEURAL
Status: DISCONTINUED | OUTPATIENT
Start: 2024-12-12 | End: 2024-12-12 | Stop reason: HOSPADM

## 2024-12-12 RX ORDER — MIDAZOLAM HYDROCHLORIDE 1 MG/ML
INJECTION, SOLUTION INTRAMUSCULAR; INTRAVENOUS
Status: DISCONTINUED | OUTPATIENT
Start: 2024-12-12 | End: 2024-12-12 | Stop reason: HOSPADM

## 2024-12-12 RX ORDER — FENTANYL CITRATE 50 UG/ML
INJECTION, SOLUTION INTRAMUSCULAR; INTRAVENOUS
Status: DISCONTINUED | OUTPATIENT
Start: 2024-12-12 | End: 2024-12-12 | Stop reason: HOSPADM

## 2024-12-12 RX ORDER — SEVELAMER CARBONATE 800 MG/1
800 TABLET, FILM COATED ORAL
COMMUNITY
Start: 2024-11-15 | End: 2025-11-15

## 2024-12-12 RX ORDER — HEPARIN SOD,PORCINE/0.9 % NACL 1000/500ML
INTRAVENOUS SOLUTION INTRAVENOUS
Status: DISCONTINUED | OUTPATIENT
Start: 2024-12-12 | End: 2024-12-12 | Stop reason: HOSPADM

## 2024-12-12 NOTE — BRIEF OP NOTE
[facility] - [facility] Lab  Brief Operative Note    Surgery Date: 12/12/2024     Surgeons and Role:     * Rohan Bright MD - Primary    Assisting Surgeon: None    Pre-op Diagnosis:  End stage renal disease [N18.6]  Mechanical complication of arteriovenous fistula surgically created [T82.590A]    Post-op Diagnosis:  Post-Op Diagnosis Codes:     * End stage renal disease [N18.6]     * Mechanical complication of arteriovenous fistula surgically created [T82.590A]    Procedure(s) (LRB):  THROMBECTOMY, HEMODIALYSIS GRAFT OR FISTULA (Left)  Fistulogram (Left)    Anesthesia: 1% lidocaine, 1 mg versed, 50 mcg fentanyl    Operative Findings: Patient was prepped and draped in a sterile fashion.  This was a successful fistulogram of his LUE brachiocephalic AV fistula with angioplasty performed in the juxta-anastomosis portion of the fistula.  Patient tolerated the procedure well and no immediate complications noted.    Estimated Blood Loss: < 10 mL         Specimens:   Specimen (24h ago, onward)      None              Discharge Note    OUTCOME:  Patient was prepped and draped in a sterile fashion.  This was a successful fistulogram of his LUE brachiocephalic AV fistula with angioplasty performed in the juxta-anastomosis portion of the fistula.  Patient tolerated the procedure well and no immediate complications noted.    DISPOSITION: Home or Self Care    FINAL DIAGNOSIS:  <principal problem not specified>    FOLLOWUP: In clinic in 1 week for suture removal and follow up ultrasound/exam    DISCHARGE INSTRUCTIONS:    Discharge Procedure Orders   Lifting restrictions   Order Comments: No heavy lifting for 24 hours.     Call MD for:  temperature >100.4     Call MD for:  severe uncontrolled pain     Call MD for:  redness, tenderness, or signs of infection (pain, swelling, redness, odor or green/yellow discharge around incision site)     Call MD for:   Order Comments: Bleeding from the access site.     Activity as tolerated

## 2024-12-12 NOTE — PLAN OF CARE
Patient requests the presence of his wife at the time of discharge and for transportation to the home setting.

## 2024-12-12 NOTE — PROCEDURES
[facility] Interventional Nephrology Service    Fistulogram Procedure Report    Date of Procedure:  12/12/2024 1:20 PM    Procedures Performed: Fistulogram with venous angioplasty of the juxta-anastomosis region, reflux arteriogram; antegrade arteriogram    Indication: poor flow noted through his access prior to dialysis, thought to be clotted    Referring Provider: Dr. Baker and [facility]    Primary Surgeon: Rohan Bright MD  Assist: none    Medications Administered:  1% lidocaine, 1 mg versed, 50 mcg fentanyl    Procedure Report in Detail:  After informed consent was obtained the patient was prepped and draped in the usual sterile fashion.  His left upper Bracheocephalic AVF was cannulated in the venous facing direction with a micropuncture system, confirmed in correct placement under fluroscopy, and a fistulogram was performed using iodinated contrast.  Fistulogram revealed patent outflow with no visible stenosis (including the cephalic arch).  Central vasculature was then evaluated with contrasted film revealing widely patent central vasculature.  Reflux arteriogram revealed a collateral vessel in the JA region as well as a stenosis (50-60%) at the JA region.  More than 6cm of the brachial artery was visualized and it was patent.  We then removed the microsheath and direct pressure was used to achieve hemostasis.      We then cannulated in the arterial facing direction with a micropuncture system, confirmed in correct placement under fluoroscopy.  The micropuncture sheath was cannulated with a glidewire which was advanced up the brachial artery, and the micropuncture sheath was removed.  A 6 Fr sheath was inserted over the glidewire, and the following interventions were performed after the patient was given 1 mg versed and 50 mcg fentanyl: using a 6 x 40 mm ultraverse pta balloon and endeflator, we advanced the balloon to the JA region and inflated to full effacement with waist seen on balloon prior to full effacement.   Follow up film revealed residual stenosis with no change in the exam, so we then advanced a 7 x 40 mm ultraverse pta balloon and using the same endeflator we inflated to full effacement again with waist seen prior to full effacement.  A repeat film was difficult to perform (given the collateral vessel, and the concern was compressing this would compress the fistula and give the appearance of a stenosis, so a berenstein catheter was advanced up the brachial artery and a fistulogram was performed which showed some residual stenosis at the same location in the JA region.  So we then advanced a 8 x 40 mm conquest pta balloon to the same JA lesion, inflated to full effacement with waist seen prior to full effacement.  Repeat fistulogram (again using the berenstein in the brachial artery, antegrade) showed much improvement in the stenosis with improved flow felt in the fistula.  The sheath was pulled, a #2 ethilon suture was used to achieve hemostasis.  No complications occurred during the procedure.     Estimated blood loss: < 10 mL   Estimated contrast used: 40 mL    Impression/Plan:  This was a successful fistulogram with angioplasty performed in the JA region of the fistula.  We will have the patient return in 1 week for suture removal and follow up exam.  Please do refer the patient back if there are any further signs of stenosis (increased venous pressure alarms, prolonged bleeding or worsening adequacy).  His fistula is good to use immediately.  Please do send him back whenever his tunneled dialysis catheter is ready for removal (should be ASAP).      Rohan Bright MD  [phone]

## 2024-12-16 LAB
OHS CV AF BURDEN PERCENT: < 1
OHS CV DC REMOTE DEVICE TYPE: NORMAL

## 2024-12-19 ENCOUNTER — OFFICE VISIT (OUTPATIENT)
Dept: NEPHROLOGY | Facility: CLINIC | Age: 69
End: 2024-12-19
Payer: MEDICARE

## 2024-12-19 VITALS
HEIGHT: 71 IN | DIASTOLIC BLOOD PRESSURE: 61 MMHG | OXYGEN SATURATION: 99 % | WEIGHT: 251.75 LBS | SYSTOLIC BLOOD PRESSURE: 119 MMHG | BODY MASS INDEX: 35.24 KG/M2 | HEART RATE: 78 BPM | TEMPERATURE: 99 F | RESPIRATION RATE: 18 BRPM

## 2024-12-19 DIAGNOSIS — N18.6 ESRD ON DIALYSIS: Primary | ICD-10-CM

## 2024-12-19 DIAGNOSIS — Z99.2 ESRD ON DIALYSIS: Primary | ICD-10-CM

## 2024-12-19 PROCEDURE — 99214 OFFICE O/P EST MOD 30 MIN: CPT | Mod: PBBFAC

## 2024-12-19 PROCEDURE — 99999 PR PBB SHADOW E&M-EST. PATIENT-LVL IV: CPT | Mod: PBBFAC,,,

## 2024-12-19 NOTE — PROGRESS NOTES
[facility] Interventional Nephrology Follow-up Exam    Date:   12/19/2024 9:38 AM    HPI:  69 year old man with ESRD (MWF at [facility] with Dr. Baker), who is new to our service, recently was referred for evaluation of his fistula to maintain patency of his LUE brachiocephalic AVF.  Last underwent fistulogram with venous angioplasty of the juxta-anastomosis region on 12/12/24.  Here for follow up exam, ultrasound and suture removal.  He has had no recent issues with prolonged bleeding, difficulty in cannulation or increased frequency of pressure alarms while on dialysis.  He still has his R IJ TDC in place.    Vitals:    12/19/24 0926   BP: 119/61   Pulse: 78   Resp: 18   Temp: 98.8 °F (37.1 °C)       Exam:   ACCESS:  LUE brachiocephalic AV fistula with soft thrill and minimal pulsatility, appropriate pulse augmentation and partial collapse with arm elevation. One suture removed without complication.    Ultrasound:  - arterial anastomosis patent without stenosis, measured at 10 mm, the juxta-anastomotic region which was angioplastied appears patent with no stenosis, measured at 8-9 mm throughout  - mid-AVF patent with no stenosis seen, vessel diameter measured at 8-10 mm, but there is a large segment of the fistula that runs 9-11 mm deep  - AVF outflow tract patent with no stenosis  - BFV approximately 2500 ml/min    Impression/Plan:  Patent AV fistula following recent fistulogram with venous angioplasty of the JA region.  Plan follow up exam in 2 months.  Please do refer him sooner if he develops signs of stenosis or impending thrombosis.  In the meantime we should continue using the fistula with two needles.  There is an area of the mid-fistula region that runs deep (9-11 mm) which I have marked and will send to the dialysis nurse (to avoid cannulation here) but above and below the fistula looks excellent for cannulations.  There are also two collateral vessels, which are likely not compromising blood flow in the fistula  and can be monitored for now.  If they pull too much blood away and blood flow rates need to be reduced, may need ligation of these vessels with Dr. Arcos.      Rohan Bright MD  [phone]

## 2025-01-15 ENCOUNTER — CLINICAL SUPPORT (OUTPATIENT)
Dept: CARDIOLOGY | Facility: HOSPITAL | Age: 70
End: 2025-01-15
Payer: MEDICARE

## 2025-01-15 ENCOUNTER — CLINICAL SUPPORT (OUTPATIENT)
Dept: CARDIOLOGY | Facility: HOSPITAL | Age: 70
End: 2025-01-15
Attending: INTERNAL MEDICINE
Payer: MEDICARE

## 2025-01-15 DIAGNOSIS — I49.8 OTHER SPECIFIED CARDIAC ARRHYTHMIAS: ICD-10-CM

## 2025-01-15 PROCEDURE — 93298 REM INTERROG DEV EVAL SCRMS: CPT | Mod: 26,,, | Performed by: INTERNAL MEDICINE

## 2025-01-15 PROCEDURE — 93298 REM INTERROG DEV EVAL SCRMS: CPT | Performed by: INTERNAL MEDICINE

## 2025-01-20 ENCOUNTER — HOSPITAL ENCOUNTER (INPATIENT)
Facility: HOSPITAL | Age: 70
LOS: 4 days | Discharge: HOME | DRG: 640 | End: 2025-01-25
Attending: EMERGENCY MEDICINE | Admitting: STUDENT IN AN ORGANIZED HEALTH CARE EDUCATION/TRAINING PROGRAM
Payer: MEDICARE

## 2025-01-20 DIAGNOSIS — R05.9 COUGH: ICD-10-CM

## 2025-01-20 DIAGNOSIS — R06.02 SHORTNESS OF BREATH: ICD-10-CM

## 2025-01-20 DIAGNOSIS — R07.9 CHEST PAIN: ICD-10-CM

## 2025-01-20 DIAGNOSIS — I50.30 (HFPEF) HEART FAILURE WITH PRESERVED EJECTION FRACTION: ICD-10-CM

## 2025-01-20 DIAGNOSIS — N18.6 END STAGE RENAL DISEASE: ICD-10-CM

## 2025-01-20 DIAGNOSIS — R91.1 LUNG NODULE: ICD-10-CM

## 2025-01-20 DIAGNOSIS — R06.02 SOB (SHORTNESS OF BREATH): ICD-10-CM

## 2025-01-20 DIAGNOSIS — E87.70 VOLUME OVERLOAD: ICD-10-CM

## 2025-01-20 DIAGNOSIS — E87.70 HYPERVOLEMIA, UNSPECIFIED HYPERVOLEMIA TYPE: Primary | ICD-10-CM

## 2025-01-20 DIAGNOSIS — N18.6 ESRD (END STAGE RENAL DISEASE): ICD-10-CM

## 2025-01-20 PROBLEM — D63.8 ANEMIA OF CHRONIC DISEASE: Status: ACTIVE | Noted: 2025-01-20

## 2025-01-20 LAB
ALBUMIN SERPL BCP-MCNC: 3.2 G/DL (ref 3.5–5.2)
ALLENS TEST: NORMAL
ALP SERPL-CCNC: 65 U/L (ref 40–150)
ALT SERPL W/O P-5'-P-CCNC: 15 U/L (ref 10–44)
ANION GAP SERPL CALC-SCNC: 17 MMOL/L (ref 8–16)
AST SERPL-CCNC: 19 U/L (ref 10–40)
BASOPHILS # BLD AUTO: 0.03 K/UL (ref 0–0.2)
BASOPHILS NFR BLD: 0.4 % (ref 0–1.9)
BILIRUB SERPL-MCNC: 0.8 MG/DL (ref 0.1–1)
BNP SERPL-MCNC: 260 PG/ML (ref 0–99)
BUN SERPL-MCNC: 24 MG/DL (ref 8–23)
BUN SERPL-MCNC: 32 MG/DL (ref 6–30)
CALCIUM SERPL-MCNC: 9.2 MG/DL (ref 8.7–10.5)
CHLORIDE SERPL-SCNC: 95 MMOL/L (ref 95–110)
CHLORIDE SERPL-SCNC: 95 MMOL/L (ref 95–110)
CO2 SERPL-SCNC: 28 MMOL/L (ref 23–29)
CREAT SERPL-MCNC: 6.6 MG/DL (ref 0.5–1.4)
CREAT SERPL-MCNC: 7.1 MG/DL (ref 0.5–1.4)
DIFFERENTIAL METHOD BLD: ABNORMAL
EOSINOPHIL # BLD AUTO: 0 K/UL (ref 0–0.5)
EOSINOPHIL NFR BLD: 0.2 % (ref 0–8)
ERYTHROCYTE [DISTWIDTH] IN BLOOD BY AUTOMATED COUNT: 15.9 % (ref 11.5–14.5)
EST. GFR  (NO RACE VARIABLE): 8.4 ML/MIN/1.73 M^2
GLUCOSE SERPL-MCNC: 147 MG/DL (ref 70–110)
GLUCOSE SERPL-MCNC: 154 MG/DL (ref 70–110)
HCT VFR BLD AUTO: 28.6 % (ref 40–54)
HCT VFR BLD CALC: 28 %PCV (ref 36–54)
HCV AB SERPL QL IA: NORMAL
HGB BLD-MCNC: 8.9 G/DL (ref 14–18)
HIV 1+2 AB+HIV1 P24 AG SERPL QL IA: NORMAL
IMM GRANULOCYTES # BLD AUTO: 0.06 K/UL (ref 0–0.04)
IMM GRANULOCYTES NFR BLD AUTO: 0.7 % (ref 0–0.5)
INFLUENZA A, MOLECULAR: NEGATIVE
INFLUENZA B, MOLECULAR: NEGATIVE
LDH SERPL L TO P-CCNC: 1.37 MMOL/L (ref 0.5–2.2)
LYMPHOCYTES # BLD AUTO: 1 K/UL (ref 1–4.8)
LYMPHOCYTES NFR BLD: 12 % (ref 18–48)
MAGNESIUM SERPL-MCNC: 1.9 MG/DL (ref 1.6–2.6)
MCH RBC QN AUTO: 33.8 PG (ref 27–31)
MCHC RBC AUTO-ENTMCNC: 31.1 G/DL (ref 32–36)
MCV RBC AUTO: 109 FL (ref 82–98)
MONOCYTES # BLD AUTO: 0.8 K/UL (ref 0.3–1)
MONOCYTES NFR BLD: 9.7 % (ref 4–15)
NEUTROPHILS # BLD AUTO: 6.5 K/UL (ref 1.8–7.7)
NEUTROPHILS NFR BLD: 77 % (ref 38–73)
NRBC BLD-RTO: 0 /100 WBC
OHS QRS DURATION: 138 MS
OHS QRS DURATION: 138 MS
OHS QTC CALCULATION: 494 MS
OHS QTC CALCULATION: 497 MS
PHOSPHATE SERPL-MCNC: 3.4 MG/DL (ref 2.7–4.5)
PLATELET # BLD AUTO: 192 K/UL (ref 150–450)
PMV BLD AUTO: 9.3 FL (ref 9.2–12.9)
POC IONIZED CALCIUM: 1.03 MMOL/L (ref 1.06–1.42)
POC TCO2 (MEASURED): 35 MMOL/L (ref 23–29)
POTASSIUM BLD-SCNC: 4.7 MMOL/L (ref 3.5–5.1)
POTASSIUM SERPL-SCNC: 4.8 MMOL/L (ref 3.5–5.1)
PROCALCITONIN SERPL IA-MCNC: 0.81 NG/ML
PROT SERPL-MCNC: 8.6 G/DL (ref 6–8.4)
RBC # BLD AUTO: 2.63 M/UL (ref 4.6–6.2)
SAMPLE: ABNORMAL
SAMPLE: NORMAL
SARS-COV-2 RDRP RESP QL NAA+PROBE: NEGATIVE
SITE: NORMAL
SODIUM BLD-SCNC: 137 MMOL/L (ref 136–145)
SODIUM SERPL-SCNC: 140 MMOL/L (ref 136–145)
SPECIMEN SOURCE: NORMAL
TROPONIN I SERPL DL<=0.01 NG/ML-MCNC: 10 NG/L (ref 0–35)
TROPONIN I SERPL DL<=0.01 NG/ML-MCNC: 12 NG/L (ref 0–35)
WBC # BLD AUTO: 8.45 K/UL (ref 3.9–12.7)

## 2025-01-20 PROCEDURE — G0378 HOSPITAL OBSERVATION PER HR: HCPCS

## 2025-01-20 PROCEDURE — 87635 SARS-COV-2 COVID-19 AMP PRB: CPT

## 2025-01-20 PROCEDURE — 85025 COMPLETE CBC W/AUTO DIFF WBC: CPT

## 2025-01-20 PROCEDURE — C1752 CATH,HEMODIALYSIS,SHORT-TERM: HCPCS

## 2025-01-20 PROCEDURE — 25000242 PHARM REV CODE 250 ALT 637 W/ HCPCS

## 2025-01-20 PROCEDURE — 94640 AIRWAY INHALATION TREATMENT: CPT | Mod: XB

## 2025-01-20 PROCEDURE — 99214 OFFICE O/P EST MOD 30 MIN: CPT | Mod: ,,, | Performed by: NURSE PRACTITIONER

## 2025-01-20 PROCEDURE — 99900035 HC TECH TIME PER 15 MIN (STAT)

## 2025-01-20 PROCEDURE — 84145 PROCALCITONIN (PCT): CPT | Performed by: PHYSICIAN ASSISTANT

## 2025-01-20 PROCEDURE — G0257 UNSCHED DIALYSIS ESRD PT HOS: HCPCS

## 2025-01-20 PROCEDURE — 83735 ASSAY OF MAGNESIUM: CPT

## 2025-01-20 PROCEDURE — 93010 ELECTROCARDIOGRAM REPORT: CPT | Mod: ,,, | Performed by: INTERNAL MEDICINE

## 2025-01-20 PROCEDURE — 5A1D70Z PERFORMANCE OF URINARY FILTRATION, INTERMITTENT, LESS THAN 6 HOURS PER DAY: ICD-10-PCS | Performed by: STUDENT IN AN ORGANIZED HEALTH CARE EDUCATION/TRAINING PROGRAM

## 2025-01-20 PROCEDURE — 86803 HEPATITIS C AB TEST: CPT | Performed by: PHYSICIAN ASSISTANT

## 2025-01-20 PROCEDURE — 83880 ASSAY OF NATRIURETIC PEPTIDE: CPT

## 2025-01-20 PROCEDURE — 84100 ASSAY OF PHOSPHORUS: CPT

## 2025-01-20 PROCEDURE — 94761 N-INVAS EAR/PLS OXIMETRY MLT: CPT

## 2025-01-20 PROCEDURE — 87389 HIV-1 AG W/HIV-1&-2 AB AG IA: CPT | Performed by: PHYSICIAN ASSISTANT

## 2025-01-20 PROCEDURE — 27000221 HC OXYGEN, UP TO 24 HOURS

## 2025-01-20 PROCEDURE — 83605 ASSAY OF LACTIC ACID: CPT

## 2025-01-20 PROCEDURE — 80053 COMPREHEN METABOLIC PANEL: CPT

## 2025-01-20 PROCEDURE — 93005 ELECTROCARDIOGRAM TRACING: CPT

## 2025-01-20 PROCEDURE — 25000242 PHARM REV CODE 250 ALT 637 W/ HCPCS: Performed by: PHYSICIAN ASSISTANT

## 2025-01-20 PROCEDURE — 94640 AIRWAY INHALATION TREATMENT: CPT

## 2025-01-20 PROCEDURE — 84484 ASSAY OF TROPONIN QUANT: CPT

## 2025-01-20 PROCEDURE — 25000003 PHARM REV CODE 250: Performed by: PHYSICIAN ASSISTANT

## 2025-01-20 PROCEDURE — 80047 BASIC METABLC PNL IONIZED CA: CPT

## 2025-01-20 PROCEDURE — 99285 EMERGENCY DEPT VISIT HI MDM: CPT | Mod: 25

## 2025-01-20 PROCEDURE — 87502 INFLUENZA DNA AMP PROBE: CPT

## 2025-01-20 PROCEDURE — 93010 ELECTROCARDIOGRAM REPORT: CPT | Mod: 76,,, | Performed by: INTERNAL MEDICINE

## 2025-01-20 RX ORDER — IBUPROFEN 200 MG
24 TABLET ORAL
Status: DISCONTINUED | OUTPATIENT
Start: 2025-01-20 | End: 2025-01-25 | Stop reason: HOSPADM

## 2025-01-20 RX ORDER — HYDROCODONE BITARTRATE AND ACETAMINOPHEN 5; 325 MG/1; MG/1
1 TABLET ORAL EVERY 6 HOURS PRN
Status: DISCONTINUED | OUTPATIENT
Start: 2025-01-20 | End: 2025-01-25 | Stop reason: HOSPADM

## 2025-01-20 RX ORDER — ATORVASTATIN CALCIUM 40 MG/1
80 TABLET, FILM COATED ORAL NIGHTLY
Status: DISCONTINUED | OUTPATIENT
Start: 2025-01-20 | End: 2025-01-25 | Stop reason: HOSPADM

## 2025-01-20 RX ORDER — IPRATROPIUM BROMIDE AND ALBUTEROL SULFATE 2.5; .5 MG/3ML; MG/3ML
3 SOLUTION RESPIRATORY (INHALATION) ONCE
Status: COMPLETED | OUTPATIENT
Start: 2025-01-20 | End: 2025-01-20

## 2025-01-20 RX ORDER — BENZONATATE 100 MG/1
100 CAPSULE ORAL 3 TIMES DAILY PRN
Status: DISCONTINUED | OUTPATIENT
Start: 2025-01-20 | End: 2025-01-21

## 2025-01-20 RX ORDER — SODIUM CHLORIDE 9 MG/ML
INJECTION, SOLUTION INTRAVENOUS ONCE
Status: CANCELLED | OUTPATIENT
Start: 2025-01-20 | End: 2025-01-20

## 2025-01-20 RX ORDER — PROCHLORPERAZINE EDISYLATE 5 MG/ML
5 INJECTION INTRAMUSCULAR; INTRAVENOUS EVERY 6 HOURS PRN
Status: DISCONTINUED | OUTPATIENT
Start: 2025-01-20 | End: 2025-01-25 | Stop reason: HOSPADM

## 2025-01-20 RX ORDER — ASCORBIC ACID 500 MG
500 TABLET ORAL DAILY
Status: DISCONTINUED | OUTPATIENT
Start: 2025-01-20 | End: 2025-01-25 | Stop reason: HOSPADM

## 2025-01-20 RX ORDER — NALOXONE HCL 0.4 MG/ML
0.02 VIAL (ML) INJECTION
Status: DISCONTINUED | OUTPATIENT
Start: 2025-01-20 | End: 2025-01-25 | Stop reason: HOSPADM

## 2025-01-20 RX ORDER — TALC
6 POWDER (GRAM) TOPICAL NIGHTLY PRN
Status: DISCONTINUED | OUTPATIENT
Start: 2025-01-20 | End: 2025-01-25 | Stop reason: HOSPADM

## 2025-01-20 RX ORDER — ONDANSETRON HYDROCHLORIDE 2 MG/ML
4 INJECTION, SOLUTION INTRAVENOUS EVERY 8 HOURS PRN
Status: DISCONTINUED | OUTPATIENT
Start: 2025-01-20 | End: 2025-01-25 | Stop reason: HOSPADM

## 2025-01-20 RX ORDER — DILTIAZEM HYDROCHLORIDE 120 MG/1
240 CAPSULE, COATED, EXTENDED RELEASE ORAL DAILY
Status: DISCONTINUED | OUTPATIENT
Start: 2025-01-21 | End: 2025-01-25 | Stop reason: HOSPADM

## 2025-01-20 RX ORDER — HYDROCODONE BITARTRATE AND ACETAMINOPHEN 10; 325 MG/1; MG/1
1 TABLET ORAL EVERY 6 HOURS PRN
Status: DISCONTINUED | OUTPATIENT
Start: 2025-01-20 | End: 2025-01-25 | Stop reason: HOSPADM

## 2025-01-20 RX ORDER — SEVELAMER CARBONATE 800 MG/1
800 TABLET, FILM COATED ORAL
Status: DISCONTINUED | OUTPATIENT
Start: 2025-01-20 | End: 2025-01-25 | Stop reason: HOSPADM

## 2025-01-20 RX ORDER — IPRATROPIUM BROMIDE AND ALBUTEROL SULFATE 2.5; .5 MG/3ML; MG/3ML
3 SOLUTION RESPIRATORY (INHALATION)
Status: COMPLETED | OUTPATIENT
Start: 2025-01-20 | End: 2025-01-20

## 2025-01-20 RX ORDER — GLUCAGON 1 MG
1 KIT INJECTION
Status: DISCONTINUED | OUTPATIENT
Start: 2025-01-20 | End: 2025-01-25 | Stop reason: HOSPADM

## 2025-01-20 RX ORDER — ACETAMINOPHEN 325 MG/1
650 TABLET ORAL EVERY 4 HOURS PRN
Status: DISCONTINUED | OUTPATIENT
Start: 2025-01-20 | End: 2025-01-22

## 2025-01-20 RX ORDER — BISACODYL 10 MG/1
10 SUPPOSITORY RECTAL DAILY PRN
Status: DISCONTINUED | OUTPATIENT
Start: 2025-01-20 | End: 2025-01-25 | Stop reason: HOSPADM

## 2025-01-20 RX ORDER — SIMETHICONE 80 MG
1 TABLET,CHEWABLE ORAL 4 TIMES DAILY PRN
Status: DISCONTINUED | OUTPATIENT
Start: 2025-01-20 | End: 2025-01-25 | Stop reason: HOSPADM

## 2025-01-20 RX ORDER — LEVOTHYROXINE SODIUM 75 UG/1
75 TABLET ORAL
Status: DISCONTINUED | OUTPATIENT
Start: 2025-01-21 | End: 2025-01-25 | Stop reason: HOSPADM

## 2025-01-20 RX ORDER — CARVEDILOL 3.12 MG/1
3.12 TABLET ORAL 2 TIMES DAILY
Status: DISCONTINUED | OUTPATIENT
Start: 2025-01-20 | End: 2025-01-25 | Stop reason: HOSPADM

## 2025-01-20 RX ORDER — SODIUM CHLORIDE 0.9 % (FLUSH) 0.9 %
10 SYRINGE (ML) INJECTION EVERY 8 HOURS PRN
Status: DISCONTINUED | OUTPATIENT
Start: 2025-01-20 | End: 2025-01-25 | Stop reason: HOSPADM

## 2025-01-20 RX ORDER — GUAIFENESIN 600 MG/1
600 TABLET, EXTENDED RELEASE ORAL 2 TIMES DAILY
Status: DISCONTINUED | OUTPATIENT
Start: 2025-01-20 | End: 2025-01-25 | Stop reason: HOSPADM

## 2025-01-20 RX ORDER — IPRATROPIUM BROMIDE AND ALBUTEROL SULFATE 2.5; .5 MG/3ML; MG/3ML
3 SOLUTION RESPIRATORY (INHALATION) EVERY 4 HOURS PRN
Status: DISCONTINUED | OUTPATIENT
Start: 2025-01-20 | End: 2025-01-25 | Stop reason: HOSPADM

## 2025-01-20 RX ORDER — ALUMINUM HYDROXIDE, MAGNESIUM HYDROXIDE, AND SIMETHICONE 1200; 120; 1200 MG/30ML; MG/30ML; MG/30ML
30 SUSPENSION ORAL 4 TIMES DAILY PRN
Status: DISCONTINUED | OUTPATIENT
Start: 2025-01-20 | End: 2025-01-25 | Stop reason: HOSPADM

## 2025-01-20 RX ORDER — POLYETHYLENE GLYCOL 3350 17 G/17G
17 POWDER, FOR SOLUTION ORAL DAILY PRN
Status: DISCONTINUED | OUTPATIENT
Start: 2025-01-20 | End: 2025-01-23

## 2025-01-20 RX ORDER — HYDRALAZINE HYDROCHLORIDE 50 MG/1
100 TABLET, FILM COATED ORAL 3 TIMES DAILY
Status: DISCONTINUED | OUTPATIENT
Start: 2025-01-20 | End: 2025-01-24

## 2025-01-20 RX ORDER — IBUPROFEN 200 MG
16 TABLET ORAL
Status: DISCONTINUED | OUTPATIENT
Start: 2025-01-20 | End: 2025-01-25 | Stop reason: HOSPADM

## 2025-01-20 RX ADMIN — HYDRALAZINE HYDROCHLORIDE 100 MG: 25 TABLET ORAL at 04:01

## 2025-01-20 RX ADMIN — SEVELAMER CARBONATE 800 MG: 800 TABLET, FILM COATED ORAL at 04:01

## 2025-01-20 RX ADMIN — Medication 500 MG: at 04:01

## 2025-01-20 RX ADMIN — GUAIFENESIN 600 MG: 600 TABLET, EXTENDED RELEASE ORAL at 08:01

## 2025-01-20 RX ADMIN — IPRATROPIUM BROMIDE AND ALBUTEROL SULFATE 3 ML: 2.5; .5 SOLUTION RESPIRATORY (INHALATION) at 03:01

## 2025-01-20 RX ADMIN — IPRATROPIUM BROMIDE AND ALBUTEROL SULFATE 3 ML: 2.5; .5 SOLUTION RESPIRATORY (INHALATION) at 12:01

## 2025-01-20 RX ADMIN — BENZONATATE 100 MG: 100 CAPSULE ORAL at 06:01

## 2025-01-20 RX ADMIN — ATORVASTATIN CALCIUM 80 MG: 40 TABLET, FILM COATED ORAL at 08:01

## 2025-01-20 NOTE — HPI
The patient is a 69 y.o. Black or  Male with multiple co morbidities including ESRD on HD MWF, HTN, HLD who presents to ED on 1/20/2025 with complaints of shortness of breath associated cough and weakness. Patient reports compliance with HD session but reports having no volume removed 2/2 symptomatic hypotensive episodes. Reports compliance with his home medications, and denies missed HD sessions. Denies fever/chills, diaphoresis, lightheadedness, HA, CP, congestion, abdominal pain, n/v/d, hematochezia, hematemesis, urinary symptoms, changes in BMs, numbness/tingling, weakness. Of note, patient was at [facility] for an ACS rule out with CXR showing congestion and CTA without evidence of PE.      In the ED, AFVSS on new requirement of 2L NC. Hgb 8.9 (previous ~11). Cr/BUN 6.6/24. . HS trop negative. K WNL. Covd/Flu/RSV negative. CXR with cardiomegaly with left greater than right bibasilar pulmonary opacities and pleural effusions. CHF exacerbation or volume overload would be favored over infectious/inflammatory process. Given duonebs x1 with mild improvement. Admitted to  for evaluation and treatment. Nephrology consulted for management of ESRD and HD treatment.

## 2025-01-20 NOTE — ED PROVIDER NOTES
Encounter Date: 1/20/2025       History     Chief Complaint   Patient presents with    Shortness of Breath     Has loop recorder     Patient is a 69-year-old male with past medical history significant for hypertension, diabetes, ESRD on dialysis Monday Wednesday Friday who presents for evaluation of shortness of breath which has persisted for the last week.  He notes an associated nonproductive cough.  No fever, chills, chest pain, nausea, vomiting, abdominal pain, numbness, weakness.  Patient dialyzed 1 day ago although he reports only ultrafiltration done at that time and no fluid removed.    The history is provided by the patient.     Review of patient's allergies indicates:   Allergen Reactions    Ambien [zolpidem] Other (See Comments)     Causes pt to hallucinate      Irbesartan      hyperkalemia     Past Medical History:   Diagnosis Date    Cancer     bladder ca and prostate ca     CKD (chronic kidney disease), stage IV     Diabetes mellitus     diet controlled    Encounter for blood transfusion     ESRD (end stage renal disease)     dialysis mon and friday    Hodgkin's lymphoma 2008    chemo and radiation    Hypertension     PAF (paroxysmal atrial fibrillation)     Presence of urostomy     Stroke     weakness left side    Thyroid disease      Past Surgical History:   Procedure Laterality Date    ABLATION      for atrial fibrilation    AV FISTULA PLACEMENT Left 07/20/2023    Procedure: CREATION, AV FISTULA;  Surgeon: Roque Arcos Jr., MD;  Location: [facility] OR;  Service: Vascular;  Laterality: Left;   / LEFT UPPER EXTROMITY    FISTULOGRAM Left 12/12/2024    Procedure: Fistulogram;  Surgeon: Rohan Bright MD;  Location: [facility] CATH LAB;  Service: Nephrology;  Laterality: Left;    HEMICOLECTOMY W/ OSTOMY      INSERTION OF IMPLANTABLE LOOP RECORDER Left 5/17/2024    Procedure: Insertion, Implantable Loop Recorder;  Surgeon: Kain Cortez MD;  Location: [facility] EP LAB;  Service: Cardiology;  Laterality: Left;  CVA,  AF,  ILR, BSCI, Local, [place], 3 Prep ** HD pt/LUE AV fistula**    INSERTION OF TUNNELED CENTRAL VENOUS HEMODIALYSIS CATHETER Right 6/17/2024    Procedure: INSERTION, CATHETER, CENTRAL VENOUS, HEMODIALYSIS, TUNNELED;  Surgeon: Roque Arcos Jr., MD;  Location: [facility];  Service: Vascular;  Laterality: Right;    PARTIAL SURGICAL REMOVAL OF BLADDER      PORTACATH PLACEMENT Right     chest wall    PROSTATECTOMY      THROMBECTOMY Left 6/17/2024    Procedure: THROMBECTOMY / LEFT;  Surgeon: Roque Arcos Jr., MD;  Location: [facility];  Service: Vascular;  Laterality: Left;    THROMBECTOMY OF HEMODIALYSIS ACCESS SITE Left 6/17/2024    Procedure: THROMBECTOMY, HEMODIALYSIS GRAFT OR FISTULA;  Surgeon: Rohan Bright MD;  Location: [facility];  Service: Nephrology;  Laterality: Left;    THROMBECTOMY OF HEMODIALYSIS ACCESS SITE Left 12/12/2024    Procedure: THROMBECTOMY, HEMODIALYSIS GRAFT OR FISTULA;  Surgeon: Rohan Bright MD;  Location: [facility];  Service: Nephrology;  Laterality: Left;  LEFT UPPER ARM    TRANSPOSITION OF BASILIC VEIN Left 7/2/2024    Procedure: TRANSPOSITION, VEIN, BASILIC-AVF/G LEFT UPPER EXTREMITY;  Surgeon: Roque Arcos Jr., MD;  Location: [facility];  Service: General;  Laterality: Left;     No family history on file.  Social History     Tobacco Use    Smoking status: Former     Types: Cigarettes    Smokeless tobacco: Never   Substance Use Topics    Alcohol use: Never    Drug use: Never     Review of Systems  All other systems reviewed and were negative; see HPI also for additional ROS.    Physical Exam     Initial Vitals [01/20/25 1033]   BP Pulse Resp Temp SpO2   135/70 98 20 98.3 °F (36.8 °C) 96 %      MAP       --         Physical Exam    Nursing note and vitals reviewed.  Constitutional: He appears well-developed and well-nourished.   HENT:   Head: Normocephalic and atraumatic.   Eyes: EOM are normal. Pupils are equal, round, and reactive to light.   Neck: Neck supple.   Normal range of  motion.  Cardiovascular:  Normal rate, regular rhythm and intact distal pulses.           No murmur heard.  Pulmonary/Chest: He exhibits no tenderness.   Crackles   Abdominal: Abdomen is soft. He exhibits no distension. There is no abdominal tenderness.   Musculoskeletal:         General: No edema. Normal range of motion.      Cervical back: Normal range of motion and neck supple.     Neurological: He is alert and oriented to person, place, and time.   Skin: Skin is warm and dry.         ED Course   Procedures  Labs Reviewed   CBC W/ AUTO DIFFERENTIAL - Abnormal       Result Value    WBC 8.45      RBC 2.63 (*)     Hemoglobin 8.9 (*)     Hematocrit 28.6 (*)      (*)     MCH 33.8 (*)     MCHC 31.1 (*)     RDW 15.9 (*)     Platelets 192      MPV 9.3      Immature Granulocytes 0.7 (*)     Gran # (ANC) 6.5      Immature Grans (Abs) 0.06 (*)     Lymph # 1.0      Mono # 0.8      Eos # 0.0      Baso # 0.03      nRBC 0      Gran % 77.0 (*)     Lymph % 12.0 (*)     Mono % 9.7      Eosinophil % 0.2      Basophil % 0.4      Differential Method Automated     B-TYPE NATRIURETIC PEPTIDE - Abnormal     (*)    COMPREHENSIVE METABOLIC PANEL - Abnormal    Sodium 140      Potassium 4.8      Chloride 95      CO2 28      Glucose 147 (*)     BUN 24 (*)     Creatinine 6.6 (*)     Calcium 9.2      Total Protein 8.6 (*)     Albumin 3.2 (*)     Total Bilirubin 0.8      Alkaline Phosphatase 65      AST 19      ALT 15      eGFR 8.4 (*)     Anion Gap 17 (*)    ISTAT PROCEDURE - Abnormal    POC Glucose 154 (*)     POC BUN 32 (*)     POC Creatinine 7.1 (*)     POC Sodium 137      POC Potassium 4.7      POC Chloride 95      POC TCO2 (MEASURED) 35 (*)     POC Ionized Calcium 1.03 (*)     POC Hematocrit 28 (*)     Sample GOVIND     INFLUENZA A & B BY MOLECULAR    Influenza A, Molecular Negative      Influenza B, Molecular Negative      Flu A & B Source Nasal swab     HEPATITIS C ANTIBODY    Hepatitis C Ab Non-reactive      Narrative:      Release to patient->Immediate   HIV 1 / 2 ANTIBODY    HIV 1/2 Ag/Ab Non-reactive      Narrative:     Release to patient->Immediate   TROPONIN I HIGH SENSITIVITY    Troponin I High Sensitivity 12     MAGNESIUM    Magnesium 1.9     SARS-COV-2 RNA AMPLIFICATION, QUAL    SARS-CoV-2 RNA, Amplification, Qual Negative     PHOSPHORUS    Phosphorus 3.4     TROPONIN I HIGH SENSITIVITY    Troponin I High Sensitivity 10     ISTAT LACTATE    POC Lactate 1.37      Sample VENOUS      Site Other      Allens Test N/A     ISTAT CHEM8     EKG Readings: (Independently Interpreted)   Initial Reading: No STEMI. Previous EKG: Compared with most recent EKG Rhythm: Normal Sinus Rhythm. Heart Rate: 94. Conduction: RBBB. ST Segments: Non-Specific ST Segment Depression.     ECG Results              EKG 12-lead (Final result)        Collection Time Result Time QRS Duration OHS QTC Calculation    01/20/25 11:23:47 01/20/25 20:05:59 138 497                     Final result by Interface, Lab In [facility] (01/20/25 20:06:07)                   Narrative:    Test Reason : R06.02,    Vent. Rate :  94 BPM     Atrial Rate :  94 BPM     P-R Int : 168 ms          QRS Dur : 138 ms      QT Int : 398 ms       P-R-T Axes :  61  -7  50 degrees    QTcB Int : 497 ms    Normal sinus rhythm  Right bundle branch block  Possible Septal infarct (cited on or before 23-Jul-2024)  Abnormal ECG  When compared with ECG of 20-Jan-2025 10:37,  No significant change was found  Confirmed by Andres Michaud ([id number]) on 1/20/2025 8:05:57 PM    Referred By: AAAREFERRAL SELF           Confirmed By: Andres Michaud                                     EKG 12-lead (Final result)        Collection Time Result Time QRS Duration OHS QTC Calculation    01/20/25 10:37:51 01/20/25 20:05:41 138 494                     Final result by Interface, Lab In [facility] (01/20/25 20:05:49)                   Narrative:    Test Reason : R06.02,    Vent. Rate :  93 BPM     Atrial Rate :  93 BPM     P-R Int :  "166 ms          QRS Dur : 138 ms      QT Int : 398 ms       P-R-T Axes :  60  -9  54 degrees    QTcB Int : 494 ms    Normal sinus rhythm  Right bundle branch block  Possible Septal infarct (cited on or before 23-Jul-2024)  Abnormal ECG  When compared with ECG of 23-Jul-2024 07:53,  No significant change was found  Confirmed by Andres Michaud ([id number]) on 1/20/2025 8:05:38 PM    Referred By: AAAREFERRAL SELF           Confirmed By: Andres Michaud                                  Imaging Results              X-Ray Chest PA And Lateral (Final result)  Result time 01/20/25 14:06:14      Final result by Drake Silva MD (01/20/25 14:06:14)                   Impression:      Cardiomegaly with left greater than right bibasilar pulmonary opacities and pleural effusions.  CHF exacerbation or volume overload would be favored over infectious/inflammatory process.  Correlation with clinical findings will be helpful in this regard.      Electronically signed by: Drake Silva MD  Date:    01/20/2025  Time:    14:06               Narrative:    EXAMINATION:  XR CHEST PA AND LATERAL    CLINICAL HISTORY:  Provided history is "  Cough, unspecified".    TECHNIQUE:  Frontal and lateral views of the chest were performed.    COMPARISON:  01/20/2025.    FINDINGS:  Exam quality is limited by soft tissue attenuation of the x-ray beam.  Cardiac wires overlie the chest.  Cardiomediastinal silhouette is enlarged.  Left-sided pleural effusion with passive atelectasis or airspace disease in the left lung base.  Increased attenuation overlying the inferior thoracic spine on the lateral view.  Trace right pleural fluid.  No worsening pleural fluid.  No pneumothorax.  No detrimental change in lung aeration.  Central venous catheter and cardiac loop recorder device are similar.                                       X-Ray Chest AP Portable (Final result)  Result time 01/20/25 13:18:51      Final result by Sabino Blanc MD (01/20/25 13:18:51) "                   Impression:      As above.      Electronically signed by: Sabino Blanc  Date:    01/20/2025  Time:    13:18               Narrative:    EXAMINATION:  XR CHEST AP PORTABLE    CLINICAL HISTORY:  shortness of breath;    TECHNIQUE:  Single frontal view of the chest was performed.    COMPARISON:  Chest radiograph 06/17/2024    FINDINGS:  Lines and tubes: Right IJ tunneled hemodialysis catheter and left chest wall loop recorder.    Heart and mediastinum: Unchanged.    Pleura: Trace left pleural effusion.  No pneumothorax.    Lungs: Lung volumes are low.  Pulmonary edema is difficult to exclude.  Patchy opacities in the left lung base, likely atelectasis.  Infection is also possible.  No consolidation on the right.    Soft tissue/bone: Unchanged.                                    X-Rays:   Independently Interpreted Readings:   Chest X-Ray: Cardiomegaly present.  Increased vascular markings consistent with CHF are present. Pulm edema no PTX or free air, no PNA     Medications   ascorbic acid (vitamin C) tablet 500 mg (500 mg Oral Given 1/20/25 1649)   carvediloL tablet 3.125 mg (has no administration in time range)   diltiaZEM 24 hr capsule 240 mg (has no administration in time range)   hydrALAZINE tablet 100 mg (100 mg Oral Given 1/20/25 1649)   levothyroxine tablet 75 mcg (has no administration in time range)   multivitamin tablet (has no administration in time range)   atorvastatin tablet 80 mg (80 mg Oral Given 1/20/25 2044)   sevelamer carbonate tablet 800 mg (800 mg Oral Given 1/20/25 1649)   sodium chloride 0.9% flush 10 mL (has no administration in time range)   albuterol-ipratropium 2.5 mg-0.5 mg/3 mL nebulizer solution 3 mL (has no administration in time range)   melatonin tablet 6 mg (has no administration in time range)   polyethylene glycol packet 17 g (has no administration in time range)   bisacodyL suppository 10 mg (has no administration in time range)   acetaminophen tablet 650 mg (has no  administration in time range)   naloxone 0.4 mg/mL injection 0.02 mg (has no administration in time range)   glucose chewable tablet 16 g (has no administration in time range)   glucose chewable tablet 24 g (has no administration in time range)   dextrose 50% injection 12.5 g (has no administration in time range)   dextrose 50% injection 25 g (has no administration in time range)   glucagon (human recombinant) injection 1 mg (has no administration in time range)   HYDROcodone-acetaminophen 5-325 mg per tablet 1 tablet (has no administration in time range)   HYDROcodone-acetaminophen  mg per tablet 1 tablet (has no administration in time range)   ondansetron injection 4 mg (has no administration in time range)   prochlorperazine injection Soln 5 mg (has no administration in time range)   simethicone chewable tablet 80 mg (has no administration in time range)   aluminum-magnesium hydroxide-simethicone 200-200-20 mg/5 mL suspension 30 mL (has no administration in time range)   guaiFENesin 12 hr tablet 600 mg (600 mg Oral Given 1/20/25 2045)   benzonatate capsule 100 mg (100 mg Oral Given 1/20/25 1830)   albuterol-ipratropium 2.5 mg-0.5 mg/3 mL nebulizer solution 3 mL (3 mLs Nebulization Given 1/20/25 1247)   albuterol-ipratropium 2.5 mg-0.5 mg/3 mL nebulizer solution 3 mL (3 mLs Nebulization Given 1/20/25 1534)     Medical Decision Making  Patient presents for cough and shortness of breath.  Differential diagnosis includes but is not limited to:  ACS, arrhythmia, electrolyte abnormality, COVID, flu, pneumonia, bronchitis, volume overload.  Patient afebrile and hemodynamically stable in the ED. Labs without leukocytosis. Covid and flu negative. BNP elevated at 260. CXR with pulmonary edema and pleural effusions. Treated with duoneb with improvement in cough.  Hypoxic to 89% on room air, improved to mid 90s on 2L nasal cannula.  Case discussed with nephrology for urgent dialysis.  Of note, pt with downtrending  hemoglobin at 8.9 today(12, one month ago, 10.4 6 months ago). He denies hemetemesis or bloody stools.  Case discussed with hospital medicine plan for admission for further management and evaluation of shortness of breath in setting of volume overload    Amount and/or Complexity of Data Reviewed  Labs: ordered. Decision-making details documented in ED Course.  Radiology: ordered and independent interpretation performed.  ECG/medicine tests: ordered and independent interpretation performed.    Risk  Prescription drug management.  Drug therapy requiring intensive monitoring for toxicity.  Decision regarding hospitalization.        Attending Attestation:   Physician Attestation Statement for Resident:  As the supervising MD   Physician Attestation Statement: I have personally seen and examined this patient.   I agree with the above history.  -:   As the supervising MD I agree with the above PE.     As the supervising MD I agree with the above treatment, course, plan, and disposition.                 I have reviewed and concur with the resident's history, physical, assessment, and plan.  I have personally interviewed and examined the patient at bedside.   I did supervise any and all procedures and was present for any critical portion, and was always immediately available for help and as a resource.     The above history physical, review of symptoms, HPI and physical exam reflect my independent interpretation and evaluation.    Complexity: High Risk    Final diagnoses:  [R06.02] SOB (shortness of breath)  [R06.02] Shortness of breath  [R05.9] Cough  [E87.70] Hypervolemia, unspecified hypervolemia type (Primary)     Joe Osuna DO, FAAEM  Emergency Staff Physician   Dept of Emergency Medicine   [facility]  Spectralink: [id number]        Disclaimer: This note has been generated using voice-recognition software. There may be typographical errors that have been missed during proof-reading.                                     Clinical Impression:  Final diagnoses:  [R06.02] SOB (shortness of breath)  [R06.02] Shortness of breath  [R05.9] Cough  [E87.70] Hypervolemia, unspecified hypervolemia type (Primary)          ED Disposition Condition    Observation                 Del Colvin Jr., MD  Resident  01/20/25 1071       Joe Osuna DO  01/20/25 4614

## 2025-01-20 NOTE — ASSESSMENT & PLAN NOTE
"69 y.o. Black or  Male ESRD-HD M-W-F presents to ED on 1/20/2025 with diagnosis of: SOB (shortness of breath) [R06.02]   Nephrology consulted for inpatient ESRD-HD management    Outpatient HD Information:  -Dialysis modality: Hemodialysis  -Outpatient HD unit: [facility]  -Nephrologist: Olivia ZIEGLER  -HD TX days: Monday/Wednesday/Friday, duration of treatment: 3-4 hrs  -Last HD TX prior to hospital admission: 1/19/25  -Dialysis access: LUE AV fistula   -Residual urine: Minium  -EDW: ?    Assessment:   - Will provide dialysis today (01/20/2025) with UF - 1-2L as BP tolerates for metabolic clearance and volume management   - Labs reviewed and dialysate to be adjusted to current labs.   - Consider ECHO  - Continue to monitor intake and output  - Please avoid gadolinium, fleets, phos-based laxatives, NSAIDs  - Dialysis thrice weekly unless more urgent indications arise. Will evaluate RRT requirements Daily.    Anemia of ESRD   Recent Labs   Lab 01/20/25  1142 01/20/25  1148   WBC 8.45  --    HGB 8.9*  --    HCT 28.6* 28*     --      No results found for: "FESATURATED", "FERRITIN"    - Goal in ESRD is Hgb of 10-11. Hgb 8.9.  - Will review chronic care plan from outpatient dialysis center for MIRNA dosing.   - if patient is on iron infusions please D/C when active infection, cautiously use EPO when hx of malignancy, high BP (SBP usually > 170mmhg).    Mineral Bone Disease in ESRD   Lab Results   Component Value Date    CALCIUM 9.2 01/20/2025    ALBUMIN 3.2 (L) 01/20/2025    PHOS 3.4 01/20/2025       - F/U PO4, Mg, Calcium. And albumin levels daily.   - Renal diet with protein intake goal 1.5 g/kg/d with 1 L fluid restriction   - If patient has poor oral intake, recommend nephro nutritional shakes (ex Novasource)  - Continue or restart home phos binder, phos 3.4  - PTH to be managed at her outpatient HD unit  " FOLLOW UP   ALLERGIES:   Allergen Reactions    Amoxicillin      swelling,rash    Metronidazole RASH and SWELLING       MEDICATIONS: reviewed and are up to date  TOBACCO:  reviewed and are up to date    Primary medical doctor: Cherry Oseguera MD    Patient is here for   Chief Complaint   Patient presents with    Office Visit     Right shoulder follow up discuss possible MRI        Refills needed: No     PREVIOUS TREATMENT: medications including NSAID, acetaminophen

## 2025-01-20 NOTE — HPI
Arthur Menjivar is a 69 y.o. male with PMHx significant for ESRD on HD MWF, HTN, HLD admitted to hospital medicine for volume overload. Patient reports shortness of breath for the past week with associated cough and weakness. Reports last HD session was yesterday (off schedule because of upcoming winter storm). He reports that they have not been taking fluid off him at HD 2/2 weakness. Reports compliance with his home medications, and denies missed HD sessions. Denies fever/chills, diaphoresis, lightheadedness, HA, CP, congestion, abdominal pain, n/v/d, hematochezia, hematemesis, urinary symptoms, changes in BMs, numbness/tingling, weakness. Of note, patient was at [facility] for an ACS rule out with CXR showing congestion and CTA without evidence of PE. Unclear if stress testing was done. Reports hx of tobacco use, but has not smoked in several years.     In the ED, AFVSS on new requirement of 2L NC. Hgb 8.9 (previous ~11). Cr/BUN 6.6/24. . HS trop negative. K WNL. Covd/Flu/RSV negative. CXR with cardiomegaly with left greater than right bibasilar pulmonary opacities and pleural effusions. CHF exacerbation or volume overload would be favored over infectious/inflammatory process. Given duonebs x1 with mild improvement.

## 2025-01-20 NOTE — ASSESSMENT & PLAN NOTE
Patient has paroxysmal (<7 days) atrial fibrillation. Patient is currently in sinus rhythm. [id number] Score: 2. The patients heart rate in the last 24 hours is as follows:  Pulse  Min: 94  Max: 107     Antiarrhythmics  diltiaZEM 24 hr capsule 240 mg, Daily, Oral    Anticoagulants       Plan  - Replete lytes with a goal of K>4, Mg >2  - patient is not on anticoagulant per chart review  - Patient's afib is currently controlled  - tele

## 2025-01-20 NOTE — SUBJECTIVE & OBJECTIVE
Past Medical History:   Diagnosis Date    Cancer     bladder ca and prostate ca     CKD (chronic kidney disease), stage IV     Diabetes mellitus     diet controlled    Encounter for blood transfusion     ESRD (end stage renal disease)     dialysis mon and friday    Hodgkin's lymphoma 2008    chemo and radiation    Hypertension     PAF (paroxysmal atrial fibrillation)     Presence of urostomy     Stroke     weakness left side    Thyroid disease        Past Surgical History:   Procedure Laterality Date    ABLATION      for atrial fibrilation    AV FISTULA PLACEMENT Left 07/20/2023    Procedure: CREATION, AV FISTULA;  Surgeon: Roque Arcos Jr., MD;  Location: [facility], [place] OR;  Service: Vascular;  Laterality: Left;   / LEFT UPPER EXTROMITY    FISTULOGRAM Left 12/12/2024    Procedure: Fistulogram;  Surgeon: Rohan Bright MD;  Location: [facility], [place] CATH LAB;  Service: Nephrology;  Laterality: Left;    HEMICOLECTOMY W/ OSTOMY      INSERTION OF IMPLANTABLE LOOP RECORDER Left 5/17/2024    Procedure: Insertion, Implantable Loop Recorder;  Surgeon: Kain Cortez MD;  Location: [facility] EP LAB;  Service: Cardiology;  Laterality: Left;  CVA, AF,  ILR, BSCI, Local, WI, 3 Prep ** HD pt/LUE AV fistula**    INSERTION OF TUNNELED CENTRAL VENOUS HEMODIALYSIS CATHETER Right 6/17/2024    Procedure: INSERTION, CATHETER, CENTRAL VENOUS, HEMODIALYSIS, TUNNELED;  Surgeon: Roque Arcos Jr., MD;  Location: [facility], [place] OR;  Service: Vascular;  Laterality: Right;    PARTIAL SURGICAL REMOVAL OF BLADDER      PORTACATH PLACEMENT Right     chest wall    PROSTATECTOMY      THROMBECTOMY Left 6/17/2024    Procedure: THROMBECTOMY / LEFT;  Surgeon: Roque Arcos Jr., MD;  Location: [facility], [place] OR;  Service: Vascular;  Laterality: Left;    THROMBECTOMY OF HEMODIALYSIS ACCESS SITE Left 6/17/2024    Procedure: THROMBECTOMY, HEMODIALYSIS GRAFT OR FISTULA;  Surgeon: Rohan Bright MD;  Location: [facility], [place] CATH LAB;  Service: Nephrology;  Laterality: Left;    THROMBECTOMY OF HEMODIALYSIS  ACCESS SITE Left 12/12/2024    Procedure: THROMBECTOMY, HEMODIALYSIS GRAFT OR FISTULA;  Surgeon: Rohan Bright MD;  Location: [facility] CATH LAB;  Service: Nephrology;  Laterality: Left;  LEFT UPPER ARM    TRANSPOSITION OF BASILIC VEIN Left 7/2/2024    Procedure: TRANSPOSITION, VEIN, BASILIC-AVF/G LEFT UPPER EXTREMITY;  Surgeon: Roque Arcos Jr., MD;  Location: [facility] OR;  Service: General;  Laterality: Left;       Review of patient's allergies indicates:   Allergen Reactions    Ambien [zolpidem] Other (See Comments)     Causes pt to hallucinate      Irbesartan      hyperkalemia     Current Facility-Administered Medications   Medication Frequency    acetaminophen tablet 650 mg Q4H PRN    albuterol-ipratropium 2.5 mg-0.5 mg/3 mL nebulizer solution 3 mL Q4H PRN    aluminum-magnesium hydroxide-simethicone 200-200-20 mg/5 mL suspension 30 mL QID PRN    ascorbic acid (vitamin C) tablet 500 mg Daily    atorvastatin tablet 80 mg QHS    benzonatate capsule 100 mg TID PRN    bisacodyL suppository 10 mg Daily PRN    carvediloL tablet 3.125 mg BID    dextrose 50% injection 12.5 g PRN    dextrose 50% injection 25 g PRN    [START ON 1/21/2025] diltiaZEM 24 hr capsule 240 mg Daily    glucagon (human recombinant) injection 1 mg PRN    glucose chewable tablet 16 g PRN    glucose chewable tablet 24 g PRN    guaiFENesin 12 hr tablet 600 mg BID    hydrALAZINE tablet 100 mg TID    HYDROcodone-acetaminophen  mg per tablet 1 tablet Q6H PRN    HYDROcodone-acetaminophen 5-325 mg per tablet 1 tablet Q6H PRN    [START ON 1/21/2025] levothyroxine tablet 75 mcg Before breakfast    melatonin tablet 6 mg Nightly PRN    [START ON 1/21/2025] multivitamin tablet Daily    naloxone 0.4 mg/mL injection 0.02 mg PRN    ondansetron injection 4 mg Q8H PRN    polyethylene glycol packet 17 g Daily PRN    prochlorperazine injection Soln 5 mg Q6H PRN    sevelamer carbonate tablet 800 mg TID WM    simethicone chewable tablet 80 mg QID PRN    sodium chloride  0.9% flush 10 mL Q8H PRN     Current Outpatient Medications   Medication    ascorbic acid, vitamin C, (VITAMIN C) 500 MG tablet    carvediloL (COREG) 3.125 MG tablet    carvediloL (COREG) 6.25 MG tablet    cholecalciferol, vitamin D3, 25 mcg (1,000 unit) Chew    cloNIDine (CATAPRES) 0.1 MG tablet    cyanocobalamin (VITAMIN B-12) 1000 MCG tablet    diltiaZEM (CARDIZEM CD) 240 MG 24 hr capsule    diltiaZEM (CARTIA XT) 240 MG 24 hr capsule    ferrous sulfate (FEOSOL) 325 mg (65 mg iron) Tab tablet    furosemide (LASIX) 20 MG tablet    heparin sodium,porcine (HEPARIN LOCK FLUSH, PORCINE, IV)    hydrALAZINE (APRESOLINE) 100 MG tablet    HYDROcodone-acetaminophen (NORCO) 7.5-325 mg per tablet    HYDROcodone-acetaminophen (NORCO) 7.5-325 mg per tablet    levothyroxine (SYNTHROID) 75 MCG tablet    magnesium oxide (MAG-OX) 400 mg (241.3 mg magnesium) tablet    multivitamin (THERAGRAN) per tablet    omega-3 fatty acids 1,000 mg Cap    rosuvastatin (CRESTOR) 40 MG Tab    sevelamer carbonate (RENVELA) 800 mg Tab    LIDOcaine-prilocaine (EMLA) cream    REPATHA SYRINGE 140 mg/mL Syrg    UNABLE TO FIND     Family History    None       Tobacco Use    Smoking status: Former     Types: Cigarettes    Smokeless tobacco: Never   Substance and Sexual Activity    Alcohol use: Never    Drug use: Never    Sexual activity: Yes     Partners: Female     Review of Systems   Constitutional:  Negative for activity change, chills and fever.   HENT:  Negative for trouble swallowing.    Eyes:  Negative for visual disturbance.   Respiratory:  Positive for cough and shortness of breath. Negative for chest tightness and wheezing.    Cardiovascular:  Negative for chest pain, palpitations and leg swelling.   Gastrointestinal:  Negative for abdominal pain, constipation, diarrhea, nausea and vomiting.   Genitourinary:  Negative for dysuria, frequency, hematuria and urgency.   Musculoskeletal:  Negative for arthralgias, back pain and gait problem.   Skin:   Negative for color change and rash.   Neurological:  Positive for weakness. Negative for dizziness, syncope, light-headedness, numbness and headaches.   Psychiatric/Behavioral:  Negative for agitation, behavioral problems, confusion and decreased concentration.      Objective:     Vital Signs (Most Recent):  Temp: 98.3 °F (36.8 °C) (01/20/25 1033)  Pulse: 100 (01/20/25 1534)  Resp: (!) 22 (01/20/25 1534)  BP: 134/63 (01/20/25 1534)  SpO2: (!) 93 % (01/20/25 1534) Vital Signs (24h Range):  Temp:  [98.3 °F (36.8 °C)] 98.3 °F (36.8 °C)  Pulse:  [] 100  Resp:  [20-24] 22  SpO2:  [93 %-100 %] 93 %  BP: (134-140)/(63-99) 134/63     Weight: 111.1 kg (245 lb) (01/20/25 1033)  Body mass index is 34.17 kg/m².  Body surface area is 2.36 meters squared.    No intake/output data recorded.     Physical Exam  Vitals and nursing note reviewed.   Constitutional:       General: He is not in acute distress.     Appearance: Normal appearance. He is obese. He is not ill-appearing.   HENT:      Head: Normocephalic and atraumatic.      Right Ear: External ear normal.      Left Ear: External ear normal.   Eyes:      Extraocular Movements: Extraocular movements intact.   Cardiovascular:      Rate and Rhythm: Normal rate and regular rhythm.      Pulses: Normal pulses.      Heart sounds: Normal heart sounds. No murmur heard.  Pulmonary:      Effort: Pulmonary effort is normal. No respiratory distress.      Breath sounds: Wheezing (worse with cough) present.      Comments: On 2L NC  Abdominal:      General: Abdomen is flat. Bowel sounds are normal.      Tenderness: There is no abdominal tenderness.   Musculoskeletal:         General: Normal range of motion.      Cervical back: Normal range of motion and neck supple.      Right lower leg: No edema.      Left lower leg: No edema.   Skin:     General: Skin is warm and dry.      Capillary Refill: Capillary refill takes less than 2 seconds.      Coloration: Skin is not jaundiced.    Neurological:      Mental Status: He is alert and oriented to person, place, and time.      Cranial Nerves: No cranial nerve deficit.   Psychiatric:         Behavior: Behavior normal.          Significant Labs:  CBC:   Recent Labs   Lab 01/20/25  1142 01/20/25  1148   WBC 8.45  --    RBC 2.63*  --    HGB 8.9*  --    HCT 28.6* 28*     --    *  --    MCH 33.8*  --    MCHC 31.1*  --      CMP:   Recent Labs   Lab 01/20/25  1142   *   CALCIUM 9.2   ALBUMIN 3.2*   PROT 8.6*      K 4.8   CO2 28   CL 95   BUN 24*   CREATININE 6.6*   ALKPHOS 65   ALT 15   AST 19   BILITOT 0.8     All labs within the past 24 hours have been reviewed.

## 2025-01-20 NOTE — ASSESSMENT & PLAN NOTE
Patient's blood pressure range in the last 24 hours was: BP  Min: 135/70  Max: 140/99.The patient's inpatient anti-hypertensive regimen is listed below:  Current Antihypertensives  carvediloL tablet 3.125 mg, 2 times daily, Oral  diltiaZEM 24 hr capsule 240 mg, Daily, Oral  hydrALAZINE tablet 100 mg, 3 times daily, Oral    Plan  - BP is controlled, no changes needed to their regimen  - tele

## 2025-01-20 NOTE — SUBJECTIVE & OBJECTIVE
Past Medical History:   Diagnosis Date    Cancer     bladder ca and prostate ca     CKD (chronic kidney disease), stage IV     Diabetes mellitus     diet controlled    Encounter for blood transfusion     ESRD (end stage renal disease)     dialysis mon and friday    Hodgkin's lymphoma 2008    chemo and radiation    Hypertension     PAF (paroxysmal atrial fibrillation)     Presence of urostomy     Stroke     weakness left side    Thyroid disease        Past Surgical History:   Procedure Laterality Date    ABLATION      for atrial fibrilation    AV FISTULA PLACEMENT Left 07/20/2023    Procedure: CREATION, AV FISTULA;  Surgeon: Roque Arcos Jr., MD;  Location: [facility] OR;  Service: Vascular;  Laterality: Left;   / LEFT UPPER EXTROMITY    FISTULOGRAM Left 12/12/2024    Procedure: Fistulogram;  Surgeon: Rohan Bright MD;  Location: [facility] CATH LAB;  Service: Nephrology;  Laterality: Left;    HEMICOLECTOMY W/ OSTOMY      INSERTION OF IMPLANTABLE LOOP RECORDER Left 5/17/2024    Procedure: Insertion, Implantable Loop Recorder;  Surgeon: Kain Cortez MD;  Location: [facility] EP LAB;  Service: Cardiology;  Laterality: Left;  CVA, AF,  ILR, BSCI, Local, ID, 3 Prep ** HD pt/LUE AV fistula**    INSERTION OF TUNNELED CENTRAL VENOUS HEMODIALYSIS CATHETER Right 6/17/2024    Procedure: INSERTION, CATHETER, CENTRAL VENOUS, HEMODIALYSIS, TUNNELED;  Surgeon: Roque Arcos Jr., MD;  Location: [facility] OR;  Service: Vascular;  Laterality: Right;    PARTIAL SURGICAL REMOVAL OF BLADDER      PORTACATH PLACEMENT Right     chest wall    PROSTATECTOMY      THROMBECTOMY Left 6/17/2024    Procedure: THROMBECTOMY / LEFT;  Surgeon: Roque Arcos Jr., MD;  Location: [facility] OR;  Service: Vascular;  Laterality: Left;    THROMBECTOMY OF HEMODIALYSIS ACCESS SITE Left 6/17/2024    Procedure: THROMBECTOMY, HEMODIALYSIS GRAFT OR FISTULA;  Surgeon: Rohan Bright MD;  Location: [facility] CATH LAB;  Service: Nephrology;  Laterality: Left;    THROMBECTOMY OF HEMODIALYSIS  ACCESS SITE Left 12/12/2024    Procedure: THROMBECTOMY, HEMODIALYSIS GRAFT OR FISTULA;  Surgeon: Rohan Bright MD;  Location: [facility] CATH LAB;  Service: Nephrology;  Laterality: Left;  LEFT UPPER ARM    TRANSPOSITION OF BASILIC VEIN Left 7/2/2024    Procedure: TRANSPOSITION, VEIN, BASILIC-AVF/G LEFT UPPER EXTREMITY;  Surgeon: Roque Arcos Jr., MD;  Location: [facility] OR;  Service: General;  Laterality: Left;       Review of patient's allergies indicates:   Allergen Reactions    Ambien [zolpidem] Other (See Comments)     Causes pt to hallucinate      Irbesartan      hyperkalemia       No current facility-administered medications on file prior to encounter.     Current Outpatient Medications on File Prior to Encounter   Medication Sig    ascorbic acid, vitamin C, (VITAMIN C) 500 MG tablet Take 500 mg by mouth once daily.    carvediloL (COREG) 3.125 MG tablet Take 3.125 mg by mouth 2 (two) times daily.    carvediloL (COREG) 6.25 MG tablet Take 6.25 mg by mouth 2 (two) times daily.    cholecalciferol, vitamin D3, 25 mcg (1,000 unit) Chew once daily.    cloNIDine (CATAPRES) 0.1 MG tablet Take 1 tablet (0.1 mg total) by mouth 2 (two) times daily.    cyanocobalamin (VITAMIN B-12) 1000 MCG tablet Take 100 mcg by mouth.    diltiaZEM (CARDIZEM CD) 240 MG 24 hr capsule Take 240 mg by mouth once daily.    diltiaZEM (CARTIA XT) 240 MG 24 hr capsule Take 1 capsule by mouth once daily.    ferrous sulfate (FEOSOL) 325 mg (65 mg iron) Tab tablet Take 325 mg by mouth daily with breakfast.    furosemide (LASIX) 20 MG tablet Take 20 mg by mouth daily as needed (daily except on dialysis days). Pt takes on non dialysis days    heparin sodium,porcine (HEPARIN LOCK FLUSH, PORCINE, IV) Inject into the vein. After HD, 2 times weekly    hydrALAZINE (APRESOLINE) 100 MG tablet Take 1 tablet by mouth 3 (three) times daily.    HYDROcodone-acetaminophen (NORCO) 7.5-325 mg per tablet Take 1 tablet by mouth every 6 (six) hours as needed for Pain.     HYDROcodone-acetaminophen (NORCO) 7.5-325 mg per tablet Take 1 tablet by mouth every 6 (six) hours as needed for Pain.    levothyroxine (SYNTHROID) 75 MCG tablet Take 75 mcg by mouth once daily.    magnesium oxide (MAG-OX) 400 mg (241.3 mg magnesium) tablet Take 400 mg by mouth.    multivitamin (THERAGRAN) per tablet Take 1 tablet by mouth once daily.    omega-3 fatty acids 1,000 mg Cap Take 1,200 mg by mouth.    rosuvastatin (CRESTOR) 40 MG Tab Take 10 mg by mouth every evening.    sevelamer carbonate (RENVELA) 800 mg Tab Take 800 mg by mouth.    LIDOcaine-prilocaine (EMLA) cream Apply topically as needed.    REPATHA SYRINGE 140 mg/mL Syrg Inject 140 mg into the skin every 14 (fourteen) days.    UNABLE TO FIND Daily. PREVAGEN     Family History    None       Tobacco Use    Smoking status: Former     Types: Cigarettes    Smokeless tobacco: Never   Substance and Sexual Activity    Alcohol use: Never    Drug use: Never    Sexual activity: Yes     Partners: Female     Review of Systems   Constitutional:  Negative for activity change, chills and fever.   HENT:  Negative for trouble swallowing.    Eyes:  Negative for photophobia and visual disturbance.   Respiratory:  Positive for cough and shortness of breath. Negative for chest tightness and wheezing.    Cardiovascular:  Negative for chest pain, palpitations and leg swelling.   Gastrointestinal:  Negative for abdominal pain, constipation, diarrhea, nausea and vomiting.   Genitourinary:  Negative for dysuria, frequency, hematuria and urgency.   Musculoskeletal:  Negative for arthralgias, back pain and gait problem.   Skin:  Negative for color change and rash.   Neurological:  Positive for weakness. Negative for dizziness, syncope, light-headedness, numbness and headaches.   Psychiatric/Behavioral:  Negative for agitation and confusion. The patient is not nervous/anxious.      Objective:     Vital Signs (Most Recent):  Temp: 98.3 °F (36.8 °C) (01/20/25 1033)  Pulse: 107  (01/20/25 1345)  Resp: 20 (01/20/25 1345)  BP: (!) 140/99 (01/20/25 1345)  SpO2: 99 % (01/20/25 1345) Vital Signs (24h Range):  Temp:  [98.3 °F (36.8 °C)] 98.3 °F (36.8 °C)  Pulse:  [] 107  Resp:  [20-24] 20  SpO2:  [96 %-100 %] 99 %  BP: (135-140)/(70-99) 140/99     Weight: 111.1 kg (245 lb)  Body mass index is 34.17 kg/m².     Physical Exam  Vitals and nursing note reviewed.   Constitutional:       General: He is not in acute distress.     Appearance: Normal appearance. He is obese. He is not ill-appearing.   HENT:      Head: Normocephalic and atraumatic.      Right Ear: External ear normal.      Left Ear: External ear normal.   Eyes:      Extraocular Movements: Extraocular movements intact.      Conjunctiva/sclera: Conjunctivae normal.      Pupils: Pupils are equal, round, and reactive to light.   Cardiovascular:      Rate and Rhythm: Normal rate and regular rhythm.      Pulses: Normal pulses.      Heart sounds: Normal heart sounds. No murmur heard.  Pulmonary:      Effort: Pulmonary effort is normal. No respiratory distress.      Breath sounds: Wheezing (worse with cough) present.      Comments: On 2L NC  Abdominal:      General: Abdomen is flat. Bowel sounds are normal.      Tenderness: There is no abdominal tenderness.   Musculoskeletal:         General: Normal range of motion.      Cervical back: Normal range of motion and neck supple.      Right lower leg: No edema.      Left lower leg: No edema.   Skin:     General: Skin is warm and dry.      Capillary Refill: Capillary refill takes less than 2 seconds.      Coloration: Skin is not jaundiced.   Neurological:      General: No focal deficit present.      Mental Status: He is alert and oriented to person, place, and time. Mental status is at baseline.      Cranial Nerves: No cranial nerve deficit.   Psychiatric:         Mood and Affect: Mood normal.         Behavior: Behavior normal.              CRANIAL NERVES     CN III, IV, VI   Pupils are equal,  "round, and reactive to light.       Significant Labs: All pertinent labs within the past 24 hours have been reviewed.  BMP:   Recent Labs   Lab 01/20/25  1142   *      K 4.8   CL 95   CO2 28   BUN 24*   CREATININE 6.6*   CALCIUM 9.2   MG 1.9     CBC:   Recent Labs   Lab 01/20/25  1142 01/20/25  1148   WBC 8.45  --    HGB 8.9*  --    HCT 28.6* 28*     --      CMP:   Recent Labs   Lab 01/20/25  1142      K 4.8   CL 95   CO2 28   *   BUN 24*   CREATININE 6.6*   CALCIUM 9.2   PROT 8.6*   ALBUMIN 3.2*   BILITOT 0.8   ALKPHOS 65   AST 19   ALT 15   ANIONGAP 17*     Cardiac Markers:   Recent Labs   Lab 01/20/25  1142   *     Troponin:   Recent Labs   Lab 01/20/25  1142   TROPONINIHS 12       Significant Imaging: I have reviewed all pertinent imaging results/findings within the past 24 hours.    Imaging Results              X-Ray Chest PA And Lateral (Final result)  Result time 01/20/25 14:06:14      Final result by Drake Silva MD (01/20/25 14:06:14)                   Impression:      Cardiomegaly with left greater than right bibasilar pulmonary opacities and pleural effusions.  CHF exacerbation or volume overload would be favored over infectious/inflammatory process.  Correlation with clinical findings will be helpful in this regard.      Electronically signed by: Drake Silva MD  Date:    01/20/2025  Time:    14:06               Narrative:    EXAMINATION:  XR CHEST PA AND LATERAL    CLINICAL HISTORY:  Provided history is "  Cough, unspecified".    TECHNIQUE:  Frontal and lateral views of the chest were performed.    COMPARISON:  01/20/2025.    FINDINGS:  Exam quality is limited by soft tissue attenuation of the x-ray beam.  Cardiac wires overlie the chest.  Cardiomediastinal silhouette is enlarged.  Left-sided pleural effusion with passive atelectasis or airspace disease in the left lung base.  Increased attenuation overlying the inferior thoracic spine on the lateral " view.  Trace right pleural fluid.  No worsening pleural fluid.  No pneumothorax.  No detrimental change in lung aeration.  Central venous catheter and cardiac loop recorder device are similar.                                       X-Ray Chest AP Portable (Final result)  Result time 01/20/25 13:18:51      Final result by Sabino Blanc MD (01/20/25 13:18:51)                   Impression:      As above.      Electronically signed by: Sabino Blanc  Date:    01/20/2025  Time:    13:18               Narrative:    EXAMINATION:  XR CHEST AP PORTABLE    CLINICAL HISTORY:  shortness of breath;    TECHNIQUE:  Single frontal view of the chest was performed.    COMPARISON:  Chest radiograph 06/17/2024    FINDINGS:  Lines and tubes: Right IJ tunneled hemodialysis catheter and left chest wall loop recorder.    Heart and mediastinum: Unchanged.    Pleura: Trace left pleural effusion.  No pneumothorax.    Lungs: Lung volumes are low.  Pulmonary edema is difficult to exclude.  Patchy opacities in the left lung base, likely atelectasis.  Infection is also possible.  No consolidation on the right.    Soft tissue/bone: Unchanged.

## 2025-01-20 NOTE — H&P
[facility] - Emergency Dept  [facility]  History & Physical    Patient Name: Arthur Menjivar  MRN: [id number]  Patient Class: OP- Observation  Admission Date: 1/20/2025  Attending Physician: Yi Soares MD   Primary Care Provider: Demetri Whitley MD         Patient information was obtained from patient, past medical records, and ER records.     Subjective:     Principal Problem:Volume overload    Chief Complaint:   Chief Complaint   Patient presents with    Shortness of Breath     Has loop recorder        HPI: Arthur Menjivar is a 69 y.o. male with PMHx significant for ESRD on HD MWF, HTN, HLD admitted to hospital medicine for volume overload. Patient reports shortness of breath for the past week with associated cough and weakness. Reports last HD session was yesterday (off schedule because of upcoming winter storm). He reports that they have not been taking fluid off him at HD 2/2 weakness. Reports compliance with his home medications, and denies missed HD sessions. Denies fever/chills, diaphoresis, lightheadedness, HA, CP, congestion, abdominal pain, n/v/d, hematochezia, hematemesis, urinary symptoms, changes in BMs, numbness/tingling, weakness. Of note, patient was at [facility] for an ACS rule out with CXR showing congestion and CTA without evidence of PE. Unclear if stress testing was done. Reports hx of tobacco use, but has not smoked in several years.     In the ED, AFVSS on new requirement of 2L NC. Hgb 8.9 (previous ~11). Cr/BUN 6.6/24. . HS trop negative. K WNL. Covd/Flu/RSV negative. CXR with cardiomegaly with left greater than right bibasilar pulmonary opacities and pleural effusions. CHF exacerbation or volume overload would be favored over infectious/inflammatory process. Given duonebs x1 with mild improvement.     Past Medical History:   Diagnosis Date    Cancer     bladder ca and prostate ca     CKD (chronic kidney disease), stage IV     Diabetes mellitus      diet controlled    Encounter for blood transfusion     ESRD (end stage renal disease)     dialysis mon and friday    Hodgkin's lymphoma 2008    chemo and radiation    Hypertension     PAF (paroxysmal atrial fibrillation)     Presence of urostomy     Stroke     weakness left side    Thyroid disease        Past Surgical History:   Procedure Laterality Date    ABLATION      for atrial fibrilation    AV FISTULA PLACEMENT Left 07/20/2023    Procedure: CREATION, AV FISTULA;  Surgeon: Roque Arcos Jr., MD;  Location: [facility], [place], operated by [facility] OR;  Service: Vascular;  Laterality: Left;   / LEFT UPPER EXTROMITY    FISTULOGRAM Left 12/12/2024    Procedure: Fistulogram;  Surgeon: Rohan Bright MD;  Location: [facility], [place], operated by [facility] CATH LAB;  Service: Nephrology;  Laterality: Left;    HEMICOLECTOMY W/ OSTOMY      INSERTION OF IMPLANTABLE LOOP RECORDER Left 5/17/2024    Procedure: Insertion, Implantable Loop Recorder;  Surgeon: Kain Cortez MD;  Location: [facility] EP LAB;  Service: Cardiology;  Laterality: Left;  CVA, AF,  ILR, BSCI, Local, MD, 3 Prep ** HD pt/LUE AV fistula**    INSERTION OF TUNNELED CENTRAL VENOUS HEMODIALYSIS CATHETER Right 6/17/2024    Procedure: INSERTION, CATHETER, CENTRAL VENOUS, HEMODIALYSIS, TUNNELED;  Surgeon: Roque Arcos Jr., MD;  Location: [facility], [place], operated by [facility] OR;  Service: Vascular;  Laterality: Right;    PARTIAL SURGICAL REMOVAL OF BLADDER      PORTACATH PLACEMENT Right     chest wall    PROSTATECTOMY      THROMBECTOMY Left 6/17/2024    Procedure: THROMBECTOMY / LEFT;  Surgeon: Roque Arcos Jr., MD;  Location: [facility], [place], operated by [facility] OR;  Service: Vascular;  Laterality: Left;    THROMBECTOMY OF HEMODIALYSIS ACCESS SITE Left 6/17/2024    Procedure: THROMBECTOMY, HEMODIALYSIS GRAFT OR FISTULA;  Surgeon: Rohan Bright MD;  Location: [facility], [place], operated by [facility] CATH LAB;  Service: Nephrology;  Laterality: Left;    THROMBECTOMY OF HEMODIALYSIS ACCESS SITE Left 12/12/2024    Procedure: THROMBECTOMY, HEMODIALYSIS GRAFT OR FISTULA;  Surgeon: Rohan Bright MD;  Location: [facility], [place], operated by [facility] CATH LAB;   Service: Nephrology;  Laterality: Left;  LEFT UPPER ARM    TRANSPOSITION OF BASILIC VEIN Left 7/2/2024    Procedure: TRANSPOSITION, VEIN, BASILIC-AVF/G LEFT UPPER EXTREMITY;  Surgeon: Roque Arcos Jr., MD;  Location: [facility];  Service: General;  Laterality: Left;       Review of patient's allergies indicates:   Allergen Reactions    Ambien [zolpidem] Other (See Comments)     Causes pt to hallucinate      Irbesartan      hyperkalemia       No current facility-administered medications on file prior to encounter.     Current Outpatient Medications on File Prior to Encounter   Medication Sig    ascorbic acid, vitamin C, (VITAMIN C) 500 MG tablet Take 500 mg by mouth once daily.    carvediloL (COREG) 3.125 MG tablet Take 3.125 mg by mouth 2 (two) times daily.    carvediloL (COREG) 6.25 MG tablet Take 6.25 mg by mouth 2 (two) times daily.    cholecalciferol, vitamin D3, 25 mcg (1,000 unit) Chew once daily.    cloNIDine (CATAPRES) 0.1 MG tablet Take 1 tablet (0.1 mg total) by mouth 2 (two) times daily.    cyanocobalamin (VITAMIN B-12) 1000 MCG tablet Take 100 mcg by mouth.    diltiaZEM (CARDIZEM CD) 240 MG 24 hr capsule Take 240 mg by mouth once daily.    diltiaZEM (CARTIA XT) 240 MG 24 hr capsule Take 1 capsule by mouth once daily.    ferrous sulfate (FEOSOL) 325 mg (65 mg iron) Tab tablet Take 325 mg by mouth daily with breakfast.    furosemide (LASIX) 20 MG tablet Take 20 mg by mouth daily as needed (daily except on dialysis days). Pt takes on non dialysis days    heparin sodium,porcine (HEPARIN LOCK FLUSH, PORCINE, IV) Inject into the vein. After HD, 2 times weekly    hydrALAZINE (APRESOLINE) 100 MG tablet Take 1 tablet by mouth 3 (three) times daily.    HYDROcodone-acetaminophen (NORCO) 7.5-325 mg per tablet Take 1 tablet by mouth every 6 (six) hours as needed for Pain.    HYDROcodone-acetaminophen (NORCO) 7.5-325 mg per tablet Take 1 tablet by mouth every 6 (six) hours as needed for Pain.    levothyroxine  (SYNTHROID) 75 MCG tablet Take 75 mcg by mouth once daily.    magnesium oxide (MAG-OX) 400 mg (241.3 mg magnesium) tablet Take 400 mg by mouth.    multivitamin (THERAGRAN) per tablet Take 1 tablet by mouth once daily.    omega-3 fatty acids 1,000 mg Cap Take 1,200 mg by mouth.    rosuvastatin (CRESTOR) 40 MG Tab Take 10 mg by mouth every evening.    sevelamer carbonate (RENVELA) 800 mg Tab Take 800 mg by mouth.    LIDOcaine-prilocaine (EMLA) cream Apply topically as needed.    REPATHA SYRINGE 140 mg/mL Syrg Inject 140 mg into the skin every 14 (fourteen) days.    UNABLE TO FIND Daily. PREVAGEN     Family History    None       Tobacco Use    Smoking status: Former     Types: Cigarettes    Smokeless tobacco: Never   Substance and Sexual Activity    Alcohol use: Never    Drug use: Never    Sexual activity: Yes     Partners: Female     Review of Systems   Constitutional:  Negative for activity change, chills and fever.   HENT:  Negative for trouble swallowing.    Eyes:  Negative for photophobia and visual disturbance.   Respiratory:  Positive for cough and shortness of breath. Negative for chest tightness and wheezing.    Cardiovascular:  Negative for chest pain, palpitations and leg swelling.   Gastrointestinal:  Negative for abdominal pain, constipation, diarrhea, nausea and vomiting.   Genitourinary:  Negative for dysuria, frequency, hematuria and urgency.   Musculoskeletal:  Negative for arthralgias, back pain and gait problem.   Skin:  Negative for color change and rash.   Neurological:  Positive for weakness. Negative for dizziness, syncope, light-headedness, numbness and headaches.   Psychiatric/Behavioral:  Negative for agitation and confusion. The patient is not nervous/anxious.      Objective:     Vital Signs (Most Recent):  Temp: 98.3 °F (36.8 °C) (01/20/25 1033)  Pulse: 107 (01/20/25 1345)  Resp: 20 (01/20/25 1345)  BP: (!) 140/99 (01/20/25 1345)  SpO2: 99 % (01/20/25 1345) Vital Signs (24h Range):  Temp:   [98.3 °F (36.8 °C)] 98.3 °F (36.8 °C)  Pulse:  [] 107  Resp:  [20-24] 20  SpO2:  [96 %-100 %] 99 %  BP: (135-140)/(70-99) 140/99     Weight: 111.1 kg (245 lb)  Body mass index is 34.17 kg/m².     Physical Exam  Vitals and nursing note reviewed.   Constitutional:       General: He is not in acute distress.     Appearance: Normal appearance. He is obese. He is not ill-appearing.   HENT:      Head: Normocephalic and atraumatic.      Right Ear: External ear normal.      Left Ear: External ear normal.   Eyes:      Extraocular Movements: Extraocular movements intact.      Conjunctiva/sclera: Conjunctivae normal.      Pupils: Pupils are equal, round, and reactive to light.   Cardiovascular:      Rate and Rhythm: Normal rate and regular rhythm.      Pulses: Normal pulses.      Heart sounds: Normal heart sounds. No murmur heard.  Pulmonary:      Effort: Pulmonary effort is normal. No respiratory distress.      Breath sounds: Wheezing (worse with cough) present.      Comments: On 2L NC  Abdominal:      General: Abdomen is flat. Bowel sounds are normal.      Tenderness: There is no abdominal tenderness.   Musculoskeletal:         General: Normal range of motion.      Cervical back: Normal range of motion and neck supple.      Right lower leg: No edema.      Left lower leg: No edema.   Skin:     General: Skin is warm and dry.      Capillary Refill: Capillary refill takes less than 2 seconds.      Coloration: Skin is not jaundiced.   Neurological:      General: No focal deficit present.      Mental Status: He is alert and oriented to person, place, and time. Mental status is at baseline.      Cranial Nerves: No cranial nerve deficit.   Psychiatric:         Mood and Affect: Mood normal.         Behavior: Behavior normal.              CRANIAL NERVES     CN III, IV, VI   Pupils are equal, round, and reactive to light.       Significant Labs: All pertinent labs within the past 24 hours have been reviewed.  BMP:   Recent Labs  "  Lab 01/20/25  1142   *      K 4.8   CL 95   CO2 28   BUN 24*   CREATININE 6.6*   CALCIUM 9.2   MG 1.9     CBC:   Recent Labs   Lab 01/20/25  1142 01/20/25  1148   WBC 8.45  --    HGB 8.9*  --    HCT 28.6* 28*     --      CMP:   Recent Labs   Lab 01/20/25  1142      K 4.8   CL 95   CO2 28   *   BUN 24*   CREATININE 6.6*   CALCIUM 9.2   PROT 8.6*   ALBUMIN 3.2*   BILITOT 0.8   ALKPHOS 65   AST 19   ALT 15   ANIONGAP 17*     Cardiac Markers:   Recent Labs   Lab 01/20/25  1142   *     Troponin:   Recent Labs   Lab 01/20/25  1142   TROPONINIHS 12       Significant Imaging: I have reviewed all pertinent imaging results/findings within the past 24 hours.    Imaging Results              X-Ray Chest PA And Lateral (Final result)  Result time 01/20/25 14:06:14      Final result by Drake Silva MD (01/20/25 14:06:14)                   Impression:      Cardiomegaly with left greater than right bibasilar pulmonary opacities and pleural effusions.  CHF exacerbation or volume overload would be favored over infectious/inflammatory process.  Correlation with clinical findings will be helpful in this regard.      Electronically signed by: Drake Silva MD  Date:    01/20/2025  Time:    14:06               Narrative:    EXAMINATION:  XR CHEST PA AND LATERAL    CLINICAL HISTORY:  Provided history is "  Cough, unspecified".    TECHNIQUE:  Frontal and lateral views of the chest were performed.    COMPARISON:  01/20/2025.    FINDINGS:  Exam quality is limited by soft tissue attenuation of the x-ray beam.  Cardiac wires overlie the chest.  Cardiomediastinal silhouette is enlarged.  Left-sided pleural effusion with passive atelectasis or airspace disease in the left lung base.  Increased attenuation overlying the inferior thoracic spine on the lateral view.  Trace right pleural fluid.  No worsening pleural fluid.  No pneumothorax.  No detrimental change in lung aeration.  Central venous " catheter and cardiac loop recorder device are similar.                                       X-Ray Chest AP Portable (Final result)  Result time 01/20/25 13:18:51      Final result by Sabino Blanc MD (01/20/25 13:18:51)                   Impression:      As above.      Electronically signed by: Sabino Blanc  Date:    01/20/2025  Time:    13:18               Narrative:    EXAMINATION:  XR CHEST AP PORTABLE    CLINICAL HISTORY:  shortness of breath;    TECHNIQUE:  Single frontal view of the chest was performed.    COMPARISON:  Chest radiograph 06/17/2024    FINDINGS:  Lines and tubes: Right IJ tunneled hemodialysis catheter and left chest wall loop recorder.    Heart and mediastinum: Unchanged.    Pleura: Trace left pleural effusion.  No pneumothorax.    Lungs: Lung volumes are low.  Pulmonary edema is difficult to exclude.  Patchy opacities in the left lung base, likely atelectasis.  Infection is also possible.  No consolidation on the right.    Soft tissue/bone: Unchanged.                                    Assessment/Plan:     * Volume overload  ESRD  Respiratory Distress  - new 2L oxygen requirement  - remaining vitals stable  - wheezing noted on exam, denies hx of COPD/asthma  - denies missed HD with last session on 1/19  - CXR with cardiomegaly with left greater than right bibasilar pulmonary opacities and pleural effusions. CHF exacerbation or volume overload would be favored over infectious/inflammatory process. Correlation with clinical findings will be helpful in this regard.   - given duoneb with mild improvement, continue prn  - reevaluate s/p HD  - nephrology consulted for fluid removal  - Monitor UOP and serial BMP and adjust therapy as needed.   - Renally dose meds.   - Avoid nephrotoxic medications and procedures.    Anemia of chronic disease  Anemia is likely due to chronic disease due to ESRD. Most recent hemoglobin and hematocrit are listed below.  Recent Labs     01/20/25  1142 01/20/25  1148    HGB 8.9*  --    HCT 28.6* 28*     Plan  - Monitor serial CBC: Every 12 hours  - Transfuse PRBC if patient becomes hemodynamically unstable, symptomatic or H/H drops below 7/21.  - Patient has not received any PRBC transfusions to date  - Patient's anemia is currently stable  - denies hematochezia, hematemesis, hemoptysis  - will repeat after HD    Hypothyroidism  - continue home levothyroxine      PAF (paroxysmal atrial fibrillation)  Patient has paroxysmal (<7 days) atrial fibrillation. Patient is currently in sinus rhythm. [id number] Score: 2. The patients heart rate in the last 24 hours is as follows:  Pulse  Min: 94  Max: 107     Antiarrhythmics  diltiaZEM 24 hr capsule 240 mg, Daily, Oral    Anticoagulants       Plan  - Replete lytes with a goal of K>4, Mg >2  - patient is not on anticoagulant per chart review  - Patient's afib is currently controlled  - tele        Essential (primary) hypertension  Patient's blood pressure range in the last 24 hours was: BP  Min: 135/70  Max: 140/99.The patient's inpatient anti-hypertensive regimen is listed below:  Current Antihypertensives  carvediloL tablet 3.125 mg, 2 times daily, Oral  diltiaZEM 24 hr capsule 240 mg, Daily, Oral  hydrALAZINE tablet 100 mg, 3 times daily, Oral    Plan  - BP is controlled, no changes needed to their regimen  - tele    Atherosclerotic heart disease of native coronary artery without angina pectoris  Pure Hypercholesteremia  Patient with known CAD  which is controlled Will continue Statin and monitor for S/Sx of angina/ACS. Continue to monitor on telemetry.       VTE Risk Mitigation (From admission, onward)           Ordered     IP VTE HIGH RISK PATIENT  Once         01/20/25 1444     Place sequential compression device  Until discontinued         01/20/25 1444     Reason for No Pharmacological VTE Prophylaxis  Once        Question:  Reasons:  Answer:  Risk of Bleeding    01/20/25 1444                         On 01/20/2025, patient should be  placed in hospital observation services under my care in collaboration with Yi Soares MD.           Yasmine Barkley PA-C  Department of Hospital Medicine  [facility] - Emergency Dept

## 2025-01-20 NOTE — ASSESSMENT & PLAN NOTE
Anemia is likely due to chronic disease due to ESRD. Most recent hemoglobin and hematocrit are listed below.  Recent Labs     01/20/25  1142 01/20/25  1148   HGB 8.9*  --    HCT 28.6* 28*     Plan  - Monitor serial CBC: Every 12 hours  - Transfuse PRBC if patient becomes hemodynamically unstable, symptomatic or H/H drops below 7/21.  - Patient has not received any PRBC transfusions to date  - Patient's anemia is currently stable  - denies hematochezia, hematemesis, hemoptysis  - will repeat after HD

## 2025-01-20 NOTE — ASSESSMENT & PLAN NOTE
ESRD  Respiratory Distress  - new 2L oxygen requirement  - remaining vitals stable  - wheezing noted on exam, denies hx of COPD/asthma  - denies missed HD with last session on 1/19  - CXR with cardiomegaly with left greater than right bibasilar pulmonary opacities and pleural effusions. CHF exacerbation or volume overload would be favored over infectious/inflammatory process. Correlation with clinical findings will be helpful in this regard.   - given duoneb with mild improvement, continue prn  - reevaluate s/p HD  - nephrology consulted for fluid removal  - Monitor UOP and serial BMP and adjust therapy as needed.   - Renally dose meds.   - Avoid nephrotoxic medications and procedures.

## 2025-01-20 NOTE — CONSULTS
[facility] - Emergency Dept  Nephrology  Consult Note    Patient Name: Arthur Menjivar  MRN: [id number]  Admission Date: 1/20/2025  Hospital Length of Stay: 0 days  Attending Provider: Yi Soares MD   Primary Care Physician: Demetri Whitley MD  Principal Problem:Volume overload    Inpatient consult to Nephrology  Consult performed by: Carlee Guardado, DNP, FNP-C  Consult ordered by: Yasmine Barkley, PA-C  Reason for consult: ESRD        Subjective:     HPI: The patient is a 69 y.o. Black or  Male with multiple co morbidities including ESRD on HD MWF, HTN, HLD who presents to ED on 1/20/2025 with complaints of shortness of breath associated cough and weakness. Patient reports compliance with HD session but reports having no volume removed 2/2 symptomatic hypotensive episodes. Reports compliance with his home medications, and denies missed HD sessions. Denies fever/chills, diaphoresis, lightheadedness, HA, CP, congestion, abdominal pain, n/v/d, hematochezia, hematemesis, urinary symptoms, changes in BMs, numbness/tingling, weakness. Of note, patient was at [facility] for an ACS rule out with CXR showing congestion and CTA without evidence of PE.      In the ED, AFVSS on new requirement of 2L NC. Hgb 8.9 (previous ~11). Cr/BUN 6.6/24. . HS trop negative. K WNL. Covd/Flu/RSV negative. CXR with cardiomegaly with left greater than right bibasilar pulmonary opacities and pleural effusions. CHF exacerbation or volume overload would be favored over infectious/inflammatory process. Given duonebs x1 with mild improvement. Admitted to  for evaluation and treatment. Nephrology consulted for management of ESRD and HD treatment.    Past Medical History:   Diagnosis Date    Cancer     bladder ca and prostate ca     CKD (chronic kidney disease), stage IV     Diabetes mellitus     diet controlled    Encounter for blood transfusion     ESRD (end stage renal disease)     dialysis mon  and friday    Hodgkin's lymphoma 2008    chemo and radiation    Hypertension     PAF (paroxysmal atrial fibrillation)     Presence of urostomy     Stroke     weakness left side    Thyroid disease        Past Surgical History:   Procedure Laterality Date    ABLATION      for atrial fibrilation    AV FISTULA PLACEMENT Left 07/20/2023    Procedure: CREATION, AV FISTULA;  Surgeon: Roque Arcos Jr., MD;  Location: [facility] OR;  Service: Vascular;  Laterality: Left;   / LEFT UPPER EXTROMITY    FISTULOGRAM Left 12/12/2024    Procedure: Fistulogram;  Surgeon: Rohan Bright MD;  Location: [facility] CATH LAB;  Service: Nephrology;  Laterality: Left;    HEMICOLECTOMY W/ OSTOMY      INSERTION OF IMPLANTABLE LOOP RECORDER Left 5/17/2024    Procedure: Insertion, Implantable Loop Recorder;  Surgeon: Kain Cortez MD;  Location: [facility] EP LAB;  Service: Cardiology;  Laterality: Left;  CVA, AF,  ILR, BSCI, Local, RI, 3 Prep ** HD pt/LUE AV fistula**    INSERTION OF TUNNELED CENTRAL VENOUS HEMODIALYSIS CATHETER Right 6/17/2024    Procedure: INSERTION, CATHETER, CENTRAL VENOUS, HEMODIALYSIS, TUNNELED;  Surgeon: Roque Arcos Jr., MD;  Location: [facility] OR;  Service: Vascular;  Laterality: Right;    PARTIAL SURGICAL REMOVAL OF BLADDER      PORTACATH PLACEMENT Right     chest wall    PROSTATECTOMY      THROMBECTOMY Left 6/17/2024    Procedure: THROMBECTOMY / LEFT;  Surgeon: Roque Arcos Jr., MD;  Location: [facility];  Service: Vascular;  Laterality: Left;    THROMBECTOMY OF HEMODIALYSIS ACCESS SITE Left 6/17/2024    Procedure: THROMBECTOMY, HEMODIALYSIS GRAFT OR FISTULA;  Surgeon: Rohan Bright MD;  Location: [facility] CATH LAB;  Service: Nephrology;  Laterality: Left;    THROMBECTOMY OF HEMODIALYSIS ACCESS SITE Left 12/12/2024    Procedure: THROMBECTOMY, HEMODIALYSIS GRAFT OR FISTULA;  Surgeon: Rohan Bright MD;  Location: [facility] CATH LAB;  Service: Nephrology;  Laterality: Left;  LEFT UPPER ARM    TRANSPOSITION OF BASILIC VEIN Left 7/2/2024     Procedure: TRANSPOSITION, VEIN, BASILIC-AVF/G LEFT UPPER EXTREMITY;  Surgeon: Roque Arcos Jr., MD;  Location: [facility];  Service: General;  Laterality: Left;       Review of patient's allergies indicates:   Allergen Reactions    Ambien [zolpidem] Other (See Comments)     Causes pt to hallucinate      Irbesartan      hyperkalemia     Current Facility-Administered Medications   Medication Frequency    acetaminophen tablet 650 mg Q4H PRN    albuterol-ipratropium 2.5 mg-0.5 mg/3 mL nebulizer solution 3 mL Q4H PRN    aluminum-magnesium hydroxide-simethicone 200-200-20 mg/5 mL suspension 30 mL QID PRN    ascorbic acid (vitamin C) tablet 500 mg Daily    atorvastatin tablet 80 mg QHS    benzonatate capsule 100 mg TID PRN    bisacodyL suppository 10 mg Daily PRN    carvediloL tablet 3.125 mg BID    dextrose 50% injection 12.5 g PRN    dextrose 50% injection 25 g PRN    [START ON 1/21/2025] diltiaZEM 24 hr capsule 240 mg Daily    glucagon (human recombinant) injection 1 mg PRN    glucose chewable tablet 16 g PRN    glucose chewable tablet 24 g PRN    guaiFENesin 12 hr tablet 600 mg BID    hydrALAZINE tablet 100 mg TID    HYDROcodone-acetaminophen  mg per tablet 1 tablet Q6H PRN    HYDROcodone-acetaminophen 5-325 mg per tablet 1 tablet Q6H PRN    [START ON 1/21/2025] levothyroxine tablet 75 mcg Before breakfast    melatonin tablet 6 mg Nightly PRN    [START ON 1/21/2025] multivitamin tablet Daily    naloxone 0.4 mg/mL injection 0.02 mg PRN    ondansetron injection 4 mg Q8H PRN    polyethylene glycol packet 17 g Daily PRN    prochlorperazine injection Soln 5 mg Q6H PRN    sevelamer carbonate tablet 800 mg TID WM    simethicone chewable tablet 80 mg QID PRN    sodium chloride 0.9% flush 10 mL Q8H PRN     Current Outpatient Medications   Medication    ascorbic acid, vitamin C, (VITAMIN C) 500 MG tablet    carvediloL (COREG) 3.125 MG tablet    carvediloL (COREG) 6.25 MG tablet    cholecalciferol, vitamin D3, 25 mcg (1,000  unit) Chew    cloNIDine (CATAPRES) 0.1 MG tablet    cyanocobalamin (VITAMIN B-12) 1000 MCG tablet    diltiaZEM (CARDIZEM CD) 240 MG 24 hr capsule    diltiaZEM (CARTIA XT) 240 MG 24 hr capsule    ferrous sulfate (FEOSOL) 325 mg (65 mg iron) Tab tablet    furosemide (LASIX) 20 MG tablet    heparin sodium,porcine (HEPARIN LOCK FLUSH, PORCINE, IV)    hydrALAZINE (APRESOLINE) 100 MG tablet    HYDROcodone-acetaminophen (NORCO) 7.5-325 mg per tablet    HYDROcodone-acetaminophen (NORCO) 7.5-325 mg per tablet    levothyroxine (SYNTHROID) 75 MCG tablet    magnesium oxide (MAG-OX) 400 mg (241.3 mg magnesium) tablet    multivitamin (THERAGRAN) per tablet    omega-3 fatty acids 1,000 mg Cap    rosuvastatin (CRESTOR) 40 MG Tab    sevelamer carbonate (RENVELA) 800 mg Tab    LIDOcaine-prilocaine (EMLA) cream    REPATHA SYRINGE 140 mg/mL Syrg    UNABLE TO FIND     Family History    None       Tobacco Use    Smoking status: Former     Types: Cigarettes    Smokeless tobacco: Never   Substance and Sexual Activity    Alcohol use: Never    Drug use: Never    Sexual activity: Yes     Partners: Female     Review of Systems   Constitutional:  Negative for activity change, chills and fever.   HENT:  Negative for trouble swallowing.    Eyes:  Negative for visual disturbance.   Respiratory:  Positive for cough and shortness of breath. Negative for chest tightness and wheezing.    Cardiovascular:  Negative for chest pain, palpitations and leg swelling.   Gastrointestinal:  Negative for abdominal pain, constipation, diarrhea, nausea and vomiting.   Genitourinary:  Negative for dysuria, frequency, hematuria and urgency.   Musculoskeletal:  Negative for arthralgias, back pain and gait problem.   Skin:  Negative for color change and rash.   Neurological:  Positive for weakness. Negative for dizziness, syncope, light-headedness, numbness and headaches.   Psychiatric/Behavioral:  Negative for agitation, behavioral problems, confusion and decreased  concentration.      Objective:     Vital Signs (Most Recent):  Temp: 98.3 °F (36.8 °C) (01/20/25 1033)  Pulse: 100 (01/20/25 1534)  Resp: (!) 22 (01/20/25 1534)  BP: 134/63 (01/20/25 1534)  SpO2: (!) 93 % (01/20/25 1534) Vital Signs (24h Range):  Temp:  [98.3 °F (36.8 °C)] 98.3 °F (36.8 °C)  Pulse:  [] 100  Resp:  [20-24] 22  SpO2:  [93 %-100 %] 93 %  BP: (134-140)/(63-99) 134/63     Weight: 111.1 kg (245 lb) (01/20/25 1033)  Body mass index is 34.17 kg/m².  Body surface area is 2.36 meters squared.    No intake/output data recorded.     Physical Exam  Vitals and nursing note reviewed.   Constitutional:       General: He is not in acute distress.     Appearance: Normal appearance. He is obese. He is not ill-appearing.   HENT:      Head: Normocephalic and atraumatic.      Right Ear: External ear normal.      Left Ear: External ear normal.   Eyes:      Extraocular Movements: Extraocular movements intact.   Cardiovascular:      Rate and Rhythm: Normal rate and regular rhythm.      Pulses: Normal pulses.      Heart sounds: Normal heart sounds. No murmur heard.  Pulmonary:      Effort: Pulmonary effort is normal. No respiratory distress.      Breath sounds: Wheezing (worse with cough) present.      Comments: On 2L NC  Abdominal:      General: Abdomen is flat. Bowel sounds are normal.      Tenderness: There is no abdominal tenderness.   Musculoskeletal:         General: Normal range of motion.      Cervical back: Normal range of motion and neck supple.      Right lower leg: No edema.      Left lower leg: No edema.   Skin:     General: Skin is warm and dry.      Capillary Refill: Capillary refill takes less than 2 seconds.      Coloration: Skin is not jaundiced.   Neurological:      Mental Status: He is alert and oriented to person, place, and time.      Cranial Nerves: No cranial nerve deficit.   Psychiatric:         Behavior: Behavior normal.          Significant Labs:  CBC:   Recent Labs   Lab 01/20/25  1142  "01/20/25  1148   WBC 8.45  --    RBC 2.63*  --    HGB 8.9*  --    HCT 28.6* 28*     --    *  --    MCH 33.8*  --    MCHC 31.1*  --      CMP:   Recent Labs   Lab 01/20/25  1142   *   CALCIUM 9.2   ALBUMIN 3.2*   PROT 8.6*      K 4.8   CO2 28   CL 95   BUN 24*   CREATININE 6.6*   ALKPHOS 65   ALT 15   AST 19   BILITOT 0.8     All labs within the past 24 hours have been reviewed.  Assessment/Plan:     Renal/  End stage renal disease  69 y.o. Black or  Male ESRD-HD M-W-F presents to ED on 1/20/2025 with diagnosis of: SOB (shortness of breath) [R06.02]   Nephrology consulted for inpatient ESRD-HD management    Outpatient HD Information:  -Dialysis modality: Hemodialysis  -Outpatient HD unit: [facility]  -Nephrologist: Olivia ZIEGLER  -HD TX days: Monday/Wednesday/Friday, duration of treatment: 3-4 hrs  -Last HD TX prior to hospital admission: 1/19/25  -Dialysis access: LUE AV fistula   -Residual urine: Minium  -EDW: ?    Assessment:   - Will provide dialysis today (01/20/2025) with UF - 1-2L as BP tolerates for metabolic clearance and volume management   - Labs reviewed and dialysate to be adjusted to current labs.   - Consider ECHO  - Continue to monitor intake and output  - Please avoid gadolinium, fleets, phos-based laxatives, NSAIDs  - Dialysis thrice weekly unless more urgent indications arise. Will evaluate RRT requirements Daily.    Anemia of ESRD   Recent Labs   Lab 01/20/25  1142 01/20/25  1148   WBC 8.45  --    HGB 8.9*  --    HCT 28.6* 28*     --      No results found for: "FESATURATED", "FERRITIN"    - Goal in ESRD is Hgb of 10-11. Hgb 8.9.  - Will review chronic care plan from outpatient dialysis center for MIRNA dosing.   - if patient is on iron infusions please D/C when active infection, cautiously use EPO when hx of malignancy, high BP (SBP usually > 170mmhg).    Mineral Bone Disease in ESRD   Lab Results   Component Value Date    CALCIUM 9.2 01/20/2025    ALBUMIN " 3.2 (L) 01/20/2025    PHOS 3.4 01/20/2025       - F/U PO4, Mg, Calcium. And albumin levels daily.   - Renal diet with protein intake goal 1.5 g/kg/d with 1 L fluid restriction   - If patient has poor oral intake, recommend nephro nutritional shakes (ex Novasource)  - Continue or restart home phos binder, phos 3.4  - PTH to be managed at her outpatient HD unit    Endocrine  * Volume overload  - HD for volume removal tonight at bedside.         Thank you for your consult. I will follow-up with patient. Please contact us if you have any additional questions.    Carlee Guardado, ARTURO, FNP-C  Nephrology  [facility] - Emergency Dept

## 2025-01-21 LAB
ALBUMIN SERPL BCP-MCNC: 3.2 G/DL (ref 3.5–5.2)
ALP SERPL-CCNC: 70 U/L (ref 40–150)
ALT SERPL W/O P-5'-P-CCNC: 14 U/L (ref 10–44)
ANION GAP SERPL CALC-SCNC: 13 MMOL/L (ref 8–16)
AST SERPL-CCNC: 17 U/L (ref 10–40)
BASOPHILS # BLD AUTO: 0.02 K/UL (ref 0–0.2)
BASOPHILS NFR BLD: 0.2 % (ref 0–1.9)
BILIRUB SERPL-MCNC: 0.9 MG/DL (ref 0.1–1)
BUN SERPL-MCNC: 20 MG/DL (ref 8–23)
CALCIUM SERPL-MCNC: 9.5 MG/DL (ref 8.7–10.5)
CHLORIDE SERPL-SCNC: 100 MMOL/L (ref 95–110)
CO2 SERPL-SCNC: 24 MMOL/L (ref 23–29)
CREAT SERPL-MCNC: 5.3 MG/DL (ref 0.5–1.4)
DIFFERENTIAL METHOD BLD: ABNORMAL
EOSINOPHIL # BLD AUTO: 0.1 K/UL (ref 0–0.5)
EOSINOPHIL NFR BLD: 1 % (ref 0–8)
ERYTHROCYTE [DISTWIDTH] IN BLOOD BY AUTOMATED COUNT: 16.1 % (ref 11.5–14.5)
EST. GFR  (NO RACE VARIABLE): 10.9 ML/MIN/1.73 M^2
GLUCOSE SERPL-MCNC: 157 MG/DL (ref 70–110)
HBV SURFACE AG SERPL QL IA: NORMAL
HCT VFR BLD AUTO: 27.9 % (ref 40–54)
HGB BLD-MCNC: 8.4 G/DL (ref 14–18)
IMM GRANULOCYTES # BLD AUTO: 0.05 K/UL (ref 0–0.04)
IMM GRANULOCYTES NFR BLD AUTO: 0.6 % (ref 0–0.5)
LYMPHOCYTES # BLD AUTO: 1.5 K/UL (ref 1–4.8)
LYMPHOCYTES NFR BLD: 17.6 % (ref 18–48)
MAGNESIUM SERPL-MCNC: 2 MG/DL (ref 1.6–2.6)
MCH RBC QN AUTO: 32.8 PG (ref 27–31)
MCHC RBC AUTO-ENTMCNC: 30.1 G/DL (ref 32–36)
MCV RBC AUTO: 109 FL (ref 82–98)
MONOCYTES # BLD AUTO: 1 K/UL (ref 0.3–1)
MONOCYTES NFR BLD: 11.7 % (ref 4–15)
NEUTROPHILS # BLD AUTO: 6 K/UL (ref 1.8–7.7)
NEUTROPHILS NFR BLD: 68.9 % (ref 38–73)
NRBC BLD-RTO: 0 /100 WBC
PHOSPHATE SERPL-MCNC: 3 MG/DL (ref 2.7–4.5)
PLATELET # BLD AUTO: 189 K/UL (ref 150–450)
PMV BLD AUTO: 8.8 FL (ref 9.2–12.9)
POTASSIUM SERPL-SCNC: 4.1 MMOL/L (ref 3.5–5.1)
PROT SERPL-MCNC: 8.7 G/DL (ref 6–8.4)
RBC # BLD AUTO: 2.56 M/UL (ref 4.6–6.2)
SODIUM SERPL-SCNC: 137 MMOL/L (ref 136–145)
WBC # BLD AUTO: 8.74 K/UL (ref 3.9–12.7)

## 2025-01-21 PROCEDURE — 94761 N-INVAS EAR/PLS OXIMETRY MLT: CPT

## 2025-01-21 PROCEDURE — 83735 ASSAY OF MAGNESIUM: CPT | Performed by: PHYSICIAN ASSISTANT

## 2025-01-21 PROCEDURE — 11000001 HC ACUTE MED/SURG PRIVATE ROOM

## 2025-01-21 PROCEDURE — 36415 COLL VENOUS BLD VENIPUNCTURE: CPT | Performed by: PHYSICIAN ASSISTANT

## 2025-01-21 PROCEDURE — 94640 AIRWAY INHALATION TREATMENT: CPT | Mod: XB

## 2025-01-21 PROCEDURE — 25000003 PHARM REV CODE 250: Performed by: PHYSICIAN ASSISTANT

## 2025-01-21 PROCEDURE — 36415 COLL VENOUS BLD VENIPUNCTURE: CPT

## 2025-01-21 PROCEDURE — 87340 HEPATITIS B SURFACE AG IA: CPT

## 2025-01-21 PROCEDURE — 84100 ASSAY OF PHOSPHORUS: CPT | Performed by: PHYSICIAN ASSISTANT

## 2025-01-21 PROCEDURE — 80053 COMPREHEN METABOLIC PANEL: CPT | Performed by: PHYSICIAN ASSISTANT

## 2025-01-21 PROCEDURE — 25000003 PHARM REV CODE 250: Performed by: STUDENT IN AN ORGANIZED HEALTH CARE EDUCATION/TRAINING PROGRAM

## 2025-01-21 PROCEDURE — 25000242 PHARM REV CODE 250 ALT 637 W/ HCPCS: Performed by: PHYSICIAN ASSISTANT

## 2025-01-21 PROCEDURE — C1752 CATH,HEMODIALYSIS,SHORT-TERM: HCPCS

## 2025-01-21 PROCEDURE — 85025 COMPLETE CBC W/AUTO DIFF WBC: CPT | Performed by: PHYSICIAN ASSISTANT

## 2025-01-21 RX ORDER — BENZONATATE 100 MG/1
200 CAPSULE ORAL 3 TIMES DAILY PRN
Status: DISCONTINUED | OUTPATIENT
Start: 2025-01-21 | End: 2025-01-25 | Stop reason: HOSPADM

## 2025-01-21 RX ADMIN — SEVELAMER CARBONATE 800 MG: 800 TABLET, FILM COATED ORAL at 12:01

## 2025-01-21 RX ADMIN — SEVELAMER CARBONATE 800 MG: 800 TABLET, FILM COATED ORAL at 05:01

## 2025-01-21 RX ADMIN — GUAIFENESIN 600 MG: 600 TABLET, EXTENDED RELEASE ORAL at 08:01

## 2025-01-21 RX ADMIN — LEVOTHYROXINE SODIUM 75 MCG: 75 TABLET ORAL at 05:01

## 2025-01-21 RX ADMIN — BENZONATATE 200 MG: 100 CAPSULE ORAL at 08:01

## 2025-01-21 RX ADMIN — ATORVASTATIN CALCIUM 80 MG: 40 TABLET, FILM COATED ORAL at 08:01

## 2025-01-21 RX ADMIN — SEVELAMER CARBONATE 800 MG: 800 TABLET, FILM COATED ORAL at 07:01

## 2025-01-21 RX ADMIN — IPRATROPIUM BROMIDE AND ALBUTEROL SULFATE 3 ML: 2.5; .5 SOLUTION RESPIRATORY (INHALATION) at 12:01

## 2025-01-21 RX ADMIN — THERA TABS 1 TABLET: TAB at 09:01

## 2025-01-21 RX ADMIN — Medication 500 MG: at 10:01

## 2025-01-21 RX ADMIN — BENZONATATE 100 MG: 100 CAPSULE ORAL at 12:01

## 2025-01-21 RX ADMIN — GUAIFENESIN 600 MG: 600 TABLET, EXTENDED RELEASE ORAL at 10:01

## 2025-01-21 NOTE — ASSESSMENT & PLAN NOTE
Pure Hypercholesteremia  Patient with known CAD  which is controlled Will continue Statin and monitor for S/Sx of angina/ACS. Continue to monitor on telemetry.

## 2025-01-21 NOTE — ASSESSMENT & PLAN NOTE
Patient has paroxysmal (<7 days) atrial fibrillation. Patient is currently in sinus rhythm. [id number] Score: 2. The patients heart rate in the last 24 hours is as follows:  Pulse  Min: 86  Max: 107     Antiarrhythmics  diltiaZEM 24 hr capsule 240 mg, Daily, Oral    Anticoagulants       Plan  - Replete lytes with a goal of K>4, Mg >2  - patient is not on anticoagulant per chart review  - Patient's afib is currently controlled  - tele

## 2025-01-21 NOTE — PLAN OF CARE
Patient is AAOx4,. Pt stable no complaints at this time. No distress noted. Call light in reach. Bed in low locked position.   Side rails x2. Belongings at bedside.   Pt free of fall and injuries Questions and concerns voiced and answered.    Problem: Adult Inpatient Plan of Care  Goal: Plan of Care Review  1/21/2025 1534 by Olu Mooney RN  Outcome: Progressing  1/21/2025 1534 by Olu Mooney RN  Outcome: Progressing  Goal: Patient-Specific Goal (Individualized)  1/21/2025 1534 by Olu Mooney RN  Outcome: Progressing  1/21/2025 1534 by Olu Mooney RN  Outcome: Progressing  Goal: Absence of Hospital-Acquired Illness or Injury  1/21/2025 1534 by Olu Mooney RN  Outcome: Progressing  1/21/2025 1534 by Olu Mooney RN  Outcome: Progressing  Goal: Optimal Comfort and Wellbeing  1/21/2025 1534 by Olu Mooney RN  Outcome: Progressing  1/21/2025 1534 by Olu Mooney RN  Outcome: Progressing  Goal: Readiness for Transition of Care  1/21/2025 1534 by Olu Mooney RN  Outcome: Progressing  1/21/2025 1534 by Olu Mooney RN  Outcome: Progressing     Problem: Hemodialysis  Goal: Safe, Effective Therapy Delivery  1/21/2025 1534 by Olu Mooney RN  Outcome: Progressing  1/21/2025 1534 by Olu Mooney RN  Outcome: Progressing  Goal: Effective Tissue Perfusion  1/21/2025 1534 by Olu Mooney RN  Outcome: Progressing  1/21/2025 1534 by Olu Mooney RN  Outcome: Progressing  Goal: Absence of Infection Signs and Symptoms  1/21/2025 1534 by Olu Mooney RN  Outcome: Progressing  1/21/2025 1534 by Olu Mooney RN  Outcome: Progressing     Problem: Infection  Goal: Absence of Infection Signs and Symptoms  1/21/2025 1534 by Olu Mooney RN  Outcome: Progressing  1/21/2025 1534 by Olu Mooney RN  Outcome: Progressing     Problem: Wound  Goal: Optimal Coping  1/21/2025 1534 by Olu Mooney RN  Outcome: Progressing  1/21/2025 1534 by Vinicio,  ASAEL Raines  Outcome: Progressing  Goal: Optimal Functional Ability  1/21/2025 1534 by Olu Mooney RN  Outcome: Progressing  1/21/2025 1534 by Olu Mooney RN  Outcome: Progressing  Goal: Absence of Infection Signs and Symptoms  1/21/2025 1534 by Olu Mooney RN  Outcome: Progressing  1/21/2025 1534 by Olu Mooney RN  Outcome: Progressing  Goal: Improved Oral Intake  1/21/2025 1534 by Olu Mooney RN  Outcome: Progressing  1/21/2025 1534 by Olu Mooney RN  Outcome: Progressing  Goal: Optimal Pain Control and Function  1/21/2025 1534 by Olu Mooney RN  Outcome: Progressing  1/21/2025 1534 by Olu Mooney RN  Outcome: Progressing  Goal: Skin Health and Integrity  1/21/2025 1534 by Olu Mooney RN  Outcome: Progressing  1/21/2025 1534 by Olu Mooney RN  Outcome: Progressing  Goal: Optimal Wound Healing  1/21/2025 1534 by Olu Mooney RN  Outcome: Progressing  1/21/2025 1534 by Olu Mooney RN  Outcome: Progressing     Problem: Skin Injury Risk Increased  Goal: Skin Health and Integrity  1/21/2025 1534 by Olu Mooney RN  Outcome: Progressing  1/21/2025 1534 by Olu Mooney RN  Outcome: Progressing     Problem: Comorbidity Management  Goal: Blood Pressure in Desired Range  1/21/2025 1534 by Olu Mooney RN  Outcome: Progressing  1/21/2025 1534 by Olu Mooney RN  Outcome: Progressing     Problem: Dysrhythmia  Goal: Normalized Cardiac Rhythm  1/21/2025 1534 by Olu Mooney RN  Outcome: Progressing  1/21/2025 1534 by Olu Mooney RN  Outcome: Progressing     Problem: Fluid Volume Excess  Goal: Fluid Balance  1/21/2025 1534 by Olu Mooney RN  Outcome: Progressing  1/21/2025 1534 by Olu Mooney RN  Outcome: Progressing     Problem: Anemia  Goal: Anemia Symptom Improvement  1/21/2025 1534 by Olu Mooney RN  Outcome: Progressing  1/21/2025 1534 by Mooney, Olu, RN  Outcome: Progressing

## 2025-01-21 NOTE — ASSESSMENT & PLAN NOTE
Creatine stable for now. BMP reviewed- noted Estimated Creatinine Clearance: 16.4 mL/min (A) (based on SCr of 5.3 mg/dL (H)). according to latest data. Based on current GFR, CKD stage is end stage.  Monitor UOP and serial BMP and adjust therapy as needed. Renally dose meds. Avoid nephrotoxic medications and procedures.

## 2025-01-21 NOTE — PROGRESS NOTES
[facility] - Observation [address] [place] [address] Medicine  Progress Note    Patient Name: Arthur Menjivar  MRN: [id number]  Patient Class: OP- Observation   Admission Date: 1/20/2025  Length of Stay: 0 days  Attending Physician: Yi Soares MD  Primary Care Provider: Demetri Whitley MD        Subjective     Principal Problem:Volume overload        HPI:  Arthur Menjivar is a 69 y.o. male with PMHx significant for ESRD on HD MWF, HTN, HLD admitted to hospital medicine for volume overload. Patient reports shortness of breath for the past week with associated cough and weakness. Reports last HD session was yesterday (off schedule because of upcoming winter storm). He reports that they have not been taking fluid off him at HD 2/2 weakness. Reports compliance with his home medications, and denies missed HD sessions. Denies fever/chills, diaphoresis, lightheadedness, HA, CP, congestion, abdominal pain, n/v/d, hematochezia, hematemesis, urinary symptoms, changes in BMs, numbness/tingling, weakness. Of note, patient was at [facility] for an ACS rule out with CXR showing congestion and CTA without evidence of PE. Unclear if stress testing was done. Reports hx of tobacco use, but has not smoked in several years.     In the ED, AFVSS on new requirement of 2L NC. Hgb 8.9 (previous ~11). Cr/BUN 6.6/24. . HS trop negative. K WNL. Covd/Flu/RSV negative. CXR with cardiomegaly with left greater than right bibasilar pulmonary opacities and pleural effusions. CHF exacerbation or volume overload would be favored over infectious/inflammatory process. Given duonebs x1 with mild improvement.     Overview/Hospital Course:  Patient weaned to room air after 2L removed with HD on 1/20.     Interval History: No acute events overnight. Patient reports feeling much better today. He does still have a lingering cough. CXR consistent with volume overload on presentation - less likely infectious process. Will get ICS to bedside  and continue to monitor.     Review of Systems  Objective:     Vital Signs (Most Recent):  Temp: 98 °F (36.7 °C) (01/21/25 0710)  Pulse: 98 (01/21/25 0710)  Resp: 18 (01/21/25 0710)  BP: 128/66 (01/21/25 0710)  SpO2: (!) 94 % (01/21/25 0710) Vital Signs (24h Range):  Temp:  [98 °F (36.7 °C)-98.9 °F (37.2 °C)] 98 °F (36.7 °C)  Pulse:  [] 98  Resp:  [17-24] 18  SpO2:  [93 %-100 %] 94 %  BP: ()/(54-99) 128/66     Weight: 111.1 kg (244 lb 14.9 oz)  Body mass index is 34.16 kg/m².    Intake/Output Summary (Last 24 hours) at 1/21/2025 1011  Last data filed at 1/20/2025 2330  Gross per 24 hour   Intake 500 ml   Output 2500 ml   Net -2000 ml         Physical Exam  Vitals and nursing note reviewed.   Constitutional:       General: He is not in acute distress.     Appearance: Normal appearance. He is obese. He is not ill-appearing.   HENT:      Head: Normocephalic and atraumatic.   Eyes:      Extraocular Movements: Extraocular movements intact.      Conjunctiva/sclera: Conjunctivae normal.      Pupils: Pupils are equal, round, and reactive to light.   Cardiovascular:      Rate and Rhythm: Normal rate and regular rhythm.      Pulses: Normal pulses.      Heart sounds: Normal heart sounds. No murmur heard.  Pulmonary:      Effort: Pulmonary effort is normal. No respiratory distress.      Breath sounds: Normal breath sounds. No wheezing or rhonchi.   Abdominal:      General: Bowel sounds are normal. There is no distension.      Palpations: Abdomen is soft.      Tenderness: There is no abdominal tenderness.   Musculoskeletal:         General: Normal range of motion.      Cervical back: Normal range of motion and neck supple.      Right lower leg: No edema.      Left lower leg: No edema.   Skin:     General: Skin is warm and dry.      Capillary Refill: Capillary refill takes less than 2 seconds.      Coloration: Skin is not jaundiced.   Neurological:      General: No focal deficit present.      Mental Status: He is  alert and oriented to person, place, and time. Mental status is at baseline.      Cranial Nerves: No cranial nerve deficit.   Psychiatric:         Mood and Affect: Mood normal.         Behavior: Behavior normal.             Significant Labs: All pertinent labs within the past 24 hours have been reviewed.    Significant Imaging: I have reviewed all pertinent imaging results/findings within the past 24 hours.    Assessment and Plan     * Volume overload  ESRD  Respiratory Distress  new 2L oxygen requirement on admit > resolved.   Remaining vitals stable. Wheezing noted on exam, denies hx of COPD/asthma > resolved  Denies missed HD with last session on 1/19. CXR with cardiomegaly with left greater than right bibasilar pulmonary opacities and pleural effusions. CHF exacerbation or volume overload would be favored over infectious/inflammatory process. Correlation with clinical findings will be helpful in this regard.   - given duoneb with mild improvement, continue prn  - dramatic improvement s/p HD    End stage renal disease  Creatine stable for now. BMP reviewed- noted Estimated Creatinine Clearance: 16.4 mL/min (A) (based on SCr of 5.3 mg/dL (H)). according to latest data. Based on current GFR, CKD stage is end stage.  Monitor UOP and serial BMP and adjust therapy as needed. Renally dose meds. Avoid nephrotoxic medications and procedures.    Anemia of chronic disease  Anemia is likely due to chronic disease due to ESRD. Most recent hemoglobin and hematocrit are listed below.  Recent Labs     01/20/25  1142 01/20/25  1148   HGB 8.9*  --    HCT 28.6* 28*       Plan  - Monitor serial CBC: Every 12 hours  - Transfuse PRBC if patient becomes hemodynamically unstable, symptomatic or H/H drops below 7/21.  - Patient has not received any PRBC transfusions to date  - Patient's anemia is currently stable  - denies hematochezia, hematemesis, hemoptysis  - will repeat after HD    Hypothyroidism  - continue home  levothyroxine      PAF (paroxysmal atrial fibrillation)  Patient has paroxysmal (<7 days) atrial fibrillation. Patient is currently in sinus rhythm. [id number] Score: 2. The patients heart rate in the last 24 hours is as follows:  Pulse  Min: 86  Max: 107     Antiarrhythmics  diltiaZEM 24 hr capsule 240 mg, Daily, Oral    Anticoagulants       Plan  - Replete lytes with a goal of K>4, Mg >2  - patient is not on anticoagulant per chart review  - Patient's afib is currently controlled  - tele        Pure hypercholesterolemia        Essential (primary) hypertension  Patient's blood pressure range in the last 24 hours was: BP  Min: 97/54  Max: 140/99.The patient's inpatient anti-hypertensive regimen is listed below:  Current Antihypertensives  carvediloL tablet 3.125 mg, 2 times daily, Oral  diltiaZEM 24 hr capsule 240 mg, Daily, Oral  hydrALAZINE tablet 100 mg, 3 times daily, Oral    Plan  - BP is controlled, no changes needed to their regimen  - tele    Atherosclerotic heart disease of native coronary artery without angina pectoris  Pure Hypercholesteremia  Patient with known CAD  which is controlled Will continue Statin and monitor for S/Sx of angina/ACS. Continue to monitor on telemetry.       VTE Risk Mitigation (From admission, onward)           Ordered     IP VTE HIGH RISK PATIENT  Once         01/20/25 1444     Place sequential compression device  Until discontinued         01/20/25 1444     Reason for No Pharmacological VTE Prophylaxis  Once        Question:  Reasons:  Answer:  Risk of Bleeding    01/20/25 1444                    Discharge Planning   JOELLE: 1/22/2025     Code Status: Full Code   Medical Readiness for Discharge Date:                            Yi Soares MD  Department of Hospital Medicine   [facility] - Observation 11H

## 2025-01-21 NOTE — PROGRESS NOTES
Bedside hemodialysis treatment 1:1 started per MD orders via LUE AV graft cannulated with gauge 15 needles without issues. VS stable to start tx. POC discussed with pt and verbalized understanding. Primary nurse made aware of initiation of HD.

## 2025-01-21 NOTE — PLAN OF CARE
Problem: Adult Inpatient Plan of Care  Goal: Plan of Care Review  Outcome: Progressing  Goal: Patient-Specific Goal (Individualized)  Outcome: Progressing  Goal: Absence of Hospital-Acquired Illness or Injury  Outcome: Progressing  Goal: Optimal Comfort and Wellbeing  Outcome: Progressing  Goal: Readiness for Transition of Care  Outcome: Progressing     Problem: Hemodialysis  Goal: Safe, Effective Therapy Delivery  Outcome: Progressing  Goal: Effective Tissue Perfusion  Outcome: Progressing  Goal: Absence of Infection Signs and Symptoms  Outcome: Progressing     Problem: Infection  Goal: Absence of Infection Signs and Symptoms  Outcome: Progressing     Problem: Wound  Goal: Optimal Coping  Outcome: Progressing  Goal: Optimal Functional Ability  Outcome: Progressing  Goal: Absence of Infection Signs and Symptoms  Outcome: Progressing  Goal: Improved Oral Intake  Outcome: Progressing  Goal: Optimal Pain Control and Function  Outcome: Progressing  Goal: Skin Health and Integrity  Outcome: Progressing  Goal: Optimal Wound Healing  Outcome: Progressing     Problem: Skin Injury Risk Increased  Goal: Skin Health and Integrity  Outcome: Progressing     Problem: Comorbidity Management  Goal: Blood Pressure in Desired Range  Outcome: Progressing     Problem: Dysrhythmia  Goal: Normalized Cardiac Rhythm  Outcome: Progressing     Problem: Fluid Volume Excess  Goal: Fluid Balance  Outcome: Progressing     Problem: Anemia  Goal: Anemia Symptom Improvement  Outcome: Progressing

## 2025-01-21 NOTE — HOSPITAL COURSE
Patient admitted for shortness of breath and cough. Patient weaned to room air after 2L removed with HD on 1/20. Hgb down trending, labs consistent with ROMINA. Patient denies bloody bowel movements. Needs c-scope outpatient. Febrile to 100.9 x2 on 1/22. Repeat CXR with progression of pulmonary edema but no consolidation. Procal uptrending. Started on azithromycin and ceftriaxone. Patients symptoms improved after antibiotics. CT chest with mild pulmonary edema. 1.2 cm nodular opacity in right lung base. Patient requires follow up CT scan in 3 months. Left thyroid nodule present. Will need outpatient ultrasound - patient and wife aware of findings and need for monitoring. Patient underwent HD per nephrology. ?trouble with cannulation. Vascular HD access ultrasound done - good flow volume 1600 and possible inflow lesion. Vascular on call and nephrology agree it can be managed as outpatient. Patient is stable and ready for discharge. Outpatient follow up appointments requested. Family eager to take patient home.

## 2025-01-21 NOTE — SUBJECTIVE & OBJECTIVE
Interval History: No acute events overnight. Patient reports feeling much better today. He does still have a lingering cough. CXR consistent with volume overload on presentation - less likely infectious process. Will get ICS to bedside and continue to monitor.     Review of Systems  Objective:     Vital Signs (Most Recent):  Temp: 98 °F (36.7 °C) (01/21/25 0710)  Pulse: 98 (01/21/25 0710)  Resp: 18 (01/21/25 0710)  BP: 128/66 (01/21/25 0710)  SpO2: (!) 94 % (01/21/25 0710) Vital Signs (24h Range):  Temp:  [98 °F (36.7 °C)-98.9 °F (37.2 °C)] 98 °F (36.7 °C)  Pulse:  [] 98  Resp:  [17-24] 18  SpO2:  [93 %-100 %] 94 %  BP: ()/(54-99) 128/66     Weight: 111.1 kg (244 lb 14.9 oz)  Body mass index is 34.16 kg/m².    Intake/Output Summary (Last 24 hours) at 1/21/2025 1011  Last data filed at 1/20/2025 2330  Gross per 24 hour   Intake 500 ml   Output 2500 ml   Net -2000 ml         Physical Exam  Vitals and nursing note reviewed.   Constitutional:       General: He is not in acute distress.     Appearance: Normal appearance. He is obese. He is not ill-appearing.   HENT:      Head: Normocephalic and atraumatic.   Eyes:      Extraocular Movements: Extraocular movements intact.      Conjunctiva/sclera: Conjunctivae normal.      Pupils: Pupils are equal, round, and reactive to light.   Cardiovascular:      Rate and Rhythm: Normal rate and regular rhythm.      Pulses: Normal pulses.      Heart sounds: Normal heart sounds. No murmur heard.  Pulmonary:      Effort: Pulmonary effort is normal. No respiratory distress.      Breath sounds: Normal breath sounds. No wheezing or rhonchi.   Abdominal:      General: Bowel sounds are normal. There is no distension.      Palpations: Abdomen is soft.      Tenderness: There is no abdominal tenderness.   Musculoskeletal:         General: Normal range of motion.      Cervical back: Normal range of motion and neck supple.      Right lower leg: No edema.      Left lower leg: No edema.    Skin:     General: Skin is warm and dry.      Capillary Refill: Capillary refill takes less than 2 seconds.      Coloration: Skin is not jaundiced.   Neurological:      General: No focal deficit present.      Mental Status: He is alert and oriented to person, place, and time. Mental status is at baseline.      Cranial Nerves: No cranial nerve deficit.   Psychiatric:         Mood and Affect: Mood normal.         Behavior: Behavior normal.             Significant Labs: All pertinent labs within the past 24 hours have been reviewed.    Significant Imaging: I have reviewed all pertinent imaging results/findings within the past 24 hours.

## 2025-01-21 NOTE — ASSESSMENT & PLAN NOTE
Patient's blood pressure range in the last 24 hours was: BP  Min: 97/54  Max: 140/99.The patient's inpatient anti-hypertensive regimen is listed below:  Current Antihypertensives  carvediloL tablet 3.125 mg, 2 times daily, Oral  diltiaZEM 24 hr capsule 240 mg, Daily, Oral  hydrALAZINE tablet 100 mg, 3 times daily, Oral    Plan  - BP is controlled, no changes needed to their regimen  - tele

## 2025-01-21 NOTE — PLAN OF CARE
[facility] - Observation 11H  Discharge Assessment    Primary Care Provider: Demetri Whitley MD     Discharge Assessment (most recent)       BRIEF DISCHARGE ASSESSMENT - 01/21/25 1030          Discharge Planning    Assessment Type Discharge Planning Brief Assessment     Resource/Environmental Concerns none     Support Systems Spouse/significant other     Equipment Currently Used at Home cane, straight     Current Living Arrangements home     Patient/Family Anticipates Transition to home;home with family     Patient/Family Anticipated Services at Transition none     DME Needed Upon Discharge  none     Discharge Plan A Home     Discharge Plan B Home                   Pt is a 69 y.o. male admitted with volume overload and has a PMH of ESRD (MWF [facility]) and HTN. Pt is independent with his ADLs and iADLs and drives. He will have transportation home. [facility] Discharge Packet given to patient and/or family with understanding verbalized.   name and number and estimated discharge date written on white board in patient's room with request to call for any questions or concerns.  Will continue to follow for needs.  Discharge Plan A and Plan B have been determined by review of patient's clinical status, future medical and therapeutic needs, and coverage/benefits for post-acute care in coordination with multidisciplinary team members.  Roque Santamaria RN,BSN

## 2025-01-21 NOTE — ASSESSMENT & PLAN NOTE
ESRD  Respiratory Distress  new 2L oxygen requirement on admit > resolved.   Remaining vitals stable. Wheezing noted on exam, denies hx of COPD/asthma > resolved  Denies missed HD with last session on 1/19. CXR with cardiomegaly with left greater than right bibasilar pulmonary opacities and pleural effusions. CHF exacerbation or volume overload would be favored over infectious/inflammatory process. Correlation with clinical findings will be helpful in this regard.   - given duoneb with mild improvement, continue prn  - dramatic improvement s/p HD

## 2025-01-22 PROBLEM — N18.6 ESRD (END STAGE RENAL DISEASE): Status: RESOLVED | Noted: 2025-01-22 | Resolved: 2025-01-22

## 2025-01-22 PROBLEM — N18.6 ESRD (END STAGE RENAL DISEASE): Status: ACTIVE | Noted: 2025-01-22

## 2025-01-22 PROBLEM — R06.02 SOB (SHORTNESS OF BREATH): Status: ACTIVE | Noted: 2025-01-22

## 2025-01-22 LAB
ABO + RH BLD: NORMAL
ALBUMIN SERPL BCP-MCNC: 2.7 G/DL (ref 3.5–5.2)
ALP SERPL-CCNC: 67 U/L (ref 40–150)
ALT SERPL W/O P-5'-P-CCNC: 13 U/L (ref 10–44)
ANION GAP SERPL CALC-SCNC: 10 MMOL/L (ref 8–16)
AST SERPL-CCNC: 15 U/L (ref 10–40)
BASOPHILS # BLD AUTO: 0.02 K/UL (ref 0–0.2)
BASOPHILS # BLD AUTO: 0.02 K/UL (ref 0–0.2)
BASOPHILS NFR BLD: 0.2 % (ref 0–1.9)
BASOPHILS NFR BLD: 0.3 % (ref 0–1.9)
BILIRUB SERPL-MCNC: 0.6 MG/DL (ref 0.1–1)
BLD GP AB SCN CELLS X3 SERPL QL: NORMAL
BUN SERPL-MCNC: 35 MG/DL (ref 8–23)
CALCIUM SERPL-MCNC: 9.2 MG/DL (ref 8.7–10.5)
CHLORIDE SERPL-SCNC: 101 MMOL/L (ref 95–110)
CO2 SERPL-SCNC: 27 MMOL/L (ref 23–29)
CREAT SERPL-MCNC: 7.4 MG/DL (ref 0.5–1.4)
CRP SERPL-MCNC: 254.3 MG/L (ref 0–8.2)
DIFFERENTIAL METHOD BLD: ABNORMAL
DIFFERENTIAL METHOD BLD: ABNORMAL
EOSINOPHIL # BLD AUTO: 0.1 K/UL (ref 0–0.5)
EOSINOPHIL # BLD AUTO: 0.1 K/UL (ref 0–0.5)
EOSINOPHIL NFR BLD: 1 % (ref 0–8)
EOSINOPHIL NFR BLD: 2 % (ref 0–8)
ERYTHROCYTE [DISTWIDTH] IN BLOOD BY AUTOMATED COUNT: 15.9 % (ref 11.5–14.5)
ERYTHROCYTE [DISTWIDTH] IN BLOOD BY AUTOMATED COUNT: 16.1 % (ref 11.5–14.5)
EST. GFR  (NO RACE VARIABLE): 7.3 ML/MIN/1.73 M^2
FERRITIN SERPL-MCNC: 4759 NG/ML (ref 20–300)
GLUCOSE SERPL-MCNC: 118 MG/DL (ref 70–110)
HCT VFR BLD AUTO: 25.2 % (ref 40–54)
HCT VFR BLD AUTO: 28.2 % (ref 40–54)
HGB BLD-MCNC: 7.7 G/DL (ref 14–18)
HGB BLD-MCNC: 8.3 G/DL (ref 14–18)
IMM GRANULOCYTES # BLD AUTO: 0.04 K/UL (ref 0–0.04)
IMM GRANULOCYTES # BLD AUTO: 0.05 K/UL (ref 0–0.04)
IMM GRANULOCYTES NFR BLD AUTO: 0.6 % (ref 0–0.5)
IMM GRANULOCYTES NFR BLD AUTO: 0.6 % (ref 0–0.5)
IRON SERPL-MCNC: 32 UG/DL (ref 45–160)
LACTATE SERPL-SCNC: 0.6 MMOL/L (ref 0.5–2.2)
LYMPHOCYTES # BLD AUTO: 0.8 K/UL (ref 1–4.8)
LYMPHOCYTES # BLD AUTO: 1.1 K/UL (ref 1–4.8)
LYMPHOCYTES NFR BLD: 10.2 % (ref 18–48)
LYMPHOCYTES NFR BLD: 16.4 % (ref 18–48)
MAGNESIUM SERPL-MCNC: 2.1 MG/DL (ref 1.6–2.6)
MCH RBC QN AUTO: 32.2 PG (ref 27–31)
MCH RBC QN AUTO: 33 PG (ref 27–31)
MCHC RBC AUTO-ENTMCNC: 29.4 G/DL (ref 32–36)
MCHC RBC AUTO-ENTMCNC: 30.6 G/DL (ref 32–36)
MCV RBC AUTO: 108 FL (ref 82–98)
MCV RBC AUTO: 109 FL (ref 82–98)
MONOCYTES # BLD AUTO: 0.6 K/UL (ref 0.3–1)
MONOCYTES # BLD AUTO: 0.7 K/UL (ref 0.3–1)
MONOCYTES NFR BLD: 10.5 % (ref 4–15)
MONOCYTES NFR BLD: 7 % (ref 4–15)
NEUTROPHILS # BLD AUTO: 4.8 K/UL (ref 1.8–7.7)
NEUTROPHILS # BLD AUTO: 6.6 K/UL (ref 1.8–7.7)
NEUTROPHILS NFR BLD: 70.2 % (ref 38–73)
NEUTROPHILS NFR BLD: 81 % (ref 38–73)
NRBC BLD-RTO: 0 /100 WBC
NRBC BLD-RTO: 0 /100 WBC
PHOSPHATE SERPL-MCNC: 4 MG/DL (ref 2.7–4.5)
PLATELET # BLD AUTO: 163 K/UL (ref 150–450)
PLATELET # BLD AUTO: 169 K/UL (ref 150–450)
PMV BLD AUTO: 8.5 FL (ref 9.2–12.9)
PMV BLD AUTO: 8.9 FL (ref 9.2–12.9)
POTASSIUM SERPL-SCNC: 4.1 MMOL/L (ref 3.5–5.1)
PROCALCITONIN SERPL IA-MCNC: 1.35 NG/ML
PROT SERPL-MCNC: 7.9 G/DL (ref 6–8.4)
RBC # BLD AUTO: 2.33 M/UL (ref 4.6–6.2)
RBC # BLD AUTO: 2.58 M/UL (ref 4.6–6.2)
SATURATED IRON: 16 % (ref 20–50)
SODIUM SERPL-SCNC: 138 MMOL/L (ref 136–145)
SPECIMEN OUTDATE: NORMAL
TOTAL IRON BINDING CAPACITY: 204 UG/DL (ref 250–450)
TRANSFERRIN SERPL-MCNC: 138 MG/DL (ref 200–375)
WBC # BLD AUTO: 6.83 K/UL (ref 3.9–12.7)
WBC # BLD AUTO: 8.17 K/UL (ref 3.9–12.7)

## 2025-01-22 PROCEDURE — 86900 BLOOD TYPING SEROLOGIC ABO: CPT | Performed by: STUDENT IN AN ORGANIZED HEALTH CARE EDUCATION/TRAINING PROGRAM

## 2025-01-22 PROCEDURE — 25000003 PHARM REV CODE 250: Performed by: PHYSICIAN ASSISTANT

## 2025-01-22 PROCEDURE — 94761 N-INVAS EAR/PLS OXIMETRY MLT: CPT

## 2025-01-22 PROCEDURE — 36415 COLL VENOUS BLD VENIPUNCTURE: CPT | Performed by: STUDENT IN AN ORGANIZED HEALTH CARE EDUCATION/TRAINING PROGRAM

## 2025-01-22 PROCEDURE — G0257 UNSCHED DIALYSIS ESRD PT HOS: HCPCS

## 2025-01-22 PROCEDURE — 90935 HEMODIALYSIS ONE EVALUATION: CPT | Mod: ,,, | Performed by: NURSE PRACTITIONER

## 2025-01-22 PROCEDURE — 85025 COMPLETE CBC W/AUTO DIFF WBC: CPT | Performed by: PHYSICIAN ASSISTANT

## 2025-01-22 PROCEDURE — 11000001 HC ACUTE MED/SURG PRIVATE ROOM

## 2025-01-22 PROCEDURE — 36415 COLL VENOUS BLD VENIPUNCTURE: CPT | Mod: XB | Performed by: NURSE PRACTITIONER

## 2025-01-22 PROCEDURE — 83735 ASSAY OF MAGNESIUM: CPT | Performed by: PHYSICIAN ASSISTANT

## 2025-01-22 PROCEDURE — 36415 COLL VENOUS BLD VENIPUNCTURE: CPT | Performed by: PHYSICIAN ASSISTANT

## 2025-01-22 PROCEDURE — 86140 C-REACTIVE PROTEIN: CPT | Performed by: NURSE PRACTITIONER

## 2025-01-22 PROCEDURE — 94640 AIRWAY INHALATION TREATMENT: CPT

## 2025-01-22 PROCEDURE — 84466 ASSAY OF TRANSFERRIN: CPT | Performed by: STUDENT IN AN ORGANIZED HEALTH CARE EDUCATION/TRAINING PROGRAM

## 2025-01-22 PROCEDURE — 84100 ASSAY OF PHOSPHORUS: CPT | Performed by: PHYSICIAN ASSISTANT

## 2025-01-22 PROCEDURE — 84145 PROCALCITONIN (PCT): CPT | Performed by: NURSE PRACTITIONER

## 2025-01-22 PROCEDURE — 80053 COMPREHEN METABOLIC PANEL: CPT | Performed by: PHYSICIAN ASSISTANT

## 2025-01-22 PROCEDURE — 25000242 PHARM REV CODE 250 ALT 637 W/ HCPCS: Performed by: PHYSICIAN ASSISTANT

## 2025-01-22 PROCEDURE — 63600175 PHARM REV CODE 636 W HCPCS: Performed by: NURSE PRACTITIONER

## 2025-01-22 PROCEDURE — 82728 ASSAY OF FERRITIN: CPT | Performed by: STUDENT IN AN ORGANIZED HEALTH CARE EDUCATION/TRAINING PROGRAM

## 2025-01-22 PROCEDURE — 83605 ASSAY OF LACTIC ACID: CPT | Performed by: NURSE PRACTITIONER

## 2025-01-22 PROCEDURE — 25000003 PHARM REV CODE 250: Performed by: STUDENT IN AN ORGANIZED HEALTH CARE EDUCATION/TRAINING PROGRAM

## 2025-01-22 PROCEDURE — 25000003 PHARM REV CODE 250: Performed by: NURSE PRACTITIONER

## 2025-01-22 PROCEDURE — 85025 COMPLETE CBC W/AUTO DIFF WBC: CPT | Mod: 91 | Performed by: STUDENT IN AN ORGANIZED HEALTH CARE EDUCATION/TRAINING PROGRAM

## 2025-01-22 RX ORDER — AZITHROMYCIN 250 MG/1
250 TABLET, FILM COATED ORAL DAILY
Status: DISCONTINUED | OUTPATIENT
Start: 2025-01-23 | End: 2025-01-25 | Stop reason: HOSPADM

## 2025-01-22 RX ORDER — CEFTRIAXONE 1 G/1
1 INJECTION, POWDER, FOR SOLUTION INTRAMUSCULAR; INTRAVENOUS
Status: DISCONTINUED | OUTPATIENT
Start: 2025-01-22 | End: 2025-01-25 | Stop reason: HOSPADM

## 2025-01-22 RX ORDER — ACETAMINOPHEN 325 MG/1
650 TABLET ORAL EVERY 4 HOURS PRN
Status: DISCONTINUED | OUTPATIENT
Start: 2025-01-22 | End: 2025-01-25 | Stop reason: HOSPADM

## 2025-01-22 RX ADMIN — DILTIAZEM HYDROCHLORIDE 240 MG: 120 CAPSULE, COATED, EXTENDED RELEASE ORAL at 11:01

## 2025-01-22 RX ADMIN — GUAIFENESIN 600 MG: 600 TABLET, EXTENDED RELEASE ORAL at 09:01

## 2025-01-22 RX ADMIN — CEFTRIAXONE 1 G: 1 INJECTION, POWDER, FOR SOLUTION INTRAMUSCULAR; INTRAVENOUS at 11:01

## 2025-01-22 RX ADMIN — POLYETHYLENE GLYCOL 3350 17 G: 17 POWDER, FOR SOLUTION ORAL at 06:01

## 2025-01-22 RX ADMIN — IPRATROPIUM BROMIDE AND ALBUTEROL SULFATE 3 ML: 2.5; .5 SOLUTION RESPIRATORY (INHALATION) at 11:01

## 2025-01-22 RX ADMIN — BENZONATATE 200 MG: 100 CAPSULE ORAL at 06:01

## 2025-01-22 RX ADMIN — ATORVASTATIN CALCIUM 80 MG: 40 TABLET, FILM COATED ORAL at 09:01

## 2025-01-22 RX ADMIN — AZITHROMYCIN MONOHYDRATE 500 MG: 500 INJECTION, POWDER, LYOPHILIZED, FOR SOLUTION INTRAVENOUS at 11:01

## 2025-01-22 RX ADMIN — ACETAMINOPHEN 650 MG: 325 TABLET ORAL at 08:01

## 2025-01-22 RX ADMIN — SEVELAMER CARBONATE 800 MG: 800 TABLET, FILM COATED ORAL at 11:01

## 2025-01-22 RX ADMIN — Medication 500 MG: at 11:01

## 2025-01-22 RX ADMIN — THERA TABS 1 TABLET: TAB at 11:01

## 2025-01-22 RX ADMIN — LEVOTHYROXINE SODIUM 75 MCG: 75 TABLET ORAL at 05:01

## 2025-01-22 RX ADMIN — SEVELAMER CARBONATE 800 MG: 800 TABLET, FILM COATED ORAL at 06:01

## 2025-01-22 NOTE — PROGRESS NOTES
01/22/25 0712 01/22/25 0716        Hemodialysis AV Graft Left forearm   No placement date or time found.   Location: Left forearm   Needle Size 15ga  --    Site Assessment No redness;No swelling  --    Patency Present;Thrill;Bruit  --    Status Accessed  --    Flows Good  --    Site Condition No complications  --    During Hemodialysis Assessment   Blood Flow Rate (mL/min)  --  400 mL/min   Dialysate Flow Rate (mL/min)  --  700 ml/min   Ultrafiltration Rate (mL/Hr)  --  880 mL/Hr   Arteriovenous Lines Secure  --  Yes   Arterial Pressure (mmHg)  --  -130 mmHg   Venous Pressure (mmHg)  --  140   Blood Volume Processed (Liters)  --  0 L   UF Removed (mL)  --  0 mL   TMP  --  10   Venous Line in Air Detector  --  Yes   Intake (mL)  --  250 mL   Intra-Hemodialysis Comments  --  HD started     Patient arrived [facility] by wheelchair. Observation HD started.

## 2025-01-22 NOTE — ASSESSMENT & PLAN NOTE
Anemia is likely due to chronic disease due to ESRD. Most recent hemoglobin and hematocrit are listed below.  Hgb acutely lower than baseline of about 11 earlier in 2024. Iron deficiency labs ordered. Patient denies bloody bowel movements.     Recent Labs     01/20/25  1142 01/20/25  1148 01/21/25  1735 01/22/25  0400   HGB 8.9*  --  8.4* 7.7*   HCT 28.6* 28* 27.9* 25.2*       Plan  - Monitor serial CBC: Daily  - Transfuse PRBC if patient becomes hemodynamically unstable, symptomatic or H/H drops below 7/21.  - Patient has not received any PRBC transfusions to date  - Patient's anemia is currently stable  - denies hematochezia, hematemesis, hemoptysis

## 2025-01-22 NOTE — PROGRESS NOTES
01/22/25 1015 01/22/25 1017        Hemodialysis AV Graft Left forearm   No placement date or time found.   Location: Left forearm   Site Assessment  --  No redness;No swelling   Patency  --  Present;Thrill;Bruit   Status  --  Deaccessed   Dressing Status  --  Clean;Dry;Intact   Site Condition  --  No complications   During Hemodialysis Assessment   Blood Flow Rate (mL/min) 400 mL/min  --    Dialysate Flow Rate (mL/min) 700 ml/min  --    Ultrafiltration Rate (mL/Hr) 350 mL/Hr  --    Arteriovenous Lines Secure Yes  --    Arterial Pressure (mmHg) -130 mmHg  --    Venous Pressure (mmHg) 140  --    Blood Volume Processed (Liters) 64.2 L  --    UF Removed (mL) 1650 mL  --    TMP 10  --    Venous Line in Air Detector Yes  --    Intake (mL) 250 mL  --    Intra-Hemodialysis Comments HD completed  --    Post-Hemodialysis Assessment   Rinseback Volume (mL) 250 mL  --    Blood Volume Processed (Liters) 64.2 L  --    Dialyzer Clearance Heavily streaked  --    Duration of Treatment 180 minutes  --    Total UF (mL) 1650 mL  --    Net Fluid Removal 1000  --    Patient Response to Treatment Antoine. well  --    Post-Treatment Weight 108.8 kg (239 lb 13.8 oz)  --    Treatment Weight Change -1.1  --      HD completed. Patient left [facility] by wheelchair in [facility].

## 2025-01-22 NOTE — PROGRESS NOTES
[facility] [address] [place] [address] Medicine  Progress Note    Patient Name: Arthur Menjivar  MRN: [id number]  Patient Class: IP- Inpatient   Admission Date: 1/20/2025  Length of Stay: 1 days  Attending Physician: Yi Soares MD  Primary Care Provider: Demetri Whitley MD        Subjective     Principal Problem:Volume overload        HPI:  Arthur Menjivar is a 69 y.o. male with PMHx significant for ESRD on HD MWF, HTN, HLD admitted to hospital medicine for volume overload. Patient reports shortness of breath for the past week with associated cough and weakness. Reports last HD session was yesterday (off schedule because of upcoming winter storm). He reports that they have not been taking fluid off him at HD 2/2 weakness. Reports compliance with his home medications, and denies missed HD sessions. Denies fever/chills, diaphoresis, lightheadedness, HA, CP, congestion, abdominal pain, n/v/d, hematochezia, hematemesis, urinary symptoms, changes in BMs, numbness/tingling, weakness. Of note, patient was at [facility] for an ACS rule out with CXR showing congestion and CTA without evidence of PE. Unclear if stress testing was done. Reports hx of tobacco use, but has not smoked in several years.     In the ED, AFVSS on new requirement of 2L NC. Hgb 8.9 (previous ~11). Cr/BUN 6.6/24. . HS trop negative. K WNL. Covd/Flu/RSV negative. CXR with cardiomegaly with left greater than right bibasilar pulmonary opacities and pleural effusions. CHF exacerbation or volume overload would be favored over infectious/inflammatory process. Given duonebs x1 with mild improvement.     Overview/Hospital Course:  Patient weaned to room air after 2L removed with HD on 1/20. Hgb down trending. Patient denies bloody bowel movements. Iron labs ordered.     Interval History: No acute events overnight. Cough persistent. Patient feeling improved compared to presentation.     Review of Systems  Objective:     Vital  Signs (Most Recent):  Temp: 98 °F (36.7 °C) (01/22/25 0711)  Pulse: 67 (01/22/25 1000)  Resp: 18 (01/22/25 0711)  BP: (!) 109/58 (01/22/25 1000)  SpO2: 97 % (01/22/25 0418) Vital Signs (24h Range):  Temp:  [97.7 °F (36.5 °C)-98.6 °F (37 °C)] 98 °F (36.7 °C)  Pulse:  [66-80] 67  Resp:  [18] 18  SpO2:  [92 %-97 %] 97 %  BP: ()/(46-61) 109/58     Weight: 111.1 kg (244 lb 14.9 oz)  Body mass index is 34.16 kg/m².    Intake/Output Summary (Last 24 hours) at 1/22/2025 1025  Last data filed at 1/21/2025 1800  Gross per 24 hour   Intake 240 ml   Output --   Net 240 ml         Physical Exam  Vitals and nursing note reviewed.   Constitutional:       General: He is not in acute distress.     Appearance: Normal appearance. He is obese. He is not ill-appearing.   HENT:      Head: Normocephalic and atraumatic.   Eyes:      Extraocular Movements: Extraocular movements intact.      Conjunctiva/sclera: Conjunctivae normal.      Pupils: Pupils are equal, round, and reactive to light.   Cardiovascular:      Rate and Rhythm: Normal rate and regular rhythm.      Pulses: Normal pulses.      Heart sounds: Normal heart sounds. No murmur heard.  Pulmonary:      Effort: Pulmonary effort is normal. No respiratory distress.      Breath sounds: Normal breath sounds. No wheezing or rhonchi.   Abdominal:      General: Bowel sounds are normal. There is no distension.      Palpations: Abdomen is soft.      Tenderness: There is no abdominal tenderness.   Musculoskeletal:         General: Normal range of motion.      Cervical back: Normal range of motion and neck supple.      Right lower leg: No edema.      Left lower leg: No edema.   Skin:     General: Skin is warm and dry.      Capillary Refill: Capillary refill takes less than 2 seconds.      Coloration: Skin is not jaundiced.   Neurological:      General: No focal deficit present.      Mental Status: He is alert and oriented to person, place, and time. Mental status is at baseline.       Cranial Nerves: No cranial nerve deficit.   Psychiatric:         Mood and Affect: Mood normal.         Behavior: Behavior normal.             Significant Labs: All pertinent labs within the past 24 hours have been reviewed.    Significant Imaging: I have reviewed all pertinent imaging results/findings within the past 24 hours.    Assessment and Plan     * Volume overload  ESRD  Respiratory Distress  new 2L oxygen requirement on admit > resolved.   Remaining vitals stable. Wheezing noted on exam, denies hx of COPD/asthma > resolved  Denies missed HD with last session on 1/19. CXR with cardiomegaly with left greater than right bibasilar pulmonary opacities and pleural effusions. CHF exacerbation or volume overload would be favored over infectious/inflammatory process. Correlation with clinical findings will be helpful in this regard.   - given duoneb with mild improvement, continue prn  - dramatic improvement s/p HD    End stage renal disease  Creatine stable for now. BMP reviewed- noted Estimated Creatinine Clearance: 11.8 mL/min (A) (based on SCr of 7.4 mg/dL (H)). according to latest data. Based on current GFR, CKD stage is end stage.  Monitor UOP and serial BMP and adjust therapy as needed. Renally dose meds. Avoid nephrotoxic medications and procedures.    Anemia of chronic disease  Anemia is likely due to chronic disease due to ESRD. Most recent hemoglobin and hematocrit are listed below.  Hgb acutely lower than baseline of about 11 earlier in 2024. Iron deficiency labs ordered. Patient denies bloody bowel movements.     Recent Labs     01/20/25  1142 01/20/25  1148 01/21/25  1735 01/22/25  0400   HGB 8.9*  --  8.4* 7.7*   HCT 28.6* 28* 27.9* 25.2*       Plan  - Monitor serial CBC: Daily  - Transfuse PRBC if patient becomes hemodynamically unstable, symptomatic or H/H drops below 7/21.  - Patient has not received any PRBC transfusions to date  - Patient's anemia is currently stable  - denies hematochezia,  hematemesis, hemoptysis    Hypothyroidism  - continue home levothyroxine      PAF (paroxysmal atrial fibrillation)  Patient has paroxysmal (<7 days) atrial fibrillation. Patient is currently in sinus rhythm. [id number] Score: 2. The patients heart rate in the last 24 hours is as follows:  Pulse  Min: 66  Max: 80     Antiarrhythmics  diltiaZEM 24 hr capsule 240 mg, Daily, Oral    Anticoagulants       Plan  - Replete lytes with a goal of K>4, Mg >2  - patient is not on anticoagulant per chart review  - Patient's afib is currently controlled  - tele        Pure hypercholesterolemia        Essential (primary) hypertension  Patient's blood pressure range in the last 24 hours was: BP  Min: 92/50  Max: 126/59.The patient's inpatient anti-hypertensive regimen is listed below:  Current Antihypertensives  carvediloL tablet 3.125 mg, 2 times daily, Oral  diltiaZEM 24 hr capsule 240 mg, Daily, Oral  hydrALAZINE tablet 100 mg, 3 times daily, Oral    Plan  - BP is controlled, no changes needed to their regimen  - tele    Atherosclerotic heart disease of native coronary artery without angina pectoris  Pure Hypercholesteremia  Patient with known CAD  which is controlled Will continue Statin and monitor for S/Sx of angina/ACS. Continue to monitor on telemetry.       VTE Risk Mitigation (From admission, onward)           Ordered     IP VTE HIGH RISK PATIENT  Once         01/20/25 1444     Place sequential compression device  Until discontinued         01/20/25 1444     Reason for No Pharmacological VTE Prophylaxis  Once        Question:  Reasons:  Answer:  Risk of Bleeding    01/20/25 1444                    Discharge Planning   JOELLE: 1/22/2025     Code Status: Full Code   Medical Readiness for Discharge Date:   Discharge Plan A: Home                        Yi Soares MD  Department of Hospital Medicine   [facility] 11H

## 2025-01-22 NOTE — ASSESSMENT & PLAN NOTE
Patient has paroxysmal (<7 days) atrial fibrillation. Patient is currently in sinus rhythm. [id number] Score: 2. The patients heart rate in the last 24 hours is as follows:  Pulse  Min: 66  Max: 80     Antiarrhythmics  diltiaZEM 24 hr capsule 240 mg, Daily, Oral    Anticoagulants       Plan  - Replete lytes with a goal of K>4, Mg >2  - patient is not on anticoagulant per chart review  - Patient's afib is currently controlled  - tele

## 2025-01-22 NOTE — ASSESSMENT & PLAN NOTE
Patient's blood pressure range in the last 24 hours was: BP  Min: 92/50  Max: 126/59.The patient's inpatient anti-hypertensive regimen is listed below:  Current Antihypertensives  carvediloL tablet 3.125 mg, 2 times daily, Oral  diltiaZEM 24 hr capsule 240 mg, Daily, Oral  hydrALAZINE tablet 100 mg, 3 times daily, Oral    Plan  - BP is controlled, no changes needed to their regimen  - tele

## 2025-01-22 NOTE — ASSESSMENT & PLAN NOTE
Creatine stable for now. BMP reviewed- noted Estimated Creatinine Clearance: 11.8 mL/min (A) (based on SCr of 7.4 mg/dL (H)). according to latest data. Based on current GFR, CKD stage is end stage.  Monitor UOP and serial BMP and adjust therapy as needed. Renally dose meds. Avoid nephrotoxic medications and procedures.

## 2025-01-22 NOTE — PROCEDURES
[facility] NOTE     Patient currently on hemodialysis for removal of uremic toxins and volume.     Patient seen and evaluated on hemodialysis, tolerating treatment, see HD flowsheet for vitals and assessments.      Ultrafiltration goal is 1L     Labs have been reviewed and the dialysate bath has been adjusted.     Assessment/Plan:  Seen on HD this morning, tolerating well.  BPs trending down.  Came in under his EDW.  Reported EDW of 112 kg, this morning at 109.9 kg.  He reports improvement in symptoms since arrival.  On room air in NAD.  Will attempt 1L today.  Will need to notify his OP dialysis unit when they re-open of patients new EDW.  Continue phos binders  Will defer MIRNA therapy to his OP unit at this time.  Can start IP if here longer.    GABRIEL Butler, FNP-BC  Nephrology  Pager:  [phone]

## 2025-01-22 NOTE — SUBJECTIVE & OBJECTIVE
Interval History: No acute events overnight. Cough persistent. Patient feeling improved compared to presentation.     Review of Systems  Objective:     Vital Signs (Most Recent):  Temp: 98 °F (36.7 °C) (01/22/25 0711)  Pulse: 67 (01/22/25 1000)  Resp: 18 (01/22/25 0711)  BP: (!) 109/58 (01/22/25 1000)  SpO2: 97 % (01/22/25 0418) Vital Signs (24h Range):  Temp:  [97.7 °F (36.5 °C)-98.6 °F (37 °C)] 98 °F (36.7 °C)  Pulse:  [66-80] 67  Resp:  [18] 18  SpO2:  [92 %-97 %] 97 %  BP: ()/(46-61) 109/58     Weight: 111.1 kg (244 lb 14.9 oz)  Body mass index is 34.16 kg/m².    Intake/Output Summary (Last 24 hours) at 1/22/2025 1025  Last data filed at 1/21/2025 1800  Gross per 24 hour   Intake 240 ml   Output --   Net 240 ml         Physical Exam  Vitals and nursing note reviewed.   Constitutional:       General: He is not in acute distress.     Appearance: Normal appearance. He is obese. He is not ill-appearing.   HENT:      Head: Normocephalic and atraumatic.   Eyes:      Extraocular Movements: Extraocular movements intact.      Conjunctiva/sclera: Conjunctivae normal.      Pupils: Pupils are equal, round, and reactive to light.   Cardiovascular:      Rate and Rhythm: Normal rate and regular rhythm.      Pulses: Normal pulses.      Heart sounds: Normal heart sounds. No murmur heard.  Pulmonary:      Effort: Pulmonary effort is normal. No respiratory distress.      Breath sounds: Normal breath sounds. No wheezing or rhonchi.   Abdominal:      General: Bowel sounds are normal. There is no distension.      Palpations: Abdomen is soft.      Tenderness: There is no abdominal tenderness.   Musculoskeletal:         General: Normal range of motion.      Cervical back: Normal range of motion and neck supple.      Right lower leg: No edema.      Left lower leg: No edema.   Skin:     General: Skin is warm and dry.      Capillary Refill: Capillary refill takes less than 2 seconds.      Coloration: Skin is not jaundiced.    Neurological:      General: No focal deficit present.      Mental Status: He is alert and oriented to person, place, and time. Mental status is at baseline.      Cranial Nerves: No cranial nerve deficit.   Psychiatric:         Mood and Affect: Mood normal.         Behavior: Behavior normal.             Significant Labs: All pertinent labs within the past 24 hours have been reviewed.    Significant Imaging: I have reviewed all pertinent imaging results/findings within the past 24 hours.

## 2025-01-23 PROBLEM — R50.9 FEBRILE: Status: ACTIVE | Noted: 2025-01-23

## 2025-01-23 LAB
ADENOVIRUS: NOT DETECTED
ALBUMIN SERPL BCP-MCNC: 2.6 G/DL (ref 3.5–5.2)
ALP SERPL-CCNC: 93 U/L (ref 40–150)
ALT SERPL W/O P-5'-P-CCNC: 19 U/L (ref 10–44)
ANION GAP SERPL CALC-SCNC: 11 MMOL/L (ref 8–16)
AST SERPL-CCNC: 21 U/L (ref 10–40)
BASOPHILS # BLD AUTO: 0.02 K/UL (ref 0–0.2)
BASOPHILS NFR BLD: 0.3 % (ref 0–1.9)
BILIRUB SERPL-MCNC: 0.5 MG/DL (ref 0.1–1)
BORDETELLA PARAPERTUSSIS (IS1001): NOT DETECTED
BORDETELLA PERTUSSIS (PTXP): NOT DETECTED
BUN SERPL-MCNC: 28 MG/DL (ref 8–23)
CALCIUM SERPL-MCNC: 9 MG/DL (ref 8.7–10.5)
CHLAMYDIA PNEUMONIAE: NOT DETECTED
CHLORIDE SERPL-SCNC: 104 MMOL/L (ref 95–110)
CO2 SERPL-SCNC: 21 MMOL/L (ref 23–29)
CORONAVIRUS 229E, COMMON COLD VIRUS: NOT DETECTED
CORONAVIRUS HKU1, COMMON COLD VIRUS: NOT DETECTED
CORONAVIRUS NL63, COMMON COLD VIRUS: NOT DETECTED
CORONAVIRUS OC43, COMMON COLD VIRUS: NOT DETECTED
CREAT SERPL-MCNC: 6 MG/DL (ref 0.5–1.4)
DIFFERENTIAL METHOD BLD: ABNORMAL
EOSINOPHIL # BLD AUTO: 0.1 K/UL (ref 0–0.5)
EOSINOPHIL NFR BLD: 1.7 % (ref 0–8)
ERYTHROCYTE [DISTWIDTH] IN BLOOD BY AUTOMATED COUNT: 16 % (ref 11.5–14.5)
EST. GFR  (NO RACE VARIABLE): 9.4 ML/MIN/1.73 M^2
FLUBV RNA NPH QL NAA+NON-PROBE: NOT DETECTED
GLUCOSE SERPL-MCNC: 94 MG/DL (ref 70–110)
HCT VFR BLD AUTO: 26 % (ref 40–54)
HGB BLD-MCNC: 7.9 G/DL (ref 14–18)
HPIV1 RNA NPH QL NAA+NON-PROBE: NOT DETECTED
HPIV2 RNA NPH QL NAA+NON-PROBE: NOT DETECTED
HPIV3 RNA NPH QL NAA+NON-PROBE: NOT DETECTED
HPIV4 RNA NPH QL NAA+NON-PROBE: NOT DETECTED
HUMAN METAPNEUMOVIRUS: NOT DETECTED
IMM GRANULOCYTES # BLD AUTO: 0.06 K/UL (ref 0–0.04)
IMM GRANULOCYTES NFR BLD AUTO: 0.9 % (ref 0–0.5)
INFLUENZA A (SUBTYPES H1,H1-2009,H3): NOT DETECTED
LYMPHOCYTES # BLD AUTO: 1.3 K/UL (ref 1–4.8)
LYMPHOCYTES NFR BLD: 20.4 % (ref 18–48)
MAGNESIUM SERPL-MCNC: 2 MG/DL (ref 1.6–2.6)
MCH RBC QN AUTO: 32.9 PG (ref 27–31)
MCHC RBC AUTO-ENTMCNC: 30.4 G/DL (ref 32–36)
MCV RBC AUTO: 108 FL (ref 82–98)
MONOCYTES # BLD AUTO: 0.7 K/UL (ref 0.3–1)
MONOCYTES NFR BLD: 10.4 % (ref 4–15)
MYCOPLASMA PNEUMONIAE: NOT DETECTED
NEUTROPHILS # BLD AUTO: 4.3 K/UL (ref 1.8–7.7)
NEUTROPHILS NFR BLD: 66.3 % (ref 38–73)
NRBC BLD-RTO: 0 /100 WBC
PHOSPHATE SERPL-MCNC: 3.2 MG/DL (ref 2.7–4.5)
PLATELET # BLD AUTO: 160 K/UL (ref 150–450)
PMV BLD AUTO: 8.8 FL (ref 9.2–12.9)
POTASSIUM SERPL-SCNC: 4.1 MMOL/L (ref 3.5–5.1)
PROT SERPL-MCNC: 7.7 G/DL (ref 6–8.4)
RBC # BLD AUTO: 2.4 M/UL (ref 4.6–6.2)
RESPIRATORY INFECTION PANEL SOURCE: NORMAL
RSV RNA NPH QL NAA+NON-PROBE: NOT DETECTED
RV+EV RNA NPH QL NAA+NON-PROBE: NOT DETECTED
SARS-COV-2 RNA RESP QL NAA+PROBE: NOT DETECTED
SODIUM SERPL-SCNC: 136 MMOL/L (ref 136–145)
WBC # BLD AUTO: 6.43 K/UL (ref 3.9–12.7)

## 2025-01-23 PROCEDURE — 83735 ASSAY OF MAGNESIUM: CPT | Performed by: PHYSICIAN ASSISTANT

## 2025-01-23 PROCEDURE — 25000003 PHARM REV CODE 250: Performed by: PHYSICIAN ASSISTANT

## 2025-01-23 PROCEDURE — 63700000 PHARM REV CODE 250 ALT 637 W/O HCPCS: Performed by: NURSE PRACTITIONER

## 2025-01-23 PROCEDURE — 80053 COMPREHEN METABOLIC PANEL: CPT | Performed by: PHYSICIAN ASSISTANT

## 2025-01-23 PROCEDURE — 84100 ASSAY OF PHOSPHORUS: CPT | Performed by: PHYSICIAN ASSISTANT

## 2025-01-23 PROCEDURE — 36415 COLL VENOUS BLD VENIPUNCTURE: CPT | Performed by: PHYSICIAN ASSISTANT

## 2025-01-23 PROCEDURE — C1752 CATH,HEMODIALYSIS,SHORT-TERM: HCPCS

## 2025-01-23 PROCEDURE — 63600175 PHARM REV CODE 636 W HCPCS: Performed by: NURSE PRACTITIONER

## 2025-01-23 PROCEDURE — 87581 M.PNEUMON DNA AMP PROBE: CPT | Performed by: STUDENT IN AN ORGANIZED HEALTH CARE EDUCATION/TRAINING PROGRAM

## 2025-01-23 PROCEDURE — 11000001 HC ACUTE MED/SURG PRIVATE ROOM

## 2025-01-23 PROCEDURE — 25000003 PHARM REV CODE 250: Performed by: STUDENT IN AN ORGANIZED HEALTH CARE EDUCATION/TRAINING PROGRAM

## 2025-01-23 PROCEDURE — 85025 COMPLETE CBC W/AUTO DIFF WBC: CPT | Performed by: PHYSICIAN ASSISTANT

## 2025-01-23 RX ORDER — POLYETHYLENE GLYCOL 3350 17 G/17G
17 POWDER, FOR SOLUTION ORAL 2 TIMES DAILY
Status: DISCONTINUED | OUTPATIENT
Start: 2025-01-23 | End: 2025-01-25 | Stop reason: HOSPADM

## 2025-01-23 RX ORDER — LACTULOSE 10 G/15ML
10 SOLUTION ORAL ONCE AS NEEDED
Status: COMPLETED | OUTPATIENT
Start: 2025-01-23 | End: 2025-01-23

## 2025-01-23 RX ADMIN — LEVOTHYROXINE SODIUM 75 MCG: 75 TABLET ORAL at 05:01

## 2025-01-23 RX ADMIN — SEVELAMER CARBONATE 800 MG: 800 TABLET, FILM COATED ORAL at 12:01

## 2025-01-23 RX ADMIN — SEVELAMER CARBONATE 800 MG: 800 TABLET, FILM COATED ORAL at 04:01

## 2025-01-23 RX ADMIN — SEVELAMER CARBONATE 800 MG: 800 TABLET, FILM COATED ORAL at 08:01

## 2025-01-23 RX ADMIN — POLYETHYLENE GLYCOL 3350 17 G: 17 POWDER, FOR SOLUTION ORAL at 08:01

## 2025-01-23 RX ADMIN — Medication 500 MG: at 08:01

## 2025-01-23 RX ADMIN — LACTULOSE 10 G: 20 SOLUTION ORAL at 12:01

## 2025-01-23 RX ADMIN — AZITHROMYCIN DIHYDRATE 250 MG: 250 TABLET ORAL at 08:01

## 2025-01-23 RX ADMIN — CARVEDILOL 3.12 MG: 3.12 TABLET, FILM COATED ORAL at 08:01

## 2025-01-23 RX ADMIN — POLYETHYLENE GLYCOL 3350 17 G: 17 POWDER, FOR SOLUTION ORAL at 12:01

## 2025-01-23 RX ADMIN — THERA TABS 1 TABLET: TAB at 08:01

## 2025-01-23 RX ADMIN — CEFTRIAXONE 1 G: 1 INJECTION, POWDER, FOR SOLUTION INTRAMUSCULAR; INTRAVENOUS at 11:01

## 2025-01-23 RX ADMIN — ATORVASTATIN CALCIUM 80 MG: 40 TABLET, FILM COATED ORAL at 08:01

## 2025-01-23 RX ADMIN — DILTIAZEM HYDROCHLORIDE 240 MG: 120 CAPSULE, COATED, EXTENDED RELEASE ORAL at 08:01

## 2025-01-23 RX ADMIN — GUAIFENESIN 600 MG: 600 TABLET, EXTENDED RELEASE ORAL at 08:01

## 2025-01-23 NOTE — ASSESSMENT & PLAN NOTE
ESRD  Respiratory Distress on arrival  new 2L oxygen requirement on admit > resolved.   Remaining vitals stable. Wheezing noted on exam, denies hx of COPD/asthma > resolved  Denies missed HD with last session on 1/19. CXR with cardiomegaly with left greater than right bibasilar pulmonary opacities and pleural effusions. CHF exacerbation or volume overload would be favored over infectious/inflammatory process. Correlation with clinical findings will be helpful in this regard.   - given duoneb with mild improvement, continue prn  - dramatic improvement s/p HD  Does not appear to volume overloaded at this time although repeat CXR was suggestive of increased pulmonary congestion

## 2025-01-23 NOTE — ASSESSMENT & PLAN NOTE
Febrile to 100.9 x2 on 1/22. Repeat CXR with progression of pulmonary edema but no consolidation. Procal uptrending. Started on azithromycin. COVID/flu/rsv negative on admit. Does have persistent cough. Non productive.  1/4 SIRS criteria  - Sputum cultures and RIP ordered as well at CT chest.   - CT chest ordered  - continue azithromycin + ceftriaxone 1/22

## 2025-01-23 NOTE — PLAN OF CARE
Problem: Adult Inpatient Plan of Care  Goal: Plan of Care Review  Outcome: Progressing  Goal: Patient-Specific Goal (Individualized)  Outcome: Progressing  Goal: Absence of Hospital-Acquired Illness or Injury  Outcome: Progressing  Goal: Optimal Comfort and Wellbeing  Outcome: Progressing  Goal: Readiness for Transition of Care  Outcome: Progressing     Problem: Hemodialysis  Goal: Safe, Effective Therapy Delivery  Outcome: Progressing  Goal: Effective Tissue Perfusion  Outcome: Progressing  Goal: Absence of Infection Signs and Symptoms  Outcome: Progressing     Problem: Infection  Goal: Absence of Infection Signs and Symptoms  Outcome: Progressing     Problem: Wound  Goal: Optimal Coping  Outcome: Progressing  Goal: Optimal Functional Ability  Outcome: Progressing  Goal: Absence of Infection Signs and Symptoms  Outcome: Progressing  Goal: Improved Oral Intake  Outcome: Progressing  Goal: Optimal Pain Control and Function  Outcome: Progressing  Goal: Skin Health and Integrity  Outcome: Progressing  Goal: Optimal Wound Healing  Outcome: Progressing     Problem: Skin Injury Risk Increased  Goal: Skin Health and Integrity  Outcome: Progressing     Problem: Comorbidity Management  Goal: Blood Pressure in Desired Range  Outcome: Progressing     Problem: Dysrhythmia  Goal: Normalized Cardiac Rhythm  Outcome: Progressing     Problem: Fluid Volume Excess  Goal: Fluid Balance  Outcome: Progressing     Problem: Anemia  Goal: Anemia Symptom Improvement  Outcome: Progressing     Problem: Chronic Kidney Disease  Goal: Electrolyte Balance  Outcome: Progressing  Goal: Fluid Balance  Outcome: Progressing

## 2025-01-23 NOTE — PROGRESS NOTES
[facility] - [facility] [address] [place] [address] Medicine  Progress Note    Patient Name: Arthur Menjivar  MRN: [id number]  Patient Class: IP- Inpatient   Admission Date: 1/20/2025  Length of Stay: 2 days  Attending Physician: Yi Soares MD  Primary Care Provider: Demetri Whitley MD        Subjective     Principal Problem:Volume overload        HPI:  Arthur Menjivar is a 69 y.o. male with PMHx significant for ESRD on HD MWF, HTN, HLD admitted to hospital medicine for volume overload. Patient reports shortness of breath for the past week with associated cough and weakness. Reports last HD session was yesterday (off schedule because of upcoming winter storm). He reports that they have not been taking fluid off him at HD 2/2 weakness. Reports compliance with his home medications, and denies missed HD sessions. Denies fever/chills, diaphoresis, lightheadedness, HA, CP, congestion, abdominal pain, n/v/d, hematochezia, hematemesis, urinary symptoms, changes in BMs, numbness/tingling, weakness. Of note, patient was at [facility] for an ACS rule out with CXR showing congestion and CTA without evidence of PE. Unclear if stress testing was done. Reports hx of tobacco use, but has not smoked in several years.     In the ED, AFVSS on new requirement of 2L NC. Hgb 8.9 (previous ~11). Cr/BUN 6.6/24. . HS trop negative. K WNL. Covd/Flu/RSV negative. CXR with cardiomegaly with left greater than right bibasilar pulmonary opacities and pleural effusions. CHF exacerbation or volume overload would be favored over infectious/inflammatory process. Given duonebs x1 with mild improvement.     Overview/Hospital Course:  Patient weaned to room air after 2L removed with HD on 1/20. Hgb down trending, labs consistent with ROMINA. Patient denies bloody bowel movements. Needs c-scope outpatient.     Febrile to 100.9 x2 on 1/22. Repeat CXR with progression of pulmonary edema but no consolidation. Procal uptrending. Started on  azithromycin and ceftriaxone. Sputum cultures and RIP ordered as well at CT chest.     Interval History: See above. Persistent cough. Patient is concerned.     Review of Systems  Objective:     Vital Signs (Most Recent):  Temp: 98.9 °F (37.2 °C) (01/23/25 0759)  Pulse: 70 (01/23/25 0759)  Resp: 16 (01/23/25 0759)  BP: 130/61 (01/23/25 0759)  SpO2: 97 % (01/23/25 0759) Vital Signs (24h Range):  Temp:  [97.7 °F (36.5 °C)-100.9 °F (38.3 °C)] 98.9 °F (37.2 °C)  Pulse:  [67-88] 70  Resp:  [16-20] 16  SpO2:  [93 %-100 %] 97 %  BP: ()/(52-62) 130/61     Weight: 111.1 kg (244 lb 14.9 oz)  Body mass index is 34.16 kg/m².    Intake/Output Summary (Last 24 hours) at 1/23/2025 1014  Last data filed at 1/22/2025 1015  Gross per 24 hour   Intake --   Output 1650 ml   Net -1650 ml         Physical Exam  Vitals and nursing note reviewed.   Constitutional:       General: He is not in acute distress.     Appearance: Normal appearance. He is obese. He is not ill-appearing.   HENT:      Head: Normocephalic and atraumatic.   Eyes:      Extraocular Movements: Extraocular movements intact.      Conjunctiva/sclera: Conjunctivae normal.      Pupils: Pupils are equal, round, and reactive to light.   Cardiovascular:      Rate and Rhythm: Normal rate and regular rhythm.      Pulses: Normal pulses.      Heart sounds: Normal heart sounds. No murmur heard.  Pulmonary:      Effort: Pulmonary effort is normal. No respiratory distress.      Breath sounds: Normal breath sounds. No wheezing or rhonchi.   Abdominal:      General: Bowel sounds are normal. There is no distension.      Palpations: Abdomen is soft.      Tenderness: There is no abdominal tenderness.   Musculoskeletal:         General: Normal range of motion.      Cervical back: Normal range of motion and neck supple.      Right lower leg: No edema.      Left lower leg: No edema.   Skin:     General: Skin is warm and dry.      Capillary Refill: Capillary refill takes less than 2  seconds.      Coloration: Skin is not jaundiced.   Neurological:      General: No focal deficit present.      Mental Status: He is alert and oriented to person, place, and time. Mental status is at baseline.      Cranial Nerves: No cranial nerve deficit.   Psychiatric:         Mood and Affect: Mood normal.         Behavior: Behavior normal.             Significant Labs: All pertinent labs within the past 24 hours have been reviewed.    Significant Imaging: I have reviewed all pertinent imaging results/findings within the past 24 hours.    Assessment and Plan     * Volume overload  ESRD  Respiratory Distress on arrival  new 2L oxygen requirement on admit > resolved.   Remaining vitals stable. Wheezing noted on exam, denies hx of COPD/asthma > resolved  Denies missed HD with last session on 1/19. CXR with cardiomegaly with left greater than right bibasilar pulmonary opacities and pleural effusions. CHF exacerbation or volume overload would be favored over infectious/inflammatory process. Correlation with clinical findings will be helpful in this regard.   - given duoneb with mild improvement, continue prn  - dramatic improvement s/p HD  Does not appear to volume overloaded at this time although repeat CXR was suggestive of increased pulmonary congestion    End stage renal disease  Creatine stable for now. BMP reviewed- noted Estimated Creatinine Clearance: 14.5 mL/min (A) (based on SCr of 6 mg/dL (H)). according to latest data. Based on current GFR, CKD stage is end stage.  Monitor UOP and serial BMP and adjust therapy as needed. Renally dose meds. Avoid nephrotoxic medications and procedures.    Anemia of chronic disease  Anemia is likely due to chronic disease due to ESRD. Most recent hemoglobin and hematocrit are listed below.  Hgb acutely lower than baseline of about 11 earlier in 2024. Labs suggestive of ROMINA. Patient denies bloody bowel movements. Needs c-scope.     Recent Labs     01/22/25  0400 01/22/25  1326  01/23/25  0312   HGB 7.7* 8.3* 7.9*   HCT 25.2* 28.2* 26.0*       Plan  - Monitor serial CBC: Daily  - Transfuse PRBC if patient becomes hemodynamically unstable, symptomatic or H/H drops below 7/21.  - Patient has not received any PRBC transfusions to date  - Patient's anemia is currently stable  - denies hematochezia, hematemesis, hemoptysis    Febrile  Febrile to 100.9 x2 on 1/22. Repeat CXR with progression of pulmonary edema but no consolidation. Procal uptrending. Started on azithromycin. COVID/flu/rsv negative on admit. Does have persistent cough. Non productive.  1/4 SIRS criteria  - Sputum cultures and RIP ordered as well at CT chest.   - CT chest ordered  - continue azithromycin + ceftriaxone 1/22       Hypothyroidism  - continue home levothyroxine      PAF (paroxysmal atrial fibrillation)  Patient has paroxysmal (<7 days) atrial fibrillation. Patient is currently in sinus rhythm. [id number] Score: 2. The patients heart rate in the last 24 hours is as follows:  Pulse  Min: 66  Max: 80     Antiarrhythmics  diltiaZEM 24 hr capsule 240 mg, Daily, Oral    Anticoagulants       Plan  - Replete lytes with a goal of K>4, Mg >2  - patient is not on anticoagulant per chart review  - Patient's afib is currently controlled  - tele        Pure hypercholesterolemia        Essential (primary) hypertension  Patient's blood pressure range in the last 24 hours was: BP  Min: 95/52  Max: 138/62.The patient's inpatient anti-hypertensive regimen is listed below:  Current Antihypertensives  carvediloL tablet 3.125 mg, 2 times daily, Oral  diltiaZEM 24 hr capsule 240 mg, Daily, Oral  hydrALAZINE tablet 100 mg, 3 times daily, Oral    Plan  - BP is controlled, no changes needed to their regimen  - tele    Atherosclerotic heart disease of native coronary artery without angina pectoris  Pure Hypercholesteremia  Patient with known CAD  which is controlled Will continue Statin and monitor for S/Sx of angina/ACS. Continue to monitor on  telemetry.       VTE Risk Mitigation (From admission, onward)           Ordered     IP VTE HIGH RISK PATIENT  Once         01/20/25 1444     Place sequential compression device  Until discontinued         01/20/25 1444     Reason for No Pharmacological VTE Prophylaxis  Once        Question:  Reasons:  Answer:  Risk of Bleeding    01/20/25 1444                    Discharge Planning   JOELLE: 1/25/2025     Code Status: Full Code   Medical Readiness for Discharge Date:   Discharge Plan A: Home                        Yi Soares MD  Department of Hospital Medicine   [facility] - Observation 11H

## 2025-01-23 NOTE — ASSESSMENT & PLAN NOTE
Anemia is likely due to chronic disease due to ESRD. Most recent hemoglobin and hematocrit are listed below.  Hgb acutely lower than baseline of about 11 earlier in 2024. Labs suggestive of ROMINA. Patient denies bloody bowel movements. Needs c-scope.     Recent Labs     01/22/25  0400 01/22/25  1326 01/23/25  0312   HGB 7.7* 8.3* 7.9*   HCT 25.2* 28.2* 26.0*       Plan  - Monitor serial CBC: Daily  - Transfuse PRBC if patient becomes hemodynamically unstable, symptomatic or H/H drops below 7/21.  - Patient has not received any PRBC transfusions to date  - Patient's anemia is currently stable  - denies hematochezia, hematemesis, hemoptysis

## 2025-01-23 NOTE — ASSESSMENT & PLAN NOTE
Patient's blood pressure range in the last 24 hours was: BP  Min: 95/52  Max: 138/62.The patient's inpatient anti-hypertensive regimen is listed below:  Current Antihypertensives  carvediloL tablet 3.125 mg, 2 times daily, Oral  diltiaZEM 24 hr capsule 240 mg, Daily, Oral  hydrALAZINE tablet 100 mg, 3 times daily, Oral    Plan  - BP is controlled, no changes needed to their regimen  - tele

## 2025-01-23 NOTE — SUBJECTIVE & OBJECTIVE
Interval History: See above. Persistent cough. Patient is concerned.     Review of Systems  Objective:     Vital Signs (Most Recent):  Temp: 98.9 °F (37.2 °C) (01/23/25 0759)  Pulse: 70 (01/23/25 0759)  Resp: 16 (01/23/25 0759)  BP: 130/61 (01/23/25 0759)  SpO2: 97 % (01/23/25 0759) Vital Signs (24h Range):  Temp:  [97.7 °F (36.5 °C)-100.9 °F (38.3 °C)] 98.9 °F (37.2 °C)  Pulse:  [67-88] 70  Resp:  [16-20] 16  SpO2:  [93 %-100 %] 97 %  BP: ()/(52-62) 130/61     Weight: 111.1 kg (244 lb 14.9 oz)  Body mass index is 34.16 kg/m².    Intake/Output Summary (Last 24 hours) at 1/23/2025 1014  Last data filed at 1/22/2025 1015  Gross per 24 hour   Intake --   Output 1650 ml   Net -1650 ml         Physical Exam  Vitals and nursing note reviewed.   Constitutional:       General: He is not in acute distress.     Appearance: Normal appearance. He is obese. He is not ill-appearing.   HENT:      Head: Normocephalic and atraumatic.   Eyes:      Extraocular Movements: Extraocular movements intact.      Conjunctiva/sclera: Conjunctivae normal.      Pupils: Pupils are equal, round, and reactive to light.   Cardiovascular:      Rate and Rhythm: Normal rate and regular rhythm.      Pulses: Normal pulses.      Heart sounds: Normal heart sounds. No murmur heard.  Pulmonary:      Effort: Pulmonary effort is normal. No respiratory distress.      Breath sounds: Normal breath sounds. No wheezing or rhonchi.   Abdominal:      General: Bowel sounds are normal. There is no distension.      Palpations: Abdomen is soft.      Tenderness: There is no abdominal tenderness.   Musculoskeletal:         General: Normal range of motion.      Cervical back: Normal range of motion and neck supple.      Right lower leg: No edema.      Left lower leg: No edema.   Skin:     General: Skin is warm and dry.      Capillary Refill: Capillary refill takes less than 2 seconds.      Coloration: Skin is not jaundiced.   Neurological:      General: No focal  deficit present.      Mental Status: He is alert and oriented to person, place, and time. Mental status is at baseline.      Cranial Nerves: No cranial nerve deficit.   Psychiatric:         Mood and Affect: Mood normal.         Behavior: Behavior normal.             Significant Labs: All pertinent labs within the past 24 hours have been reviewed.    Significant Imaging: I have reviewed all pertinent imaging results/findings within the past 24 hours.

## 2025-01-23 NOTE — ASSESSMENT & PLAN NOTE
Creatine stable for now. BMP reviewed- noted Estimated Creatinine Clearance: 14.5 mL/min (A) (based on SCr of 6 mg/dL (H)). according to latest data. Based on current GFR, CKD stage is end stage.  Monitor UOP and serial BMP and adjust therapy as needed. Renally dose meds. Avoid nephrotoxic medications and procedures.

## 2025-01-23 NOTE — PLAN OF CARE
Problem: Adult Inpatient Plan of Care  Goal: Plan of Care Review  Outcome: Progressing  Goal: Patient-Specific Goal (Individualized)  Outcome: Progressing  Goal: Absence of Hospital-Acquired Illness or Injury  Outcome: Progressing  Goal: Optimal Comfort and Wellbeing  Outcome: Progressing  Goal: Readiness for Transition of Care  Outcome: Progressing     Problem: Hemodialysis  Goal: Safe, Effective Therapy Delivery  Outcome: Progressing  Goal: Effective Tissue Perfusion  Outcome: Progressing  Goal: Absence of Infection Signs and Symptoms  Outcome: Progressing     Problem: Infection  Goal: Absence of Infection Signs and Symptoms  Outcome: Progressing     Problem: Wound  Goal: Optimal Coping  Outcome: Progressing  Goal: Optimal Functional Ability  Outcome: Progressing  Goal: Absence of Infection Signs and Symptoms  Outcome: Progressing  Goal: Improved Oral Intake  Outcome: Progressing  Goal: Optimal Pain Control and Function  Outcome: Progressing  Goal: Skin Health and Integrity  Outcome: Progressing  Goal: Optimal Wound Healing  Outcome: Progressing     Problem: Skin Injury Risk Increased  Goal: Skin Health and Integrity  Outcome: Progressing     Problem: Comorbidity Management  Goal: Blood Pressure in Desired Range  Outcome: Progressing     Problem: Dysrhythmia  Goal: Normalized Cardiac Rhythm  Outcome: Progressing     Problem: Fluid Volume Excess  Goal: Fluid Balance  Outcome: Progressing     Problem: Anemia  Goal: Anemia Symptom Improvement  Outcome: Progressing     Problem: Chronic Kidney Disease  Goal: Electrolyte Balance  Outcome: Progressing  Goal: Fluid Balance  Outcome: Progressing  Laxative given , incentive spirometry tried to obtain specimen for culture , patuient cough is tight and non productive , no BM yet , wanted and obtained depends for night use . Informed constant use and wearing can cause skin damage and maceration , softening of the skin . Patient replied only wear at night when laxative taken  to avoid accident . Urostomy no output noted , dialysis patient still has a urine output .

## 2025-01-24 LAB
ALBUMIN SERPL BCP-MCNC: 2.7 G/DL (ref 3.5–5.2)
ANION GAP SERPL CALC-SCNC: 12 MMOL/L (ref 8–16)
ASCENDING AORTA: 3.78 CM
AV AREA BY CONTINUOUS VTI: 3 CM2
AV INDEX (PROSTH): 0.85
AV LVOT MEAN GRADIENT: 3 MMHG
AV LVOT PEAK GRADIENT: 7 MMHG
AV MEAN GRADIENT: 5 MMHG
AV PEAK GRADIENT: 12 MMHG
AV VALVE AREA BY VELOCITY RATIO: 2.6 CM²
AV VALVE AREA: 2.9 CM2
AV VELOCITY RATIO: 0.76
BASOPHILS # BLD AUTO: 0.03 K/UL (ref 0–0.2)
BASOPHILS # BLD AUTO: 0.04 K/UL (ref 0–0.2)
BASOPHILS NFR BLD: 0.5 % (ref 0–1.9)
BASOPHILS NFR BLD: 0.6 % (ref 0–1.9)
BSA FOR ECHO PROCEDURE: 2.36 M2
BUN SERPL-MCNC: 42 MG/DL (ref 8–23)
CALCIUM SERPL-MCNC: 9.4 MG/DL (ref 8.7–10.5)
CHLORIDE SERPL-SCNC: 105 MMOL/L (ref 95–110)
CO2 SERPL-SCNC: 20 MMOL/L (ref 23–29)
CREAT SERPL-MCNC: 8.1 MG/DL (ref 0.5–1.4)
CV ECHO LV RWT: 0.72 CM
DIFFERENTIAL METHOD BLD: ABNORMAL
DIFFERENTIAL METHOD BLD: ABNORMAL
DOP CALC AO PEAK VEL: 1.7 M/S
DOP CALC AO VTI: 26 CM
DOP CALC LVOT AREA: 3.5 CM2
DOP CALC LVOT DIAMETER: 2.1 CM
DOP CALC LVOT PEAK VEL: 1.3 M/S
DOP CALC LVOT STROKE VOLUME: 76.5 CM3
DOP CALC RVOT AREA: 2.77 CM2
DOP CALC RVOT DIAMETER: 1.88 CM
DOP CALCLVOT PEAK VEL VTI: 22.1 CM
E WAVE DECELERATION TIME: 356 MS
E/A RATIO: 1.04
E/E' RATIO: 10 M/S
ECHO EF ESTIMATED: 55 %
ECHO LV POSTERIOR WALL: 1.4 CM (ref 0.6–1.1)
EOSINOPHIL # BLD AUTO: 0.1 K/UL (ref 0–0.5)
EOSINOPHIL # BLD AUTO: 0.1 K/UL (ref 0–0.5)
EOSINOPHIL NFR BLD: 1.6 % (ref 0–8)
EOSINOPHIL NFR BLD: 2 % (ref 0–8)
ERYTHROCYTE [DISTWIDTH] IN BLOOD BY AUTOMATED COUNT: 15.9 % (ref 11.5–14.5)
ERYTHROCYTE [DISTWIDTH] IN BLOOD BY AUTOMATED COUNT: 16.3 % (ref 11.5–14.5)
EST. GFR  (NO RACE VARIABLE): 6.6 ML/MIN/1.73 M^2
FRACTIONAL SHORTENING: 28.2 % (ref 28–44)
GLUCOSE SERPL-MCNC: 105 MG/DL (ref 70–110)
HCT VFR BLD AUTO: 24.5 % (ref 40–54)
HCT VFR BLD AUTO: 25.2 % (ref 40–54)
HGB BLD-MCNC: 7.6 G/DL (ref 14–18)
HGB BLD-MCNC: 7.9 G/DL (ref 14–18)
IMM GRANULOCYTES # BLD AUTO: 0.05 K/UL (ref 0–0.04)
IMM GRANULOCYTES # BLD AUTO: 0.06 K/UL (ref 0–0.04)
IMM GRANULOCYTES NFR BLD AUTO: 0.8 % (ref 0–0.5)
IMM GRANULOCYTES NFR BLD AUTO: 0.9 % (ref 0–0.5)
INTERVENTRICULAR SEPTUM: 1.3 CM (ref 0.6–1.1)
IVC DIAMETER: 1.24 CM
IVRT: 114 MS
LA MAJOR: 5.7 CM
LA MINOR: 5.7 CM
LA WIDTH: 4.1 CM
LACTATE SERPL-SCNC: 0.8 MMOL/L (ref 0.5–2.2)
LEFT ATRIUM SIZE: 3.7 CM
LEFT ATRIUM VOLUME INDEX MOD: 26 ML/M2
LEFT ATRIUM VOLUME INDEX: 32 ML/M2
LEFT ATRIUM VOLUME MOD: 59 ML
LEFT ATRIUM VOLUME: 73 CM3
LEFT INTERNAL DIMENSION IN SYSTOLE: 2.8 CM (ref 2.1–4)
LEFT VENTRICLE DIASTOLIC VOLUME INDEX: 28.92 ML/M2
LEFT VENTRICLE DIASTOLIC VOLUME: 66.52 ML
LEFT VENTRICLE MASS INDEX: 82.8 G/M2
LEFT VENTRICLE SYSTOLIC VOLUME INDEX: 12.9 ML/M2
LEFT VENTRICLE SYSTOLIC VOLUME: 29.69 ML
LEFT VENTRICULAR INTERNAL DIMENSION IN DIASTOLE: 3.9 CM (ref 3.5–6)
LEFT VENTRICULAR MASS: 190.4 G
LV LATERAL E/E' RATIO: 12
LV SEPTAL E/E' RATIO: 8
LYMPHOCYTES # BLD AUTO: 1.4 K/UL (ref 1–4.8)
LYMPHOCYTES # BLD AUTO: 1.6 K/UL (ref 1–4.8)
LYMPHOCYTES NFR BLD: 23.4 % (ref 18–48)
LYMPHOCYTES NFR BLD: 24.1 % (ref 18–48)
MAGNESIUM SERPL-MCNC: 2.2 MG/DL (ref 1.6–2.6)
MCH RBC QN AUTO: 32.5 PG (ref 27–31)
MCH RBC QN AUTO: 32.6 PG (ref 27–31)
MCHC RBC AUTO-ENTMCNC: 31 G/DL (ref 32–36)
MCHC RBC AUTO-ENTMCNC: 31.3 G/DL (ref 32–36)
MCV RBC AUTO: 104 FL (ref 82–98)
MCV RBC AUTO: 105 FL (ref 82–98)
MONOCYTES # BLD AUTO: 0.6 K/UL (ref 0.3–1)
MONOCYTES # BLD AUTO: 0.7 K/UL (ref 0.3–1)
MONOCYTES NFR BLD: 10.5 % (ref 4–15)
MONOCYTES NFR BLD: 9.9 % (ref 4–15)
MV A" WAVE DURATION": 68.51 MS
MV PEAK A VEL: 0.69 M/S
MV PEAK E VEL: 0.72 M/S
NEUTROPHILS # BLD AUTO: 3.8 K/UL (ref 1.8–7.7)
NEUTROPHILS # BLD AUTO: 4.3 K/UL (ref 1.8–7.7)
NEUTROPHILS NFR BLD: 62.7 % (ref 38–73)
NEUTROPHILS NFR BLD: 63 % (ref 38–73)
NRBC BLD-RTO: 0 /100 WBC
NRBC BLD-RTO: 0 /100 WBC
OHS CV RV/LV RATIO: 0.92 CM
PHOSPHATE SERPL-MCNC: 4 MG/DL (ref 2.7–4.5)
PISA TR MAX VEL: 2.5 M/S
PLATELET # BLD AUTO: 172 K/UL (ref 150–450)
PLATELET # BLD AUTO: 175 K/UL (ref 150–450)
PMV BLD AUTO: 9 FL (ref 9.2–12.9)
PMV BLD AUTO: 9.1 FL (ref 9.2–12.9)
POTASSIUM SERPL-SCNC: 4.7 MMOL/L (ref 3.5–5.1)
PULM VEIN A" WAVE DURATION": 68.51 MS
PULM VEIN S/D RATIO: 0.93
PULMONIC VEIN PEAK A VELOCITY: 0.3 M/S
PV PEAK D VEL: 0.44 M/S
PV PEAK S VEL: 0.41 M/S
RA MAJOR: 5.09 CM
RA PRESSURE ESTIMATED: 3 MMHG
RA WIDTH: 4.17 CM
RBC # BLD AUTO: 2.33 M/UL (ref 4.6–6.2)
RBC # BLD AUTO: 2.43 M/UL (ref 4.6–6.2)
RIGHT ATRIAL AREA: 16.2 CM2
RIGHT VENTRICLE DIASTOLIC BASEL DIMENSION: 3.6 CM
RV TB RVSP: 6 MMHG
RV TISSUE DOPPLER FREE WALL SYSTOLIC VELOCITY 1 (APICAL 4 CHAMBER VIEW): 11.31 CM/S
SINUS: 3.2 CM
SODIUM SERPL-SCNC: 137 MMOL/L (ref 136–145)
STJ: 2.93 CM
TDI LATERAL: 0.06 M/S
TDI SEPTAL: 0.09 M/S
TDI: 0.08 M/S
TR MAX PG: 25 MMHG
TRICUSPID ANNULAR PLANE SYSTOLIC EXCURSION: 1.36 CM
TV PEAK GRADIENT: 26 MMHG
TV REST PULMONARY ARTERY PRESSURE: 28 MMHG
WBC # BLD AUTO: 5.98 K/UL (ref 3.9–12.7)
WBC # BLD AUTO: 6.84 K/UL (ref 3.9–12.7)
Z-SCORE OF LEFT VENTRICULAR DIMENSION IN END DIASTOLE: -8.26
Z-SCORE OF LEFT VENTRICULAR DIMENSION IN END SYSTOLE: -5.16

## 2025-01-24 PROCEDURE — 90935 HEMODIALYSIS ONE EVALUATION: CPT | Mod: ,,, | Performed by: NURSE PRACTITIONER

## 2025-01-24 PROCEDURE — 85025 COMPLETE CBC W/AUTO DIFF WBC: CPT | Performed by: PHYSICIAN ASSISTANT

## 2025-01-24 PROCEDURE — 63600175 PHARM REV CODE 636 W HCPCS: Performed by: NURSE PRACTITIONER

## 2025-01-24 PROCEDURE — 83735 ASSAY OF MAGNESIUM: CPT | Performed by: STUDENT IN AN ORGANIZED HEALTH CARE EDUCATION/TRAINING PROGRAM

## 2025-01-24 PROCEDURE — 83605 ASSAY OF LACTIC ACID: CPT | Performed by: PHYSICIAN ASSISTANT

## 2025-01-24 PROCEDURE — 36415 COLL VENOUS BLD VENIPUNCTURE: CPT | Performed by: PHYSICIAN ASSISTANT

## 2025-01-24 PROCEDURE — 25000003 PHARM REV CODE 250: Performed by: PHYSICIAN ASSISTANT

## 2025-01-24 PROCEDURE — 25500020 PHARM REV CODE 255: Performed by: STUDENT IN AN ORGANIZED HEALTH CARE EDUCATION/TRAINING PROGRAM

## 2025-01-24 PROCEDURE — 80069 RENAL FUNCTION PANEL: CPT | Performed by: STUDENT IN AN ORGANIZED HEALTH CARE EDUCATION/TRAINING PROGRAM

## 2025-01-24 PROCEDURE — 80100016 HC MAINTENANCE HEMODIALYSIS

## 2025-01-24 PROCEDURE — 11000001 HC ACUTE MED/SURG PRIVATE ROOM

## 2025-01-24 PROCEDURE — 85025 COMPLETE CBC W/AUTO DIFF WBC: CPT | Mod: 91 | Performed by: PHYSICIAN ASSISTANT

## 2025-01-24 PROCEDURE — 63700000 PHARM REV CODE 250 ALT 637 W/O HCPCS: Performed by: NURSE PRACTITIONER

## 2025-01-24 PROCEDURE — 25000003 PHARM REV CODE 250: Performed by: STUDENT IN AN ORGANIZED HEALTH CARE EDUCATION/TRAINING PROGRAM

## 2025-01-24 RX ORDER — HEPARIN SODIUM 1000 [USP'U]/ML
1000 INJECTION, SOLUTION INTRAVENOUS; SUBCUTANEOUS
Status: DISCONTINUED | OUTPATIENT
Start: 2025-01-24 | End: 2025-01-25 | Stop reason: HOSPADM

## 2025-01-24 RX ADMIN — DILTIAZEM HYDROCHLORIDE 240 MG: 120 CAPSULE, COATED, EXTENDED RELEASE ORAL at 08:01

## 2025-01-24 RX ADMIN — SEVELAMER CARBONATE 800 MG: 800 TABLET, FILM COATED ORAL at 04:01

## 2025-01-24 RX ADMIN — SEVELAMER CARBONATE 800 MG: 800 TABLET, FILM COATED ORAL at 08:01

## 2025-01-24 RX ADMIN — ATORVASTATIN CALCIUM 80 MG: 40 TABLET, FILM COATED ORAL at 09:01

## 2025-01-24 RX ADMIN — HUMAN ALBUMIN MICROSPHERES AND PERFLUTREN 0.66 MG: 10; .22 INJECTION, SOLUTION INTRAVENOUS at 03:01

## 2025-01-24 RX ADMIN — AZITHROMYCIN DIHYDRATE 250 MG: 250 TABLET ORAL at 08:01

## 2025-01-24 RX ADMIN — GUAIFENESIN 600 MG: 600 TABLET, EXTENDED RELEASE ORAL at 09:01

## 2025-01-24 RX ADMIN — POLYETHYLENE GLYCOL 3350 17 G: 17 POWDER, FOR SOLUTION ORAL at 09:01

## 2025-01-24 RX ADMIN — Medication 500 MG: at 08:01

## 2025-01-24 RX ADMIN — GUAIFENESIN 600 MG: 600 TABLET, EXTENDED RELEASE ORAL at 08:01

## 2025-01-24 RX ADMIN — CEFTRIAXONE 1 G: 1 INJECTION, POWDER, FOR SOLUTION INTRAMUSCULAR; INTRAVENOUS at 11:01

## 2025-01-24 RX ADMIN — HEPARIN SODIUM 1000 UNITS: 1000 INJECTION, SOLUTION INTRAVENOUS; SUBCUTANEOUS at 01:01

## 2025-01-24 RX ADMIN — POLYETHYLENE GLYCOL 3350 17 G: 17 POWDER, FOR SOLUTION ORAL at 08:01

## 2025-01-24 RX ADMIN — THERA TABS 1 TABLET: TAB at 08:01

## 2025-01-24 RX ADMIN — LEVOTHYROXINE SODIUM 75 MCG: 75 TABLET ORAL at 05:01

## 2025-01-24 NOTE — PROGRESS NOTES
[facility]OD[facility] NOTE     Patient currently on hemodialysis for removal of uremic toxins .     Patient seen and evaluated on hemodialysis, tolerating treatment, see HD flowsheet for vitals and assessments.      No Hypotension, chest pain, shortness of breath, cramping, nausea or vomiting.     Following patient for the management of ESRD    Target UF: 2 L as tolerated, keep MAP >65.  Seen on HD, no distress. BP marginal.   Remain under EDW of 112 kg. Pre HD weight 110.1 kg. UFR adjusted. Verbalizes improvement of symptoms.   Issues with cannulation this AM; venous needle requiring 3 sticks. Will plan for VAS HD US for evaluation. Will likely need vascular consult depending on US results.   Hgb 7.6, will plan for EPO  Phos 3.2, remain on binders.   Consider renal diet restrictions; please limit daily intake to 32 oz per day.   No lab stick/BP intake on access site  Continue to monitor intake and output, daily weights   Please avoid gadolinium, fleets, phos-based laxatives, NSAIDs  Will follow closely and continue dialysis treatments while in-patient    KRIS Guardado DNP, APRN, FNP-C  Department of Nephrology  [facility]  Pager: [phone]

## 2025-01-24 NOTE — ASSESSMENT & PLAN NOTE
Anemia is likely due to chronic disease due to ESRD. Most recent hemoglobin and hematocrit are listed below.  Hgb acutely lower than baseline of about 11 earlier in 2024. Labs suggestive of ROMINA. Patient denies bloody bowel movements. Needs c-scope.     Recent Labs     01/22/25  1326 01/23/25  0312 01/24/25  0250   HGB 8.3* 7.9* 7.6*   HCT 28.2* 26.0* 24.5*       Plan  - Monitor serial CBC: Daily  - Transfuse PRBC if patient becomes hemodynamically unstable, symptomatic or H/H drops below 7/21.  - Patient has not received any PRBC transfusions to date  - Patient's anemia is currently stable  - denies hematochezia, hematemesis, hemoptysis

## 2025-01-24 NOTE — PLAN OF CARE
Problem: Adult Inpatient Plan of Care  Goal: Plan of Care Review  Outcome: Progressing  Goal: Patient-Specific Goal (Individualized)  Outcome: Progressing  Goal: Absence of Hospital-Acquired Illness or Injury  Outcome: Progressing  Goal: Optimal Comfort and Wellbeing  Outcome: Progressing  Goal: Readiness for Transition of Care  Outcome: Progressing     Problem: Hemodialysis  Goal: Safe, Effective Therapy Delivery  Outcome: Progressing  Goal: Effective Tissue Perfusion  Outcome: Progressing  Goal: Absence of Infection Signs and Symptoms  Outcome: Progressing     Problem: Infection  Goal: Absence of Infection Signs and Symptoms  Outcome: Progressing     Problem: Wound  Goal: Optimal Coping  Outcome: Progressing  Goal: Optimal Functional Ability  Outcome: Progressing  Goal: Absence of Infection Signs and Symptoms  Outcome: Progressing  Goal: Improved Oral Intake  Outcome: Progressing  Goal: Optimal Pain Control and Function  Outcome: Progressing  Goal: Skin Health and Integrity  Outcome: Progressing  Goal: Optimal Wound Healing  Outcome: Progressing     Problem: Skin Injury Risk Increased  Goal: Skin Health and Integrity  Outcome: Progressing     Problem: Comorbidity Management  Goal: Blood Pressure in Desired Range  Outcome: Progressing     Problem: Dysrhythmia  Goal: Normalized Cardiac Rhythm  Outcome: Progressing     Problem: Fluid Volume Excess  Goal: Fluid Balance  Outcome: Progressing     Problem: Anemia  Goal: Anemia Symptom Improvement  Outcome: Progressing     Problem: Chronic Kidney Disease  Goal: Electrolyte Balance  Outcome: Progressing  Goal: Fluid Balance  Outcome: Progressing   To echo via w/c returned from dialysis

## 2025-01-24 NOTE — NURSING
3.5 hours  dialysis treatment completed . 1.5L UF removed . Both HD needles were deaccessed and pressure  held in each site  for 5 minutes .   HD  catheter used  for  venous line cause  could not  maintain  pressure  from  HD needles . So, HD catheter locked  with heparine and wrapped  with tape and gauze . Central line  dressing changed .   Report given to Primary Nurse .   Patient was transported in wheelchair .

## 2025-01-24 NOTE — PLAN OF CARE
Problem: Hemodialysis  Goal: Safe, Effective Therapy Delivery  1/24/2025 1358 by Rosetta Díaz RN  Outcome: Progressing  1/24/2025 1210 by Rosetta Díaz RN  Outcome: Progressing  Goal: Effective Tissue Perfusion  1/24/2025 1358 by Rosetta Díaz RN  Outcome: Progressing  1/24/2025 1210 by Rosetta Díaz RN  Outcome: Progressing  Goal: Absence of Infection Signs and Symptoms  1/24/2025 1358 by Rosetta Díaz RN  Outcome: Progressing  1/24/2025 1210 by Rosetta Díaz RN  Outcome: Progressing

## 2025-01-24 NOTE — SUBJECTIVE & OBJECTIVE
Interval History:     Patient seen in HD center. Reports cough is mildly improving.  Patient went to HD today - issues with cannulation. VAS HD ultrasound ordered  CT chest reviewed with patient.     Review of Systems  Objective:     Vital Signs (Most Recent):  Temp: 98 °F (36.7 °C) (01/24/25 0758)  Pulse: 91 (01/24/25 1230)  Resp: 17 (01/24/25 0758)  BP: 97/67 (01/24/25 1230)  SpO2: 96 % (01/24/25 0758) Vital Signs (24h Range):  Temp:  [98 °F (36.7 °C)-98.6 °F (37 °C)] 98 °F (36.7 °C)  Pulse:  [] 91  Resp:  [17-20] 17  SpO2:  [95 %-99 %] 96 %  BP: ()/(48-69) 97/67     Weight: 111.1 kg (244 lb 14.9 oz)  Body mass index is 34.16 kg/m².    Intake/Output Summary (Last 24 hours) at 1/24/2025 1448  Last data filed at 1/23/2025 1735  Gross per 24 hour   Intake 240 ml   Output --   Net 240 ml         Physical Exam  Constitutional:       General: He is not in acute distress.     Appearance: He is obese. He is not ill-appearing.   Cardiovascular:      Rate and Rhythm: Normal rate.      Heart sounds: Normal heart sounds.   Pulmonary:      Effort: Pulmonary effort is normal. No respiratory distress.      Breath sounds: Normal breath sounds.   Abdominal:      General: Abdomen is flat.      Palpations: Abdomen is soft.      Tenderness: There is no abdominal tenderness.   Musculoskeletal:      Right lower leg: No edema.      Left lower leg: No edema.   Neurological:      Mental Status: He is alert and oriented to person, place, and time. Mental status is at baseline.   Psychiatric:         Mood and Affect: Mood normal.             Significant Labs: All pertinent labs within the past 24 hours have been reviewed.  BMP:   Recent Labs   Lab 01/24/25  0939         K 4.7      CO2 20*   BUN 42*   CREATININE 8.1*   CALCIUM 9.4   MG 2.2     CBC:   Recent Labs   Lab 01/23/25  0312 01/24/25  0250   WBC 6.43 5.98   HGB 7.9* 7.6*   HCT 26.0* 24.5*    175       Significant Imaging: I have reviewed all  pertinent imaging results/findings within the past 24 hours.

## 2025-01-24 NOTE — ASSESSMENT & PLAN NOTE
Febrile to 100.9 x2 on 1/22. Repeat CXR with progression of pulmonary edema but no consolidation. Procal uptrending. Started on azithromycin. COVID/flu/rsv negative on admit. Does have persistent cough. Non productive.  1/4 SIRS criteria. Reports cough is now slightly improving   - Sputum cultures and RIP ordered as well at CT chest.   - CT chest ordered - with pulmonary edema. HD today.   - will need outpatient repeat CT scan for monitoring.   - continue azithromycin + ceftriaxone 1/22

## 2025-01-24 NOTE — PROGRESS NOTES
[facility] [address] [place] [address] Medicine  Progress Note    Patient Name: Arthur Menjivar  MRN: [id number]  Patient Class: IP- Inpatient   Admission Date: 1/20/2025  Length of Stay: 3 days  Attending Physician: Maggie Zavala MD  Primary Care Provider: Demetri Whitley MD          Principal Problem:Volume overload        HPI:  Arthur Menjivar is a 69 y.o. male with PMHx significant for ESRD on HD MWF, HTN, HLD admitted to hospital medicine for volume overload. Patient reports shortness of breath for the past week with associated cough and weakness. Reports last HD session was yesterday (off schedule because of upcoming winter storm). He reports that they have not been taking fluid off him at HD 2/2 weakness. Reports compliance with his home medications, and denies missed HD sessions. Denies fever/chills, diaphoresis, lightheadedness, HA, CP, congestion, abdominal pain, n/v/d, hematochezia, hematemesis, urinary symptoms, changes in BMs, numbness/tingling, weakness. Of note, patient was at [facility] for an ACS rule out with CXR showing congestion and CTA without evidence of PE. Unclear if stress testing was done. Reports hx of tobacco use, but has not smoked in several years.     In the ED, AFVSS on new requirement of 2L NC. Hgb 8.9 (previous ~11). Cr/BUN 6.6/24. . HS trop negative. K WNL. Covd/Flu/RSV negative. CXR with cardiomegaly with left greater than right bibasilar pulmonary opacities and pleural effusions. CHF exacerbation or volume overload would be favored over infectious/inflammatory process. Given duonebs x1 with mild improvement.     Overview/Hospital Course:  Patient weaned to room air after 2L removed with HD on 1/20. Hgb down trending, labs consistent with ROMINA. Patient denies bloody bowel movements. Needs c-scope outpatient.     Febrile to 100.9 x2 on 1/22. Repeat CXR with progression of pulmonary edema but no consolidation. Procal uptrending. Started on azithromycin and  ceftriaxone. Sputum cultures and RIP ordered as well at CT chest.     Interval History:     Patient seen in HD center. Reports cough is mildly improving.  Patient went to HD today - issues with cannulation. VAS HD ultrasound ordered  CT chest reviewed with patient.     Review of Systems  Objective:     Vital Signs (Most Recent):  Temp: 98 °F (36.7 °C) (01/24/25 0758)  Pulse: 91 (01/24/25 1230)  Resp: 17 (01/24/25 0758)  BP: 97/67 (01/24/25 1230)  SpO2: 96 % (01/24/25 0758) Vital Signs (24h Range):  Temp:  [98 °F (36.7 °C)-98.6 °F (37 °C)] 98 °F (36.7 °C)  Pulse:  [] 91  Resp:  [17-20] 17  SpO2:  [95 %-99 %] 96 %  BP: ()/(48-69) 97/67     Weight: 111.1 kg (244 lb 14.9 oz)  Body mass index is 34.16 kg/m².    Intake/Output Summary (Last 24 hours) at 1/24/2025 1448  Last data filed at 1/23/2025 1735  Gross per 24 hour   Intake 240 ml   Output --   Net 240 ml         Physical Exam  Constitutional:       General: He is not in acute distress.     Appearance: He is obese. He is not ill-appearing.   Cardiovascular:      Rate and Rhythm: Normal rate.      Heart sounds: Normal heart sounds.   Pulmonary:      Effort: Pulmonary effort is normal. No respiratory distress.      Breath sounds: Normal breath sounds.   Abdominal:      General: Abdomen is flat.      Palpations: Abdomen is soft.      Tenderness: There is no abdominal tenderness.   Musculoskeletal:      Right lower leg: No edema.      Left lower leg: No edema.   Neurological:      Mental Status: He is alert and oriented to person, place, and time. Mental status is at baseline.   Psychiatric:         Mood and Affect: Mood normal.             Significant Labs: All pertinent labs within the past 24 hours have been reviewed.  BMP:   Recent Labs   Lab 01/24/25  0939         K 4.7      CO2 20*   BUN 42*   CREATININE 8.1*   CALCIUM 9.4   MG 2.2     CBC:   Recent Labs   Lab 01/23/25  0312 01/24/25  0250   WBC 6.43 5.98   HGB 7.9* 7.6*   HCT 26.0*  24.5*    175       Significant Imaging: I have reviewed all pertinent imaging results/findings within the past 24 hours.    Assessment and Plan     * Volume overload  ESRD  Respiratory Distress on arrival  new 2L oxygen requirement on admit > resolved.   Remaining vitals stable. Wheezing noted on exam, denies hx of COPD/asthma > resolved  Denies missed HD with last session on 1/19. CXR with cardiomegaly with left greater than right bibasilar pulmonary opacities and pleural effusions. CHF exacerbation or volume overload would be favored over infectious/inflammatory process. Correlation with clinical findings will be helpful in this regard.   - given duoneb with mild improvement, continue prn  - dramatic improvement s/p HD  Does not appear to volume overloaded at this time although repeat CXR was suggestive of increased pulmonary congestion    Febrile  Febrile to 100.9 x2 on 1/22. Repeat CXR with progression of pulmonary edema but no consolidation. Procal uptrending. Started on azithromycin. COVID/flu/rsv negative on admit. Does have persistent cough. Non productive.  1/4 SIRS criteria. Reports cough is now slightly improving   - Sputum cultures and RIP ordered as well at CT chest.   - CT chest ordered - with pulmonary edema. HD today.   - will need outpatient repeat CT scan for monitoring.   - continue azithromycin + ceftriaxone 1/22       Anemia of chronic disease  Anemia is likely due to chronic disease due to ESRD. Most recent hemoglobin and hematocrit are listed below.  Hgb acutely lower than baseline of about 11 earlier in 2024. Labs suggestive of ROMINA. Patient denies bloody bowel movements. Needs c-scope.     Recent Labs     01/22/25  1326 01/23/25  0312 01/24/25  0250   HGB 8.3* 7.9* 7.6*   HCT 28.2* 26.0* 24.5*       Plan  - Monitor serial CBC: Daily  - Transfuse PRBC if patient becomes hemodynamically unstable, symptomatic or H/H drops below 7/21.  - Patient has not received any PRBC transfusions to  date  - Patient's anemia is currently stable  - denies hematochezia, hematemesis, hemoptysis    End stage renal disease  Creatine stable for now. BMP reviewed- noted Estimated Creatinine Clearance: 14.5 mL/min (A) (based on SCr of 6 mg/dL (H)). according to latest data. Based on current GFR, CKD stage is end stage.  Monitor UOP and serial BMP and adjust therapy as needed. Renally dose meds. Avoid nephrotoxic medications and procedures.    Hypothyroidism  - continue home levothyroxine      PAF (paroxysmal atrial fibrillation)  Patient has paroxysmal (<7 days) atrial fibrillation. Patient is currently in sinus rhythm. [id number] Score: 2. The patients heart rate in the last 24 hours is as follows:  Pulse  Min: 66  Max: 80     Antiarrhythmics  diltiaZEM 24 hr capsule 240 mg, Daily, Oral    Anticoagulants       Plan  - Replete lytes with a goal of K>4, Mg >2  - patient is not on anticoagulant per chart review  - Patient's afib is currently controlled  - tele        Pure hypercholesterolemia        Essential (primary) hypertension  Patient's blood pressure range in the last 24 hours was: BP  Min: 95/52  Max: 138/62.The patient's inpatient anti-hypertensive regimen is listed below:  Current Antihypertensives  carvediloL tablet 3.125 mg, 2 times daily, Oral  diltiaZEM 24 hr capsule 240 mg, Daily, Oral  hydrALAZINE tablet 100 mg, 3 times daily, Oral    Plan  - BP is controlled, no changes needed to their regimen  - tele    Atherosclerotic heart disease of native coronary artery without angina pectoris  Pure Hypercholesteremia  Patient with known CAD  which is controlled Will continue Statin and monitor for S/Sx of angina/ACS. Continue to monitor on telemetry.       VTE Risk Mitigation (From admission, onward)           Ordered     heparin (porcine) injection 1,000 Units  As needed (PRN)         01/24/25 1215     IP VTE HIGH RISK PATIENT  Once         01/20/25 1444     Place sequential compression device  Until discontinued          01/20/25 1444     Reason for No Pharmacological VTE Prophylaxis  Once        Question:  Reasons:  Answer:  Risk of Bleeding    01/20/25 1444                    Discharge Planning   JOELLE: 1/25/2025     Code Status: Full Code   Medical Readiness for Discharge Date:   Discharge Plan A: Home              Maggie Zavala MD  Department of Hospital Medicine   [facility] - Observation 11H

## 2025-01-24 NOTE — NURSING
Patient arrived in wheelchair . Left Upper arm graft accessed with 15  G needles . Dialysis  treatment  started .

## 2025-01-25 VITALS
TEMPERATURE: 98 F | WEIGHT: 244.94 LBS | DIASTOLIC BLOOD PRESSURE: 57 MMHG | OXYGEN SATURATION: 95 % | HEART RATE: 71 BPM | HEIGHT: 71 IN | BODY MASS INDEX: 34.29 KG/M2 | RESPIRATION RATE: 18 BRPM | SYSTOLIC BLOOD PRESSURE: 107 MMHG

## 2025-01-25 LAB
ALBUMIN SERPL BCP-MCNC: 2.5 G/DL (ref 3.5–5.2)
ALP SERPL-CCNC: 86 U/L (ref 40–150)
ALT SERPL W/O P-5'-P-CCNC: 18 U/L (ref 10–44)
ANION GAP SERPL CALC-SCNC: 13 MMOL/L (ref 8–16)
AST SERPL-CCNC: 16 U/L (ref 10–40)
BASOPHILS # BLD AUTO: 0.04 K/UL (ref 0–0.2)
BASOPHILS NFR BLD: 0.7 % (ref 0–1.9)
BILIRUB SERPL-MCNC: 0.4 MG/DL (ref 0.1–1)
BUN SERPL-MCNC: 35 MG/DL (ref 8–23)
CALCIUM SERPL-MCNC: 9.1 MG/DL (ref 8.7–10.5)
CHLORIDE SERPL-SCNC: 100 MMOL/L (ref 95–110)
CO2 SERPL-SCNC: 22 MMOL/L (ref 23–29)
CREAT SERPL-MCNC: 6.5 MG/DL (ref 0.5–1.4)
DIFFERENTIAL METHOD BLD: ABNORMAL
EOSINOPHIL # BLD AUTO: 0.1 K/UL (ref 0–0.5)
EOSINOPHIL NFR BLD: 1.7 % (ref 0–8)
ERYTHROCYTE [DISTWIDTH] IN BLOOD BY AUTOMATED COUNT: 16.2 % (ref 11.5–14.5)
EST. GFR  (NO RACE VARIABLE): 8.6 ML/MIN/1.73 M^2
GLUCOSE SERPL-MCNC: 123 MG/DL (ref 70–110)
HCT VFR BLD AUTO: 27 % (ref 40–54)
HGB BLD-MCNC: 8.3 G/DL (ref 14–18)
IMM GRANULOCYTES # BLD AUTO: 0.06 K/UL (ref 0–0.04)
IMM GRANULOCYTES NFR BLD AUTO: 1 % (ref 0–0.5)
LYMPHOCYTES # BLD AUTO: 1.2 K/UL (ref 1–4.8)
LYMPHOCYTES NFR BLD: 20.3 % (ref 18–48)
MCH RBC QN AUTO: 32.4 PG (ref 27–31)
MCHC RBC AUTO-ENTMCNC: 30.7 G/DL (ref 32–36)
MCV RBC AUTO: 106 FL (ref 82–98)
MONOCYTES # BLD AUTO: 0.6 K/UL (ref 0.3–1)
MONOCYTES NFR BLD: 10 % (ref 4–15)
NEUTROPHILS # BLD AUTO: 3.9 K/UL (ref 1.8–7.7)
NEUTROPHILS NFR BLD: 66.3 % (ref 38–73)
NRBC BLD-RTO: 0 /100 WBC
PLATELET # BLD AUTO: 169 K/UL (ref 150–450)
PMV BLD AUTO: 9.1 FL (ref 9.2–12.9)
POTASSIUM SERPL-SCNC: 4.1 MMOL/L (ref 3.5–5.1)
PROT SERPL-MCNC: 7.5 G/DL (ref 6–8.4)
RBC # BLD AUTO: 2.56 M/UL (ref 4.6–6.2)
SODIUM SERPL-SCNC: 135 MMOL/L (ref 136–145)
WBC # BLD AUTO: 5.81 K/UL (ref 3.9–12.7)

## 2025-01-25 PROCEDURE — 63700000 PHARM REV CODE 250 ALT 637 W/O HCPCS: Performed by: NURSE PRACTITIONER

## 2025-01-25 PROCEDURE — 85025 COMPLETE CBC W/AUTO DIFF WBC: CPT | Performed by: STUDENT IN AN ORGANIZED HEALTH CARE EDUCATION/TRAINING PROGRAM

## 2025-01-25 PROCEDURE — 25000003 PHARM REV CODE 250: Performed by: STUDENT IN AN ORGANIZED HEALTH CARE EDUCATION/TRAINING PROGRAM

## 2025-01-25 PROCEDURE — 36415 COLL VENOUS BLD VENIPUNCTURE: CPT | Performed by: STUDENT IN AN ORGANIZED HEALTH CARE EDUCATION/TRAINING PROGRAM

## 2025-01-25 PROCEDURE — 25000003 PHARM REV CODE 250: Performed by: PHYSICIAN ASSISTANT

## 2025-01-25 PROCEDURE — 99900035 HC TECH TIME PER 15 MIN (STAT)

## 2025-01-25 PROCEDURE — 93990 DOPPLER FLOW TESTING: CPT | Performed by: SURGERY

## 2025-01-25 PROCEDURE — 80053 COMPREHEN METABOLIC PANEL: CPT | Performed by: STUDENT IN AN ORGANIZED HEALTH CARE EDUCATION/TRAINING PROGRAM

## 2025-01-25 RX ORDER — BENZONATATE 200 MG/1
200 CAPSULE ORAL 3 TIMES DAILY PRN
Qty: 15 CAPSULE | Refills: 0 | Status: SHIPPED | OUTPATIENT
Start: 2025-01-25 | End: 2025-02-01

## 2025-01-25 RX ORDER — AMOXICILLIN AND CLAVULANATE POTASSIUM 500; 125 MG/1; MG/1
1 TABLET, FILM COATED ORAL 2 TIMES DAILY
Qty: 6 TABLET | Refills: 0 | Status: SHIPPED | OUTPATIENT
Start: 2025-01-25 | End: 2025-01-30

## 2025-01-25 RX ORDER — DILTIAZEM HYDROCHLORIDE 120 MG/1
120 CAPSULE, COATED, EXTENDED RELEASE ORAL DAILY
Qty: 30 CAPSULE | Refills: 1 | Status: SHIPPED | OUTPATIENT
Start: 2025-01-25 | End: 2025-03-26

## 2025-01-25 RX ORDER — IPRATROPIUM BROMIDE AND ALBUTEROL SULFATE 2.5; .5 MG/3ML; MG/3ML
3 SOLUTION RESPIRATORY (INHALATION) EVERY 4 HOURS PRN
Qty: 90 ML | Refills: 0 | Status: SHIPPED | OUTPATIENT
Start: 2025-01-25 | End: 2025-03-26

## 2025-01-25 RX ORDER — GUAIFENESIN 600 MG/1
600 TABLET, EXTENDED RELEASE ORAL 2 TIMES DAILY
Qty: 10 TABLET | Refills: 0 | Status: SHIPPED | OUTPATIENT
Start: 2025-01-25 | End: 2025-02-01

## 2025-01-25 RX ORDER — AZITHROMYCIN 250 MG/1
250 TABLET, FILM COATED ORAL DAILY
Qty: 3 TABLET | Refills: 0 | Status: SHIPPED | OUTPATIENT
Start: 2025-01-26 | End: 2025-01-30

## 2025-01-25 RX ORDER — SODIUM CHLORIDE 9 MG/ML
INJECTION, SOLUTION INTRAVENOUS ONCE
Status: CANCELLED | OUTPATIENT
Start: 2025-01-27

## 2025-01-25 RX ADMIN — DILTIAZEM HYDROCHLORIDE 240 MG: 120 CAPSULE, COATED, EXTENDED RELEASE ORAL at 08:01

## 2025-01-25 RX ADMIN — AZITHROMYCIN DIHYDRATE 250 MG: 250 TABLET ORAL at 08:01

## 2025-01-25 RX ADMIN — SEVELAMER CARBONATE 800 MG: 800 TABLET, FILM COATED ORAL at 12:01

## 2025-01-25 RX ADMIN — LEVOTHYROXINE SODIUM 75 MCG: 75 TABLET ORAL at 05:01

## 2025-01-25 RX ADMIN — POLYETHYLENE GLYCOL 3350 17 G: 17 POWDER, FOR SOLUTION ORAL at 08:01

## 2025-01-25 RX ADMIN — SEVELAMER CARBONATE 800 MG: 800 TABLET, FILM COATED ORAL at 08:01

## 2025-01-25 RX ADMIN — Medication 500 MG: at 08:01

## 2025-01-25 RX ADMIN — GUAIFENESIN 600 MG: 600 TABLET, EXTENDED RELEASE ORAL at 08:01

## 2025-01-25 RX ADMIN — THERA TABS 1 TABLET: TAB at 08:01

## 2025-01-25 RX ADMIN — CARVEDILOL 3.12 MG: 3.12 TABLET, FILM COATED ORAL at 08:01

## 2025-01-25 NOTE — PLAN OF CARE
[facility]  Discharge Final Note    Primary Care Provider: Demetri Whitley MD    Expected Discharge Date: 1/25/2025    Final Discharge Note (most recent)       Final Note - 01/25/25 1741          Final Note    Assessment Type Final Discharge Note     Anticipated Discharge Disposition Home or Self Care     What phone number can be called within the next 1-3 days to see how you are doing after discharge? [phone]     Hospital Resources/Appts/Education Provided Provided patient/caregiver with written discharge plan information        Post-Acute Status    Discharge Delays None known at this time                   Future Appointments   Date Time Provider Department Center   2/11/2025  9:40 AM Jarad Greene MD [facility] CARDIO [facility]   2/20/2025  9:30 AM [facility], [facility] NEPHRO [facility] Vernon Salinas RN  Weekend  - [facility]  [phone]

## 2025-01-25 NOTE — DISCHARGE INSTRUCTIONS
Please take antibiotics for 3 more days.   Please take Diltiazem 120mg for blood pressure. Discontinue hydralazine and carvedilol.     Please follow outpatient with vascular surgery, nephrology, cardiology, and PCP.  Follow up regularly with podiatry.       If you develop worsening shortness of breath, difficulty breathing, chest pain, nausea/vomiting, stomach pain, please return to ED.

## 2025-01-25 NOTE — PLAN OF CARE
CM noted discharge order. Reviewed chart and handoff to confirmed plan. Disposition: Home with no needs. Ambulatory referral(s) placed, needs hospital follow-up and outpatient vascular appointment at [facility] [place]. CHW will schedule. Discharge medications will deliver to bedside.       Kika Salinas RN  [facility]  - [facility] [place] [facility]  [phone]

## 2025-01-26 NOTE — PLAN OF CARE
[facility] scheduled pcp f/u   Future Appointments   Date Time Provider Department Center   2/4/2025  9:40 AM [facility]   2/11/2025  9:40 AM Jarad Greene MD [facility] CARDIO [facility]   2/20/2025  9:30 AM [facility], [facility]RO [facility]

## 2025-01-27 ENCOUNTER — TELEPHONE (OUTPATIENT)
Dept: HEMATOLOGY/ONCOLOGY | Facility: CLINIC | Age: 70
End: 2025-01-27
Payer: MEDICARE

## 2025-01-27 LAB
OHS CV AF BURDEN PERCENT: < 1
OHS CV DC REMOTE DEVICE TYPE: NORMAL

## 2025-01-27 NOTE — DISCHARGE SUMMARY
[facility] [address] [place] [address] Medicine  Discharge Summary      Patient Name: Arthur Menjivar  MRN: [id number]  TALYA: [id number]  Patient Class: IP- Inpatient  Admission Date: 1/20/2025  Hospital Length of Stay: 4 days  Discharge Date and Time: 1/25/2025  6:29 PM  Attending Physician: Mary att. providers found   Discharging Provider: Maggie Zavala MD  Primary Care Provider: Demetri Whitley MD  [facility] Medicine Team: [facility]  Maggie Zavala MD  Primary Care Team: [facility]    HPI:   Arthur Menjivar is a 69 y.o. male with PMHx significant for ESRD on HD MWF, HTN, HLD admitted to hospital medicine for volume overload. Patient reports shortness of breath for the past week with associated cough and weakness. Reports last HD session was yesterday (off schedule because of upcoming winter storm). He reports that they have not been taking fluid off him at HD 2/2 weakness. Reports compliance with his home medications, and denies missed HD sessions. Denies fever/chills, diaphoresis, lightheadedness, HA, CP, congestion, abdominal pain, n/v/d, hematochezia, hematemesis, urinary symptoms, changes in BMs, numbness/tingling, weakness. Of note, patient was at [facility] for an ACS rule out with CXR showing congestion and CTA without evidence of PE. Unclear if stress testing was done. Reports hx of tobacco use, but has not smoked in several years.     In the ED, AFVSS on new requirement of 2L NC. Hgb 8.9 (previous ~11). Cr/BUN 6.6/24. . HS trop negative. K WNL. Covd/Flu/RSV negative. CXR with cardiomegaly with left greater than right bibasilar pulmonary opacities and pleural effusions. CHF exacerbation or volume overload would be favored over infectious/inflammatory process. Given duonebs x1 with mild improvement.     * No surgery found *      Hospital Course:   Patient admitted for shortness of breath and cough. Patient weaned to room air after 2L removed with HD on 1/20. Hgb down trending,  labs consistent with ROMINA. Patient denies bloody bowel movements. Needs c-scope outpatient. Febrile to 100.9 x2 on 1/22. Repeat CXR with progression of pulmonary edema but no consolidation. Procal uptrending. Started on azithromycin and ceftriaxone. Patients symptoms improved after antibiotics. CT chest with mild pulmonary edema. 1.2 cm nodular opacity in right lung base. Patient requires follow up CT scan in 3 months. Left thyroid nodule present. Will need outpatient ultrasound - patient and wife aware of findings and need for monitoring. Patient underwent HD per nephrology. ?trouble with cannulation. Vascular HD access ultrasound done - good flow volume 1600 and possible inflow lesion. Vascular on call and nephrology agree it can be managed as outpatient. Patient is stable and ready for discharge. Outpatient follow up appointments requested. Family eager to take patient home.      Goals of Care Treatment Preferences:  Code Status: Full Code      SDOH Screening:  The patient declined to be screened for utility difficulties, food insecurity, transport difficulties, housing insecurity, and interpersonal safety, so no concerns could be identified this admission.     Consults:   Consults (From admission, onward)          Status Ordering Provider     Inpatient consult to Nephrology  Once        Provider:  (Not yet assigned)    Completed BRAULIO PAEZ              Final Active Diagnoses:    Diagnosis Date Noted POA    PRINCIPAL PROBLEM:  Volume overload [E87.70] 01/20/2025 Yes    Febrile [R50.9] 01/23/2025 No    SOB (shortness of breath) [R06.02] 01/22/2025 Yes    Anemia of chronic disease [D63.8] 01/20/2025 Yes    End stage renal disease [N18.6] 07/02/2024 Yes    PAF (paroxysmal atrial fibrillation) [I48.0] 10/20/2020 Yes    Hypothyroidism [E03.9] 10/20/2018 Yes    Atherosclerotic heart disease of native coronary artery without angina pectoris [I25.10] 10/03/2017 Yes    Pure hypercholesterolemia [E78.00] 08/03/2015  Yes    Essential (primary) hypertension [I10] 04/21/2015 Yes      Problems Resolved During this Admission:    Diagnosis Date Noted Date Resolved POA    ESRD (end stage renal disease) [N18.6] 01/22/2025 01/22/2025 Yes       Discharged Condition: stable    Disposition: Home or Self Care    Follow Up:    Patient Instructions:      Ambulatory Referral/Consult to Suspected Oncology Diagnosis Clinic   Standing Status: Future   Referral Priority: Routine Referral Type: Consultation   Referral Reason: Specialty Services Required   Requested Specialty: Hematology and Oncology   Number of Visits Requested: 1     Ambulatory referral/consult to Vascular Surgery   Standing Status: Future   Referral Priority: Routine Referral Type: Consultation   Referral Reason: Specialty Services Required   Requested Specialty: Vascular Surgery   Number of Visits Requested: 1       Significant Diagnostic Studies: N/A    Pending Diagnostic Studies:       None           Medications:  Reconciled Home Medications:      Medication List        START taking these medications      albuterol-ipratropium 2.5 mg-0.5 mg/3 mL nebulizer solution  Commonly known as: DUO-NEB  Use one vial (3 mL) by nebulization every 4 (four) hours as needed for Wheezing - Rescue     amoxicillin-clavulanate 500-125mg 500-125 mg Tab  Commonly known as: AUGMENTIN  Take 1 tablet (500 mg total) by mouth 2 (two) times daily for 3 days     azithromycin 250 MG tablet  Commonly known as: Z-GEORGIA  Take 1 tablet (250 mg total) by mouth once daily for 3 days     benzonatate 200 MG capsule  Commonly known as: TESSALON  Take 1 capsule (200 mg total) by mouth 3 (three) times daily as needed for Cough.     MUCUS RELIEF  mg 12 hr tablet  Generic drug: guaiFENesin  Take 1 tablet (600 mg total) by mouth 2 (two) times daily for 5 days            CHANGE how you take these medications      diltiaZEM 120 MG Cp24  Commonly known as: CARTIA XT  Take 1 capsule (120 mg total) by mouth once  daily.  What changed:   medication strength  how much to take  when to take this  Another medication with the same name was removed. Continue taking this medication, and follow the directions you see here.            CONTINUE taking these medications      ascorbic acid (vitamin C) 500 MG tablet  Commonly known as: VITAMIN C  Take 500 mg by mouth once daily.     cholecalciferol (vitamin D3) 25 mcg (1,000 unit) Chew  once daily.     cyanocobalamin 1000 MCG tablet  Commonly known as: VITAMIN B-12  Take 100 mcg by mouth.     ferrous sulfate 325 mg (65 mg iron) Tab tablet  Commonly known as: FEOSOL  Take 325 mg by mouth daily with breakfast.     furosemide 20 MG tablet  Commonly known as: LASIX  Take 20 mg by mouth daily as needed (daily except on dialysis days). Pt takes on non dialysis days     HEPARIN LOCK FLUSH (PORCINE) IV  Inject into the vein. After HD, 2 times weekly     * HYDROcodone-acetaminophen 7.5-325 mg per tablet  Commonly known as: NORCO  Take 1 tablet by mouth every 6 (six) hours as needed for Pain.     * HYDROcodone-acetaminophen 7.5-325 mg per tablet  Commonly known as: NORCO  Take 1 tablet by mouth every 6 (six) hours as needed for Pain.     levothyroxine 75 MCG tablet  Commonly known as: SYNTHROID  Take 75 mcg by mouth once daily.     LIDOcaine-prilocaine cream  Commonly known as: EMLA  Apply topically as needed.     magnesium oxide 400 mg (241.3 mg magnesium) tablet  Commonly known as: MAG-OX  Take 400 mg by mouth.     multivitamin per tablet  Commonly known as: THERAGRAN  Take 1 tablet by mouth once daily.     omega-3 fatty acids 1,000 mg Cap  Take 1,200 mg by mouth.     REPATHA SYRINGE 140 mg/mL Syrg  Generic drug: evolocumab  Inject 140 mg into the skin every 14 (fourteen) days.     rosuvastatin 40 MG Tab  Commonly known as: CRESTOR  Take 10 mg by mouth every evening.     sevelamer carbonate 800 mg Tab  Commonly known as: RENVELA  Take 800 mg by mouth.     UNABLE TO FIND  Daily. PREVAGEN            * This list has 2 medication(s) that are the same as other medications prescribed for you. Read the directions carefully, and ask your doctor or other care provider to review them with you.                STOP taking these medications      carvediloL 3.125 MG tablet  Commonly known as: COREG     carvediloL 6.25 MG tablet  Commonly known as: COREG     cloNIDine 0.1 MG tablet  Commonly known as: CATAPRES     hydrALAZINE 100 MG tablet  Commonly known as: APRESOLINE              Indwelling Lines/Drains at time of discharge:   Lines/Drains/Airways       Central Venous Catheter Line  Duration                  Hemodialysis Catheter 06/17/24 1700 right internal jugular 223 days              Drain  Duration                  Hemodialysis AV Graft Left forearm -- days         Urostomy 08/13/20 0955 ureterostomy, right RLQ 1627 days                    Time spent on the discharge of patient: 45 minutes         Maggie Zavala MD  Department of Hospital Medicine  [facility]

## 2025-01-28 ENCOUNTER — TELEPHONE (OUTPATIENT)
Dept: HEMATOLOGY/ONCOLOGY | Facility: CLINIC | Age: 70
End: 2025-01-28
Payer: MEDICARE

## 2025-01-28 NOTE — TELEPHONE ENCOUNTER
Received call from Bridgett at [facility] that patient wants to have care at [facility].  Called patient to schedule an appointment.  LM.  Will try again.

## 2025-01-28 NOTE — TELEPHONE ENCOUNTER
Patient returned call.  Patient scheduled for 02/20/25 with Dr. Rudolph.  Agreeable to date and time.

## 2025-01-28 NOTE — TELEPHONE ENCOUNTER
Called Mr. Menjivar to schedule appointment in Diagnosis clinic.  Patient states that he sees Dr. Vargas.  Abnormal CT report faxed to ASAEL Urias Navigator at [facility].

## 2025-01-29 DIAGNOSIS — Z01.818 PRE-OPERATIVE EXAMINATION: Primary | ICD-10-CM

## 2025-01-30 ENCOUNTER — TELEPHONE (OUTPATIENT)
Dept: ELECTROPHYSIOLOGY | Facility: CLINIC | Age: 70
End: 2025-01-30
Payer: MEDICARE

## 2025-01-30 NOTE — TELEPHONE ENCOUNTER
Thank you.  Just spoke to his wife.  They both will come to see my Tuesday 2/4 and will discuss ELINOR device which I agree with.

## 2025-01-30 NOTE — TELEPHONE ENCOUNTER
Spoke with patient regarding recent episode of AF lasting 9hrs. First episode observed since ILR implant in May 2024. He reported he was in the hospital with pneumonia.     We discussed the etiology and pathophysiology of strokes associated with atrial fibrillation. We discussed that his risk factors ([id number] score 5) portend a high enough risk that warrants long-term anticoagulation for stroke risk reduction to which he has a relative long-term contraindication to due to recurrent bleeding and iron deficiency anemia(HAS-BLED score 4) We discussed the role of the left atrial appendage in stroke origin in atrial fibrillation and how a ELINOR occlusion device can help reduce long-term stroke risk absent long-term anticoagulation. We discussed the implant procedure including transseptal puncture and the risks of the procedure including but not limited to death, infection, bleeding, stroke, MI, cardiac perforation, vascular injury, embolism (including device embolism), cardiac tamponade, skin burns, and other organic injury including the possibility for need for surgery. We discussed the need to for DAPT therapy for 6 months post-implant followed by lifelong aspirin 81mg daily.     Alternative discussed including remaining on anticoagulation and accepting the risk of bleeding or not taking anticoagulation and accepting the risk of stroke. He is unsure of what he would want to do and would like to think about it. He should start aspirin 81mg daily for now.    Will ask clinic team to arrange for a clinic visit with me.

## 2025-02-01 ENCOUNTER — CLINICAL SUPPORT (OUTPATIENT)
Dept: CARDIOLOGY | Facility: HOSPITAL | Age: 70
End: 2025-02-01
Payer: MEDICARE

## 2025-02-01 DIAGNOSIS — I49.8 OTHER SPECIFIED CARDIAC ARRHYTHMIAS: ICD-10-CM

## 2025-02-03 ENCOUNTER — TELEPHONE (OUTPATIENT)
Dept: ELECTROPHYSIOLOGY | Facility: CLINIC | Age: 70
End: 2025-02-03
Payer: MEDICARE

## 2025-02-03 NOTE — TELEPHONE ENCOUNTER
Remote transmission received for AF episode lasting 3 hours and 18 minutes on 1/31/2025 at 0451 am with an average V rate of 119 bpm.    Pt symptoms:  pt does not recall any symptoms, unsure if he was awake or sleeping at that time.    Appt with Dr. Greene on 2/4/2025 to discuss ELINOR    Diltiazem 120 mg  Cp24 QD    Not on OACs at this time

## 2025-02-04 ENCOUNTER — OFFICE VISIT (OUTPATIENT)
Dept: CARDIOLOGY | Facility: CLINIC | Age: 70
End: 2025-02-04
Payer: MEDICARE

## 2025-02-04 ENCOUNTER — TELEPHONE (OUTPATIENT)
Dept: ELECTROPHYSIOLOGY | Facility: CLINIC | Age: 70
End: 2025-02-04
Payer: MEDICARE

## 2025-02-04 VITALS
BODY MASS INDEX: 34.27 KG/M2 | WEIGHT: 245.69 LBS | HEART RATE: 89 BPM | DIASTOLIC BLOOD PRESSURE: 62 MMHG | OXYGEN SATURATION: 99 % | SYSTOLIC BLOOD PRESSURE: 126 MMHG

## 2025-02-04 DIAGNOSIS — I25.10 ATHEROSCLEROSIS OF NATIVE CORONARY ARTERY OF NATIVE HEART WITHOUT ANGINA PECTORIS: ICD-10-CM

## 2025-02-04 DIAGNOSIS — I48.0 PAF (PAROXYSMAL ATRIAL FIBRILLATION): ICD-10-CM

## 2025-02-04 DIAGNOSIS — I50.32 HYPERTENSIVE HEART DISEASE WITH CHRONIC DIASTOLIC CONGESTIVE HEART FAILURE: ICD-10-CM

## 2025-02-04 DIAGNOSIS — I10 ESSENTIAL (PRIMARY) HYPERTENSION: ICD-10-CM

## 2025-02-04 DIAGNOSIS — I11.0 HYPERTENSIVE HEART DISEASE WITH CHRONIC DIASTOLIC CONGESTIVE HEART FAILURE: ICD-10-CM

## 2025-02-04 DIAGNOSIS — N18.6 END STAGE RENAL DISEASE: ICD-10-CM

## 2025-02-04 DIAGNOSIS — I82.562 CHRONIC DEEP VEIN THROMBOSIS (DVT) OF CALF MUSCLE VEIN OF LEFT LOWER EXTREMITY: ICD-10-CM

## 2025-02-04 DIAGNOSIS — Z86.73 HISTORY OF CVA (CEREBROVASCULAR ACCIDENT): Primary | ICD-10-CM

## 2025-02-04 PROCEDURE — 99999 PR PBB SHADOW E&M-EST. PATIENT-LVL IV: CPT | Mod: PBBFAC,,, | Performed by: INTERNAL MEDICINE

## 2025-02-04 PROCEDURE — 99215 OFFICE O/P EST HI 40 MIN: CPT | Mod: S$PBB,,, | Performed by: INTERNAL MEDICINE

## 2025-02-04 PROCEDURE — 99214 OFFICE O/P EST MOD 30 MIN: CPT | Mod: PBBFAC,PN | Performed by: INTERNAL MEDICINE

## 2025-02-04 NOTE — TELEPHONE ENCOUNTER
----- Message from Noemi sent at 2/4/2025 10:35 AM CST -----  Regarding: procedure  Pt is calling to schedule his procedure and can be reached at [phone].    Thank you

## 2025-02-04 NOTE — PROGRESS NOTES
Cardiology    2/4/2025  9:31 AM    Problem list  Patient Active Problem List   Diagnosis    Atherosclerotic heart disease of native coronary artery without angina pectoris    Essential (primary) hypertension    Pure hypercholesterolemia    Renal insufficiency    PAF (paroxysmal atrial fibrillation)    Chronic kidney disease, stage IV (severe)    History of bladder cancer    History of CVA (cerebrovascular accident)    Hypothyroidism    History of adenocarcinoma of prostate    Hypertensive heart disease with chronic diastolic congestive heart failure    Chronic deep vein thrombosis (DVT) of calf muscle vein of left lower extremity    Thrombosis of renal dialysis arteriovenous graft    End stage renal disease    Volume overload    Anemia of chronic disease    SOB (shortness of breath)    Febrile       CC:  Follow-up    HPI:  Patient is here for follow-up.  He was admitted last month with pneumonia.  His loop recorder revealed episodes of atrial fibrillation on January 31st.  Dr. Cortez discussed via phone with patient regarding left atrial appendage advice occluded.  Patient has not made his decision yet.  He is currently getting dialysis due tunneled catheter in the right chest.  His left arm AV fistula is not working and has an appointment for PTA.    Medications  Current Outpatient Medications   Medication Sig Dispense Refill    albuterol-ipratropium (DUO-NEB) 2.5 mg-0.5 mg/3 mL nebulizer solution Use one vial (3 mL) by nebulization every 4 (four) hours as needed for Wheezing - Rescue 90 mL 0    ascorbic acid, vitamin C, (VITAMIN C) 500 MG tablet Take 500 mg by mouth once daily.      cholecalciferol, vitamin D3, 25 mcg (1,000 unit) Chew once daily.      cyanocobalamin (VITAMIN B-12) 1000 MCG tablet Take 100 mcg by mouth.      ferrous sulfate (FEOSOL) 325 mg (65 mg iron) Tab tablet Take 325 mg by mouth daily with breakfast.      furosemide (LASIX) 20 MG tablet Take 20 mg by mouth daily as needed (daily  except on dialysis days). Pt takes on non dialysis days      levothyroxine (SYNTHROID) 75 MCG tablet Take 75 mcg by mouth once daily.      LIDOcaine-prilocaine (EMLA) cream Apply topically as needed.      multivitamin (THERAGRAN) per tablet Take 1 tablet by mouth once daily.      nebulizer and compressor Sharon Use as directed 1 each 0    omega-3 fatty acids 1,000 mg Cap Take 1,200 mg by mouth.      REPATHA SYRINGE 140 mg/mL Syrg Inject 140 mg into the skin every 14 (fourteen) days. 2 mL 11    rosuvastatin (CRESTOR) 40 MG Tab Take 10 mg by mouth every evening.      sevelamer carbonate (RENVELA) 800 mg Tab Take 800 mg by mouth.      UNABLE TO FIND Daily. PREVAGEN      diltiaZEM (CARTIA XT) 120 MG Cp24 Take 1 capsule (120 mg total) by mouth once daily. (Patient not taking: Reported on 2/4/2025) 30 capsule 1    heparin sodium,porcine (HEPARIN LOCK FLUSH, PORCINE, IV) Inject into the vein. After HD, 2 times weekly      HYDROcodone-acetaminophen (NORCO) 7.5-325 mg per tablet Take 1 tablet by mouth every 6 (six) hours as needed for Pain. (Patient not taking: Reported on 2/4/2025) 12 tablet 0    HYDROcodone-acetaminophen (NORCO) 7.5-325 mg per tablet Take 1 tablet by mouth every 6 (six) hours as needed for Pain. (Patient not taking: Reported on 2/4/2025) 20 tablet 0     No current facility-administered medications for this visit.      Prior to Admission medications    Medication Sig Start Date End Date Taking? Authorizing Provider   albuterol-ipratropium (DUO-NEB) 2.5 mg-0.5 mg/3 mL nebulizer solution Use one vial (3 mL) by nebulization every 4 (four) hours as needed for Wheezing - Rescue 1/25/25 3/26/25 Yes Maggie Zavala MD   ascorbic acid, vitamin C, (VITAMIN C) 500 MG tablet Take 500 mg by mouth once daily.   Yes Provider, Historical   cholecalciferol, vitamin D3, 25 mcg (1,000 unit) Chew once daily. 10/25/16  Yes Provider, Historical   cyanocobalamin (VITAMIN B-12) 1000 MCG tablet Take 100 mcg by mouth.   Yes Provider,  Historical   ferrous sulfate (FEOSOL) 325 mg (65 mg iron) Tab tablet Take 325 mg by mouth daily with breakfast.   Yes Provider, Historical   furosemide (LASIX) 20 MG tablet Take 20 mg by mouth daily as needed (daily except on dialysis days). Pt takes on non dialysis days   Yes Provider, Historical   levothyroxine (SYNTHROID) 75 MCG tablet Take 75 mcg by mouth once daily. 8/12/21  Yes Provider, Historical   LIDOcaine-prilocaine (EMLA) cream Apply topically as needed.   Yes Provider, Historical   multivitamin (THERAGRAN) per tablet Take 1 tablet by mouth once daily.   Yes Provider, Historical   nebulizer and compressor Sharon Use as directed 1/25/25  Yes Maggie Zavala MD   omega-3 fatty acids 1,000 mg Cap Take 1,200 mg by mouth.   Yes Provider, Historical   REPATHA SYRINGE 140 mg/mL Syrg Inject 140 mg into the skin every 14 (fourteen) days. 5/16/24  Yes Jarad Greene MD   rosuvastatin (CRESTOR) 40 MG Tab Take 10 mg by mouth every evening.   Yes Provider, Historical   sevelamer carbonate (RENVELA) 800 mg Tab Take 800 mg by mouth. 11/15/24 11/15/25 Yes Provider, Historical   UNABLE TO FIND Daily. PREVAGEN   Yes Provider, Historical   diltiaZEM (CARTIA XT) 120 MG Cp24 Take 1 capsule (120 mg total) by mouth once daily.  Patient not taking: Reported on 2/4/2025 1/25/25 3/26/25  Maggie Zavala MD   heparin sodium,porcine (HEPARIN LOCK FLUSH, PORCINE, IV) Inject into the vein. After HD, 2 times weekly    Provider, Historical   HYDROcodone-acetaminophen (NORCO) 7.5-325 mg per tablet Take 1 tablet by mouth every 6 (six) hours as needed for Pain.  Patient not taking: Reported on 2/4/2025 6/17/24   Roque Arcos Jr., MD   HYDROcodone-acetaminophen (NORCO) 7.5-325 mg per tablet Take 1 tablet by mouth every 6 (six) hours as needed for Pain.  Patient not taking: Reported on 2/4/2025 7/2/24   Roque Arcos Jr., MD   magnesium oxide (MAG-OX) 400 mg (241.3 mg magnesium) tablet Take 400 mg by mouth.  2/4/25  Provider, Historical          History  Past Medical History:   Diagnosis Date    Cancer     bladder ca and prostate ca     CKD (chronic kidney disease), stage IV     Diabetes mellitus     diet controlled    Encounter for blood transfusion     ESRD (end stage renal disease)     dialysis mon and friday    Hodgkin's lymphoma 2008    chemo and radiation    Hypertension     PAF (paroxysmal atrial fibrillation)     Presence of urostomy     Stroke     weakness left side    Thyroid disease      Past Surgical History:   Procedure Laterality Date    ABLATION      for atrial fibrilation    AV FISTULA PLACEMENT Left 07/20/2023    Procedure: CREATION, AV FISTULA;  Surgeon: Roque Arcos Jr., MD;  Location: [facility] OR;  Service: Vascular;  Laterality: Left;   / LEFT UPPER EXTROMITY    FISTULOGRAM Left 12/12/2024    Procedure: Fistulogram;  Surgeon: Rohan Bright MD;  Location: [facility] CATH LAB;  Service: Nephrology;  Laterality: Left;    HEMICOLECTOMY W/ OSTOMY      INSERTION OF IMPLANTABLE LOOP RECORDER Left 5/17/2024    Procedure: Insertion, Implantable Loop Recorder;  Surgeon: Kain Cortez MD;  Location: [facility] EP LAB;  Service: Cardiology;  Laterality: Left;  CVA, AF,  ILR, BSCI, Local, CA, 3 Prep ** HD pt/LUE AV fistula**    INSERTION OF TUNNELED CENTRAL VENOUS HEMODIALYSIS CATHETER Right 6/17/2024    Procedure: INSERTION, CATHETER, CENTRAL VENOUS, HEMODIALYSIS, TUNNELED;  Surgeon: Roque Arcos Jr., MD;  Location: [facility] OR;  Service: Vascular;  Laterality: Right;    PARTIAL SURGICAL REMOVAL OF BLADDER      PORTACATH PLACEMENT Right     chest wall    PROSTATECTOMY      THROMBECTOMY Left 6/17/2024    Procedure: THROMBECTOMY / LEFT;  Surgeon: Roque Arcos Jr., MD;  Location: [facility] OR;  Service: Vascular;  Laterality: Left;    THROMBECTOMY OF HEMODIALYSIS ACCESS SITE Left 6/17/2024    Procedure: THROMBECTOMY, HEMODIALYSIS GRAFT OR FISTULA;  Surgeon: Rohan Bright MD;  Location: [facility] CATH LAB;  Service: Nephrology;  Laterality: Left;    THROMBECTOMY  OF HEMODIALYSIS ACCESS SITE Left 12/12/2024    Procedure: THROMBECTOMY, HEMODIALYSIS GRAFT OR FISTULA;  Surgeon: Rohan Bright MD;  Location: [facility] CATH LAB;  Service: Nephrology;  Laterality: Left;  LEFT UPPER ARM    TRANSPOSITION OF BASILIC VEIN Left 7/2/2024    Procedure: TRANSPOSITION, VEIN, BASILIC-AVF/G LEFT UPPER EXTREMITY;  Surgeon: Roque Arcos Jr., MD;  Location: [facility] OR;  Service: General;  Laterality: Left;     Social History     Socioeconomic History    Marital status:    Tobacco Use    Smoking status: Former     Types: Cigarettes    Smokeless tobacco: Never   Substance and Sexual Activity    Alcohol use: Never    Drug use: Never    Sexual activity: Yes     Partners: Female     Social Drivers of Health     Financial Resource Strain: Patient Declined (1/20/2025)    Overall Financial Resource Strain (CARDIA)     Difficulty of Paying Living Expenses: Patient declined   Food Insecurity: Patient Declined (1/20/2025)    Hunger Vital Sign     Worried About Running Out of Food in the Last Year: Patient declined     Ran Out of Food in the Last Year: Patient declined   Transportation Needs: Patient Declined (1/20/2025)    TRANSPORTATION NEEDS     Transportation : Patient declined    Received from [facility]    Exercise Vital Sign   Stress: Patient Declined (1/20/2025)    [employer] - Occupational Stress Questionnaire     Feeling of Stress : Patient declined   Housing Stability: Low Risk  (1/20/2025)    Housing Stability Vital Sign     Unable to Pay for Housing in the Last Year: No     Homeless in the Last Year: No         Allergies  Review of patient's allergies indicates:   Allergen Reactions    Ambien [zolpidem] Other (See Comments)     Causes pt to hallucinate      Irbesartan      hyperkalemia         Review of Systems   Review of Systems   Constitutional: Negative for decreased appetite, fever and weight loss.   HENT:  Negative for congestion and nosebleeds.    Eyes:   Negative for double vision, vision loss in left eye, vision loss in right eye and visual disturbance.   Cardiovascular:  Negative for chest pain, claudication, cyanosis, dyspnea on exertion, irregular heartbeat, leg swelling, near-syncope, orthopnea, palpitations, paroxysmal nocturnal dyspnea and syncope.   Respiratory:  Positive for cough. Negative for hemoptysis, shortness of breath, sleep disturbances due to breathing, snoring, sputum production and wheezing.    Endocrine: Negative for cold intolerance and heat intolerance.   Skin:  Negative for nail changes and rash.   Musculoskeletal:  Negative for joint pain, muscle cramps, muscle weakness and myalgias.   Gastrointestinal:  Negative for change in bowel habit, heartburn, hematemesis, hematochezia, hemorrhoids and melena.   Neurological:  Negative for dizziness, focal weakness and headaches.         Physical Exam  Wt Readings from Last 1 Encounters:   02/04/25 111.4 kg (245 lb 11.2 oz)     BP Readings from Last 3 Encounters:   02/04/25 126/62   01/25/25 (!) 107/57   12/19/24 119/61     Pulse Readings from Last 1 Encounters:   02/04/25 89     Body mass index is 34.27 kg/m².    Physical Exam  Vitals reviewed.   Constitutional:       Appearance: He is well-developed.   HENT:      Head: Atraumatic.   Eyes:      General: No scleral icterus.  Neck:      Vascular: Normal carotid pulses. No carotid bruit, hepatojugular reflux or JVD.   Cardiovascular:      Rate and Rhythm: Normal rate and regular rhythm.      Chest Wall: PMI is not displaced.      Pulses: Intact distal pulses.           Carotid pulses are 2+ on the right side and 2+ on the left side.       Radial pulses are 2+ on the right side and 2+ on the left side.        Dorsalis pedis pulses are 2+ on the right side and 2+ on the left side.      Heart sounds: Normal heart sounds, S1 normal and S2 normal. No murmur heard.     No friction rub.      Comments: LUE AV fistula with thrill and bruit.  Tunneled catheter  in right chest.  Pulmonary:      Effort: Pulmonary effort is normal. No respiratory distress.      Breath sounds: No stridor. Examination of the left-middle field reveals rhonchi. Examination of the left-lower field reveals rhonchi. Rhonchi present. No wheezing or rales.   Chest:      Chest wall: No tenderness.   Abdominal:      General: Bowel sounds are normal.      Palpations: Abdomen is soft.   Musculoskeletal:      Cervical back: Neck supple. No edema.   Skin:     General: Skin is warm and dry.      Nails: There is no clubbing.   Neurological:      Mental Status: He is alert and oriented to person, place, and time.   Psychiatric:         Behavior: Behavior normal.         Thought Content: Thought content normal.             Assessment  1. Chronic deep vein thrombosis (DVT) of calf muscle vein of left lower extremity  Stable    2. History of CVA (cerebrovascular accident) (Primary)  Unchanged    3. Hypertensive heart disease with chronic diastolic congestive heart failure   controlled    4. PAF (paroxysmal atrial fibrillation)  Episodes of atrial fibrillation on monitor during his hospitalization for a pneumonia    5. Essential (primary) hypertension  Controlled continue current medications and monitor    6. Atherosclerosis of native coronary artery of native heart without angina pectoris  Stable no angina    7. End stage renal disease  Unchanged        Plan and Discussion  Reviewed and discussed his hospitalization last month for pneumonia.  Reviewed and discussed his echocardiogram results which showed good left ventricular function, indeterminate diastolic dysfunction.  Discussed his episode of atrial fibrillation.  Reviewed and discussed Dr. Miller's note and recommendation with him.  Discussed left atrial appendage closure for stroke prophylaxis with him.  Patient is now amenable to have left atrial appendage closure device.  Patient will get PTA of his left arm AV fistula 1st, followed by removal of right  chest tunneled catheter and then he can proceed with LAAO device.    Follow Up  3 months      Jarad Greene MD, F.A.C.C, F.S.C.A.I.      Total professional time spent for the encounter: 40 minutes  Time was spent preparing to see the patient, reviewing results of prior testing, obtaining and/or reviewing separately obtained history, performing a medically appropriate examination and interview, counseling and educating the patient/family, ordering medications/tests/procedures, referring and communicating with other health care professionals, documenting clinical information in the electronic health record, and independently interpreting results.    Disclaimer: This document was created using voice recognition software (M*Modal Fluency Direct). Although it may be edited, this document may contain errors related to incorrect recognition of the spoken word. Please call the physician if clarification is needed.

## 2025-02-04 NOTE — TELEPHONE ENCOUNTER
Spoke with patient who states that he is unable to schedule his Amulet procedure at this time due to needing to have procedures on his Vascular access for his dialysis that is not working, prior to the Amulet procedure. Patient states that once he has healed from his Vascular surgeries he will call back to schedule. Patient advised that Dr Cortez schedule is typically out about 8 weeks. Patient verbalized understanding.

## 2025-02-07 ENCOUNTER — TELEPHONE (OUTPATIENT)
Dept: ELECTROPHYSIOLOGY | Facility: CLINIC | Age: 70
End: 2025-02-07
Payer: MEDICARE

## 2025-02-11 ENCOUNTER — PATIENT MESSAGE (OUTPATIENT)
Dept: CARDIOLOGY | Facility: OTHER | Age: 70
End: 2025-02-11
Payer: MEDICARE

## 2025-02-13 ENCOUNTER — HOSPITAL ENCOUNTER (OUTPATIENT)
Facility: OTHER | Age: 70
Discharge: HOME | End: 2025-02-13
Attending: INTERNAL MEDICINE | Admitting: INTERNAL MEDICINE
Payer: MEDICARE

## 2025-02-13 VITALS
DIASTOLIC BLOOD PRESSURE: 83 MMHG | SYSTOLIC BLOOD PRESSURE: 180 MMHG | WEIGHT: 245 LBS | HEIGHT: 71 IN | TEMPERATURE: 98 F | RESPIRATION RATE: 16 BRPM | BODY MASS INDEX: 34.3 KG/M2 | OXYGEN SATURATION: 99 % | HEART RATE: 67 BPM

## 2025-02-13 DIAGNOSIS — N18.6 END STAGE RENAL DISEASE: ICD-10-CM

## 2025-02-13 DIAGNOSIS — T82.590A MECHANICAL COMPLICATION OF ARTERIOVENOUS FISTULA SURGICALLY CREATED, INITIAL ENCOUNTER: ICD-10-CM

## 2025-02-13 PROCEDURE — C1894 INTRO/SHEATH, NON-LASER: HCPCS | Performed by: INTERNAL MEDICINE

## 2025-02-13 PROCEDURE — 25500020 PHARM REV CODE 255: Performed by: INTERNAL MEDICINE

## 2025-02-13 PROCEDURE — 63600175 PHARM REV CODE 636 W HCPCS: Performed by: INTERNAL MEDICINE

## 2025-02-13 RX ORDER — LIDOCAINE HYDROCHLORIDE 10 MG/ML
INJECTION, SOLUTION INFILTRATION; PERINEURAL
Status: DISCONTINUED | OUTPATIENT
Start: 2025-02-13 | End: 2025-02-13 | Stop reason: HOSPADM

## 2025-02-13 RX ORDER — HEPARIN SOD,PORCINE/0.9 % NACL 1000/500ML
INTRAVENOUS SOLUTION INTRAVENOUS
Status: DISCONTINUED | OUTPATIENT
Start: 2025-02-13 | End: 2025-02-13 | Stop reason: HOSPADM

## 2025-02-13 NOTE — PROCEDURES
[facility] Interventional Nephrology Service    Fistulogram Procedure Report    Date of Procedure:  02/13/2025 12:04 PM    Procedures Performed: Fistulogram with no interventions, reflux arteriogram    Indication: Drop in blood flow required on dialysis    Referring Provider: Dr. Baker and VERÓNICA    Primary Surgeon: Rohan Bright MD  Assist: none    Medications Administered:  1% lidocaine    Procedure Report in Detail:  After informed consent was obtained the patient was prepped and draped in the usual sterile fashion.  His left upper Bracheocephalic AVF was cannulated in the venous facing direction with a micropuncture system, confirmed in correct placement under fluroscopy, and a fistulogram was performed using iodinated contrast.  Fistulogram revealed widely patent fistula with no focal stenoses.  Central vasculature was then evaluated with contrasted film revealing widely patent central vasculature.  Reflux arteriogram revealed patent arterial anastomosis with no JA stenosis.  More than 6cm of the brachial artery was visualized and it was patent.  At this point it was felt no interventions were warranted, so the micropuncture sheath was pulled, and direct pressure was used to achieve hemostasis.  No complications occurred during the procedure.     Estimated blood loss: < 10 mL   Estimated contrast used: 15 mL    Impression/Plan:  This was a successful fistulogram with no interventions performed in the fistula.  We will have the patient return in 2 months for follow up exam.  Please do refer the patient back if there are any further signs of stenosis (increased venous pressure alarms, prolonged bleeding or worsening adequacy).      Rohan Bright MD  [phone]

## 2025-02-13 NOTE — H&P
[facility] - Cath Lab  History & Physical    Subjective:      Chief Complaint/Reason for Admission: Here for fistulogram    Arthur Menjivar is a 69 y.o. male with ESRD (MWF at [facility] with Dr. Baker) who presents today for fistulogram after recently noting a need to lower his blood flow rate (?elevated venous pressures).  Of note, he last had a fistulogram with venous angioplasty of the JA region approximately 2 months prior.  He was using it just fine, has had to do 1 and 1 with his TDC approximately 3 times this last month.      Past Medical History:   Diagnosis Date    Cancer     bladder ca and prostate ca     CKD (chronic kidney disease), stage IV     Diabetes mellitus     diet controlled    Encounter for blood transfusion     ESRD (end stage renal disease)     dialysis mon and friday    Hodgkin's lymphoma 2008    chemo and radiation    Hypertension     PAF (paroxysmal atrial fibrillation)     Presence of urostomy     Stroke     weakness left side    Thyroid disease      Past Surgical History:   Procedure Laterality Date    ABLATION      for atrial fibrilation    AV FISTULA PLACEMENT Left 07/20/2023    Procedure: CREATION, AV FISTULA;  Surgeon: Roque Arcos Jr., MD;  Location: [facility];  Service: Vascular;  Laterality: Left;   / LEFT UPPER EXTROMITY    FISTULOGRAM Left 12/12/2024    Procedure: Fistulogram;  Surgeon: Rohan Bright MD;  Location: [facility] CATH LAB;  Service: Nephrology;  Laterality: Left;    HEMICOLECTOMY W/ OSTOMY      INSERTION OF IMPLANTABLE LOOP RECORDER Left 5/17/2024    Procedure: Insertion, Implantable Loop Recorder;  Surgeon: Kain Cortez MD;  Location: [facility] EP LAB;  Service: Cardiology;  Laterality: Left;  CVA, AF,  ILR, BSCI, Local, DE, 3 Prep ** HD pt/LUE AV fistula**    INSERTION OF TUNNELED CENTRAL VENOUS HEMODIALYSIS CATHETER Right 6/17/2024    Procedure: INSERTION, CATHETER, CENTRAL VENOUS, HEMODIALYSIS, TUNNELED;  Surgeon: Roque Arcos Jr., MD;  Location: [facility];  Service:  Vascular;  Laterality: Right;    PARTIAL SURGICAL REMOVAL OF BLADDER      PORTACATH PLACEMENT Right     chest wall    PROSTATECTOMY      THROMBECTOMY Left 6/17/2024    Procedure: THROMBECTOMY / LEFT;  Surgeon: Roque Arcos Jr., MD;  Location: [facility] OR;  Service: Vascular;  Laterality: Left;    THROMBECTOMY OF HEMODIALYSIS ACCESS SITE Left 6/17/2024    Procedure: THROMBECTOMY, HEMODIALYSIS GRAFT OR FISTULA;  Surgeon: Rohan Bright MD;  Location: [facility] CATH LAB;  Service: Nephrology;  Laterality: Left;    THROMBECTOMY OF HEMODIALYSIS ACCESS SITE Left 12/12/2024    Procedure: THROMBECTOMY, HEMODIALYSIS GRAFT OR FISTULA;  Surgeon: Rohan Bright MD;  Location: [facility] CATH LAB;  Service: Nephrology;  Laterality: Left;  LEFT UPPER ARM    TRANSPOSITION OF BASILIC VEIN Left 7/2/2024    Procedure: TRANSPOSITION, VEIN, BASILIC-AVF/G LEFT UPPER EXTREMITY;  Surgeon: Roque Arcos Jr., MD;  Location: [facility] OR;  Service: General;  Laterality: Left;     Social History     Tobacco Use    Smoking status: Former     Types: Cigarettes    Smokeless tobacco: Never   Substance Use Topics    Alcohol use: Never    Drug use: Never       PTA Medications   Medication Sig    ascorbic acid, vitamin C, (VITAMIN C) 500 MG tablet Take 500 mg by mouth once daily.    cholecalciferol, vitamin D3, 25 mcg (1,000 unit) Chew once daily.    cyanocobalamin (VITAMIN B-12) 1000 MCG tablet Take 100 mcg by mouth.    diltiaZEM (CARTIA XT) 120 MG Cp24 Take 1 capsule (120 mg total) by mouth once daily.    furosemide (LASIX) 20 MG tablet Take 20 mg by mouth daily as needed (daily except on dialysis days). Pt takes on non dialysis days    levothyroxine (SYNTHROID) 75 MCG tablet Take 75 mcg by mouth once daily.    multivitamin (THERAGRAN) per tablet Take 1 tablet by mouth once daily.    omega-3 fatty acids 1,000 mg Cap Take 1,200 mg by mouth.    rosuvastatin (CRESTOR) 40 MG Tab Take 10 mg by mouth every evening.    sevelamer carbonate (RENVELA) 800 mg Tab Take 800  mg by mouth.    UNABLE TO FIND Daily. PREVAGEN    albuterol-ipratropium (DUO-NEB) 2.5 mg-0.5 mg/3 mL nebulizer solution Use one vial (3 mL) by nebulization every 4 (four) hours as needed for Wheezing - Rescue    ferrous sulfate (FEOSOL) 325 mg (65 mg iron) Tab tablet Take 325 mg by mouth daily with breakfast.    heparin sodium,porcine (HEPARIN LOCK FLUSH, PORCINE, IV) Inject into the vein. After HD, 2 times weekly    HYDROcodone-acetaminophen (NORCO) 7.5-325 mg per tablet Take 1 tablet by mouth every 6 (six) hours as needed for Pain. (Patient not taking: Reported on 2/4/2025)    HYDROcodone-acetaminophen (NORCO) 7.5-325 mg per tablet Take 1 tablet by mouth every 6 (six) hours as needed for Pain. (Patient not taking: Reported on 2/4/2025)    LIDOcaine-prilocaine (EMLA) cream Apply topically as needed.    nebulizer and compressor Sharon Use as directed    REPATHA SYRINGE 140 mg/mL Syrg Inject 140 mg into the skin every 14 (fourteen) days.     Review of patient's allergies indicates:   Allergen Reactions    Ambien [zolpidem] Other (See Comments)     Causes pt to hallucinate      Irbesartan      hyperkalemia        Review of Systems   Constitutional: Negative.    Respiratory: Negative.     Cardiovascular: Negative.        Objective:      Vital Signs (Most Recent)  Temp: 98 °F (36.7 °C) (02/13/25 0912)  Pulse: 66 (02/13/25 0912)  Resp: 16 (02/13/25 0912)  BP: (!) 146/64 (02/13/25 0912)  SpO2: 97 % (02/13/25 0912)    Vital Signs Range (Last 24H):  Temp:  [98 °F (36.7 °C)]   Pulse:  [66]   Resp:  [16]   BP: (146)/(64)   SpO2:  [97 %]     Physical Exam  Constitutional:       General: He is not in acute distress.     Appearance: He is well-developed. He is not diaphoretic.   HENT:      Head: Normocephalic and atraumatic.   Pulmonary:      Effort: Pulmonary effort is normal. No respiratory distress.   Musculoskeletal:      Cervical back: Neck supple.      Comments: LUE brachiocephalic AVF with soft thrill, moderate pulsatility  near the anastomosis, with transition in the JA region, appropriate pulse augmentation and partial collapse with arm elevation.   Skin:     General: Skin is warm and dry.   Neurological:      Mental Status: He is alert and oriented to person, place, and time.   Psychiatric:         Behavior: Behavior normal.       Assessment:      There are no hospital problems to display for this patient.      Plan:      Fistulogram planned today.  Risks explained, consent signed.

## 2025-02-13 NOTE — BRIEF OP NOTE
[facility]  Brief Operative Note    Surgery Date: 2/13/2025     Surgeons and Role:     * Rohan Bright MD - Primary    Assisting Surgeon: None    Pre-op Diagnosis:  End stage renal disease [N18.6]  Mechanical complication of arteriovenous fistula surgically created, initial encounter [T82.590A]    Post-op Diagnosis:  Post-Op Diagnosis Codes:     * End stage renal disease [N18.6]     * Mechanical complication of arteriovenous fistula surgically created, initial encounter [T82.590A]    Procedure(s) (LRB):  Fistulogram (Left)    Anesthesia: 1% lidocaine    Operative Findings: Patient was prepped and draped in a sterile fashion.  This was a successful fistulogram of his LUE brachiocephalic AV fistula with no interventions performed in the fistula.  Patient tolerated the procedure well and no immediate complications noted.    Estimated Blood Loss: < 10 mL         Specimens:   Specimen (24h ago, onward)      None              Discharge Note    OUTCOME:  Patient was prepped and draped in a sterile fashion.  This was a successful fistulogram of his LUE brachiocephalic AV fistula with no interventions performed in the fistula.  Patient tolerated the procedure well and no immediate complications noted.    DISPOSITION: Home or Self Care    FINAL DIAGNOSIS:  <principal problem not specified>    FOLLOWUP: In clinic in 2 months for follow up exam and ultrasound    DISCHARGE INSTRUCTIONS:    Discharge Procedure Orders   Lifting restrictions   Order Comments: No heavy lifting for 24 hours.     Call MD for:  temperature >100.4     Call MD for:  severe uncontrolled pain     Call MD for:  redness, tenderness, or signs of infection (pain, swelling, redness, odor or green/yellow discharge around incision site)     Call MD for:   Order Comments: Bleeding from the access site.     Activity as tolerated

## 2025-02-13 NOTE — Clinical Note
14 ml of contrast were injected throughout the case. 36 mL of contrast was the total wasted during the case. 50 mL was the total amount used during the case.

## 2025-02-15 ENCOUNTER — CLINICAL SUPPORT (OUTPATIENT)
Dept: CARDIOLOGY | Facility: HOSPITAL | Age: 70
End: 2025-02-15
Attending: INTERNAL MEDICINE
Payer: MEDICARE

## 2025-02-15 DIAGNOSIS — I49.8 OTHER SPECIFIED CARDIAC ARRHYTHMIAS: ICD-10-CM

## 2025-02-15 PROCEDURE — 93298 REM INTERROG DEV EVAL SCRMS: CPT | Performed by: INTERNAL MEDICINE

## 2025-02-17 LAB
OHS CV AF BURDEN PERCENT: 3
OHS CV DC REMOTE DEVICE TYPE: NORMAL
OHS CV ICD SHOCK: NO

## 2025-02-19 ENCOUNTER — TELEPHONE (OUTPATIENT)
Dept: HEMATOLOGY/ONCOLOGY | Facility: CLINIC | Age: 70
End: 2025-02-19
Payer: MEDICARE

## 2025-02-20 ENCOUNTER — OFFICE VISIT (OUTPATIENT)
Dept: HEMATOLOGY/ONCOLOGY | Facility: CLINIC | Age: 70
End: 2025-02-20
Payer: MEDICARE

## 2025-02-20 ENCOUNTER — E-CONSULT (OUTPATIENT)
Dept: PULMONOLOGY | Facility: CLINIC | Age: 70
End: 2025-02-20
Payer: MEDICARE

## 2025-02-20 VITALS
SYSTOLIC BLOOD PRESSURE: 148 MMHG | BODY MASS INDEX: 35.16 KG/M2 | TEMPERATURE: 98 F | RESPIRATION RATE: 18 BRPM | OXYGEN SATURATION: 99 % | DIASTOLIC BLOOD PRESSURE: 78 MMHG | HEIGHT: 71 IN | HEART RATE: 71 BPM | WEIGHT: 251.13 LBS

## 2025-02-20 DIAGNOSIS — Z85.51 HISTORY OF BLADDER CANCER: ICD-10-CM

## 2025-02-20 DIAGNOSIS — I48.0 PAF (PAROXYSMAL ATRIAL FIBRILLATION): ICD-10-CM

## 2025-02-20 DIAGNOSIS — D64.9 NORMOCYTIC ANEMIA: ICD-10-CM

## 2025-02-20 DIAGNOSIS — R91.1 PULMONARY NODULE: Primary | ICD-10-CM

## 2025-02-20 DIAGNOSIS — Z85.71 HISTORY OF HODGKIN'S LYMPHOMA: ICD-10-CM

## 2025-02-20 DIAGNOSIS — Z93.6 S/P ILEAL CONDUIT: ICD-10-CM

## 2025-02-20 DIAGNOSIS — R91.1 LUNG NODULE: Primary | ICD-10-CM

## 2025-02-20 DIAGNOSIS — N18.6 END STAGE RENAL DISEASE: ICD-10-CM

## 2025-02-20 PROBLEM — R06.02 SOB (SHORTNESS OF BREATH): Status: RESOLVED | Noted: 2025-01-22 | Resolved: 2025-02-20

## 2025-02-20 PROBLEM — Z85.46 HISTORY OF ADENOCARCINOMA OF PROSTATE: Status: RESOLVED | Noted: 2020-04-29 | Resolved: 2025-02-20

## 2025-02-20 PROBLEM — R50.9 FEBRILE: Status: RESOLVED | Noted: 2025-01-23 | Resolved: 2025-02-20

## 2025-02-20 PROBLEM — N18.4 CHRONIC KIDNEY DISEASE, STAGE IV (SEVERE): Status: RESOLVED | Noted: 2020-04-29 | Resolved: 2025-02-20

## 2025-02-20 PROCEDURE — 99215 OFFICE O/P EST HI 40 MIN: CPT | Mod: PBBFAC | Performed by: INTERNAL MEDICINE

## 2025-02-20 NOTE — PROGRESS NOTES
[facility]     Outpatient Medical Oncology Note  Patient: Arthur Menjivar  MRN: [id number]  Primary Care Provider: Demetri Whitley MD  Chief Complaint: 1.2 cm Right [place] [facility]monary Nodule - Consult    Subjective     Subjective:   HPI:   Arthur Menjivar is a 69 y.o. male with PMH significant for:   - HTN/T2DM/ESRD (started HD 3/2024 MWF via AVF - Left arm)  - History of high-grade muscle invasive bladder cancer ([id number], stage IV) s/p NACT and cystectomy/prostatectomy with ileal conduit, diagnosed in 2017, treated at [facility], LIZABETH  - Hodgkin's Lymphoma s/p chemotherapy and RP radiation - 2005, treated at [facility] (no records)  - Paroxysmal Atrial Fibrillation (intolerant to AC due to hematuria; future plan for watchman device)  - CVA at age 61 ([facility])  - Diastolic heart failure  - HLD   - Hypothyroidism  - LLE DVT (details unclear; unknown if provoked or unprovoked)    He is referred to Medical Oncology as recent CT chest performed for AHRF 2/2 volume overload from missing dialysis and pneumonia; showed a 1.2 cm nodular opacity in the right lung base.     HPI:  5/31/2023: CT chest ([facility] report no images): 9 mm RLL granuloma; right lung otherwise clear, 1 cm renal hypodensity likely cyst  1/5/2025: CTA chest: report not images, no pericardial effusion, no suspicious pulmonary nodule or mass commented on  1/20 - 1/25/2025: presented with SOB and fever/cough, admitted for AHRF requiring 2L NC 2/2 volume overload from missing dialysis and pneumonia  1/23/2025: CT chest without contrast: 1.2 cm nodular opacity adjacent to presumed atelectasis at right lung base, no mediastinal or hilar adenopathy, 3 mm punctate nodule at right lung apex, 1.9 cm left thyroid nodule, mild pulmonary edema, moderate pericardial effusion; other: coronary atherosclerosis   1/24/2025: TTE: small pericardial effusion, EF: 60%, diastolic dysfunction     Interval History:    Mr. Menjivar is accompanied today by  his wife. He is overall doing well.     Patient's current symptoms are:  (1) Dry cough: since his hospitalization in January 2025, he has dealt with a dry cough, gradual improvement over the last few weeks.  - He had an episode of substernal chest pain prompting him to go to the ER on 1/20/2025; chest pain has not recurred   - He has chronic stable GIANG    (2) Generalized weakness: over the last year, since starting dialysis, he has felt generally weak to the point where he now uses a walker and cane for ambulation; no focal deficits.   - associated with fatigue     (3) Diarrhea: over the last several years, he has about 5 to 6 loose stools without blood daily. He has an outside GI ([facility]) who he follows with; cause of diarrhea unknown.   - He reports his last colonoscopy was at age 66 with a few polyps but otherwise unremarkable.   - He admits to bilateral lower quadrant abdominal cramping mainly postprandially.   - No N/V, GI bleeding, etc    (4) Chemotherapy induced neuropathy: affecting bilateral feet, numbness, had issues w/ nonhealing wound, started in 2017 after chemotherapy for the bladder cancer    (5) Hematuria: he has not been on anticoagulation for about 5 years, when trialed on apixaban, he notes orange tinge in his urostomy bag     Patient otherwise denies fevers, chills, night sweats, adenopathy, headaches, N/V, weight loss, rashes     Review of Systems:  14-point review of systems was asked with the pertinent positives and/or negatives stated in HPI.     Oncology History: minimal records   - History of high-grade muscle invasive bladder cancer ([id number], stage IV) s/p NACT and cystectomy/prostatectomy with ileal conduit, diagnosed in 2017, Treated at [facility] with Dr. Vargas   - Hodgkin's Lymphoma s/p chemotherapy and RP radiation - 2005, treated at [facility] by Dr. Gamez (no records)    Past Medical History:   - HTN/T2DM/ESRD (3/2024, AVF - Left arm)  - Paroxysmal Atrial Fibrillation (intolerant  to AC due to hematuria; future plan for watchman device)  - Diastolic heart failure  - HLD,  - Hypothyroidism  - LLE DVT (details unclear)    Past Medical History:   Diagnosis Date    Cancer     bladder ca and prostate ca     CKD (chronic kidney disease), stage IV     Diabetes mellitus     diet controlled    Encounter for blood transfusion     ESRD (end stage renal disease)     dialysis mon and friday    Hodgkin's lymphoma 2008    chemo and radiation    Hypertension     PAF (paroxysmal atrial fibrillation)     Presence of urostomy     Stroke     weakness left side    Thyroid disease        Past Surgical HIstory:   Past Surgical History:   Procedure Laterality Date    ABLATION      for atrial fibrilation    AV FISTULA PLACEMENT Left 07/20/2023    Procedure: CREATION, AV FISTULA;  Surgeon: Roque Arcos Jr., MD;  Location: [facility] OR;  Service: Vascular;  Laterality: Left;   / LEFT UPPER EXTROMITY    FISTULOGRAM Left 12/12/2024    Procedure: Fistulogram;  Surgeon: Rohan Bright MD;  Location: [facility] CATH LAB;  Service: Nephrology;  Laterality: Left;    FISTULOGRAM Left 2/13/2025    Procedure: Fistulogram;  Surgeon: Rohan Bright MD;  Location: [facility] CATH LAB;  Service: Nephrology;  Laterality: Left;    HEMICOLECTOMY W/ OSTOMY      INSERTION OF IMPLANTABLE LOOP RECORDER Left 5/17/2024    Procedure: Insertion, Implantable Loop Recorder;  Surgeon: Kain Cortez MD;  Location: [facility] EP LAB;  Service: Cardiology;  Laterality: Left;  CVA, AF,  ILR, BSCI, Local, KS, 3 Prep ** HD pt/LUE AV fistula**    INSERTION OF TUNNELED CENTRAL VENOUS HEMODIALYSIS CATHETER Right 6/17/2024    Procedure: INSERTION, CATHETER, CENTRAL VENOUS, HEMODIALYSIS, TUNNELED;  Surgeon: Roque Arcos Jr., MD;  Location: [facility] OR;  Service: Vascular;  Laterality: Right;    PARTIAL SURGICAL REMOVAL OF BLADDER      PORTACATH PLACEMENT Right     chest wall    PROSTATECTOMY      THROMBECTOMY Left 6/17/2024    Procedure: THROMBECTOMY / LEFT;  Surgeon: Josselyn  Roque WALLACE Jr., MD;  Location: [facility] OR;  Service: Vascular;  Laterality: Left;    THROMBECTOMY OF HEMODIALYSIS ACCESS SITE Left 6/17/2024    Procedure: THROMBECTOMY, HEMODIALYSIS GRAFT OR FISTULA;  Surgeon: Rohan Bright MD;  Location: [facility] CATH LAB;  Service: Nephrology;  Laterality: Left;    THROMBECTOMY OF HEMODIALYSIS ACCESS SITE Left 12/12/2024    Procedure: THROMBECTOMY, HEMODIALYSIS GRAFT OR FISTULA;  Surgeon: Rohan Bright MD;  Location: [facility] CATH LAB;  Service: Nephrology;  Laterality: Left;  LEFT UPPER ARM    TRANSPOSITION OF BASILIC VEIN Left 7/2/2024    Procedure: TRANSPOSITION, VEIN, BASILIC-AVF/G LEFT UPPER EXTREMITY;  Surgeon: Roque Arcos Jr., MD;  Location: [facility] OR;  Service: General;  Laterality: Left;     Family History:   - No family history of cancer     Social History:   Lives: [place]   , wife recently underwent kidney transplant and is doing well   Children: Yes   Occupation: retired    Tobacco use: former - smoked for about 10 years, 10 cigarettes/day, quit at age 49   Alcohol use: denies   Illicit drug use: denies    reports that he has quit smoking. His smoking use included cigarettes. He has never used smokeless tobacco. He reports that he does not drink alcohol and does not use drugs.     Allergies:  Review of patient's allergies indicates:   Allergen Reactions    Ambien [zolpidem] Other (See Comments)     Causes pt to hallucinate      Irbesartan      hyperkalemia       Medications:  Current Outpatient Medications   Medication Instructions    albuterol-ipratropium (DUO-NEB) 2.5 mg-0.5 mg/3 mL nebulizer solution Use one vial (3 mL) by nebulization every 4 (four) hours as needed for Wheezing - Rescue    ascorbic acid (vitamin C) (VITAMIN C) 500 mg, Daily    cholecalciferol, vitamin D3, 25 mcg (1,000 unit) Chew Daily    cyanocobalamin (VITAMIN B-12) 100 mcg    diltiaZEM (CARTIA XT) 120 mg, Oral, Daily    furosemide (LASIX) 20 mg, Daily PRN    heparin sodium,porcine  "(HEPARIN LOCK FLUSH, PORCINE, IV) Inject into the vein. After HD, 2 times weekly    HYDROcodone-acetaminophen (NORCO) 7.5-325 mg per tablet 1 tablet, Oral, Every 6 hours PRN    HYDROcodone-acetaminophen (NORCO) 7.5-325 mg per tablet 1 tablet, Oral, Every 6 hours PRN    levothyroxine (SYNTHROID) 75 mcg, Daily    LIDOcaine-prilocaine (EMLA) cream As needed (PRN)    multivitamin (THERAGRAN) per tablet 1 tablet, Daily    nebulizer and compressor Sharon Use as directed    omega-3 fatty acids 1,200 mg    REPATHA SYRINGE 140 mg/mL Syrg Inject 140 mg into the skin every 14 (fourteen) days.    rosuvastatin (CRESTOR) 10 mg, Nightly    sevelamer carbonate (RENVELA) 800 mg    UNABLE TO FIND Daily          Objective:   Vitals:   Vitals:    02/20/25 1334   BP: (!) 148/78   BP Location: Right arm   Patient Position: Sitting   Pulse: 71   Resp: 18   Temp: 98.2 °F (36.8 °C)   TempSrc: Oral   SpO2: 99%   Weight: 113.9 kg (251 lb 1.7 oz)   Height: 5' 11" (1.803 m)     BMI: Body mass index is 35.02 kg/m².  ECOG Performance Status: 1    Physical Exam     General Appearance:    Alert, cooperative, no distress   Head:    Normocephalic, without obvious abnormality, atraumatic   Throat:   Lips, mucosa, and tongue normal; teeth and gums normal   Neck:   Supple, symmetrical, no adenopathy;     thyroid:  no enlargement/tenderness, 2 cm left thyroid nodule noted   Lungs:     Clear to auscultation bilaterally, respirations unlabored    Heart:    Regular rate and rhythm, S1 and S2 normal   Abdomen:     Soft, non-tender, bowel sounds active, no masses, no organomegaly   Extremities:   Extremities normal, atraumatic, no cyanosis or edema   Pulses:   2+ and symmetric all extremities   Skin:   Skin color, texture, turgor normal, no rashes or lesions   Lymph nodes:   Cervical, supraclavicular, and axillary nodes normal   Neurologic:   Mild diffuse weakness, sensation      Laboratory Data:  Clinical Support on 02/15/2025   Component Date Value Ref Range " Status    ICD Shock 2025 No   Final    Device Type 2025 Loop   Final    AF [place] % 2025 3   Final     Recent Labs   Lab Result Units 25  0250 25  2138 25  1103   WBC K/uL 5.98 6.84 5.81   Hemoglobin g/dL 7.6* 7.9* 8.3*   Hematocrit % 24.5* 25.2* 27.0*   Platelets K/uL 175 172 169     Recent Labs   Lab Result Units 25  0400 25  0312 25  0939 25  1103   Creatinine mg/dL 7.4* 6.0* 8.1* 6.5*   AST U/L 15 21  --  16   ALT U/L 13 19  --  18     Recent Labs   Lab Result Units 25  1326   Iron ug/dL 32*   Ferritin ng/mL 4,759*        Imagin2025: CT chest without contrast: 1.2 cm nodular opacity adjacent to presumed atelectasis at right lung base, no mediastinal or hilar adenopathy, 3 mm punctate nodule at right lung apex, 1.9 cm left thyroid nodule, mild pulmonary edema, moderate pericardial effusion; other: coronary atherosclerosis       Assessment   Assessment and Plan:   Arthur Menjivar is a 69 y.o. male with  HTN/T2DM/ESRD (HD MWF via AV Fistula), History of high-grade muscle invasive bladder cancer ([id number], stage IV) s/p NACT and cystectomy/prostatectomy with ileal conduit, diagnosed in 2017 (currently LIZABETH), Hodgkin's lymphoma (unclear stage, treated with chemotherapy and RP radiation in , no records), Paroxysmal Atrial Fibrillation (intolerant to AC due to hematuria; future plan for watchman device), Diastolic heart failure, LLE DVT (details unclear). He is referred to Medical Oncology as recent CT chest performed for AHRF 2/2 volume overload from missing dialysis showed a 1.2 cm nodular opacity in the right lung base.     # 1.2 cm Right Lung Pulmonary Nodule   2025: CT chest without contrast: 1.2 cm nodular opacity adjacent to presumed atelectasis at right lung base, no mediastinal or hilar adenopathy, 3 mm punctate nodule at right lung apex, 1.9 cm left thyroid nodule, mild pulmonary edema, moderate pericardial effusion; other:  coronary atherosclerosis     - Obtain discs of [facility] imaging for comparison to prior   - Refer to pulmonary and plan for follow up CT chest in 2025    # IgG Kappa MGUS   - 2024: KF mg/L, Ratio: 2.78, M spike: 1 g/dL (0.7 in )  - Established with Dr. Orta from [facility] - will repeat labs on return visit     # History of high-grade muscle invasive bladder cancer ([id number], stage IV) s/p NACT and cystectomy/prostatectomy with ileal conduit, diagnosed in 2017   - Currently LIZABETH   - Established with Dr. Orta from [facility]    # Other Co-Morbidities:  - 1.9 cm left thyroid nodule: noted on CT chest 2025, thyroid ultrasound to be done at [facility] (already scheduled)  - Normocytic anemia: likely anemia of renal disease, per nephrology   - HTN (coreg, hydralazine, clonidine)/T2DM/ESRD: per nephrology   - Small pericardial effusion: noted on recent TTE, per cardiology (return precautions regarding chest pain reviewed)  - LLE DVT: details unclear, if unprovoked, would consider lifelong AC given male. Will try to get records to help clarify  - Other Cancer Screening: PSA <0.1 (), colonoscopy: at age 66, patient reports polyps, due around now (no records, at [facility]) - encouraged PCP follow up    Follow Up:  - Imaging: CT chest first week of April  - Referrals: Pulmonary  - RTC in mid April after CT and labs   [] discs of [facility] imaging; obtain records of LLE DVT   [] repeat imaging - ct and LE ultrasound      Med Onc Chart Routing  Urgent    Follow up with physician . RTC in mid 2025 (ideally after pulmonary appointment)   Follow up with YOLIE    Infusion scheduling note    Injection scheduling note    Labs   Scheduling:  Preferred lab:  Lab interval:  Please let patient i noticed he's due for labs as well; if we could schedule those a few hours before his CT in April   Imaging   CT chest first week of 2025   Pharmacy appointment    Other referrals         Pulmonary         MDM  includes:    - Acute or chronic illness or injury that poses a threat to life or bodily function  - Consideration and discussion of significant complications based on comorbidities  - Review of prior external notes from unique source and review of diagnostic tests and information  - Independent review and explanation of 3+ results from unique tests   - Discussion of management and ordering 3+ unique tests   - Extensive discussion of treatment and management including consideration of possible diagnoses and management options  - Prescription drug management  - Drug therapy requiring intensive monitoring for toxicity    - Patient seen in diagnosis clinic.   - Overall, I discussed the diagnosis, history, stage, labs/imaging, prognosis, management, and treatment plan as applicable. I reviewed adverse short and long term effects as applicable.   - Informed patient if symptoms are getting worse that it is their responsibility to call the clinic and schedule follow up sooner than stated follow up. Also informed patient if they do not hear from the appointment center in 2-5 business days for their referrals, the patient must call the Oncology clinic so we can follow up on procedures or referral scheduling. Patient was fully informed of current medical plan, all questions were answered and patient verbalized understanding. No further questions.   - Face to Face Visit time I spent with the patient: 75 minutes of total time spent on the encounter, including counseling patient and/or coordinating care, which includes face to face time and non-face to face time preparing to see the patient (eg, review of tests), Obtaining and/or reviewing separately obtained history, Documenting clinical information in the electronic or other health record, Independently interpreting results (not separately reported) and communicating results to the patient/family/caregiver, or Care coordination (not separately reported).     Signed   Kika  CLARENCE Rudolph MD  [facility]

## 2025-02-20 NOTE — CONSULTS
[facility]  Response for E-Consult     Patient Name: Arthur Menjivar  MRN: [id number]  Primary Care Provider: Demetri Whitley MD   Requesting Provider: Kika Rudolph MD  Consults    Recommendation: since has smoking history and bladder cancer, consider PET scan , Nodify blood test.     More conservatively repeat CT chest in 3 months.     Additional future steps to consider: Refer to pulmonary .    Total time of Consultation: 15 minutes.     I did not speak to the requesting provider verbally about this.     *This eConsult is based on the clinical data available to me and is furnished without benefit of a physical examination. The eConsult will need to be interpreted in light of any clinical issues or changes in patient status not available to me at the time of filing this eConsults. Significant changes in patient condition or level of acuity should result in immediate formal consultation and reevaluation. Please alert me if you have further questions.    Thank you for this eConsult referral.     Kylee Cox MD  [facility]

## 2025-02-21 ENCOUNTER — TELEPHONE (OUTPATIENT)
Dept: HEMATOLOGY/ONCOLOGY | Facility: CLINIC | Age: 70
End: 2025-02-21
Payer: MEDICARE

## 2025-02-28 ENCOUNTER — TELEPHONE (OUTPATIENT)
Dept: TRANSPLANT | Facility: CLINIC | Age: 70
End: 2025-02-28
Payer: MEDICARE

## 2025-03-03 ENCOUNTER — DOCUMENTATION ONLY (OUTPATIENT)
Dept: TRANSPLANT | Facility: CLINIC | Age: 70
End: 2025-03-03
Payer: MEDICARE

## 2025-03-10 ENCOUNTER — TELEPHONE (OUTPATIENT)
Dept: ELECTROPHYSIOLOGY | Facility: CLINIC | Age: 70
End: 2025-03-10
Payer: MEDICARE

## 2025-03-11 ENCOUNTER — TELEPHONE (OUTPATIENT)
Dept: ELECTROPHYSIOLOGY | Facility: CLINIC | Age: 70
End: 2025-03-11
Payer: MEDICARE

## 2025-03-11 DIAGNOSIS — R31.9 HEMATURIA, UNSPECIFIED TYPE: ICD-10-CM

## 2025-03-11 DIAGNOSIS — Z86.73 HISTORY OF CVA (CEREBROVASCULAR ACCIDENT): Primary | ICD-10-CM

## 2025-03-11 DIAGNOSIS — Z01.818 PREOP TESTING: ICD-10-CM

## 2025-03-11 DIAGNOSIS — I49.9 CARDIAC ARRHYTHMIA, UNSPECIFIED CARDIAC ARRHYTHMIA TYPE: ICD-10-CM

## 2025-03-11 DIAGNOSIS — I48.0 PAF (PAROXYSMAL ATRIAL FIBRILLATION): ICD-10-CM

## 2025-03-11 NOTE — TELEPHONE ENCOUNTER
Spoke to pt. Pt agreed to 5/8/25 with 8 am arrival for Watchman. Reviewed allergies, holding Lasix on day of procedure. Per pt, tunneled cath to be removed on 3/13/25. Instructions to follow through portal. Patient verbalized understanding and had no further questions.

## 2025-03-13 ENCOUNTER — PROCEDURE VISIT (OUTPATIENT)
Dept: SURGERY | Facility: CLINIC | Age: 70
End: 2025-03-13
Attending: SPECIALIST
Payer: MEDICARE

## 2025-03-13 VITALS — HEART RATE: 90 BPM | DIASTOLIC BLOOD PRESSURE: 81 MMHG | OXYGEN SATURATION: 98 % | SYSTOLIC BLOOD PRESSURE: 151 MMHG

## 2025-03-13 DIAGNOSIS — N18.6 ESRD ON DIALYSIS: Primary | ICD-10-CM

## 2025-03-13 DIAGNOSIS — Z99.2 ESRD ON DIALYSIS: Primary | ICD-10-CM

## 2025-03-13 PROCEDURE — 36589 REMOVAL TUNNELED CV CATH: CPT | Mod: S$PBB,,, | Performed by: SPECIALIST

## 2025-03-13 PROCEDURE — 36589 REMOVAL TUNNELED CV CATH: CPT | Mod: PBBFAC | Performed by: SPECIALIST

## 2025-03-13 PROCEDURE — 99499 UNLISTED E&M SERVICE: CPT | Mod: S$PBB,,, | Performed by: SPECIALIST

## 2025-03-13 NOTE — PROCEDURES
Status post construction AV fistula left upper extremity on 07/02/2024   Fistula working well in dialysis, cuffed catheter still in place  Patient for removal of cuffed catheter

## 2025-03-13 NOTE — PROCEDURES
Removal, Right IJ Catheter    Date/Time: 3/13/2025 2:00 PM    Performed by: Roque Arcos Jr., MD  Authorized by: Roque Arcos Jr., MD    Consent Done?:  Yes (Verbal)  Timeout: prior to procedure the correct patient, procedure, and site was verified    Prep: patient was prepped and draped in usual sterile fashion    Local anesthesia used?: Yes    Anesthesia:  Local infiltration  Local anesthetic:  Lidocaine 2% with epinephrine  Anesthetic total (ml):  10  Assistants?: Yes    List of assistants:  Yi Sotomayor I was present for the entire procedure.  : IJ Catheter.  Body area:  Chest  Laterality:  Right  Position:  Supine   Patient was prepped and draped in the normal sterile fashion.  Anesthesia:  Local infiltration  Local anesthetic:  Lidocaine 2% with epinephrine   Hemostasis was obtained.  Sterile dressings:  Gauze   Needle, instrument, and sponge counts were correct.   Patient tolerated the procedure well with no immediate complications.   Post-operative instructions were provided for the patient.

## 2025-03-18 ENCOUNTER — TELEPHONE (OUTPATIENT)
Dept: ELECTROPHYSIOLOGY | Facility: CLINIC | Age: 70
End: 2025-03-18
Payer: MEDICARE

## 2025-03-20 ENCOUNTER — CLINICAL SUPPORT (OUTPATIENT)
Dept: CARDIOLOGY | Facility: HOSPITAL | Age: 70
End: 2025-03-20
Payer: MEDICARE

## 2025-03-20 ENCOUNTER — CLINICAL SUPPORT (OUTPATIENT)
Dept: CARDIOLOGY | Facility: HOSPITAL | Age: 70
End: 2025-03-20
Attending: INTERNAL MEDICINE
Payer: MEDICARE

## 2025-03-20 DIAGNOSIS — I49.8 OTHER SPECIFIED CARDIAC ARRHYTHMIAS: ICD-10-CM

## 2025-03-20 PROCEDURE — 93298 REM INTERROG DEV EVAL SCRMS: CPT | Mod: 26,,, | Performed by: INTERNAL MEDICINE

## 2025-03-25 LAB
OHS CV AF BURDEN PERCENT: < 1
OHS CV DC REMOTE DEVICE TYPE: NORMAL
OHS CV ICD SHOCK: NO

## 2025-04-01 ENCOUNTER — PATIENT MESSAGE (OUTPATIENT)
Dept: ELECTROPHYSIOLOGY | Facility: CLINIC | Age: 70
End: 2025-04-01
Payer: MEDICARE

## 2025-04-03 ENCOUNTER — OFFICE VISIT (OUTPATIENT)
Dept: PULMONOLOGY | Facility: CLINIC | Age: 70
End: 2025-04-03
Payer: MEDICARE

## 2025-04-03 VITALS
WEIGHT: 246.06 LBS | HEIGHT: 71 IN | SYSTOLIC BLOOD PRESSURE: 144 MMHG | BODY MASS INDEX: 34.45 KG/M2 | OXYGEN SATURATION: 98 % | DIASTOLIC BLOOD PRESSURE: 74 MMHG | HEART RATE: 82 BPM

## 2025-04-03 DIAGNOSIS — R91.1 PULMONARY NODULE: ICD-10-CM

## 2025-04-03 DIAGNOSIS — Z87.891 HISTORY OF TOBACCO USE: ICD-10-CM

## 2025-04-03 DIAGNOSIS — Z99.2 ESRD (END STAGE RENAL DISEASE) ON DIALYSIS: Primary | ICD-10-CM

## 2025-04-03 DIAGNOSIS — E66.01 SEVERE OBESITY (BMI 35.0-39.9) WITH COMORBIDITY: ICD-10-CM

## 2025-04-03 DIAGNOSIS — N18.6 ESRD (END STAGE RENAL DISEASE) ON DIALYSIS: Primary | ICD-10-CM

## 2025-04-03 PROCEDURE — 99214 OFFICE O/P EST MOD 30 MIN: CPT | Mod: PBBFAC | Performed by: EMERGENCY MEDICINE

## 2025-04-03 PROCEDURE — 99999 PR PBB SHADOW E&M-EST. PATIENT-LVL IV: CPT | Mod: PBBFAC,,, | Performed by: EMERGENCY MEDICINE

## 2025-04-03 RX ORDER — CARVEDILOL 6.25 MG/1
TABLET ORAL
COMMUNITY

## 2025-04-03 RX ORDER — DIGOXIN 125 MCG
TABLET ORAL
COMMUNITY

## 2025-04-03 RX ORDER — HYDRALAZINE HYDROCHLORIDE 100 MG/1
TABLET, FILM COATED ORAL
COMMUNITY

## 2025-04-03 RX ORDER — AZITHROMYCIN 250 MG/1
TABLET, FILM COATED ORAL
COMMUNITY
Start: 2025-01-25

## 2025-04-03 RX ORDER — AMOXICILLIN AND CLAVULANATE POTASSIUM 500; 125 MG/1; MG/1
TABLET, FILM COATED ORAL
COMMUNITY
Start: 2025-01-25

## 2025-04-03 RX ORDER — CLONIDINE HYDROCHLORIDE 0.1 MG/1
TABLET ORAL
COMMUNITY

## 2025-04-03 NOTE — PROGRESS NOTES
Pulmonary & Critical Care Medicine   Consultation Note    Reason for Consultation:    HPI: Referral from Dr. Rudolph-   Arthur CARLSON Otto is a 69 y.o. male with PMH significant for:   - HTN/T2DM/ESRD (started HD 3/2024 MWF via AVF - Left arm)  - History of high-grade muscle invasive bladder cancer ([id number], stage IV) s/p NACT and cystectomy/prostatectomy with ileal conduit, diagnosed in 2017, [place], [place]  - Hodgkin's Lymphoma s/p chemotherapy and RP radiation - 2005, treated at [facility] (no records)  - Paroxysmal Atrial Fibrillation (intolerant to AC due to hematuria; future plan for watchman device)  - CVA at age 61 ([facility])  - Diastolic heart failure  - HLD   - Hypothyroidism  - LLE DVT (details unclear; unknown if provoked or unprovoked)     He is referred to Medical Oncology as recent CT chest performed for AHRF 2/2 volume overload from missing dialysis and pneumonia; showed a 1.2 cm nodular opacity in the right lung base.      HPI:  5/31/2023: CT chest ([facility] report no images): 9 mm RLL granuloma; right lung otherwise clear, 1 cm renal hypodensity likely cyst  1/5/2025: CTA chest: report not images, no pericardial effusion, no suspicious pulmonary nodule or mass commented on  1/20 - 1/25/2025: presented with SOB and fever/cough, admitted for AHRF requiring 2L NC 2/2 volume overload from missing dialysis and pneumonia  1/23/2025: CT chest without contrast: 1.2 cm nodular opacity adjacent to presumed atelectasis at right lung base, no mediastinal or hilar adenopathy, 3 mm punctate nodule at right lung apex, 1.9 cm left thyroid nodule, mild pulmonary edema, moderate pericardial effusion; other: coronary atherosclerosis   1/24/2025: TTE: small pericardial effusion, EF: 60%, diastolic dysfunction     Interval History:    Mr. Menjivar is accompanied today by his wife. He is overall doing well.      Patient's current symptoms are:  (1) Dry cough: since his hospitalization in January 2025, he has dealt  with a dry cough, gradual improvement over the last few weeks.  - He had an episode of substernal chest pain prompting him to go to the ER on 1/20/2025; chest pain has not recurred   - He has chronic stable GIANG     (2) Generalized weakness: over the last year, since starting dialysis, he has felt generally weak to the point where he now uses a walker and cane for ambulation; no focal deficits.   - associated with fatigue       Today,   Mr. Menjivar is accompanied by wife in clinic. He reports that he is doing very well. No Issues with HD and tolerating good through LUE fistula.   He is near dry weight  No SOB. Cough all resolved. No additional pulmonary complaints.   At baseline he ambulates with use of walker.   A repeat CT imaging is schedule for 4/24 and ordered per Dr. Rudolph.   No interval imaging from Jan admit to now for review.   He is a former tobacco user, but quit >40 years prior. No known underlying pulmonary disease.        Additional Pulmonary History:   Occupational/Environmental Exposures:Electrical work.. Enjoying half-way   From [facility].. [place] Had new roof and sidding installed now complete.   Exposure to Animals/Pets: None   Travel History: None   History of exposures to TB: Quant negative 2024. No exposures.    Family History of Lung Cancer: None   Childhood history of Lung Disease: None   OAC/Anti-PLT- Heparin with HD.   GLP- None   Chest surgery/Trauma- None    PNA history- As above. None   Tobacco use- Quit 40 years prior.   Scheduled for watchman next month.     Past Medical History:   Diagnosis Date    Cancer     bladder ca and prostate ca     CKD (chronic kidney disease), stage IV     Diabetes mellitus     diet controlled    Encounter for blood transfusion     ESRD (end stage renal disease)     dialysis mon and friday    Hodgkin's lymphoma 2008    chemo and radiation    Hypertension     PAF (paroxysmal atrial fibrillation)     Presence of urostomy     Stroke      weakness left side    Thyroid disease      Past Surgical History:   Procedure Laterality Date    ABLATION      for atrial fibrilation    AV FISTULA PLACEMENT Left 07/20/2023    Procedure: CREATION, AV FISTULA;  Surgeon: Roque Arcos Jr., MD;  Location: [facility], [place], operated by [facility] OR;  Service: Vascular;  Laterality: Left;   / LEFT UPPER EXTROMITY    FISTULOGRAM Left 12/12/2024    Procedure: Fistulogram;  Surgeon: Rohan Bright MD;  Location: [facility], [place], operated by [facility] CATH LAB;  Service: Nephrology;  Laterality: Left;    FISTULOGRAM Left 2/13/2025    Procedure: Fistulogram;  Surgeon: Rohan Bright MD;  Location: [facility], [place], operated [facility] CATH LAB;  Service: Nephrology;  Laterality: Left;    HEMICOLECTOMY W/ OSTOMY      INSERTION OF IMPLANTABLE LOOP RECORDER Left 5/17/2024    Procedure: Insertion, Implantable Loop Recorder;  Surgeon: Kain Cortez MD;  Location: [facility] EP LAB;  Service: Cardiology;  Laterality: Left;  CVA, AF,  ILR, BSCI, Local, WA, 3 Prep ** HD pt/LUE AV fistula**    INSERTION OF TUNNELED CENTRAL VENOUS HEMODIALYSIS CATHETER Right 6/17/2024    Procedure: INSERTION, CATHETER, CENTRAL VENOUS, HEMODIALYSIS, TUNNELED;  Surgeon: Roque Arcos Jr., MD;  Location: [facility];  Service: Vascular;  Laterality: Right;    PARTIAL SURGICAL REMOVAL OF BLADDER      PORTACATH PLACEMENT Right     chest wall    PROSTATECTOMY      THROMBECTOMY Left 6/17/2024    Procedure: THROMBECTOMY / LEFT;  Surgeon: Roque Arcos Jr., MD;  Location: [facility];  Service: Vascular;  Laterality: Left;    THROMBECTOMY OF HEMODIALYSIS ACCESS SITE Left 6/17/2024    Procedure: THROMBECTOMY, HEMODIALYSIS GRAFT OR FISTULA;  Surgeon: Rohan Bright MD;  Location: [facility], [place], operated [facility] CATH LAB;  Service: Nephrology;  Laterality: Left;    THROMBECTOMY OF HEMODIALYSIS ACCESS SITE Left 12/12/2024    Procedure: THROMBECTOMY, HEMODIALYSIS GRAFT OR FISTULA;  Surgeon: Rohan Bright MD;  Location: [facility], [place], operated by [facility] CATH LAB;  Service: Nephrology;  Laterality: Left;  LEFT UPPER ARM    TRANSPOSITION OF BASILIC VEIN Left 7/2/2024     "Procedure: TRANSPOSITION, VEIN, BASILIC-AVF/G LEFT UPPER EXTREMITY;  Surgeon: Roque Arcos Jr., MD;  Location: [facility];  Service: General;  Laterality: Left;     Social History:   Social History[1]  No family history on file.  Drug Allergies:   Review of patient's allergies indicates:   Allergen Reactions    Ambien [zolpidem] Other (See Comments)     Causes pt to hallucinate      Irbesartan      hyperkalemia         Review of Systems:   A comprehensive 12-point review of systems was performed, and is negative except for those items mentioned above in the HPI section of this note.     Vital Signs:  BP (!) 144/74 (BP Location: Right arm, Patient Position: Sitting)   Pulse 82   Ht 5' 11" (1.803 m)   Wt 111.6 kg (246 lb 0.5 oz)   SpO2 98%   BMI 34.31 kg/m²        Physical Exam:   GEN- NAD AAOx3 Well Built, In wheelchair.. Uses cane/walker at home.   HEENT- ATNC, PERRLA, EOMI, OP-Cl. No JVD, LAD or bruit noted. Trachea Midline.   CV- RRR No M/R/G  RESP- CTA-Bilateral   GI- S/NT/ND.   Ext- MAEW, No deformity. No edema or rashes noted.       Personal Review and Summary of Prior Diagnostics    Laboratory Studies: Reviewed      Latest Reference Range & Units Most Recent   WBC 4.5 - 11.0 103/uL 13.4 (H) (E)  12/5/24 14:40   WBC 3.90 - 12.70 K/uL 5.81  1/25/25 11:03   RBC 4.60 - 6.20 M/uL 2.56 (L)  1/25/25 11:03   RBC 4.50 - 5.90 106/uL 3.52 (L) (E)  12/5/24 14:40   Hemoglobin 14.0 - 18.0 g/dL 8.3 (L)  1/25/25 11:03   Hematocrit 40.0 - 54.0 % 27.0 (L)  1/25/25 11:03   MCV 82 - 98 fL 106 (H)  1/25/25 11:03   MCH 27.0 - 31.0 pg 32.4 (H)  1/25/25 11:03   MCHC 32.0 - 36.0 g/dL 30.7 (L)  1/25/25 11:03   RDW 11.5 - 14.5 % 16.2 (H)  1/25/25 11:03   Platelet Count 150 - 450 K/uL 169  1/25/25 11:03   PLATELET COUNT 130 - 400 103/uL 155 (E)  12/5/24 14:40   MPV 9.2 - 12.9 fL 9.1 (L)  1/25/25 11:03   Gran % 38.0 - 73.0 % 66.3  1/25/25 11:03   Lymph % 18.0 - 48.0 % 20.3  1/25/25 11:03   Mono % 4.0 - 15.0 % 10.0  1/25/25 11:03 "   Eos % 0.0 - 8.0 % 1.7  1/25/25 11:03   Basophil % 0.0 - 1.9 % 0.7  1/25/25 11:03   Immature Granulocytes 0.0 - 0.5 % 1.0 (H)  1/25/25 11:03   Gran # (ANC) 1.8 - 7.7 K/uL 3.9  1/25/25 11:03   Lymph # 1.0 - 4.8 K/uL 1.2  1/25/25 11:03   Mono # 0.3 - 1.0 K/uL 0.6  1/25/25 11:03   Eos # 0.0 - 0.5 K/uL 0.1  1/25/25 11:03   Baso # 0.00 - 0.20 K/uL 0.04  1/25/25 11:03   Immature Grans (Abs) 0.00 - 0.04 K/uL 0.06 (H)  1/25/25 11:03   nRBC 0 /100 WBC 0  1/25/25 11:03   Differential Method  Automated  1/25/25 11:03   Iron 45 - 160 ug/dL 32 (L)  1/22/25 13:26   TIBC 250 - 450 ug/dL 204 (L)  1/22/25 13:26   Saturated Iron 20 - 50 % 16 (L)  1/22/25 13:26   Transferrin 200 - 375 mg/dL 138 (L)  1/22/25 13:26   Ferritin 20.0 - 300.0 ng/mL 4,759 (H)  1/22/25 13:26   PT 9.0 - 12.5 sec 10.6  8/13/20 10:15   INR 0.8 - 1.2  1.0  8/13/20 10:15   Sodium 136 - 145 mmol/L 135 (L)  1/25/25 11:03   Potassium 3.5 - 5.1 mmol/L 4.1  1/25/25 11:03   Potassium 3.6 - 5.2 mmol/L 4.4 (E)  12/5/24 14:40   Chloride 95 - 110 mmol/L 100  1/25/25 11:03   CO2 23 - 29 mmol/L 22 (L)  1/25/25 11:03   Anion Gap 8 - 16 mmol/L 13  1/25/25 11:03   BUN 8 - 23 mg/dL 35 (H)  1/25/25 11:03   BUN 7.0 - 25.0 mg/dL 30.0 (H) (E)  12/5/24 14:40   Creatinine 0.5 - 1.4 mg/dL 6.5 (H)  1/25/25 11:03   BUN/CREAT RATIO 12 - 20  5 (L) (E)  11/26/24 10:27   eGFR >60 mL/min/1.73 m^2 8.6 !  1/25/25 11:03   eGFR >=90 mL/min/1.73m2 8 (L) (E)  12/5/24 14:40   Glucose 70 - 110 mg/dL 123 (H)  1/25/25 11:03   Calcium 8.7 - 10.5 mg/dL 9.1  1/25/25 11:03   Phosphorus Level 2.7 - 4.5 mg/dL 4.0  1/24/25 09:39   Magnesium  1.6 - 2.6 mg/dL 2.2  1/24/25 09:39   Alkaline Phosphatase 20 - 120 U/L 55 (E)  12/5/24 14:40   ALP 40 - 150 U/L 86  1/25/25 11:03   PROTEIN TOTAL 6.0 - 8.4 g/dL 7.5  1/25/25 11:03   Albumin 3.5 - 5.2 g/dL 2.5 (L)  1/25/25 11:03   BILIRUBIN TOTAL 0.1 - 1.0 mg/dL 0.4  1/25/25 11:03   AST 10 - 40 U/L 16  1/25/25 11:03   ALT 10 - 44 U/L 18  1/25/25 11:03   CRP 0.0 - 8.2 mg/L  254.3 (H)  1/22/25 20:05   Cholesterol Total 120 - 199 mg/dL 125  2/1/23 10:19   HDL 40 - 75 mg/dL 46  2/1/23 10:19   HDL/Cholesterol Ratio 20.0 - 50.0 % 36.8  2/1/23 10:19   Non HDL Chol. (LDL+VLDL) <130 mg/dL (calc) 133 (H)  4/15/21 10:37   Non-HDL Cholesterol mg/dL 79  2/1/23 10:19   Total Cholesterol/HDL Ratio 2.0 - 5.0  2.7  2/1/23 10:19   Triglycerides 30 - 150 mg/dL 78  2/1/23 10:19   LDL Cholesterol 63.0 - 159.0 mg/dL 63.4  2/1/23 10:19   BNP 0 - 99 pg/mL 260 (H)  1/20/25 11:42   CPK 30 - 200 U/L 162 (E)  1/4/25 10:10   Troponin I <0.03 ng/mL 0.02 (E)  1/4/25 12:36   Troponin I High Sensitivity 0 - 35 ng/L 10  1/20/25 14:37   Lactic Acid Level 0.5 - 2.2 mmol/L 0.8  1/24/25 21:38   Hemoglobin A1C External 4.0 - 5.6 % 5.5  7/20/23 20:27   Estimated Avg Glucose 68 - 131 mg/dL 111  7/20/23 20:27   TSH 0.400 - 4.000 uIU/mL 7.647 (H)  7/20/23 13:19   Free T4 0.71 - 1.51 ng/dL 0.83  7/20/23 13:19   Procalcitonin <0.25 ng/mL 1.35 (H)  1/22/25 20:05   Group & Rh  O POS  1/22/25 13:26   INDIRECT TRESSA  NEG  1/22/25 13:26   Specimen Outdate  01/25/2025 23:59  1/22/25 13:26   Hepatitis B Surface Ag Non-reactive  Non-reactive  1/21/25 02:26   Hepatitis C Ab Non-reactive  Non-reactive  1/20/25 11:42   Flu A & B Source  Nasal swab  1/20/25 11:08   Influenza A, Molecular Negative  Negative  1/20/25 11:08   Influenza B, Molecular Negative  Negative  1/20/25 11:08   SARS-CoV-2 RNA, Amplification, Qual Negative  Negative  1/20/25 11:08   HIV 1/2 Ag/Ab Non-reactive  Non-reactive  1/20/25 11:42   HIV Ag/Ab 4th Gen NON-REACTIVE  NON-REACTIVE (E)  7/15/20 09:14   SARS-CoV2 (COVID-19) Qualitative PCR Not Detected  Not Detected  1/23/25 11:42   Influenza B Not Detected  Not Detected  1/23/25 11:42   Human Rhinovirus/Enterovirus Not Detected  Not Detected  1/23/25 11:42   Parainfluenza Virus 1 Not Detected  Not Detected  1/23/25 11:42   Parainfluenza Virus 2 Not Detected  Not Detected  1/23/25 11:42   Parainfluenza Virus 3 Not  Detected  Not Detected  1/23/25 11:42   Parainfluenza Virus 4 Not Detected  Not Detected  1/23/25 11:42   Coronavirus OC43, Common Cold Virus Not Detected  Not Detected  1/23/25 11:42   Coronavirus NL63, Common Cold Virus Not Detected  Not Detected  1/23/25 11:42   Coronavirus HKU1, Common Cold Virus Not Detected  Not Detected  1/23/25 11:42   Coronavirus 229E, Common Cold Virus Not Detected  Not Detected  1/23/25 11:42   Bordetella pertussis (ptxP) Not Detected  Not Detected  1/23/25 11:42   Adenovirus Not Detected  Not Detected  1/23/25 11:42   Bordetella Parapertussis (KK3243) Not Detected  Not Detected  1/23/25 11:42   Chlamydia pneumoniae Not Detected  Not Detected  1/23/25 11:42   Mycoplasma pneumoniae Not Detected  Not Detected  1/23/25 11:42   Respiratory Infection Panel Source  NP Swab  1/23/25 11:42   Respiratory Syncytial Virus Not Detected  Not Detected  1/23/25 11:42   Influenza A Not Detected  Not Detected  1/23/25 11:42   Human Metapneumovirus Not Detected  Not Detected  1/23/25 11:42       Microbiology Data: Reviewed       Summary of Chest Imaging Personally Reviewed:     CT CHEST- 1/2025  Suspected mild pulmonary edema noting some respiratory motion artifact.  Small bibasilar pleural effusions and adjacent atelectasis, left greater than right.  Moderate pericardial effusion.  1.2 cm nodular opacity adjacent to presumed subsegmental atelectasis at right lung base.  Additional punctate nodule at right lung apex.  Recommend follow-up noncontrast CT chest in 3 months at minimum.     Suspected left lobe thyroid nodule.  Consider further evaluation with outpatient ultrasound.              2D Echo:     Left Ventricle: The left ventricle is normal in size. Ventricular mass is normal. Mildly increased wall thickness. There is concentric remodeling. There is normal systolic function with a visually estimated ejection fraction of 60 - 65%. There is diastolic dysfunction but grade cannot be determined.    Right  Ventricle: Normal right ventricular cavity size. There is mild hypertrophy. Systolic function is mildly reduced.    Aortic Valve: There is mild aortic valve sclerosis.    Tricuspid Valve: There is mild regurgitation.    Pulmonary Artery: The estimated pulmonary artery systolic pressure is 28 mmHg.    IVC/SVC: Normal venous pressure at 3 mmHg.    Pericardium: There is a small circumferential effusion.       PFT's: None            Assessment:     No diagnosis found.     Encounter Medications[2]  No orders of the defined types were placed in this encounter.      Plan:     Pulmonary nodule  ESRD  Hospital follow up PNA/abnormal CT chest   History of urothelial malignancy   History of lymphoma   Morbid obesity- weight loss/exercise discussed.     -- On review of CT imaging the predominate findings are lower lung zone atelectatic change/consolidation and small effusion. Right LL pulm nodule slightly more discrete, but overall still appears c/w atelectasis. He is now without pulmonary symptoms. Tolerating HD well. I would plan to repeat CT scan of chest on 4/24 as already ordered. I anticipate findings will resolve. If persistent we can discuss additional evaluation. I have CCed myself to imaging and will plan for phone discussion with patient and wife once complete.     Ap Frias MD   [facility]        [1]   Social History  Socioeconomic History    Marital status:    Tobacco Use    Smoking status: Former     Types: Cigarettes    Smokeless tobacco: Never   Substance and Sexual Activity    Alcohol use: Never    Drug use: Never    Sexual activity: Yes     Partners: Female     Social Drivers of Health     Financial Resource Strain: Patient Declined (1/20/2025)    Overall Financial Resource Strain (CARDIA)     Difficulty of Paying Living Expenses: Patient declined   Food Insecurity: Patient Declined (1/20/2025)    Hunger Vital Sign     Worried About Running Out of Food in the Last Year:  Patient declined     Ran Out of Food in the Last Year: Patient declined   Transportation Needs: Patient Declined (1/20/2025)    TRANSPORTATION NEEDS     Transportation : Patient declined    Received from [facility]    Exercise Vital Sign   Stress: Patient Declined (1/20/2025)    [employer] - Occupational Stress Questionnaire     Feeling of Stress : Patient declined   Housing Stability: Unknown (1/20/2025)    Housing Stability Vital Sign     Unable to Pay for Housing in the Last Year: No     Homeless in the Last Year: No   [2]   Outpatient Encounter Medications as of 4/3/2025   Medication Sig Dispense Refill    albuterol-ipratropium (DUO-NEB) 2.5 mg-0.5 mg/3 mL nebulizer solution Use one vial (3 mL) by nebulization every 4 (four) hours as needed for Wheezing - Rescue 90 mL 0    ascorbic acid, vitamin C, (VITAMIN C) 500 MG tablet Take 500 mg by mouth once daily.      cholecalciferol, vitamin D3, 25 mcg (1,000 unit) Chew once daily.      cyanocobalamin (VITAMIN B-12) 1000 MCG tablet Take 100 mcg by mouth.      diltiaZEM (CARTIA XT) 120 MG Cp24 Take 1 capsule (120 mg total) by mouth once daily. 30 capsule 1    furosemide (LASIX) 20 MG tablet Take 20 mg by mouth daily as needed (daily except on dialysis days). Pt takes on non dialysis days      heparin sodium,porcine (HEPARIN LOCK FLUSH, PORCINE, IV) Inject into the vein. After HD, 2 times weekly (Patient not taking: Reported on 2/20/2025)      HYDROcodone-acetaminophen (NORCO) 7.5-325 mg per tablet Take 1 tablet by mouth every 6 (six) hours as needed for Pain. (Patient not taking: Reported on 2/20/2025) 12 tablet 0    HYDROcodone-acetaminophen (NORCO) 7.5-325 mg per tablet Take 1 tablet by mouth every 6 (six) hours as needed for Pain. (Patient not taking: Reported on 2/20/2025) 20 tablet 0    levothyroxine (SYNTHROID) 75 MCG tablet Take 75 mcg by mouth once daily.      LIDOcaine-prilocaine (EMLA) cream Apply topically as needed. (Patient  not taking: Reported on 2/20/2025)      multivitamin (THERAGRAN) per tablet Take 1 tablet by mouth once daily.      nebulizer and compressor Sharon Use as directed 1 each 0    omega-3 fatty acids 1,000 mg Cap Take 1,200 mg by mouth. (Patient not taking: Reported on 2/20/2025)      REPATHA SYRINGE 140 mg/mL Syrg Inject 140 mg into the skin every 14 (fourteen) days. 2 mL 11    rosuvastatin (CRESTOR) 40 MG Tab Take 10 mg by mouth every evening.      sevelamer carbonate (RENVELA) 800 mg Tab Take 800 mg by mouth.      UNABLE TO FIND Daily. PREVAGEN       No facility-administered encounter medications on file as of 4/3/2025.

## 2025-04-14 RX ORDER — CEFAZOLIN 2 G/1
2 INJECTION, POWDER, FOR SOLUTION INTRAMUSCULAR; INTRAVENOUS
Status: CANCELLED | OUTPATIENT
Start: 2025-04-14

## 2025-04-24 ENCOUNTER — HOSPITAL ENCOUNTER (OUTPATIENT)
Dept: RADIOLOGY | Facility: OTHER | Age: 70
Discharge: HOME | End: 2025-04-24
Attending: INTERNAL MEDICINE
Payer: MEDICARE

## 2025-04-24 ENCOUNTER — OFFICE VISIT (OUTPATIENT)
Dept: NEPHROLOGY | Facility: CLINIC | Age: 70
End: 2025-04-24
Payer: MEDICARE

## 2025-04-24 VITALS
WEIGHT: 242.94 LBS | OXYGEN SATURATION: 99 % | HEIGHT: 71 IN | TEMPERATURE: 98 F | RESPIRATION RATE: 14 BRPM | SYSTOLIC BLOOD PRESSURE: 111 MMHG | HEART RATE: 81 BPM | BODY MASS INDEX: 34.01 KG/M2 | DIASTOLIC BLOOD PRESSURE: 77 MMHG

## 2025-04-24 DIAGNOSIS — Z99.2 ESRD ON DIALYSIS: Primary | ICD-10-CM

## 2025-04-24 DIAGNOSIS — R91.1 PULMONARY NODULE: ICD-10-CM

## 2025-04-24 DIAGNOSIS — N18.6 ESRD ON DIALYSIS: Primary | ICD-10-CM

## 2025-04-24 PROCEDURE — 99214 OFFICE O/P EST MOD 30 MIN: CPT | Mod: PBBFAC

## 2025-04-24 PROCEDURE — 99999 PR PBB SHADOW E&M-EST. PATIENT-LVL IV: CPT | Mod: PBBFAC,,,

## 2025-04-24 PROCEDURE — 71250 CT THORAX DX C-: CPT | Mod: 26,,, | Performed by: RADIOLOGY

## 2025-04-24 PROCEDURE — 71250 CT THORAX DX C-: CPT | Mod: TC

## 2025-04-24 NOTE — PROGRESS NOTES
U Interventional Nephrology Follow-up Exam    Date:   04/24/2025 9:38 AM    HPI:  69 year old man with ESRD (MWF at [facility] with Dr. Baker), who is new to our service, recently was referred for evaluation of his fistula to maintain patency of his LUE brachiocephalic AVF.  Last underwent fistulogram with venous angioplasty of the juxta-anastomosis region on 12/12/24.  Here for follow up exam and ultrasound.  He has had no recent issues with prolonged bleeding, difficulty in cannulation or increased frequency of pressure alarms while on dialysis.  He has now removed his R IJ TDC.    Vitals:    04/24/25 0941   BP: 111/77   Pulse: 81   Resp: 14   Temp: 98.1 °F (36.7 °C)     Exam:   ACCESS:  LUE brachiocephalic AV fistula with soft thrill and minimal pulsatility, appropriate pulse augmentation and full collapse with arm elevation.     Ultrasound:  - arterial anastomosis patent without stenosis, measured at 11 mm, the juxta-anastomotic region which was angioplastied appears patent with no stenosis, measured at 8-9 mm throughout  - mid-AVF patent with no stenosis seen, vessel diameter measured at 8-10 mm, but there is a large segment of the fistula that runs 9-11 mm deep (there are cannulation spots in this area)  - AVF outflow tract patent with no stenosis  - BFV approximately 1600 (from 2500) ml/min  - 2 collateral vessels (one in the JA region and one more proximal)    Impression/Plan:  Patent AV fistula following recent fistulogram with venous angioplasty of the JA region.  Plan follow up exam in 3 months.  Please do refer him sooner if he develops signs of stenosis or impending thrombosis.  In the meantime I will send photos of his access to the dialysis nurse and areas to avoid (the deep segment).  Just above this (proximal) is an excellent long segment of fistula that is more superficial and more appropriate for cannulation.      Rohan Bright MD  [phone]

## 2025-04-25 ENCOUNTER — TELEPHONE (OUTPATIENT)
Dept: HEMATOLOGY/ONCOLOGY | Facility: CLINIC | Age: 70
End: 2025-04-25
Payer: MEDICARE

## 2025-04-25 ENCOUNTER — PATIENT MESSAGE (OUTPATIENT)
Dept: ELECTROPHYSIOLOGY | Facility: CLINIC | Age: 70
End: 2025-04-25
Payer: MEDICARE

## 2025-04-25 NOTE — TELEPHONE ENCOUNTER
----- Message from Sima sent at 4/25/2025  3:29 PM CDT -----  Regarding: Appt  Contact: Pt  [phone]  Current Appt date:  04/28/25 Type of Appt:   Est Pt  Physician:  Kika Rudolph, MDReason for rescheduling:  Pt has dialysis on Mondays, Wednesdays and Fridays   Caller: Arthur Contact Preference:  [phone]

## 2025-04-26 ENCOUNTER — RESULTS FOLLOW-UP (OUTPATIENT)
Dept: HEMATOLOGY/ONCOLOGY | Facility: CLINIC | Age: 70
End: 2025-04-26

## 2025-04-30 ENCOUNTER — CLINICAL SUPPORT (OUTPATIENT)
Dept: CARDIOLOGY | Facility: HOSPITAL | Age: 70
End: 2025-04-30
Attending: INTERNAL MEDICINE
Payer: MEDICARE

## 2025-04-30 ENCOUNTER — CLINICAL SUPPORT (OUTPATIENT)
Dept: CARDIOLOGY | Facility: HOSPITAL | Age: 70
End: 2025-04-30
Payer: MEDICARE

## 2025-04-30 DIAGNOSIS — I49.8 OTHER SPECIFIED CARDIAC ARRHYTHMIAS: ICD-10-CM

## 2025-04-30 PROCEDURE — 93298 REM INTERROG DEV EVAL SCRMS: CPT | Mod: 26,,, | Performed by: INTERNAL MEDICINE

## 2025-04-30 PROCEDURE — 93298 REM INTERROG DEV EVAL SCRMS: CPT | Performed by: INTERNAL MEDICINE

## 2025-05-01 ENCOUNTER — LAB VISIT (OUTPATIENT)
Dept: LAB | Facility: HOSPITAL | Age: 70
End: 2025-05-01
Attending: INTERNAL MEDICINE
Payer: MEDICARE

## 2025-05-01 DIAGNOSIS — R31.9 HEMATURIA, UNSPECIFIED TYPE: ICD-10-CM

## 2025-05-01 DIAGNOSIS — I48.0 PAF (PAROXYSMAL ATRIAL FIBRILLATION): ICD-10-CM

## 2025-05-01 DIAGNOSIS — Z86.73 HISTORY OF CVA (CEREBROVASCULAR ACCIDENT): ICD-10-CM

## 2025-05-01 DIAGNOSIS — Z01.818 PREOP TESTING: ICD-10-CM

## 2025-05-01 DIAGNOSIS — I49.9 CARDIAC ARRHYTHMIA, UNSPECIFIED CARDIAC ARRHYTHMIA TYPE: ICD-10-CM

## 2025-05-01 LAB
APTT PPP: 28.5 SECONDS (ref 21–32)
INDIRECT COOMBS: NORMAL
INR PPP: 1 (ref 0.8–1.2)
PROTHROMBIN TIME: 11.2 SECONDS (ref 9–12.5)
RH BLD: NORMAL
SPECIMEN OUTDATE: NORMAL

## 2025-05-01 PROCEDURE — 85730 THROMBOPLASTIN TIME PARTIAL: CPT

## 2025-05-01 PROCEDURE — 85610 PROTHROMBIN TIME: CPT

## 2025-05-01 PROCEDURE — 36415 COLL VENOUS BLD VENIPUNCTURE: CPT

## 2025-05-01 PROCEDURE — 86901 BLOOD TYPING SEROLOGIC RH(D): CPT | Performed by: INTERNAL MEDICINE

## 2025-05-06 ENCOUNTER — OFFICE VISIT (OUTPATIENT)
Dept: CARDIOLOGY | Facility: CLINIC | Age: 70
End: 2025-05-06
Payer: MEDICARE

## 2025-05-06 ENCOUNTER — TELEPHONE (OUTPATIENT)
Dept: ELECTROPHYSIOLOGY | Facility: CLINIC | Age: 70
End: 2025-05-06
Payer: MEDICARE

## 2025-05-06 VITALS
OXYGEN SATURATION: 97 % | HEART RATE: 81 BPM | WEIGHT: 247.56 LBS | SYSTOLIC BLOOD PRESSURE: 138 MMHG | BODY MASS INDEX: 34.53 KG/M2 | DIASTOLIC BLOOD PRESSURE: 76 MMHG

## 2025-05-06 DIAGNOSIS — I25.10 ATHEROSCLEROSIS OF NATIVE CORONARY ARTERY OF NATIVE HEART WITHOUT ANGINA PECTORIS: ICD-10-CM

## 2025-05-06 DIAGNOSIS — I48.0 PAF (PAROXYSMAL ATRIAL FIBRILLATION): Primary | ICD-10-CM

## 2025-05-06 DIAGNOSIS — I11.0 HYPERTENSIVE HEART DISEASE WITH CHRONIC DIASTOLIC CONGESTIVE HEART FAILURE: ICD-10-CM

## 2025-05-06 DIAGNOSIS — I10 ESSENTIAL (PRIMARY) HYPERTENSION: ICD-10-CM

## 2025-05-06 DIAGNOSIS — I71.21 ANEURYSM OF ASCENDING AORTA WITHOUT RUPTURE: ICD-10-CM

## 2025-05-06 DIAGNOSIS — I50.32 HYPERTENSIVE HEART DISEASE WITH CHRONIC DIASTOLIC CONGESTIVE HEART FAILURE: ICD-10-CM

## 2025-05-06 PROCEDURE — 99999 PR PBB SHADOW E&M-EST. PATIENT-LVL III: CPT | Mod: PBBFAC,,, | Performed by: INTERNAL MEDICINE

## 2025-05-06 PROCEDURE — 99214 OFFICE O/P EST MOD 30 MIN: CPT | Mod: S$PBB,,, | Performed by: INTERNAL MEDICINE

## 2025-05-06 PROCEDURE — 99213 OFFICE O/P EST LOW 20 MIN: CPT | Mod: PBBFAC,PN | Performed by: INTERNAL MEDICINE

## 2025-05-06 NOTE — PROGRESS NOTES
Cardiology    5/6/2025  8:50 AM    Problem list  Problem List[1]    History of Present Illness    Patient presents today for follow up He continues with dialysis treatment with a fistula in place and catheter removed. He requires additional fluid administration during dialysis due to low BP, which has improved his overall experience and alleviated previous discomfort. He denies chest pain or dyspnea. He takes diltiazem for BP management, which he holds on dialysis days.  He is scheduled for Watchman device in 2 days.          Medications  Current Medications[2]   Prior to Admission medications    Medication Sig Start Date End Date Taking? Authorizing Provider   ascorbic acid, vitamin C, (VITAMIN C) 500 MG tablet Take 500 mg by mouth once daily.   Yes Provider, Historical   cholecalciferol, vitamin D3, 25 mcg (1,000 unit) Chew once daily. 10/25/16  Yes Provider, Historical   cyanocobalamin (VITAMIN B-12) 1000 MCG tablet Take 100 mcg by mouth.   Yes Provider, Historical   furosemide (LASIX) 20 MG tablet Take 20 mg by mouth daily as needed (daily except on dialysis days). Pt takes on non dialysis days   Yes Provider, Historical   heparin sodium,porcine (HEPARIN LOCK FLUSH, PORCINE, IV) Inject into the vein. After HD, 2 times weekly   Yes Provider, Historical   levothyroxine (SYNTHROID) 75 MCG tablet Take 75 mcg by mouth once daily. 8/12/21  Yes Provider, Historical   LIDOcaine-prilocaine (EMLA) cream Apply topically as needed.   Yes Provider, Historical   multivitamin (THERAGRAN) per tablet Take 1 tablet by mouth once daily.   Yes Provider, Historical   omega-3 fatty acids 1,000 mg Cap Take 1,200 mg by mouth.   Yes Provider, Historical   REPATHA SYRINGE 140 mg/mL Syrg Inject 140 mg into the skin every 14 (fourteen) days. 5/16/24  Yes Jarad Greene MD   rosuvastatin (CRESTOR) 40 MG Tab Take 10 mg by mouth every evening.   Yes Provider, Historical   sevelamer carbonate (RENVELA) 800 mg Tab Take 800 mg by  mouth. 11/15/24 11/15/25 Yes Provider, Historical   UNABLE TO FIND Daily. PREVAGEN   Yes Provider, Historical   albuterol-ipratropium (DUO-NEB) 2.5 mg-0.5 mg/3 mL nebulizer solution Use one vial (3 mL) by nebulization every 4 (four) hours as needed for Wheezing - Rescue 1/25/25 3/26/25  Maggie Zavala MD   diltiaZEM (CARTIA XT) 120 MG Cp24 Take 1 capsule (120 mg total) by mouth once daily. 1/25/25 3/26/25  Maggie Zavala MD   nebulizer and compressor Sharon Use as directed  Patient not taking: Reported on 5/6/2025 1/25/25   Maggie Zavala MD   amoxicillin-clavulanate 500-125mg (AUGMENTIN) 500-125 mg Tab  1/25/25 5/6/25  Provider, Historical   azithromycin (Z-GEORGIA) 250 MG tablet  1/25/25 5/6/25  Provider, Historical   carvediloL (COREG) 6.25 MG tablet 180  Patient not taking: Reported on 4/24/2025 5/6/25  Provider, Historical   cloNIDine (CATAPRES) 0.1 MG tablet 270  Patient not taking: Reported on 4/24/2025 5/6/25  Provider, Historical   digoxin (DIGITEK) 125 mcg tablet tablet  Patient not taking: Reported on 4/24/2025 5/6/25  Provider, Historical   hydrALAZINE (APRESOLINE) 100 MG tablet 270  Patient not taking: Reported on 4/24/2025 5/6/25  Provider, Historical   HYDROcodone-acetaminophen (NORCO) 7.5-325 mg per tablet Take 1 tablet by mouth every 6 (six) hours as needed for Pain.  Patient not taking: Reported on 4/24/2025 6/17/24 5/6/25  Roque Arcos Jr., MD   HYDROcodone-acetaminophen (NORCO) 7.5-325 mg per tablet Take 1 tablet by mouth every 6 (six) hours as needed for Pain.  Patient not taking: Reported on 4/24/2025 7/2/24 5/6/25  Roque Arcos Jr., MD         History  Past Medical History:   Diagnosis Date    Cancer     bladder ca and prostate ca     CKD (chronic kidney disease), stage IV     Diabetes mellitus     diet controlled    Encounter for blood transfusion     ESRD (end stage renal disease)     dialysis mon and friday    Hodgkin's lymphoma 2008    chemo and radiation    Hypertension     PAF  (paroxysmal atrial fibrillation)     Presence of urostomy     Stroke     weakness left side    Thyroid disease      Past Surgical History:   Procedure Laterality Date    ABLATION      for atrial fibrilation    AV FISTULA PLACEMENT Left 07/20/2023    Procedure: CREATION, AV FISTULA;  Surgeon: Roque Arcos Jr., MD;  Location: [facility] operated [facility] OR;  Service: Vascular;  Laterality: Left;   / LEFT UPPER EXTROMITY    FISTULOGRAM Left 12/12/2024    Procedure: Fistulogram;  Surgeon: Rohan Bright MD;  Location: [facility] operated [facility] CATH LAB;  Service: Nephrology;  Laterality: Left;    FISTULOGRAM Left 2/13/2025    Procedure: Fistulogram;  Surgeon: Rohan Bright MD;  Location: [facility] operated [facility] CATH LAB;  Service: Nephrology;  Laterality: Left;    HEMICOLECTOMY W/ OSTOMY      INSERTION OF IMPLANTABLE LOOP RECORDER Left 5/17/2024    Procedure: Insertion, Implantable Loop Recorder;  Surgeon: Kain Cortez MD;  Location: [facility] EP LAB;  Service: Cardiology;  Laterality: Left;  CVA, AF,  ILR, BSCI, Local, CA, 3 Prep ** HD pt/LUE AV fistula**    INSERTION OF TUNNELED CENTRAL VENOUS HEMODIALYSIS CATHETER Right 6/17/2024    Procedure: INSERTION, CATHETER, CENTRAL VENOUS, HEMODIALYSIS, TUNNELED;  Surgeon: Roque Arcos Jr., MD;  Location: [facility];  Service: Vascular;  Laterality: Right;    PARTIAL SURGICAL REMOVAL OF BLADDER      PORTACATH PLACEMENT Right     chest wall    PROSTATECTOMY      THROMBECTOMY Left 6/17/2024    Procedure: THROMBECTOMY / LEFT;  Surgeon: Roque Arcos Jr., MD;  Location: [facility];  Service: Vascular;  Laterality: Left;    THROMBECTOMY OF HEMODIALYSIS ACCESS SITE Left 6/17/2024    Procedure: THROMBECTOMY, HEMODIALYSIS GRAFT OR FISTULA;  Surgeon: Rohan Bright MD;  Location: [facility] CATH LAB;  Service: Nephrology;  Laterality: Left;    THROMBECTOMY OF HEMODIALYSIS ACCESS SITE Left 12/12/2024    Procedure: THROMBECTOMY, HEMODIALYSIS GRAFT OR FISTULA;  Surgeon: Rohan Bright MD;  Location: [facility] CATH LAB;  Service: Nephrology;  Laterality: Left;   LEFT UPPER ARM    TRANSPOSITION OF BASILIC VEIN Left 7/2/2024    Procedure: TRANSPOSITION, VEIN, BASILIC-AVF/G LEFT UPPER EXTREMITY;  Surgeon: Roque Arcos Jr., MD;  Location: [facility];  Service: General;  Laterality: Left;     Social History[3]      Allergies  Review of patient's allergies indicates:   Allergen Reactions    Ambien [zolpidem] Other (See Comments)     Causes pt to hallucinate      Irbesartan      hyperkalemia         Review of Systems   Review of Systems   Constitutional: Negative for decreased appetite, fever and weight loss.   HENT:  Negative for congestion and nosebleeds.    Eyes:  Negative for double vision, vision loss in left eye, vision loss in right eye and visual disturbance.   Cardiovascular:  Negative for chest pain, claudication, cyanosis, dyspnea on exertion, irregular heartbeat, leg swelling, near-syncope, orthopnea, palpitations, paroxysmal nocturnal dyspnea and syncope.   Respiratory:  Negative for cough, hemoptysis, shortness of breath, sleep disturbances due to breathing, snoring, sputum production and wheezing.    Endocrine: Negative for cold intolerance and heat intolerance.   Skin:  Negative for nail changes and rash.   Musculoskeletal:  Negative for joint pain, muscle cramps, muscle weakness and myalgias.   Gastrointestinal:  Negative for change in bowel habit, heartburn, hematemesis, hematochezia, hemorrhoids and melena.   Neurological:  Negative for dizziness, focal weakness and headaches.         Physical Exam  Wt Readings from Last 1 Encounters:   05/06/25 112.3 kg (247 lb 9.2 oz)     BP Readings from Last 3 Encounters:   05/06/25 138/76   04/24/25 111/77   04/03/25 (!) 144/74     Pulse Readings from Last 1 Encounters:   05/06/25 81     Body mass index is 34.53 kg/m².    Physical Exam  Vitals reviewed.   Constitutional:       Appearance: He is well-developed.   HENT:      Head: Atraumatic.   Eyes:      General: No scleral icterus.  Neck:      Vascular: Normal carotid  pulses. No carotid bruit, hepatojugular reflux or JVD.   Cardiovascular:      Rate and Rhythm: Normal rate and regular rhythm.      Chest Wall: PMI is not displaced.      Pulses: Intact distal pulses.           Carotid pulses are 2+ on the right side and 2+ on the left side.       Radial pulses are 2+ on the right side and 2+ on the left side.        Dorsalis pedis pulses are 2+ on the right side and 2+ on the left side.      Heart sounds: Normal heart sounds, S1 normal and S2 normal. No murmur heard.     No friction rub.      Comments: LUE AV fistula with thrill and bruit.  Tunneled catheter in right chest.  Pulmonary:      Effort: Pulmonary effort is normal. No respiratory distress.      Breath sounds: No stridor. No wheezing, rhonchi or rales.   Chest:      Chest wall: No tenderness.   Abdominal:      General: Bowel sounds are normal.      Palpations: Abdomen is soft.   Musculoskeletal:      Cervical back: Neck supple. No edema.   Skin:     General: Skin is warm and dry.      Nails: There is no clubbing.   Neurological:      Mental Status: He is alert and oriented to person, place, and time.   Psychiatric:         Behavior: Behavior normal.         Thought Content: Thought content normal.           Assessment & Plan    I48.0 PAF (paroxysmal atrial fibrillation)  I25.10 Atherosclerosis of native coronary artery of native heart without angina pectoris  I10 Essential (primary) hypertension  I11.0, I50.32 Hypertensive heart disease with chronic diastolic congestive heart failure  I71.21 Aneurysm of ascending aorta without rupture    PAF (PAROXYSMAL ATRIAL FIBRILLATION):  - Reviewed upcoming atrial appendage closure device procedure scheduled for Thursday.  - Follow up in 3 months to assess post-procedure status and device function.  - Continued diltiazem at current dose, hold on dialysis days.    ATHEROSCLEROSIS OF NATIVE CORONARY ARTERY OF NATIVE HEART WITHOUT ANGINA PECTORIS:  - Requested lipid profile results from  dialysis center to avoid redundant testing; patient to ask dialysis center to fax results to the office.     Ascending aortic aneurysm 4.6 cm seen on CTA  -continue to monitor.            Jarad Greene MD, F.A.C.C, F.S.C.A.I.      Total professional time spent for the encounter: 30 minutes  Time was spent preparing to see the patient, reviewing results of prior testing, obtaining and/or reviewing separately obtained history, performing a medically appropriate examination and interview, counseling and educating the patient/family, ordering medications/tests/procedures, referring and communicating with other health care professionals, documenting clinical information in the electronic health record, and independently interpreting results.    This note was generated with the assistance of ambient listening technology. Verbal consent was obtained by the patient and accompanying visitor(s) for the recording of patient appointment to facilitate this note. I attest to having reviewed and edited the generated note for accuracy, though some syntax or spelling errors may persist. Please contact the author of this note for any clarification.           [1]   Patient Active Problem List  Diagnosis    Atherosclerotic heart disease of native coronary artery without angina pectoris    Essential (primary) hypertension    Pure hypercholesterolemia    Renal insufficiency    PAF (paroxysmal atrial fibrillation)    History of bladder cancer    History of CVA (cerebrovascular accident)    Hypothyroidism    Hypertensive heart disease with chronic diastolic congestive heart failure    Chronic deep vein thrombosis (DVT) of calf muscle vein of left lower extremity    Thrombosis of renal dialysis arteriovenous graft    End stage renal disease    Volume overload    Anemia of chronic disease    S/P ileal conduit    History of Hodgkin's lymphoma    Severe obesity (BMI 35.0-39.9) with comorbidity    Aneurysm of ascending aorta without rupture    [2]   Current Outpatient Medications   Medication Sig Dispense Refill    ascorbic acid, vitamin C, (VITAMIN C) 500 MG tablet Take 500 mg by mouth once daily.      cholecalciferol, vitamin D3, 25 mcg (1,000 unit) Chew once daily.      cyanocobalamin (VITAMIN B-12) 1000 MCG tablet Take 100 mcg by mouth.      furosemide (LASIX) 20 MG tablet Take 20 mg by mouth daily as needed (daily except on dialysis days). Pt takes on non dialysis days      heparin sodium,porcine (HEPARIN LOCK FLUSH, PORCINE, IV) Inject into the vein. After HD, 2 times weekly      levothyroxine (SYNTHROID) 75 MCG tablet Take 75 mcg by mouth once daily.      LIDOcaine-prilocaine (EMLA) cream Apply topically as needed.      multivitamin (THERAGRAN) per tablet Take 1 tablet by mouth once daily.      omega-3 fatty acids 1,000 mg Cap Take 1,200 mg by mouth.      REPATHA SYRINGE 140 mg/mL Syrg Inject 140 mg into the skin every 14 (fourteen) days. 2 mL 11    rosuvastatin (CRESTOR) 40 MG Tab Take 10 mg by mouth every evening.      sevelamer carbonate (RENVELA) 800 mg Tab Take 800 mg by mouth.      UNABLE TO FIND Daily. PREVAGEN      albuterol-ipratropium (DUO-NEB) 2.5 mg-0.5 mg/3 mL nebulizer solution Use one vial (3 mL) by nebulization every 4 (four) hours as needed for Wheezing - Rescue 90 mL 0    diltiaZEM (CARTIA XT) 120 MG Cp24 Take 1 capsule (120 mg total) by mouth once daily. 30 capsule 1    nebulizer and compressor Sharon Use as directed (Patient not taking: Reported on 2025) 1 each 0     No current facility-administered medications for this visit.   [3]   Social History  Socioeconomic History    Marital status:    Tobacco Use    Smoking status: Former     Current packs/day: 0.00     Types: Cigarettes     Quit date: 1990     Years since quittin.3    Smokeless tobacco: Never   Substance and Sexual Activity    Alcohol use: Never    Drug use: Never    Sexual activity: Yes     Partners: Female     Social Drivers of Health      Financial Resource Strain: Patient Declined (1/20/2025)    Overall Financial Resource Strain (CARDIA)     Difficulty of Paying Living Expenses: Patient declined   Food Insecurity: Patient Declined (1/20/2025)    Hunger Vital Sign     Worried About Running Out of Food in the Last Year: Patient declined     Ran Out of Food in the Last Year: Patient declined   Transportation Needs: Patient Declined (1/20/2025)    TRANSPORTATION NEEDS     Transportation : Patient declined    Received from [facility]    Exercise Vital Sign   Stress: Patient Declined (1/20/2025)    [employer] [place] [employer] - Occupational Stress Questionnaire     Feeling of Stress : Patient declined   Housing Stability: Unknown (1/20/2025)    Housing Stability Vital Sign     Unable to Pay for Housing in the Last Year: No     Homeless in the Last Year: No

## 2025-05-06 NOTE — TELEPHONE ENCOUNTER
Left message asking patient to return call to go over instructions for Watchman scheduled on 5/8/25.

## 2025-05-07 ENCOUNTER — ANESTHESIA EVENT (OUTPATIENT)
Dept: MEDSURG UNIT | Facility: HOSPITAL | Age: 70
End: 2025-05-07
Payer: MEDICARE

## 2025-05-07 ENCOUNTER — TELEPHONE (OUTPATIENT)
Dept: ELECTROPHYSIOLOGY | Facility: CLINIC | Age: 70
End: 2025-05-07
Payer: MEDICARE

## 2025-05-07 NOTE — ANESTHESIA PREPROCEDURE EVALUATION
05/07/2025  Arthur Menjivar is a 69 y.o., male presentingh for ELINOR closure device placement    Problem List[1]  Current Outpatient Medications   Medication Instructions    albuterol-ipratropium (DUO-NEB) 2.5 mg-0.5 mg/3 mL nebulizer solution Use one vial (3 mL) by nebulization every 4 (four) hours as needed for Wheezing - Rescue    ascorbic acid (vitamin C) (VITAMIN C) 500 mg, Daily    cholecalciferol, vitamin D3, 25 mcg (1,000 unit) Chew Daily    cyanocobalamin (VITAMIN B-12) 100 mcg    diltiaZEM (CARTIA XT) 120 mg, Oral, Daily    furosemide (LASIX) 20 mg, Daily PRN    heparin sodium,porcine (HEPARIN LOCK FLUSH, PORCINE, IV) Inject into the vein. After HD, 2 times weekly    levothyroxine (SYNTHROID) 75 mcg, Daily    LIDOcaine-prilocaine (EMLA) cream As needed (PRN)    multivitamin (THERAGRAN) per tablet 1 tablet, Daily    nebulizer and compressor Sharon Use as directed    omega-3 fatty acids 1,200 mg    REPATHA SYRINGE 140 mg/mL Syrg Inject 140 mg into the skin every 14 (fourteen) days.    rosuvastatin (CRESTOR) 10 mg, Nightly    sevelamer carbonate (RENVELA) 800 mg    UNABLE TO FIND Daily     Pertinent Cardiac Studies:  Transthoracic Echo Left Heart Catheterization   Results for orders placed during the hospital encounter of 01/20/25    Echo    Interpretation Summary    Left Ventricle: The left ventricle is normal in size. Ventricular mass is normal. Mildly increased wall thickness. There is concentric remodeling. There is normal systolic function with a visually estimated ejection fraction of 60 - 65%. There is diastolic dysfunction but grade cannot be determined.    Right Ventricle: Normal right ventricular cavity size. There is mild hypertrophy. Systolic function is mildly reduced.    Aortic Valve: There is mild aortic valve sclerosis.    Tricuspid Valve: There is mild regurgitation.    Pulmonary  Artery: The estimated pulmonary artery systolic pressure is 28 mmHg.    IVC/SVC: Normal venous pressure at 3 mmHg.    Pericardium: There is a small circumferential effusion.   Results for orders placed during the hospital encounter of 02/13/25    Cardiac catheterization    Narrative  Procedure performed in the Invasive Lab  - See Procedure Log link below for nursing documentation  - See OpNote on Surgeries Tab for physician findings  - See Imaging Tab for radiologist dictation           Pre-op Assessment    I have reviewed the Patient Summary Reports.     I have reviewed the Nursing Notes. I have reviewed the NPO Status.   I have reviewed the Medications.     Review of Systems  Anesthesia Hx:   History of prior surgery of interest to airway management or planning:  Previous anesthesia: Nerve Block      for June 17 2024 [facility].    Denies Family Hx of Anesthesia complications.   Personal Hx of Anesthesia complications          Slow To Awaken/Delayed Emergence and moderate, did not delay extubation, but prolonged PACU stay          Social:  Non-Smoker       Hematology/Oncology:       -- Anemia (hb 11.7):                    --  Cancer in past history:                 Oncology Comments: Bladder/prostate  ca recent scan neg    lymphoma       EENT/Dental:  EENT/Dental Normal           Cardiovascular:  Exercise tolerance: good   Hypertension   CAD  asymptomatic  Dysrhythmias (hx paroxysmal AF, none since ablation) atrial fibrillation  CHF    hyperlipidemia   ECG has been reviewed. Sees Dr. Greene  10/20 stress test neg for ischemia  Recent ECHO EF 55%     Coronary Artery Disease:               Congestive Heart Failure (CHF)                Hypertension     Atrial Fibrillation     Pulmonary:  Pulmonary Normal        Denies Sleep Apnea.                Renal/:  Chronic Renal Disease, CKD, ESRD   Hemodialysis Mon, wed, Fri  Last dialysis yesterday    S/p cystectomy, has ostomy     Kidney Function/Disease              Hepatic/GI:  Hepatic/GI Normal     Denies GERD.                Musculoskeletal:  Arthritis               Neurological:   CVA                       CVA - Cerebrovasular Accident                 Endocrine:  Diabetes Hypothyroidism  75# weight loss Diabetes                   Hypothyroidism        Obesity / BMI > 30  Dermatological:  Skin Normal    Psych:  Psychiatric Normal                    Physical Exam  General: Cooperative, Alert and Oriented    Airway:  Mallampati: II   Mouth Opening: Small, but > 3cm    Dental:  No maxillary teeth. No loose teeth (lower)    Anesthesia Plan  Type of Anesthesia, risks & benefits discussed:    Anesthesia Type: Gen ETT  Intra-op Monitoring Plan: Standard ASA Monitors  Post Op Pain Control Plan: multimodal analgesia and IV/PO Opioids PRN  Induction:  IV  Airway Plan: Video and Direct, Post-Induction  Informed Consent: Informed consent signed with the Patient and all parties understand the risks and agree with anesthesia plan.  All questions answered.   ASA Score: 4  Day of Surgery Review of History & Physical: H&P Update referred to the surgeon/provider.  Anesthesia Plan Notes: Regional discussed  Labs am of surgery    Ready For Surgery From Anesthesia Perspective.     .           [1]   Patient Active Problem List  Diagnosis    Atherosclerotic heart disease of native coronary artery without angina pectoris    Essential (primary) hypertension    Pure hypercholesterolemia    Renal insufficiency    PAF (paroxysmal atrial fibrillation)    History of bladder cancer    History of CVA (cerebrovascular accident)    Hypothyroidism    Hypertensive heart disease with chronic diastolic congestive heart failure    Chronic deep vein thrombosis (DVT) of calf muscle vein of left lower extremity    Thrombosis of renal dialysis arteriovenous graft    End stage renal disease    Volume overload    Anemia of chronic disease    S/P ileal conduit    History of Hodgkin's lymphoma    Severe obesity (BMI  35.0-39.9) with comorbidity    Aneurysm of ascending aorta without rupture

## 2025-05-07 NOTE — TELEPHONE ENCOUNTER
Spoke to patient    CONFIRMED procedure arrival time of 8 am    Reiterated instructions including:    -Directions to check in desk    -NPO after midnight night prior to procedure. Fasting upon arrival to the hospital the day of the procedure. Patient allowed to drink water up until 2 hours prior to arrival due to IV fluid shortage.     -High importance of HOLDING Lasix on day of procedure (last dose on 5/7/25)    - Confirms no new meds prescribed or med changes since scheduling -     -Pre-procedure LABS Reviewed; BUN 28 on 4/24/25 and creatinine 7.1 on 4/24/25 (pt's baseline, hx of ESRD)    -Confirmed absence or presence of implanted device/stimulator Bsci ILR in situ, LUE AV fistula (nonfunctioning), urostomy bag rt side    -Confirmed no recent or current fever, cough, or shortness of breath.    -Confirmed no redness, rash, irritation, or yeast infection to skin/ chest / groin area.     Patient verbalized understanding of above, denies any further questions and appreciated the call.

## 2025-05-07 NOTE — TELEPHONE ENCOUNTER
Kain Cortez MD Searls, Sydney, RN  Thanks          Previous Messages       ----- Message -----  From: Jocelyn Giles RN  Sent: 5/7/2025  10:03 AM CDT  To: Kain Cortez MD    Pt scheduled for Bevalleyman tomorrow. BUN 28 on 4/24/25 and creatinine 7.1 on 4/24/25 (pt's baseline, hx of ESRD)

## 2025-05-08 ENCOUNTER — ANESTHESIA (OUTPATIENT)
Dept: MEDSURG UNIT | Facility: HOSPITAL | Age: 70
End: 2025-05-08
Payer: MEDICARE

## 2025-05-08 ENCOUNTER — HOSPITAL ENCOUNTER (INPATIENT)
Facility: HOSPITAL | Age: 70
LOS: 1 days | Discharge: HOME | DRG: 273 | End: 2025-05-08
Attending: INTERNAL MEDICINE | Admitting: INTERNAL MEDICINE
Payer: MEDICARE

## 2025-05-08 VITALS
WEIGHT: 240 LBS | HEIGHT: 71 IN | BODY MASS INDEX: 33.6 KG/M2 | HEART RATE: 71 BPM | TEMPERATURE: 98 F | DIASTOLIC BLOOD PRESSURE: 87 MMHG | SYSTOLIC BLOOD PRESSURE: 155 MMHG | OXYGEN SATURATION: 100 % | RESPIRATION RATE: 20 BRPM

## 2025-05-08 DIAGNOSIS — I48.91 ATRIAL FIBRILLATION: ICD-10-CM

## 2025-05-08 DIAGNOSIS — Z86.73 HISTORY OF CVA (CEREBROVASCULAR ACCIDENT): ICD-10-CM

## 2025-05-08 DIAGNOSIS — I48.0 PAF (PAROXYSMAL ATRIAL FIBRILLATION): ICD-10-CM

## 2025-05-08 DIAGNOSIS — I49.9 ARRHYTHMIA: ICD-10-CM

## 2025-05-08 DIAGNOSIS — R31.9 HEMATURIA, UNSPECIFIED TYPE: ICD-10-CM

## 2025-05-08 LAB
GLUCOSE SERPL-MCNC: 101 MG/DL (ref 70–110)
HCO3 UR-SCNC: 28.5 MMOL/L (ref 24–28)
HCT VFR BLD CALC: 27 %PCV (ref 36–54)
INDIRECT COOMBS: NORMAL
OHS QRS DURATION: 138 MS
OHS QTC CALCULATION: 480 MS
PCO2 BLDA: 47.7 MMHG (ref 35–45)
PH SMN: 7.38 [PH] (ref 7.35–7.45)
PO2 BLDA: 20 MMHG (ref 40–60)
POC ACTIVATED CLOTTING TIME K: 245 SEC (ref 74–137)
POC BE: 3 MMOL/L (ref -2–2)
POC IONIZED CALCIUM: 0.96 MMOL/L (ref 1.06–1.42)
POC SATURATED O2: 31 % (ref 95–100)
POC TCO2: 30 MMOL/L (ref 24–29)
POCT GLUCOSE: 149 MG/DL (ref 70–110)
POTASSIUM BLD-SCNC: 3.5 MMOL/L (ref 3.5–5.1)
RH BLD: NORMAL
SAMPLE: ABNORMAL
SAMPLE: ABNORMAL
SODIUM BLD-SCNC: 143 MMOL/L (ref 136–145)
SPECIMEN OUTDATE: NORMAL

## 2025-05-08 PROCEDURE — 25500020 PHARM REV CODE 255: Performed by: INTERNAL MEDICINE

## 2025-05-08 PROCEDURE — 37000008 HC ANESTHESIA 1ST 15 MINUTES: Performed by: INTERNAL MEDICINE

## 2025-05-08 PROCEDURE — 86900 BLOOD TYPING SEROLOGIC ABO: CPT

## 2025-05-08 PROCEDURE — 25000003 PHARM REV CODE 250: Performed by: STUDENT IN AN ORGANIZED HEALTH CARE EDUCATION/TRAINING PROGRAM

## 2025-05-08 PROCEDURE — 93005 ELECTROCARDIOGRAM TRACING: CPT

## 2025-05-08 PROCEDURE — 63600175 PHARM REV CODE 636 W HCPCS: Performed by: INTERNAL MEDICINE

## 2025-05-08 PROCEDURE — 11000001 HC ACUTE MED/SURG PRIVATE ROOM

## 2025-05-08 PROCEDURE — 86920 COMPATIBILITY TEST SPIN: CPT

## 2025-05-08 PROCEDURE — 02L73DK OCCLUSION OF LEFT ATRIAL APPENDAGE WITH INTRALUMINAL DEVICE, PERCUTANEOUS APPROACH: ICD-10-PCS | Performed by: INTERNAL MEDICINE

## 2025-05-08 PROCEDURE — C1894 INTRO/SHEATH, NON-LASER: HCPCS | Performed by: INTERNAL MEDICINE

## 2025-05-08 PROCEDURE — 25000003 PHARM REV CODE 250: Performed by: NURSE ANESTHETIST, CERTIFIED REGISTERED

## 2025-05-08 PROCEDURE — C1760 CLOSURE DEV, VASC: HCPCS | Performed by: INTERNAL MEDICINE

## 2025-05-08 PROCEDURE — 63600175 PHARM REV CODE 636 W HCPCS: Performed by: NURSE ANESTHETIST, CERTIFIED REGISTERED

## 2025-05-08 PROCEDURE — 37000009 HC ANESTHESIA EA ADD 15 MINS: Performed by: INTERNAL MEDICINE

## 2025-05-08 PROCEDURE — 82962 GLUCOSE BLOOD TEST: CPT | Performed by: INTERNAL MEDICINE

## 2025-05-08 PROCEDURE — 63600175 PHARM REV CODE 636 W HCPCS: Performed by: STUDENT IN AN ORGANIZED HEALTH CARE EDUCATION/TRAINING PROGRAM

## 2025-05-08 PROCEDURE — C1889 IMPLANT/INSERT DEVICE, NOC: HCPCS | Performed by: INTERNAL MEDICINE

## 2025-05-08 PROCEDURE — 93010 ELECTROCARDIOGRAM REPORT: CPT | Mod: ,,, | Performed by: INTERNAL MEDICINE

## 2025-05-08 PROCEDURE — 33340 PERQ CLSR TCAT L ATR APNDGE: CPT | Mod: ,,, | Performed by: INTERNAL MEDICINE

## 2025-05-08 PROCEDURE — 27201423 OPTIME MED/SURG SUP & DEVICES STERILE SUPPLY: Performed by: INTERNAL MEDICINE

## 2025-05-08 PROCEDURE — 33340 PERQ CLSR TCAT L ATR APNDGE: CPT | Performed by: INTERNAL MEDICINE

## 2025-05-08 PROCEDURE — C1769 GUIDE WIRE: HCPCS | Performed by: INTERNAL MEDICINE

## 2025-05-08 DEVICE — LEFT ATRIAL APPENDAGE CLOSURE DEVICE WITH DELIVERY SYSTEM
Type: IMPLANTABLE DEVICE | Site: HEART | Status: FUNCTIONAL
Brand: WATCHMAN FLX™ PRO

## 2025-05-08 RX ORDER — GLUCAGON 1 MG
1 KIT INJECTION
Status: DISCONTINUED | OUTPATIENT
Start: 2025-05-08 | End: 2025-05-08 | Stop reason: HOSPADM

## 2025-05-08 RX ORDER — HEPARIN SOD,PORCINE/0.9 % NACL 1000/500ML
INTRAVENOUS SOLUTION INTRAVENOUS
Status: DISCONTINUED | OUTPATIENT
Start: 2025-05-08 | End: 2025-05-08 | Stop reason: HOSPADM

## 2025-05-08 RX ORDER — DEXMEDETOMIDINE HYDROCHLORIDE 100 UG/ML
INJECTION, SOLUTION INTRAVENOUS
Status: DISCONTINUED | OUTPATIENT
Start: 2025-05-08 | End: 2025-05-08

## 2025-05-08 RX ORDER — ACETAMINOPHEN 10 MG/ML
1000 INJECTION, SOLUTION INTRAVENOUS ONCE
Status: DISCONTINUED | OUTPATIENT
Start: 2025-05-08 | End: 2025-05-08 | Stop reason: HOSPADM

## 2025-05-08 RX ORDER — ACETAMINOPHEN 325 MG/1
650 TABLET ORAL EVERY 4 HOURS PRN
Status: DISCONTINUED | OUTPATIENT
Start: 2025-05-08 | End: 2025-05-08 | Stop reason: HOSPADM

## 2025-05-08 RX ORDER — DEXMEDETOMIDINE HYDROCHLORIDE 4 UG/ML
INJECTION, SOLUTION INTRAVENOUS CONTINUOUS PRN
Status: DISCONTINUED | OUTPATIENT
Start: 2025-05-08 | End: 2025-05-08

## 2025-05-08 RX ORDER — PROPOFOL 10 MG/ML
VIAL (ML) INTRAVENOUS
Status: DISCONTINUED | OUTPATIENT
Start: 2025-05-08 | End: 2025-05-08

## 2025-05-08 RX ORDER — VASOPRESSIN 20 [USP'U]/ML
INJECTION, SOLUTION INTRAMUSCULAR; SUBCUTANEOUS
Status: DISCONTINUED | OUTPATIENT
Start: 2025-05-08 | End: 2025-05-08

## 2025-05-08 RX ORDER — CEFAZOLIN SODIUM 1 G/3ML
INJECTION, POWDER, FOR SOLUTION INTRAMUSCULAR; INTRAVENOUS
Status: DISCONTINUED | OUTPATIENT
Start: 2025-05-08 | End: 2025-05-08

## 2025-05-08 RX ORDER — HEPARIN SODIUM 1000 [USP'U]/ML
INJECTION, SOLUTION INTRAVENOUS; SUBCUTANEOUS
Status: DISCONTINUED | OUTPATIENT
Start: 2025-05-08 | End: 2025-05-08

## 2025-05-08 RX ORDER — LIDOCAINE HYDROCHLORIDE 20 MG/ML
INJECTION INTRAVENOUS
Status: DISCONTINUED | OUTPATIENT
Start: 2025-05-08 | End: 2025-05-08

## 2025-05-08 RX ORDER — ROCURONIUM BROMIDE 10 MG/ML
INJECTION, SOLUTION INTRAVENOUS
Status: DISCONTINUED | OUTPATIENT
Start: 2025-05-08 | End: 2025-05-08

## 2025-05-08 RX ORDER — SODIUM CHLORIDE 0.9 % (FLUSH) 0.9 %
10 SYRINGE (ML) INJECTION
Status: DISCONTINUED | OUTPATIENT
Start: 2025-05-08 | End: 2025-05-08 | Stop reason: HOSPADM

## 2025-05-08 RX ORDER — LIDOCAINE HYDROCHLORIDE 20 MG/ML
INJECTION, SOLUTION INFILTRATION; PERINEURAL
Status: DISCONTINUED | OUTPATIENT
Start: 2025-05-08 | End: 2025-05-08 | Stop reason: HOSPADM

## 2025-05-08 RX ORDER — NITROGLYCERIN 20 MG/G
1 OINTMENT TOPICAL EVERY 8 HOURS
Status: DISCONTINUED | OUTPATIENT
Start: 2025-05-08 | End: 2025-05-08 | Stop reason: HOSPADM

## 2025-05-08 RX ORDER — HYDROCODONE BITARTRATE AND ACETAMINOPHEN 500; 5 MG/1; MG/1
TABLET ORAL
Status: DISCONTINUED | OUTPATIENT
Start: 2025-05-08 | End: 2025-05-08 | Stop reason: HOSPADM

## 2025-05-08 RX ORDER — IODIXANOL 320 MG/ML
INJECTION, SOLUTION INTRAVASCULAR
Status: DISCONTINUED | OUTPATIENT
Start: 2025-05-08 | End: 2025-05-08 | Stop reason: HOSPADM

## 2025-05-08 RX ORDER — ONDANSETRON HYDROCHLORIDE 2 MG/ML
4 INJECTION, SOLUTION INTRAVENOUS DAILY PRN
Status: DISCONTINUED | OUTPATIENT
Start: 2025-05-08 | End: 2025-05-08 | Stop reason: HOSPADM

## 2025-05-08 RX ORDER — FENTANYL CITRATE 50 UG/ML
INJECTION, SOLUTION INTRAMUSCULAR; INTRAVENOUS
Status: DISCONTINUED | OUTPATIENT
Start: 2025-05-08 | End: 2025-05-08

## 2025-05-08 RX ORDER — MIDAZOLAM HYDROCHLORIDE 1 MG/ML
INJECTION INTRAMUSCULAR; INTRAVENOUS
Status: DISCONTINUED | OUTPATIENT
Start: 2025-05-08 | End: 2025-05-08

## 2025-05-08 RX ORDER — ASPIRIN 81 MG/1
81 TABLET ORAL DAILY
Status: DISCONTINUED | OUTPATIENT
Start: 2025-05-08 | End: 2025-05-08 | Stop reason: HOSPADM

## 2025-05-08 RX ORDER — NITROGLYCERIN 20 MG/G
2 OINTMENT TOPICAL EVERY 6 HOURS
Status: DISCONTINUED | OUTPATIENT
Start: 2025-05-08 | End: 2025-05-08

## 2025-05-08 RX ORDER — DEXAMETHASONE SODIUM PHOSPHATE 4 MG/ML
INJECTION, SOLUTION INTRA-ARTICULAR; INTRALESIONAL; INTRAMUSCULAR; INTRAVENOUS; SOFT TISSUE
Status: DISCONTINUED | OUTPATIENT
Start: 2025-05-08 | End: 2025-05-08

## 2025-05-08 RX ORDER — PROTAMINE SULFATE 10 MG/ML
INJECTION, SOLUTION INTRAVENOUS
Status: DISCONTINUED | OUTPATIENT
Start: 2025-05-08 | End: 2025-05-08

## 2025-05-08 RX ADMIN — FENTANYL CITRATE 100 MCG: 50 INJECTION, SOLUTION INTRAMUSCULAR; INTRAVENOUS at 10:05

## 2025-05-08 RX ADMIN — ROCURONIUM BROMIDE 30 MG: 10 INJECTION INTRAVENOUS at 11:05

## 2025-05-08 RX ADMIN — SUGAMMADEX 400 MG: 100 INJECTION, SOLUTION INTRAVENOUS at 12:05

## 2025-05-08 RX ADMIN — HEPARIN SODIUM 11000 UNITS: 1000 INJECTION, SOLUTION INTRAVENOUS; SUBCUTANEOUS at 11:05

## 2025-05-08 RX ADMIN — LIDOCAINE HYDROCHLORIDE 100 MG: 20 INJECTION INTRAVENOUS at 10:05

## 2025-05-08 RX ADMIN — ROCURONIUM BROMIDE 20 MG: 10 INJECTION INTRAVENOUS at 11:05

## 2025-05-08 RX ADMIN — VASOPRESSIN 2 UNITS: 20 INJECTION INTRAVENOUS at 11:05

## 2025-05-08 RX ADMIN — CEFAZOLIN 2 G: 330 INJECTION, POWDER, FOR SOLUTION INTRAMUSCULAR; INTRAVENOUS at 11:05

## 2025-05-08 RX ADMIN — DEXMEDETOMIDINE 8 MCG: 200 INJECTION, SOLUTION INTRAVENOUS at 12:05

## 2025-05-08 RX ADMIN — ASPIRIN 81 MG: 81 TABLET, COATED ORAL at 04:05

## 2025-05-08 RX ADMIN — MIDAZOLAM 2 MG: 1 INJECTION INTRAMUSCULAR; INTRAVENOUS at 10:05

## 2025-05-08 RX ADMIN — PROPOFOL 150 MG: 10 INJECTION, EMULSION INTRAVENOUS at 10:05

## 2025-05-08 RX ADMIN — ROCURONIUM BROMIDE 50 MG: 10 INJECTION INTRAVENOUS at 10:05

## 2025-05-08 RX ADMIN — SODIUM CHLORIDE: 0.9 INJECTION, SOLUTION INTRAVENOUS at 10:05

## 2025-05-08 RX ADMIN — PROTAMINE SULFATE 5 MG: 10 INJECTION, SOLUTION INTRAVENOUS at 12:05

## 2025-05-08 RX ADMIN — DEXAMETHASONE SODIUM PHOSPHATE 4 MG: 4 INJECTION, SOLUTION INTRAMUSCULAR; INTRAVENOUS at 11:05

## 2025-05-08 RX ADMIN — PROTAMINE SULFATE 105 MG: 10 INJECTION, SOLUTION INTRAVENOUS at 12:05

## 2025-05-08 RX ADMIN — PHENYLEPHRINE HYDROCHLORIDE 0.5 MCG/KG/MIN: 10 INJECTION INTRAVENOUS at 11:05

## 2025-05-08 RX ADMIN — NITROGLYCERIN 1 INCH: 20 OINTMENT TOPICAL at 01:05

## 2025-05-08 RX ADMIN — VASOPRESSIN 2 UNITS: 20 INJECTION INTRAVENOUS at 10:05

## 2025-05-08 RX ADMIN — ONDANSETRON 4 MG: 2 INJECTION INTRAMUSCULAR; INTRAVENOUS at 12:05

## 2025-05-08 NOTE — DISCHARGE INSTRUCTIONS
Recommendations following Watchman implantation:  Take Eliquis 5mg twice daily + aspirin for 6 weeks after implantation  Follow up ALISON in 6 weeks.   If ALISON shows well seated device, will stop Eliquis and start clopidogrel and Aspirin 81 mg daily   6 months after device implantation Aspirin only    Groin site management, precautions, and restrictions:  Remove the bandages over your groin area the morning after your procedure. You can shower after you remove these bandages. Keep the groin sites clean and dry. You do not need to apply ointments or bandages to the area.   If oozing from groin site occurs, apply pressure without letting up for 15 minutes and lay flat for 1 hour. If bleeding has resolved, you can continue to monitor. If the bleeding continues or there is significant swelling or pain in the groin area, please visit the nearest ER for evaluation and treatment. DO NOT STOP TAKING YOUR BLOOD THINNER UNLESS INSTRUCTED BY A PHYSICIAN.   Do not take baths or submerge your groin area or at least 1 week or when the puncture sites in your groin have completely healed  Do not lift anything over 10 lbs for the first week after your procedure, and avoid strenuous activity during this time to allow for the groin sites to heal. After 1 week, there are no activity restrictions.  Please contact the electrophysiology clinic or go to the ER if you experience: severe chest pain, shortness of breath, bleeding or swelling of the groin sites, or any other concerns.

## 2025-05-08 NOTE — PLAN OF CARE
Pt ambulates with walker. Tolerates hydration and food without issue. Right groin remains CDI. No bleeding. No hematoma noted. +thrill/ +bruit. Follow up call to Claus ZIEGLER in ep lab, on aspirin 81mg to be given now and pt should start at home. Patient discharged per MD orders. Instructions given on medications, wound care, activity, signs of infection, when to call MD, and follow up appointments. Pt verbalized understanding. PIVs removed. Patient and family used wc off unit to private vehicle.    Dr Mann called to bedside to reassess RUE, no new changes. Pt ready to leave.

## 2025-05-08 NOTE — ASSESSMENT & PLAN NOTE
70 yo male with hypertension, prior stroke, CKD stage IV, prior DVT, prior stroke, aortic atherosclerotic disease, paroxysmal atrial fibrillation and hematuria here for implantation of LAAO with watchman device.    We discussed the etiology and pathophysiology of strokes associated with atrial fibrillation. We discussed that his risk factors ([id number] score 5) portend a high enough risk that warrants long-term anticoagulation for stroke risk reduction to which he has a relative long-term contraindication to due to recurrent bleeding and iron deficiency anemia(HAS-BLED score 4) We discussed the role of the left atrial appendage in stroke origin in atrial fibrillation and how a ELINOR occlusion device can help reduce long-term stroke risk absent long-term anticoagulation. We discussed the implant procedure including transseptal puncture and the risks of the procedure including but not limited to death, infection, bleeding, stroke, MI, cardiac perforation, vascular injury, embolism (including device embolism), cardiac tamponade, skin burns, and other organic injury including the possibility for need for surgery. We discussed the need to for DAPT therapy for 6 months post-implant followed by lifelong aspirin 81mg daily.      Alternative discussed including remaining on anticoagulation and accepting the risk of bleeding or not taking anticoagulation and accepting the risk of stroke.

## 2025-05-08 NOTE — NURSING TRANSFER
Nursing Transfer Note      5/8/2025   2:49 PM    Nurse giving handoff:leorn   Nurse receiving handoff:kala sscu rn    Reason patient is being transferred: ep pacu 2 to sscu 12/home    Transfer To: ep pacu 2 to sscu 12/home    Transfer via stretcher    Transfer with cardiac monitoring, tele box on confirmed by tele tech    Transported by a niraj rn    Transfer Vital Signs:  Blood Pressure:171/74  Heart Rate:60  O2:100% room air  Temperature:97.3  Respirations:18    Telemetry: Box Number [id number], Rate 60, Rhythm sr, and Telemetry  tele tech  Order for Tele Monitor? Yes    Additional Lines: condom cath    Medicines sent: prescription to be delivered ordered by md    Any special needs or follow-up needed: bedrest x2hrs. Hemostasis 1245. Done at 1445.     Patient belongings transferred with patient: sscu locker    Chart send with patient: Yes    Notified: spouse    Patient reassessed at: 5/8/25 1400. Next due 1430  Upon arrival to floor: cardiac monitor applied, patient oriented to room, call bell in reach, and bed in lowest position, groin check done with sscu rn.  Pt has right foot piv and right ac piv.

## 2025-05-08 NOTE — HOSPITAL COURSE
Patient with history of hematuria now s/p successful implantation of 20mm Watchman FLX Pro .    Recommendations following Watchman implantation:  Take Eliquis 5mg twice daily + aspirin for 6 weeks after implantation  Follow up ALISON in 6 weeks.   If ALISON shows well seated device without DRT and abscence of peridevice leak (or <5 mm) will stop Eliquis and start clopidogrel and Aspirin 81 mg daily   6 months after device implantation Aspirin monotherapy

## 2025-05-08 NOTE — Clinical Note
Hemodynamics were performed.  An angiography was performed of the ELINOR. The angiography was performed via hand injection with 5 mL of contrast.  Pressure is 21

## 2025-05-08 NOTE — H&P
[facility] - Short Stay Cardiac Unit  Cardiac Electrophysiology  History and Physical     Admission Date: 5/8/2025  Code Status: Prior   Attending Provider: Kain Cortez MD   Principal Problem:PAF (paroxysmal atrial fibrillation)    Subjective:     Chief Complaint:  AF     HPI:  68 yo male with hypertension, prior stroke, CKD stage IV, prior DVT, prior stroke, aortic atherosclerotic disease, paroxysmal atrial fibrillation and hematuria here for implantation of LAAO device. He reports he was diagnosed with AF at [facility] 15-20 years ago. He is unaware of any episodes after. He was on xarelto however this was stopped in 2021 due to hematuria when he was being treated with bladder cancer. He now has a urostomy. He notes blood in the urostomy if he starts xarelto. He had a holter monitor in October of 2020 which was read as atrial fibrillation however selected strips on my review in the file show sinus rhythm with PACs and some runs of nonsustained AT. He reports rare episode of palpitations that can last an hour. He reports a stroke occurred 7-8 years ago. He has some residual left sided weakness. He reports he was not having any AF around that time. He feels it was a heat related stroke. He recently had an episode of AF detected on ILR. CHADSVASC 5 and discussed stroke risk and need for anticoagulation therapy as well as alterantives which include LAAO device.      I reviewed available ECGs in EPIC which show sinus rhythm, at times with PACs. He has had a RBBB on ECGs since 2/2023       Past Medical History:   Diagnosis Date    Cancer     bladder ca and prostate ca     CKD (chronic kidney disease), stage IV     Diabetes mellitus     diet controlled    Encounter for blood transfusion     ESRD (end stage renal disease)     dialysis mon and friday    Hodgkin's lymphoma 2008    chemo and radiation    Hypertension     PAF (paroxysmal atrial fibrillation)     Presence of urostomy     Stroke     weakness left side    Thyroid  disease        Past Surgical History:   Procedure Laterality Date    ABLATION      for atrial fibrilation    AV FISTULA PLACEMENT Left 07/20/2023    Procedure: CREATION, AV FISTULA;  Surgeon: Roque Arcos Jr., MD;  Location: [facility] OR;  Service: Vascular;  Laterality: Left;   / LEFT UPPER EXTROMITY    FISTULOGRAM Left 12/12/2024    Procedure: Fistulogram;  Surgeon: Rohan Bright MD;  Location: [facility] CATH LAB;  Service: Nephrology;  Laterality: Left;    FISTULOGRAM Left 2/13/2025    Procedure: Fistulogram;  Surgeon: Rohan Bright MD;  Location: [facility] CATH LAB;  Service: Nephrology;  Laterality: Left;    HEMICOLECTOMY W/ OSTOMY      INSERTION OF IMPLANTABLE LOOP RECORDER Left 5/17/2024    Procedure: Insertion, Implantable Loop Recorder;  Surgeon: Kain Cortez MD;  Location: [facility] EP LAB;  Service: Cardiology;  Laterality: Left;  CVA, AF,  ILR, BSCI, Local, CA, 3 Prep ** HD pt/LUE AV fistula**    INSERTION OF TUNNELED CENTRAL VENOUS HEMODIALYSIS CATHETER Right 6/17/2024    Procedure: INSERTION, CATHETER, CENTRAL VENOUS, HEMODIALYSIS, TUNNELED;  Surgeon: Roque Arcos Jr., MD;  Location: [facility];  Service: Vascular;  Laterality: Right;    PARTIAL SURGICAL REMOVAL OF BLADDER      PORTACATH PLACEMENT Right     chest wall    PROSTATECTOMY      THROMBECTOMY Left 6/17/2024    Procedure: THROMBECTOMY / LEFT;  Surgeon: Roque Arcos Jr., MD;  Location: [facility];  Service: Vascular;  Laterality: Left;    THROMBECTOMY OF HEMODIALYSIS ACCESS SITE Left 6/17/2024    Procedure: THROMBECTOMY, HEMODIALYSIS GRAFT OR FISTULA;  Surgeon: Rohan Bright MD;  Location: [facility] CATH LAB;  Service: Nephrology;  Laterality: Left;    THROMBECTOMY OF HEMODIALYSIS ACCESS SITE Left 12/12/2024    Procedure: THROMBECTOMY, HEMODIALYSIS GRAFT OR FISTULA;  Surgeon: Rohan Bright MD;  Location: [facility] CATH LAB;  Service: Nephrology;  Laterality: Left;  LEFT UPPER ARM    TRANSPOSITION OF BASILIC VEIN Left 7/2/2024    Procedure: TRANSPOSITION, VEIN,  BASILIC-AVF/G LEFT UPPER EXTREMITY;  Surgeon: Roque Arcos Jr., MD;  Location: [facility] OR;  Service: General;  Laterality: Left;       Review of patient's allergies indicates:   Allergen Reactions    Ambien [zolpidem] Other (See Comments)     Causes pt to hallucinate      Irbesartan      hyperkalemia       No current facility-administered medications on file prior to encounter.     Current Outpatient Medications on File Prior to Encounter   Medication Sig    cyanocobalamin (VITAMIN B-12) 1000 MCG tablet Take 100 mcg by mouth.    diltiaZEM (CARTIA XT) 120 MG Cp24 Take 1 capsule (120 mg total) by mouth once daily.    furosemide (LASIX) 20 MG tablet Take 20 mg by mouth daily as needed (daily except on dialysis days). Pt takes on non dialysis days    levothyroxine (SYNTHROID) 75 MCG tablet Take 75 mcg by mouth once daily.    rosuvastatin (CRESTOR) 40 MG Tab Take 10 mg by mouth every evening.    sevelamer carbonate (RENVELA) 800 mg Tab Take 800 mg by mouth.    albuterol-ipratropium (DUO-NEB) 2.5 mg-0.5 mg/3 mL nebulizer solution Use one vial (3 mL) by nebulization every 4 (four) hours as needed for Wheezing - Rescue    ascorbic acid, vitamin C, (VITAMIN C) 500 MG tablet Take 500 mg by mouth once daily.    cholecalciferol, vitamin D3, 25 mcg (1,000 unit) Chew once daily.    heparin sodium,porcine (HEPARIN LOCK FLUSH, PORCINE, IV) Inject into the vein. After HD, 2 times weekly    LIDOcaine-prilocaine (EMLA) cream Apply topically as needed.    multivitamin (THERAGRAN) per tablet Take 1 tablet by mouth once daily.    nebulizer and compressor Sharon Use as directed (Patient not taking: Reported on 5/6/2025)    omega-3 fatty acids 1,000 mg Cap Take 1,200 mg by mouth.    REPATHA SYRINGE 140 mg/mL Syrg Inject 140 mg into the skin every 14 (fourteen) days.    UNABLE TO FIND Daily. PREVAGEN     Family History    None       Tobacco Use    Smoking status: Former     Current packs/day: 0.00     Types: Cigarettes     Quit date: 1/1/1990      Years since quittin.3    Smokeless tobacco: Never   Substance and Sexual Activity    Alcohol use: Never    Drug use: Never    Sexual activity: Yes     Partners: Female     Review of Systems   All other systems reviewed and are negative.    Objective:     Vital Signs (Most Recent):  Temp: 97.2 °F (36.2 °C) (25)  Pulse: 67 (25)  Resp: 18 (25)  BP: (!) 144/69 (25)  SpO2: 99 % (25) Vital Signs (24h Range):  Temp:  [97.2 °F (36.2 °C)] 97.2 °F (36.2 °C)  Pulse:  [67] 67  Resp:  [18] 18  SpO2:  [99 %] 99 %  BP: (144)/(69) 144/69       Weight: 108.9 kg (240 lb)  Body mass index is 33.47 kg/m².    SpO2: 99 %        Physical Exam  Vitals and nursing note reviewed.   Constitutional:       Appearance: Normal appearance.   Cardiovascular:      Rate and Rhythm: Normal rate and regular rhythm.      Pulses: Normal pulses.      Heart sounds: Normal heart sounds.   Pulmonary:      Effort: Pulmonary effort is normal.      Breath sounds: Normal breath sounds.   Abdominal:      General: Abdomen is flat.   Musculoskeletal:      Right lower leg: No edema.      Left lower leg: No edema.   Skin:     General: Skin is warm.   Neurological:      General: No focal deficit present.      Mental Status: He is alert.            Significant Labs: All pertinent lab results from the last 24 hours have been reviewed.    Assessment and Plan:     * PAF (paroxysmal atrial fibrillation)  68 yo male with hypertension, prior stroke, CKD stage IV, prior DVT, prior stroke, aortic atherosclerotic disease, paroxysmal atrial fibrillation and hematuria here for implantation of LAAO with watchman device.    We discussed the etiology and pathophysiology of strokes associated with atrial fibrillation. We discussed that his risk factors ([id number] score 5) portend a high enough risk that warrants long-term anticoagulation for stroke risk reduction to which he has a relative long-term contraindication to  due to recurrent bleeding and iron deficiency anemia(HAS-BLED score 4) We discussed the role of the left atrial appendage in stroke origin in atrial fibrillation and how a ELINOR occlusion device can help reduce long-term stroke risk absent long-term anticoagulation. We discussed the implant procedure including transseptal puncture and the risks of the procedure including but not limited to death, infection, bleeding, stroke, MI, cardiac perforation, vascular injury, embolism (including device embolism), cardiac tamponade, skin burns, and other organic injury including the possibility for need for surgery. We discussed the need to for DAPT therapy for 6 months post-implant followed by lifelong aspirin 81mg daily.      Alternative discussed including remaining on anticoagulation and accepting the risk of bleeding or not taking anticoagulation and accepting the risk of stroke.        Zulay Devlin MD  Cardiac Electrophysiology  [facility] - Short Stay Cardiac Unit

## 2025-05-08 NOTE — TRANSFER OF CARE
"Anesthesia Transfer of Care Note    Patient: Arthur Menjivar    Procedure(s) Performed: Procedure(s) (LRB):  Left atrial appendage closure device (N/A)  Transesophageal echo (ALISON) intra-procedure log documentation (N/A)    Patient location: PACU    Anesthesia Type: general    Transport from OR: Transported from OR on 6-10 L/min O2 by face mask with adequate spontaneous ventilation    Post pain: adequate analgesia    Post assessment: no apparent anesthetic complications and tolerated procedure well    Post vital signs: stable    Level of consciousness: awake and oriented    Nausea/Vomiting: no nausea/vomiting    Complications: none    Transfer of care protocol was followed    Last vitals: Visit Vitals  BP (!) 144/69 (BP Location: Right arm, Patient Position: Lying)   Pulse 67   Temp 36.2 °C (97.2 °F) (Temporal)   Resp 18   Ht 5' 11" (1.803 m)   Wt 108.9 kg (240 lb)   SpO2 99%   BMI 33.47 kg/m²     "

## 2025-05-08 NOTE — PLAN OF CARE
Received report from Marina dickens pacu RN. Patient s/p Watchman placement via right groin, AAOx3. VSS, no c/o pain or discomfort at this time, resp even and unlabored. Gauze/tegaderm dressing to right groin is CDI. No active bleeding. No hematoma noted. Post procedure protocol reviewed with patient and patient's family. BLE doppler pulses noted. LUE +thrill and +bruit. Understanding verbalized. Family members at bedside. Nurse call bell within reach.   Nitrostat cream to top of right hand and right forearm taped in place.    Called tele to verify on monitor. Pt is 60 NSR. Not showing airstrip.

## 2025-05-08 NOTE — NURSING
Dr Mann at bedside and removed nitro stat dressing x2 away from right arm. Right arm has good pulse and color. Anesthesia MD OK with discharge at this time. Will remove PIV from right foot to walk patient.

## 2025-05-08 NOTE — BRIEF OP NOTE
: Kain Cortez MD  Date of procedure: 05/08/2025    Post-operative diagnosis: AF    Procedure performed: Left Atrial Appendage Occlusion with Watchman device    Description of procedure: The patient was brought to the EP lab in the fasting state. Prepped and draped in sterile fashion. Safety timeout was performed. Sedation administered by anesthesia staff. Ultrasound guided venous access of the right femoral vein was performed. 16 Fr short sheath placed in right femoral vein. Heparin bolus given. ALISON and fluoroscopic guided single transseptal puncture performed. Watchman deployed in ELINOR using ALISON and fluoroscopic guidance. Tug test confirmed stable position. ALISON confirmed adequate compression and no significant duncan-device flow. Sheaths removed. Two silk figure of 8 sutures placed at right femoral access site for hemostasis.     EBL: <10 mL    Specimens removed: None  Complications: no immediate    The attending physician was present for entire duration of the procedure    Zulay Devlin MD, PGY8  Electrophysiology

## 2025-05-08 NOTE — HPI
68 yo male with hypertension, prior stroke, CKD stage IV, prior DVT, prior stroke, aortic atherosclerotic disease, paroxysmal atrial fibrillation and hematuria here for implantation of LAAO device. He reports he was diagnosed with AF at [facility] 15-20 years ago. He is unaware of any episodes after. He was on xarelto however this was stopped in 2021 due to hematuria when he was being treated with bladder cancer. He now has a urostomy. He notes blood in the urostomy if he starts xarelto. He had a holter monitor in October of 2020 which was read as atrial fibrillation however selected strips on my review in the file show sinus rhythm with PACs and some runs of nonsustained AT. He reports rare episode of palpitations that can last an hour. He reports a stroke occurred 7-8 years ago. He has some residual left sided weakness. He reports he was not having any AF around that time. He feels it was a heat related stroke. He recently had an episode of AF detected on ILR. CHADSVASC 5 and discussed stroke risk and need for anticoagulation therapy as well as alterantives which include LAAO device.      I reviewed available ECGs in EPIC which show sinus rhythm, at times with PACs. He has had a RBBB on ECGs since 2/2023

## 2025-05-08 NOTE — ANESTHESIA PROCEDURE NOTES
Intubation    Date/Time: 5/8/2025 10:28 AM    Performed by: Aurora Willams CRNA  Authorized by: Eva Naik MD    Intubation:     Induction:  Intravenous    Intubated:  Postinduction    Mask Ventilation:  Easy with oral airway    Attempts:  1    Attempted By:  CRNA    Method of Intubation:  Video laryngoscopy    Blade:  Azevedo 4    Laryngeal View Grade: Grade I - full view of cords      Difficult Airway Encountered?: No      Complications:  None    Airway Device:  Oral endotracheal tube    Airway Device Size:  7.5    Style/Cuff Inflation:  Cuffed (inflated to minimal occlusive pressure)    Tube secured:  22    Secured at:  The lips    Placement Verified By:  Capnometry    Complicating Factors:  Obesity and short neck    Findings Post-Intubation:  BS equal bilateral and atraumatic/condition of teeth unchanged

## 2025-05-08 NOTE — Clinical Note
5 ml of contrast were injected throughout the case. 95 mL of contrast was the total wasted during the case. 100 mL was the total amount used during the case.

## 2025-05-08 NOTE — SUBJECTIVE & OBJECTIVE
Past Medical History:   Diagnosis Date    Cancer     bladder ca and prostate ca     CKD (chronic kidney disease), stage IV     Diabetes mellitus     diet controlled    Encounter for blood transfusion     ESRD (end stage renal disease)     dialysis mon and friday    Hodgkin's lymphoma 2008    chemo and radiation    Hypertension     PAF (paroxysmal atrial fibrillation)     Presence of urostomy     Stroke     weakness left side    Thyroid disease        Past Surgical History:   Procedure Laterality Date    ABLATION      for atrial fibrilation    AV FISTULA PLACEMENT Left 07/20/2023    Procedure: CREATION, AV FISTULA;  Surgeon: Roque Arcos Jr., MD;  Location: [facility] OR;  Service: Vascular;  Laterality: Left;   / LEFT UPPER EXTROMITY    FISTULOGRAM Left 12/12/2024    Procedure: Fistulogram;  Surgeon: Rohan Bright MD;  Location: [facility] CATH LAB;  Service: Nephrology;  Laterality: Left;    FISTULOGRAM Left 2/13/2025    Procedure: Fistulogram;  Surgeon: Rohan Bright MD;  Location: [facility] CATH LAB;  Service: Nephrology;  Laterality: Left;    HEMICOLECTOMY W/ OSTOMY      INSERTION OF IMPLANTABLE LOOP RECORDER Left 5/17/2024    Procedure: Insertion, Implantable Loop Recorder;  Surgeon: Kain Cortez MD;  Location: [facility] EP LAB;  Service: Cardiology;  Laterality: Left;  CVA, AF,  ILR, BSCI, Local, MS, 3 Prep ** HD pt/LUE AV fistula**    INSERTION OF TUNNELED CENTRAL VENOUS HEMODIALYSIS CATHETER Right 6/17/2024    Procedure: INSERTION, CATHETER, CENTRAL VENOUS, HEMODIALYSIS, TUNNELED;  Surgeon: Roque Arcos Jr., MD;  Location: [facility] OR;  Service: Vascular;  Laterality: Right;    PARTIAL SURGICAL REMOVAL OF BLADDER      PORTACATH PLACEMENT Right     chest wall    PROSTATECTOMY      THROMBECTOMY Left 6/17/2024    Procedure: THROMBECTOMY / LEFT;  Surgeon: Roque Arcos Jr., MD;  Location: [facility];  Service: Vascular;  Laterality: Left;    THROMBECTOMY OF HEMODIALYSIS ACCESS SITE Left 6/17/2024    Procedure: THROMBECTOMY,  HEMODIALYSIS GRAFT OR FISTULA;  Surgeon: Rohan Bright MD;  Location: [facility] CATH LAB;  Service: Nephrology;  Laterality: Left;    THROMBECTOMY OF HEMODIALYSIS ACCESS SITE Left 12/12/2024    Procedure: THROMBECTOMY, HEMODIALYSIS GRAFT OR FISTULA;  Surgeon: Rohan Bright MD;  Location: [facility] CATH LAB;  Service: Nephrology;  Laterality: Left;  LEFT UPPER ARM    TRANSPOSITION OF BASILIC VEIN Left 7/2/2024    Procedure: TRANSPOSITION, VEIN, BASILIC-AVF/G LEFT UPPER EXTREMITY;  Surgeon: Roque Arcos Jr., MD;  Location: [facility] OR;  Service: General;  Laterality: Left;       Review of patient's allergies indicates:   Allergen Reactions    Ambien [zolpidem] Other (See Comments)     Causes pt to hallucinate      Irbesartan      hyperkalemia       No current facility-administered medications on file prior to encounter.     Current Outpatient Medications on File Prior to Encounter   Medication Sig    cyanocobalamin (VITAMIN B-12) 1000 MCG tablet Take 100 mcg by mouth.    diltiaZEM (CARTIA XT) 120 MG Cp24 Take 1 capsule (120 mg total) by mouth once daily.    furosemide (LASIX) 20 MG tablet Take 20 mg by mouth daily as needed (daily except on dialysis days). Pt takes on non dialysis days    levothyroxine (SYNTHROID) 75 MCG tablet Take 75 mcg by mouth once daily.    rosuvastatin (CRESTOR) 40 MG Tab Take 10 mg by mouth every evening.    sevelamer carbonate (RENVELA) 800 mg Tab Take 800 mg by mouth.    albuterol-ipratropium (DUO-NEB) 2.5 mg-0.5 mg/3 mL nebulizer solution Use one vial (3 mL) by nebulization every 4 (four) hours as needed for Wheezing - Rescue    ascorbic acid, vitamin C, (VITAMIN C) 500 MG tablet Take 500 mg by mouth once daily.    cholecalciferol, vitamin D3, 25 mcg (1,000 unit) Chew once daily.    heparin sodium,porcine (HEPARIN LOCK FLUSH, PORCINE, IV) Inject into the vein. After HD, 2 times weekly    LIDOcaine-prilocaine (EMLA) cream Apply topically as needed.    multivitamin (THERAGRAN) per tablet Take 1  tablet by mouth once daily.    nebulizer and compressor Sharon Use as directed (Patient not taking: Reported on 2025)    omega-3 fatty acids 1,000 mg Cap Take 1,200 mg by mouth.    REPATHA SYRINGE 140 mg/mL Syrg Inject 140 mg into the skin every 14 (fourteen) days.    UNABLE TO FIND Daily. PREVAGEN     Family History    None       Tobacco Use    Smoking status: Former     Current packs/day: 0.00     Types: Cigarettes     Quit date: 1990     Years since quittin.3    Smokeless tobacco: Never   Substance and Sexual Activity    Alcohol use: Never    Drug use: Never    Sexual activity: Yes     Partners: Female     Review of Systems   All other systems reviewed and are negative.    Objective:     Vital Signs (Most Recent):  Temp: 97.2 °F (36.2 °C) (25 0900)  Pulse: 67 (25 0900)  Resp: 18 (25 0900)  BP: (!) 144/69 (25 0900)  SpO2: 99 % (25) Vital Signs (24h Range):  Temp:  [97.2 °F (36.2 °C)] 97.2 °F (36.2 °C)  Pulse:  [67] 67  Resp:  [18] 18  SpO2:  [99 %] 99 %  BP: (144)/(69) 144/69       Weight: 108.9 kg (240 lb)  Body mass index is 33.47 kg/m².    SpO2: 99 %        Physical Exam  Vitals and nursing note reviewed.   Constitutional:       Appearance: Normal appearance.   Cardiovascular:      Rate and Rhythm: Normal rate and regular rhythm.      Pulses: Normal pulses.      Heart sounds: Normal heart sounds.   Pulmonary:      Effort: Pulmonary effort is normal.      Breath sounds: Normal breath sounds.   Abdominal:      General: Abdomen is flat.   Musculoskeletal:      Right lower leg: No edema.      Left lower leg: No edema.   Skin:     General: Skin is warm.   Neurological:      General: No focal deficit present.      Mental Status: He is alert.            Significant Labs: All pertinent lab results from the last 24 hours have been reviewed.

## 2025-05-08 NOTE — NURSING
Dr Mann at the bedside and wants to hold off on getting patient up until he speaks with vascular sx. MD wants patient to stay in recovery longer.

## 2025-05-08 NOTE — PLAN OF CARE
"Pt aaox4 follows commands. S/p watchman device. Pt arrived from ep lab with right groin perclose x2, gauze/trans film noted. Hemostasis at 1245. Bedrest x2 hrs. Done at 1445. No hematoma or drainage noted. Doppler pulses and marked. Pt arrived with 2 sites to right hand and right forearm, iv infiltrated in ep lab. Drsg removed and nitropaste intact to both sites per md order. Paper secured with tape. Sscu rn aware. No EKG ordered per md order. Pt denies wearing dentures. Pt states "I walk with a cane, I do dialysis 3x week".  "I had dialysis yesterday".   Pt has limb alert to left ua, fistula intact with positive thrill and bruit noted. Pt has right lower quad urostomy bag intact. Stoma pink and moist, no drainage in bag. Pt states "I don't normally urinate much". Pt's family updated over phone by ep pacu rn. Verbalizes understanding. Re updated with sscu room number 12.  Tele box on confirmed by tele tech. Dr richard updated pt in ep pacu 2. Verbalizes understanding. Pt complaints of "sore throat". Did not want any water in ep pacu. Tolerating ice chips. Pt does have right foot piv and right ac piv intact and saline locked. See flowsheet for full assessment. Pt on room air. Bg 154  "

## 2025-05-08 NOTE — CONSULTS
[facility], [place]  ALISON Consult      Arthur Menjivar  YOB: 1955  Medical Record Number:  [id number]  Attending Physician:  Kain Cortez MD   Current Principal Problem:  <principal problem not specified>    History        HPI  69 year-old man with hypertension, prior stroke, CKD stage IV, prior DVT, prior stroke, aortic atherosclerotic disease, paroxysmal atrial fibrillation and hematuria. He reports he was diagnosed with AF at [facility] 15-20 years ago. He is unaware of any episodes after. He was on xarelto however this was stopped in 2021 due to hematuria when he was being treated with bladder cancer. He now has a urostomy. He notes blood in the urostomy if he starts xarelto. He had a holter monitor in October of 2020 which was read as atrial fibrillation however selected strips on my review in the file show sinus rhythm with PACs and some runs of nonsustained AT. He reports rare episode of palpitations that can last an hour. He reports a stroke occurred 7-8 years ago. He has some residual left sided weakness. He reports he was not having any AF around that time. He feels it was a heat related stroke. Presents today for a watchmen placement.          Dysphagia or odynophagia:  No  Liver Disease, esophageal disease, or known varices:  No  Upper GI Bleeding: No  Snoring:  No  Sleep Apnea:  No  Prior neck surgery or radiation:  No  History of anesthetic difficulties:  No  Family history of anesthetic difficulties:  No  Last oral intake: yesterday before midnight  Able to move neck in all directions:  Yes    Medications - Outpatient  Prior to Admission medications    Medication Sig Start Date End Date Taking? Authorizing Provider   albuterol-ipratropium (DUO-NEB) 2.5 mg-0.5 mg/3 mL nebulizer solution Use one vial (3 mL) by nebulization every 4 (four) hours as needed for Wheezing - Rescue 1/25/25 3/26/25  Maggei Zavala MD   ascorbic acid, vitamin C, (VITAMIN C) 500 MG tablet Take 500 mg  by mouth once daily.    Provider, Historical   cholecalciferol, vitamin D3, 25 mcg (1,000 unit) Chew once daily. 10/25/16   Provider, Historical   cyanocobalamin (VITAMIN B-12) 1000 MCG tablet Take 100 mcg by mouth.    Provider, Historical   diltiaZEM (CARTIA XT) 120 MG Cp24 Take 1 capsule (120 mg total) by mouth once daily. 1/25/25 3/26/25  Maggie Zavala MD   furosemide (LASIX) 20 MG tablet Take 20 mg by mouth daily as needed (daily except on dialysis days). Pt takes on non dialysis days    Provider, Historical   heparin sodium,porcine (HEPARIN LOCK FLUSH, PORCINE, IV) Inject into the vein. After HD, 2 times weekly    Provider, Historical   levothyroxine (SYNTHROID) 75 MCG tablet Take 75 mcg by mouth once daily. 8/12/21   Provider, Historical   LIDOcaine-prilocaine (EMLA) cream Apply topically as needed.    Provider, Historical   multivitamin (THERAGRAN) per tablet Take 1 tablet by mouth once daily.    Provider, Historical   nebulizer and compressor Sharon Use as directed  Patient not taking: Reported on 5/6/2025 1/25/25   Maggie Zavala MD   omega-3 fatty acids 1,000 mg Cap Take 1,200 mg by mouth.    Provider, Historical   REPATHA SYRINGE 140 mg/mL Syrg Inject 140 mg into the skin every 14 (fourteen) days. 5/16/24   Jarad Greene MD   rosuvastatin (CRESTOR) 40 MG Tab Take 10 mg by mouth every evening.    Provider, Historical   sevelamer carbonate (RENVELA) 800 mg Tab Take 800 mg by mouth. 11/15/24 11/15/25  Provider, Historical   UNABLE TO FIND Daily. PREVAGEN    Provider, Historical       Medications - Current  Scheduled Meds:  Continuous Infusions:    Allergies  Review of patient's allergies indicates:   Allergen Reactions    Ambien [zolpidem] Other (See Comments)     Causes pt to hallucinate      Irbesartan      hyperkalemia     Past Medical History  Past Medical History:   Diagnosis Date    Cancer     bladder ca and prostate ca     CKD (chronic kidney disease), stage IV     Diabetes mellitus     diet  controlled    Encounter for blood transfusion     ESRD (end stage renal disease)     dialysis mon and friday    Hodgkin's lymphoma 2008    chemo and radiation    Hypertension     PAF (paroxysmal atrial fibrillation)     Presence of urostomy     Stroke     weakness left side    Thyroid disease      Past Surgical History  Past Surgical History:   Procedure Laterality Date    ABLATION      for atrial fibrilation    AV FISTULA PLACEMENT Left 07/20/2023    Procedure: CREATION, AV FISTULA;  Surgeon: Roque Arcos Jr., MD;  Location: [facility] OR;  Service: Vascular;  Laterality: Left;   / LEFT UPPER EXTROMITY    FISTULOGRAM Left 12/12/2024    Procedure: Fistulogram;  Surgeon: Rohan Bright MD;  Location: [facility] CATH LAB;  Service: Nephrology;  Laterality: Left;    FISTULOGRAM Left 2/13/2025    Procedure: Fistulogram;  Surgeon: Rohan Bright MD;  Location: [facility] CATH LAB;  Service: Nephrology;  Laterality: Left;    HEMICOLECTOMY W/ OSTOMY      INSERTION OF IMPLANTABLE LOOP RECORDER Left 5/17/2024    Procedure: Insertion, Implantable Loop Recorder;  Surgeon: Kain Cortez MD;  Location: [facility] EP LAB;  Service: Cardiology;  Laterality: Left;  CVA, AF,  ILR, BSCI, Local, GA, 3 Prep ** HD pt/LUE AV fistula**    INSERTION OF TUNNELED CENTRAL VENOUS HEMODIALYSIS CATHETER Right 6/17/2024    Procedure: INSERTION, CATHETER, CENTRAL VENOUS, HEMODIALYSIS, TUNNELED;  Surgeon: Roque Arcos Jr., MD;  Location: [facility] OR;  Service: Vascular;  Laterality: Right;    PARTIAL SURGICAL REMOVAL OF BLADDER      PORTACATH PLACEMENT Right     chest wall    PROSTATECTOMY      THROMBECTOMY Left 6/17/2024    Procedure: THROMBECTOMY / LEFT;  Surgeon: Roque Arcos Jr., MD;  Location: [facility] OR;  Service: Vascular;  Laterality: Left;    THROMBECTOMY OF HEMODIALYSIS ACCESS SITE Left 6/17/2024    Procedure: THROMBECTOMY, HEMODIALYSIS GRAFT OR FISTULA;  Surgeon: Rohan Bright MD;  Location: [facility] CATH LAB;  Service: Nephrology;  Laterality: Left;     THROMBECTOMY OF HEMODIALYSIS ACCESS SITE Left 12/12/2024    Procedure: THROMBECTOMY, HEMODIALYSIS GRAFT OR FISTULA;  Surgeon: Rohan Bright MD;  Location: [facility] CATH LAB;  Service: Nephrology;  Laterality: Left;  LEFT UPPER ARM    TRANSPOSITION OF BASILIC VEIN Left 7/2/2024    Procedure: TRANSPOSITION, VEIN, BASILIC-AVF/G LEFT UPPER EXTREMITY;  Surgeon: Roque Arcos Jr., MD;  Location: [facility] OR;  Service: General;  Laterality: Left;     ROS  10 point ROS performed and negative except as stated in HPI     Physical Examination   Vital Signs       24 Hour VS Range     No intake or output data in the 24 hours ending 05/08/25 0657      Physical Exam  Vitals and nursing note reviewed.   Constitutional:       General: He is not in acute distress.     Appearance: Normal appearance. He is not diaphoretic.   HENT:      Head: Normocephalic and atraumatic.      Mouth/Throat:      Mouth: Mucous membranes are moist.   Eyes:      General: No scleral icterus.     Extraocular Movements: Extraocular movements intact.   Neck:      Vascular: No carotid bruit, hepatojugular reflux or JVD.   Cardiovascular:      Rate and Rhythm: Normal rate and regular rhythm.      Pulses: Normal pulses.           Carotid pulses are 2+ on the right side and 2+ on the left side.       Radial pulses are 2+ on the right side and 2+ on the left side.        Femoral pulses are 2+ on the right side and 2+ on the left side.       Popliteal pulses are 2+ on the right side and 2+ on the left side.        Dorsalis pedis pulses are 2+ on the right side and 2+ on the left side.        Posterior tibial pulses are 2+ on the right side and 2+ on the left side.      Heart sounds: No murmur heard.     No friction rub.   Pulmonary:      Effort: Pulmonary effort is normal. No respiratory distress.      Breath sounds: Normal breath sounds. No wheezing.   Chest:      Chest wall: No tenderness.   Abdominal:      General: Abdomen is flat. Bowel sounds are normal. There is no  "distension.      Tenderness: There is no abdominal tenderness.   Musculoskeletal:      Right lower leg: No edema.      Left lower leg: No edema.   Skin:     General: Skin is warm and dry.      Capillary Refill: Capillary refill takes less than 2 seconds.      Findings: No rash or wound.   Neurological:      General: No focal deficit present.      Mental Status: He is alert and oriented to person, place, and time.   Psychiatric:         Mood and Affect: Mood normal.         Thought Content: Thought content normal.         Data   No results for input(s): "WBC", "HGB", "HCT", "PLT" in the last 168 hours.   Recent Labs   Lab 05/01/25  1119   PROTIME 11.2   INR 1.0      No results for input(s): "NA", "K", "CL", "CO2", "BUN", "CREATININE", "ANIONGAP", "CALCIUM" in the last 168 hours.   Assessment & Plan     ALISON for ELINOR assessment during a watchmen   -No absolute contraindications of esophageal stricture, tumor, perforation, laceration,or diverticulum and/or active GI bleed  -The risks, benefits & alternatives of the procedure were explained to the patient.   -The risks of transesophageal echo include but are not limited to:  Dental trauma, esophageal trauma/perforation, bleeding, laryngospasm/brochospasm, aspiration, sore throat/hoarseness, & dislodgement of the endotracheal tube/nasogastric tube (where applicable).    -The risks of ANES monitored sedation include hypotension, respiratory depression, arrhythmias, bronchospasm, & death.    -Informed consent was obtained. The patient is agreeable to proceed with the procedure and all questions and concerns addressed.    Case discussed with an attending in echocardiography lab.    Lelia Jones MD  [facility]   Cardiovascular Disease PGY-V    "

## 2025-05-08 NOTE — PROGRESS NOTES
Dr gallego anesthesia at bedside. Pt had infiltrated 2 pivs to right hand and right forearm with vasopression iv.  Iv's were removed in ep lab.  Positive cap refill noted and warm to touch. Right arm elevated on blanket rolls. Per dr thomas/dr gallego, drsg removed and nitropaste 1 inch placed to both right hand site and right forearm site, paper marked and secured with tape.  Pt educated on purpose of medication. Verbalizes understanding. Will continue to monitor. See flowsheet for assessment and vital signs.

## 2025-05-09 LAB
OHS CV AF BURDEN PERCENT: < 1
OHS CV DC REMOTE DEVICE TYPE: NORMAL
POCT GLUCOSE: 154 MG/DL (ref 70–110)

## 2025-05-12 LAB
ABO + RH BLD: NORMAL
ABO + RH BLD: NORMAL
BLD PROD TYP BPU: NORMAL
BLD PROD TYP BPU: NORMAL
BLOOD UNIT EXPIRATION DATE: NORMAL
BLOOD UNIT EXPIRATION DATE: NORMAL
BLOOD UNIT TYPE CODE: 5100
BLOOD UNIT TYPE CODE: 5100
CROSSMATCH INTERPRETATION: NORMAL
CROSSMATCH INTERPRETATION: NORMAL
DISPENSE STATUS: NORMAL
DISPENSE STATUS: NORMAL
UNIT NUMBER: NORMAL
UNIT NUMBER: NORMAL

## 2025-05-13 NOTE — DISCHARGE SUMMARY
[facility] - Cardiology  Cardiac Electrophysiology  Discharge Summary      Patient Name: Arthur Menjivar  MRN: [id number]  Admission Date: 5/8/2025  Hospital Length of Stay: 1 days  Discharge Date and Time: 05/13/2025 11:14 AM  Attending Physician: Mary att. providers found    Discharging Provider: Zulay Devlin MD  Primary Care Physician: Demetri Whitley MD    HPI:   68 yo male with hypertension, prior stroke, CKD stage IV, prior DVT, prior stroke, aortic atherosclerotic disease, paroxysmal atrial fibrillation and hematuria here for implantation of LAAO device. He reports he was diagnosed with AF at [facility] 15-20 years ago. He is unaware of any episodes after. He was on xarelto however this was stopped in 2021 due to hematuria when he was being treated with bladder cancer. He now has a urostomy. He notes blood in the urostomy if he starts xarelto. He had a holter monitor in October of 2020 which was read as atrial fibrillation however selected strips on my review in the file show sinus rhythm with PACs and some runs of nonsustained AT. He reports rare episode of palpitations that can last an hour. He reports a stroke occurred 7-8 years ago. He has some residual left sided weakness. He reports he was not having any AF around that time. He feels it was a heat related stroke. He recently had an episode of AF detected on ILR. CHADSVASC 5 and discussed stroke risk and need for anticoagulation therapy as well as alterantives which include LAAO device.      I reviewed available ECGs in EPIC which show sinus rhythm, at times with PACs. He has had a RBBB on ECGs since 2/2023       Procedure(s) (LRB):  Left atrial appendage closure device (N/A)  Transesophageal echo (ALISON) intra-procedure log documentation (N/A)     Indwelling Lines/Drains at time of discharge:  Lines/Drains/Airways       Central Venous Catheter Line  Duration                  Hemodialysis Catheter 06/17/24 1700 right internal jugular 329 days               Drain  Duration                  Hemodialysis AV Graft Left upper arm -- days         Urostomy 08/13/20 0955 ureterostomy, right RLQ 1734 days                    Hospital Course:  Patient with history of hematuria now s/p successful implantation of 20mm Watchman FLX Pro .    Recommendations following Watchman implantation:  Take Eliquis 5mg twice daily + aspirin for 6 weeks after implantation  Follow up ALISON in 6 weeks.   If ALISON shows well seated device without DRT and abscence of peridevice leak (or <5 mm) will stop Eliquis and start clopidogrel and Aspirin 81 mg daily   6 months after device implantation Aspirin monotherapy    Goals of Care Treatment Preferences:  Code Status: Full Code      Consults:     Significant Diagnostic Studies: N/A    Pending Diagnostic Studies:       None            Final Active Diagnoses:    Diagnosis Date Noted POA    PRINCIPAL PROBLEM:  PAF (paroxysmal atrial fibrillation) [I48.0] 10/20/2020 Yes      Problems Resolved During this Admission:     No new Assessment & Plan notes have been filed under this hospital service since the last note was generated.  Service: Arrhythmia      Discharged Condition: stable    Disposition: Home or Self Care    Follow Up:   Follow-up Information       Kain Cortez MD Follow up in 3 month(s).    Specialties: Electrophysiology, Cardiology  Contact information:  [address] [place] [id number]  [phone]                           Patient Instructions:   No discharge procedures on file.  Medications:  Reconciled Home Medications:      Medication List        START taking these medications      ELIQUIS 5 mg Tab  Generic drug: apixaban  Take 1 tablet (5 mg total) by mouth 2 (two) times daily.            CONTINUE taking these medications      albuterol-ipratropium 2.5 mg-0.5 mg/3 mL nebulizer solution  Commonly known as: DUO-NEB  Use one vial (3 mL) by nebulization every 4 (four) hours as needed for Wheezing - Rescue     ascorbic acid  (vitamin C) 500 MG tablet  Commonly known as: VITAMIN C  Take 500 mg by mouth once daily.     cholecalciferol (vitamin D3) 25 mcg (1,000 unit) Chew  once daily.     cyanocobalamin 1000 MCG tablet  Commonly known as: VITAMIN B-12  Take 100 mcg by mouth.     diltiaZEM 120 MG Cp24  Commonly known as: CARTIA XT  Take 1 capsule (120 mg total) by mouth once daily.     furosemide 20 MG tablet  Commonly known as: LASIX  Take 20 mg by mouth daily as needed (daily except on dialysis days). Pt takes on non dialysis days     HEPARIN LOCK FLUSH (PORCINE) IV  Inject into the vein. After HD, 2 times weekly     levothyroxine 75 MCG tablet  Commonly known as: SYNTHROID  Take 75 mcg by mouth once daily.     LIDOcaine-prilocaine cream  Commonly known as: EMLA  Apply topically as needed.     multivitamin per tablet  Commonly known as: THERAGRAN  Take 1 tablet by mouth once daily.     omega-3 fatty acids 1,000 mg Cap  Take 1,200 mg by mouth.     REPATHA SYRINGE 140 mg/mL Syrg  Generic drug: evolocumab  Inject 140 mg into the skin every 14 (fourteen) days.     rosuvastatin 40 MG Tab  Commonly known as: CRESTOR  Take 10 mg by mouth every evening.     sevelamer carbonate 800 mg Tab  Commonly known as: RENVELA  Take 800 mg by mouth.     UNABLE TO FIND  Daily. PREVAGEN            ASK your doctor about these medications      COMP-AIR NEBULIZER COMPRESSOR Sharon  Generic drug: nebulizer and compressor  Use as directed              Time spent on the discharge of patient: 45 minutes    Zulay Devlin MD  Cardiac Electrophysiology  [facility] - Cardiology

## 2025-05-15 ENCOUNTER — OFFICE VISIT (OUTPATIENT)
Dept: HEMATOLOGY/ONCOLOGY | Facility: CLINIC | Age: 70
End: 2025-05-15
Payer: MEDICARE

## 2025-05-15 VITALS
HEIGHT: 71 IN | HEART RATE: 76 BPM | WEIGHT: 252.19 LBS | DIASTOLIC BLOOD PRESSURE: 70 MMHG | OXYGEN SATURATION: 98 % | SYSTOLIC BLOOD PRESSURE: 155 MMHG | BODY MASS INDEX: 35.31 KG/M2

## 2025-05-15 DIAGNOSIS — D47.2 MGUS (MONOCLONAL GAMMOPATHY OF UNKNOWN SIGNIFICANCE): ICD-10-CM

## 2025-05-15 DIAGNOSIS — R91.1 PULMONARY NODULE: Primary | ICD-10-CM

## 2025-05-15 DIAGNOSIS — Z85.71 HISTORY OF HODGKIN'S LYMPHOMA: ICD-10-CM

## 2025-05-15 DIAGNOSIS — I48.0 PAF (PAROXYSMAL ATRIAL FIBRILLATION): ICD-10-CM

## 2025-05-15 DIAGNOSIS — I10 ESSENTIAL (PRIMARY) HYPERTENSION: ICD-10-CM

## 2025-05-15 DIAGNOSIS — E87.70 HYPERVOLEMIA, UNSPECIFIED HYPERVOLEMIA TYPE: ICD-10-CM

## 2025-05-15 DIAGNOSIS — D63.8 ANEMIA OF CHRONIC DISEASE: ICD-10-CM

## 2025-05-15 DIAGNOSIS — E43 UNSPECIFIED SEVERE PROTEIN-CALORIE MALNUTRITION: ICD-10-CM

## 2025-05-15 DIAGNOSIS — Z85.51 HISTORY OF BLADDER CANCER: ICD-10-CM

## 2025-05-15 PROCEDURE — 99999 PR PBB SHADOW E&M-EST. PATIENT-LVL IV: CPT | Mod: PBBFAC,,, | Performed by: INTERNAL MEDICINE

## 2025-05-15 PROCEDURE — 99417 PROLNG OP E/M EACH 15 MIN: CPT | Mod: S$PBB,,, | Performed by: INTERNAL MEDICINE

## 2025-05-15 PROCEDURE — 99215 OFFICE O/P EST HI 40 MIN: CPT | Mod: S$PBB,,, | Performed by: INTERNAL MEDICINE

## 2025-05-15 PROCEDURE — 99214 OFFICE O/P EST MOD 30 MIN: CPT | Mod: PBBFAC | Performed by: INTERNAL MEDICINE

## 2025-05-15 NOTE — PROGRESS NOTES
[facility]     Outpatient Medical Oncology Note  Patient: Arthur Menjivar  MRN: [id number]  Primary Care Provider: Demetri Whitley MD  Chief Complaint: 1.2 cm Right [place] Pulmonary Nodule      Subjective     Subjective:   HPI:   Arthur Menjivar is a 69 y.o. male with PMH significant for:   - HTN/T2DM/ESRD (started HD 3/2024 MWF via AVF - Left arm)  - History of high-grade muscle invasive bladder cancer ([id number], stage IV) s/p NACT and cystectomy/prostatectomy with ileal conduit, diagnosed in 2017, treated at [facility], LIZABETH  - Hodgkin's Lymphoma s/p chemotherapy and RP radiation - 2005, treated at [facility] (no records)  - Paroxysmal Atrial Fibrillation (resumed apixaban)  - CVA at age 61 ([facility])  - Diastolic heart failure  - HLD   - Hypothyroidism  - LLE DVT (details unclear; unknown if provoked or unprovoked)    He was originally referred to Medical Oncology as CT chest performed 1/23/2025 for AHRF 2/2 volume overload from missing dialysis and pneumonia; showed a 1.2 cm nodular opacity in the right lung base. He presents today to review follow up CT chest.     HPI:  5/31/2023: CT chest ([facility] report no images): 9 mm RLL granuloma; right lung otherwise clear, 1 cm renal hypodensity likely cyst  1/5/2025: CTA chest: report not images, no pericardial effusion, no suspicious pulmonary nodule or mass commented on  1/20 - 1/25/2025: presented with SOB and fever/cough, admitted for AHRF requiring 2L NC 2/2 volume overload from missing dialysis and pneumonia  1/23/2025: CT chest without contrast: 1.2 cm nodular opacity adjacent to presumed atelectasis at right lung base, no mediastinal or hilar adenopathy, 3 mm punctate nodule at right lung apex, 1.9 cm left thyroid nodule, mild pulmonary edema, moderate pericardial effusion; other: coronary atherosclerosis   1/24/2025: TTE: small pericardial effusion, EF: 60%, diastolic dysfunction  4/24/2025: Non-contrast CT chest: stable RML opacity  with resolution of adjacent pleural thickening favored inflammatory (possibly granulomatous disease vs. atypical infection)    DVT history: unclear, no records, per pt report   In around 2010 - pt reports he had a LLE DVT, treated with years of AC - pt does not recall details, no prior surgery so pt thinks it was unprovoked   - can't recall which hospital treated him for this      Interval History:    Mr. Menjivar is accompanied today by his wife. He is overall doing well. No change in prior symptoms.     Patient's current symptoms are:  (1) Dry cough: since his hospitalization in January 2025, he has dealt with a dry cough, gradual improvement over the last few weeks, now resolved.  - He had an episode of substernal chest pain prompting him to go to the ER on 1/20/2025; chest pain has not recurred   - He has chronic stable GIANG    (2) Generalized weakness: over the last year, since starting dialysis, he has felt generally weak to the point where he now uses a walker and cane for ambulation; no focal deficits.   - associated with fatigue     (3) Diarrhea: over the last several years, he has about 5 to 6 loose stools without blood daily. He has an outside GI ([facility]) who he follows with; cause of diarrhea unknown  - He reports his last colonoscopy was at age 66 with a few polyps but otherwise unremarkable.   - He admits to bilateral lower quadrant abdominal cramping mainly postprandially.   - No N/V, GI bleeding, etc    (4) Chemotherapy induced neuropathy: affecting bilateral feet, numbness, had issues w/ nonhealing wound, started in 2017 after chemotherapy for the bladder cancer    (5) Hematuria: he has not been on anticoagulation for about 5 years, when trialed on apixaban, he notes orange tinge in his urostomy bag but no significant bleeding.     Patient otherwise denies fevers, chills, night sweats, adenopathy, headaches, N/V, weight loss, rashes     Review of Systems:  14-point review of systems was asked with the  pertinent positives and/or negatives stated in HPI.     Oncology History: minimal records   - History of high-grade muscle invasive bladder cancer (fP5P1Id, stage IV) s/p NACT and cystectomy/prostatectomy with ileal conduit, diagnosed in 2017, Treated at [facility] with Dr. Vargas   - Hodgkin's Lymphoma s/p chemotherapy and RP radiation - 2005, treated at [facility] by Dr. Gamez (no records)    Past Medical History:   - HTN/T2DM/ESRD (3/2024, AVF - Left arm)  - Paroxysmal Atrial Fibrillation (intolerant to AC due to hematuria; future plan for watchman device)  - Diastolic heart failure  - HLD,  - Hypothyroidism  - LLE DVT (details unclear)    Past Medical History:   Diagnosis Date    Cancer     bladder ca and prostate ca     CKD (chronic kidney disease), stage IV     Diabetes mellitus     diet controlled    Encounter for blood transfusion     ESRD (end stage renal disease)     dialysis mon and friday    Hodgkin's lymphoma 2008    chemo and radiation    Hypertension     PAF (paroxysmal atrial fibrillation)     Presence of urostomy     Sleep apnea     Stroke     weakness left side    Thyroid disease        Past Surgical HIstory:   Past Surgical History:   Procedure Laterality Date    ABLATION      for atrial fibrilation    AV FISTULA PLACEMENT Left 07/20/2023    Procedure: CREATION, AV FISTULA;  Surgeon: Roque Arcos Jr., MD;  Location: [facility] OR;  Service: Vascular;  Laterality: Left;   / LEFT UPPER EXTROMITY    CLOSURE OF LEFT ATRIAL APPENDAGE USING DEVICE N/A 5/8/2025    Procedure: Left atrial appendage closure device;  Surgeon: Kain Cortez MD;  Location: [facility] EP LAB;  Service: Cardiology;  Laterality: N/A;  AF, ALISON, Watchman, BSci, Gen, MI, 3 Prep *BSci ILR in situ, LUE AV fistula, urostomy bag rt side*    ECHOCARDIOGRAM,TRANSESOPHAGEAL N/A 5/8/2025    Procedure: Transesophageal echo (ALISON) intra-procedure log documentation;  Surgeon: Jacobo Thomas MD;  Location: [facility] EP LAB;  Service: Cardiology;   Laterality: N/A;    EYE SURGERY      FISTULOGRAM Left 12/12/2024    Procedure: Fistulogram;  Surgeon: Rohan Bright MD;  Location: [facility] CATH LAB;  Service: Nephrology;  Laterality: Left;    FISTULOGRAM Left 02/13/2025    Procedure: Fistulogram;  Surgeon: Rohan Bright MD;  Location: [facility] CATH LAB;  Service: Nephrology;  Laterality: Left;    HEMICOLECTOMY W/ OSTOMY      INSERTION OF IMPLANTABLE LOOP RECORDER Left 05/17/2024    Procedure: Insertion, Implantable Loop Recorder;  Surgeon: Kain Cortez MD;  Location: [facility] EP LAB;  Service: Cardiology;  Laterality: Left;  CVA, AF,  ILR, BSCI, Local, MN, 3 Prep ** HD pt/LUE AV fistula**    INSERTION OF TUNNELED CENTRAL VENOUS HEMODIALYSIS CATHETER Right 06/17/2024    Procedure: INSERTION, CATHETER, CENTRAL VENOUS, HEMODIALYSIS, TUNNELED;  Surgeon: Roque Arcos Jr., MD;  Location: [facility];  Service: Vascular;  Laterality: Right;    PARTIAL SURGICAL REMOVAL OF BLADDER      PORTACATH PLACEMENT Right     chest wall    PROSTATECTOMY      THROMBECTOMY Left 06/17/2024    Procedure: THROMBECTOMY / LEFT;  Surgeon: Roque Arcos Jr., MD;  Location: [facility];  Service: Vascular;  Laterality: Left;    THROMBECTOMY OF HEMODIALYSIS ACCESS SITE Left 06/17/2024    Procedure: THROMBECTOMY, HEMODIALYSIS GRAFT OR FISTULA;  Surgeon: Rohan Bright MD;  Location: [facility] CATH LAB;  Service: Nephrology;  Laterality: Left;    THROMBECTOMY OF HEMODIALYSIS ACCESS SITE Left 12/12/2024    Procedure: THROMBECTOMY, HEMODIALYSIS GRAFT OR FISTULA;  Surgeon: Rohan Bright MD;  Location: [facility] CATH LAB;  Service: Nephrology;  Laterality: Left;  LEFT UPPER ARM    TRANSPOSITION OF BASILIC VEIN Left 07/02/2024    Procedure: TRANSPOSITION, VEIN, BASILIC-AVF/G LEFT UPPER EXTREMITY;  Surgeon: Roque Arcos Jr., MD;  Location: [facility];  Service: General;  Laterality: Left;     Family History:   - No family history of cancer     Social History:   Lives: [place]   , wife recently underwent  "kidney transplant and is doing well; she previously worked at [employer]   Children: Yes   Occupation: retired    Tobacco use: former - smoked for about 10 years, 10 cigarettes/day, quit at age 49   Alcohol use: denies   Illicit drug use: denies     Allergies:  Review of patient's allergies indicates:   Allergen Reactions    Ambien [zolpidem] Other (See Comments)     Causes pt to hallucinate      Irbesartan      hyperkalemia       Medications:  Current Outpatient Medications   Medication Instructions    albuterol-ipratropium (DUO-NEB) 2.5 mg-0.5 mg/3 mL nebulizer solution Use one vial (3 mL) by nebulization every 4 (four) hours as needed for Wheezing - Rescue    ascorbic acid (vitamin C) (VITAMIN C) 500 mg, Daily    cholecalciferol, vitamin D3, 25 mcg (1,000 unit) Chew Daily    cyanocobalamin (VITAMIN B-12) 100 mcg    diltiaZEM (CARTIA XT) 120 mg, Oral, Daily    ELIQUIS 5 mg, Oral, 2 times daily    furosemide (LASIX) 20 mg, Daily PRN    heparin sodium,porcine (HEPARIN LOCK FLUSH, PORCINE, IV) Inject into the vein. After HD, 2 times weekly    levothyroxine (SYNTHROID) 75 mcg, Daily    LIDOcaine-prilocaine (EMLA) cream As needed (PRN)    multivitamin (THERAGRAN) per tablet 1 tablet, Daily    nebulizer and compressor Sharon Use as directed    omega-3 fatty acids 1,200 mg    REPATHA SYRINGE 140 mg/mL Syrg Inject 140 mg into the skin every 14 (fourteen) days.    rosuvastatin (CRESTOR) 10 mg, Nightly    sevelamer carbonate (RENVELA) 800 mg    UNABLE TO FIND Daily          Objective:   Vitals:   Vitals:    05/15/25 0941   BP: (!) 155/70   Patient Position: Sitting   Pulse: 76   SpO2: 98%   Weight: 114.4 kg (252 lb 3.3 oz)   Height: 5' 11" (1.803 m)     BMI: Body mass index is 35.18 kg/m².  ECOG Performance Status: 1    Physical Exam     General Appearance:    Alert, cooperative, no distress   Head:    Normocephalic, without obvious abnormality, atraumatic   Throat:   Lips, mucosa, and tongue normal; teeth and gums " normal   Neck:   Supple, symmetrical, no adenopathy;     thyroid:  no enlargement/tenderness, 2 cm left thyroid nodule noted   Lungs:     Clear to auscultation bilaterally, respirations unlabored    Heart:    Regular rate and rhythm, S1 and S2 normal   Abdomen:     Soft, non-tender, bowel sounds active, no masses, no organomegaly   Extremities:   Extremities normal, atraumatic, no cyanosis or edema   Pulses:   2+ and symmetric all extremities   Skin:   Skin color, texture, turgor normal, no rashes or lesions   Lymph nodes:   Cervical, supraclavicular, and axillary nodes normal   Neurologic:   Mild diffuse weakness, sensation      Laboratory Data:  No visits with results within 1 Week(s) from this visit.   Latest known visit with results is:   Hospital Outpatient Visit on 2025   Component Date Value Ref Range Status    BSA 2025 2.34  m2 Final     Recent Labs   Lab Result Units 25  1117 25  1023   WBC K/uL 4.25  --    HGB gm/dL 12.0*  --    POC Hematocrit %PCV  --  27*   HCT % 37.1*  --    Platelet Count K/uL 135*  --      Recent Labs   Lab Result Units 25  1117   Creatinine mg/dL 7.1*   AST unit/L 21   ALT unit/L 17     Recent Labs   Lab Result Units 25  1117   Iron Level ug/dL 90   Ferritin ng/mL 1,980.0*        Imagin2025: CT chest without contrast: 1.2 cm nodular opacity adjacent to presumed atelectasis at right lung base, no mediastinal or hilar adenopathy, 3 mm punctate nodule at right lung apex, 1.9 cm left thyroid nodule, mild pulmonary edema, moderate pericardial effusion; other: coronary atherosclerosis     2025: Noncontrast CT chest: stable RML opacity with resolution of adjacent pleural thickening favored inflammatory (possibly granulomatous disease vs. atypical infection)  - other: coronary and aortic atherosclerosis, ascending aortic ectasia up to 4.6 cm, stable 2 cm left thyroid lobe nodule   - resolution of  pericardial effusion and left sided pleural  effusion with elevation of left hemidiaphragm, diaphragmatic hernia containing RP fat         Discussion with pulmonology regarding 2025 CT:      Assessment   Assessment and Plan:   Arthur Menjivar is a 69 y.o. male with HTN/T2DM/ESRD (HD MWF via AV Fistula), History of high-grade muscle invasive bladder cancer ([id number], stage IV) s/p NACT and cystectomy/prostatectomy with ileal conduit, diagnosed in 2017 (currently LIZABETH), Hodgkin's lymphoma (unclear stage, treated with chemotherapy and RP radiation in , no records), Paroxysmal Atrial Fibrillation (resumed apixaban), Diastolic heart failure, LLE DVT (details unclear). He was originally referred to Medical Oncology as CT chest performed 2025 for AHRF 2/2 volume overload from missing dialysis and pneumonia; showed a 1.2 cm nodular opacity in the right lung base. He presents today to review follow up CT chest.     # 1.2 cm Right Lung Pulmonary Nodule   2025: CT chest without contrast: 1.2 cm nodular opacity adjacent to presumed atelectasis at right lung base, no mediastinal or hilar adenopathy, 3 mm punctate nodule at right lung apex, 1.9 cm left thyroid nodule, mild pulmonary edema, moderate pericardial effusion  2025: Noncontrast CT chest: stable RML opacity with resolution of adjacent pleural thickening favored inflammatory (possibly granulomatous disease vs. atypical infection)    - Referred chest imaging with pulmonary, see discussion above, who notes overall improvement most consistent with an inflammatory cause; will continue to follow with CT chest in 6 months     # IgG Kappa MGUS   - 2024: KF mg/L, Ratio: 2.78, M spike: 1 g/dL (0.7 in )  - May 2025 - Hgb: 12 (8.3) MCV: 109, WBC: 4, platelets: 135, CMP: Cr: 7.1, GFR: 8, ferritin: 1980, TSAT: 33%, STFR: 1.8, KF mg/dL, L F mg/dL, Ratio: 2.61  - SPEP: M spike: 1.23 g/dL (1 on 2025 at [facility] [place] [place]), IgG kappa monoclonal protein      - Obtain UPEP/UIFE   - Discussed  bone marrow biopsy (KFLC and LFLC out of range even despite taking into account GFR; but ratio within acceptable range based on GFR)  - Pt opts to continue with MGUS labs in 4 to 6 months     # History of high-grade muscle invasive bladder cancer ([id number], stage IV) s/p NACT and cystectomy/prostatectomy with ileal conduit, diagnosed in 2017   - Currently LIZABETH   - Established with Dr. Orta from [facility]  - Surveillance below - renal ultrasound annually and annual labs (CBC, CMP, B12)    # Other Co-Morbidities:  - 1.9 cm left thyroid nodule: noted on CT chest 1/23/2025, thyroid ultrasound 2/26/2025 with nodules that do not meet criteria for FNA, per PCP  - Normocytic anemia: likely anemia of renal disease, per nephrology   - HTN (coreg, hydralazine, clonidine - no longer listed as taking)/T2DM/ESRD: per nephrology   - Small pericardial effusion: noted on recent TTE, per cardiology (return precautions regarding chest pain reviewed)  - Atrial fibrillation: on apixaban and diltiazem, s/p watchman 5/8/2025, per cardiology  - LLE DVT: details unclear, if unprovoked, would consider lifelong AC given male. Encouraged pt to try to get records to help clarify, on apixaban for the atrial fibrillation though  - Diarrhea: encouraged follow up with GI, [facility]  - Other Cancer Screening: PSA <0.1 (2025), colonoscopy: at age 66, patient reports polyps, due around now (no records, at [facility]) - encouraged PCP follow up    Follow Up:  - Imaging: CT chest in 6 months - October 2025  - Labs: MGUS labs in 4 to 6 months   - Other: UPEP/UIFE now   - RTC after labs and CT     Med Onc Chart Routing      Follow up with physician . RTC 6 months, October 2025 with labs and CT chest two weeks prior   Follow up with YOLIE    Infusion scheduling note    Injection scheduling note    Labs   Scheduling:  Preferred lab:  Lab interval:  All labs ordered during today's encounter at least one week prior to return; UPEP and UIFE now   Imaging    CT chest in October 2025   Pharmacy appointment    Other referrals            MDM includes:    - Acute or chronic illness or injury that poses a threat to life or bodily function  - Consideration and discussion of significant complications based on comorbidities  - Review of prior external notes from unique source and review of diagnostic tests and information  - Independent review and explanation of 3+ results from unique tests   - Discussion of management and ordering 3+ unique tests   - Extensive discussion of treatment and management including consideration of possible diagnoses and management options    - Patient seen in diagnosis clinic.   - Overall, I discussed the diagnosis, history, stage, labs/imaging, prognosis, management, and treatment plan as applicable. I reviewed adverse short and long term effects as applicable.   - Informed patient if symptoms are getting worse that it is their responsibility to call the clinic and schedule follow up sooner than stated follow up. Also informed patient if they do not hear from the appointment center in 2-5 business days for their referrals, the patient must call the Oncology clinic so we can follow up on procedures or referral scheduling. Patient was fully informed of current medical plan, all questions were answered and patient verbalized understanding. No further questions.   - Face to Face Visit time I spent with the patient: 60 minutes of total time spent on the encounter, including counseling patient and/or coordinating care, which includes face to face time and non-face to face time preparing to see the patient (eg, review of tests), Obtaining and/or reviewing separately obtained history, Documenting clinical information in the electronic or other health record, Independently interpreting results (not separately reported) and communicating results to the patient/family/caregiver, or Care coordination (not separately reported).         Signed   Kika ILMA  MD Klaudia  [facility]

## 2025-05-19 ENCOUNTER — LAB VISIT (OUTPATIENT)
Dept: LAB | Facility: HOSPITAL | Age: 70
End: 2025-05-19
Payer: MEDICARE

## 2025-05-19 DIAGNOSIS — D47.2 MGUS (MONOCLONAL GAMMOPATHY OF UNKNOWN SIGNIFICANCE): ICD-10-CM

## 2025-05-19 PROCEDURE — 84166 PROTEIN E-PHORESIS/URINE/CSF: CPT

## 2025-05-21 PROBLEM — R91.1 PULMONARY NODULE: Status: ACTIVE | Noted: 2025-05-21

## 2025-05-22 LAB
PATHOLOGIST INTERPRETATION - UPE TIMED (OHS): ABNORMAL
PROT 24H UR-MRATE: 690 MG/SPEC
PROT UR-MCNC: 197 MG/DL
TOTAL HOURS OF COLLECTION (OHS): 24 HR
TOTAL VOLUME  (OHS): 350 ML

## 2025-05-26 DIAGNOSIS — E78.5 HYPERLIPIDEMIA LDL GOAL <70: ICD-10-CM

## 2025-05-26 RX ORDER — EVOLOCUMAB 140 MG/ML
140 INJECTION, SOLUTION SUBCUTANEOUS
Qty: 2 ML | Refills: 11 | Status: ACTIVE | OUTPATIENT
Start: 2025-05-26

## 2025-06-17 ENCOUNTER — PATIENT MESSAGE (OUTPATIENT)
Dept: ELECTROPHYSIOLOGY | Facility: CLINIC | Age: 70
End: 2025-06-17
Payer: MEDICARE

## 2025-06-17 ENCOUNTER — TELEPHONE (OUTPATIENT)
Dept: ELECTROPHYSIOLOGY | Facility: CLINIC | Age: 70
End: 2025-06-17
Payer: MEDICARE

## 2025-06-17 NOTE — TELEPHONE ENCOUNTER
Spoke to patient     CONFIRMED procedure arrival time of 11:00 AM on 6/19/2025    Reiterated instructions including:  -Directions to check in desk  -NPO after midnight night prior to procedure  -Patient allowed to drink water up until 2 hours prior to arrival due to IV fluid shortage.   -High importance of HOLDING Lasix the morning of the procedure. Patient verbalized understanding.   -Confirmed compliance of Eliquis. Instructed patient to continue to take Eliquis as prescribed, do not miss any doses, and take the morning of the procedure. Patient verbalized understanding. Patient requested a new refill sent to pharmacy.   -Confirmed presence of implanted device/stimulator ILR BSCI, LUE AV fistula-non functioning, Rt. Side Urostomy bag, Watchman   -Confirmed no fever, cough, or shortness of breath in the past 30 days  -Confirmed no redness, rash, irritation, or yeast infection to skin area.   -Reviewed current visitor policy    Patient verbalized understanding of above and appreciated the call.

## 2025-06-19 ENCOUNTER — ANESTHESIA EVENT (OUTPATIENT)
Dept: MEDSURG UNIT | Facility: HOSPITAL | Age: 70
End: 2025-06-19
Payer: MEDICARE

## 2025-06-19 ENCOUNTER — HOSPITAL ENCOUNTER (OUTPATIENT)
Dept: CARDIOLOGY | Facility: HOSPITAL | Age: 70
Discharge: HOME | End: 2025-06-19
Attending: INTERNAL MEDICINE | Admitting: INTERNAL MEDICINE
Payer: MEDICARE

## 2025-06-19 ENCOUNTER — ANESTHESIA (OUTPATIENT)
Dept: MEDSURG UNIT | Facility: HOSPITAL | Age: 70
End: 2025-06-19
Payer: MEDICARE

## 2025-06-19 ENCOUNTER — HOSPITAL ENCOUNTER (OUTPATIENT)
Facility: HOSPITAL | Age: 70
Discharge: HOME | End: 2025-06-19
Attending: INTERNAL MEDICINE | Admitting: INTERNAL MEDICINE
Payer: MEDICARE

## 2025-06-19 VITALS
BODY MASS INDEX: 35.28 KG/M2 | HEIGHT: 71 IN | HEART RATE: 66 BPM | DIASTOLIC BLOOD PRESSURE: 57 MMHG | WEIGHT: 252 LBS | SYSTOLIC BLOOD PRESSURE: 123 MMHG

## 2025-06-19 VITALS
SYSTOLIC BLOOD PRESSURE: 150 MMHG | RESPIRATION RATE: 19 BRPM | OXYGEN SATURATION: 98 % | TEMPERATURE: 98 F | DIASTOLIC BLOOD PRESSURE: 86 MMHG | HEART RATE: 82 BPM

## 2025-06-19 DIAGNOSIS — I48.91 ATRIAL FIBRILLATION: ICD-10-CM

## 2025-06-19 DIAGNOSIS — R31.9 HEMATURIA, UNSPECIFIED TYPE: ICD-10-CM

## 2025-06-19 DIAGNOSIS — Z86.73 HISTORY OF CVA (CEREBROVASCULAR ACCIDENT): ICD-10-CM

## 2025-06-19 DIAGNOSIS — Z95.818 PRESENCE OF WATCHMAN LEFT ATRIAL APPENDAGE CLOSURE DEVICE: Primary | ICD-10-CM

## 2025-06-19 DIAGNOSIS — I48.0 PAF (PAROXYSMAL ATRIAL FIBRILLATION): ICD-10-CM

## 2025-06-19 LAB
BSA FOR ECHO PROCEDURE: 2.39 M2
OHS QRS DURATION: 132 MS
OHS QTC CALCULATION: 483 MS
POCT GLUCOSE: 105 MG/DL (ref 70–110)
POCT GLUCOSE: 118 MG/DL (ref 70–110)

## 2025-06-19 PROCEDURE — 82962 GLUCOSE BLOOD TEST: CPT

## 2025-06-19 PROCEDURE — 93320 DOPPLER ECHO COMPLETE: CPT | Mod: 26,,, | Performed by: INTERNAL MEDICINE

## 2025-06-19 PROCEDURE — 93010 ELECTROCARDIOGRAM REPORT: CPT | Mod: ,,, | Performed by: INTERNAL MEDICINE

## 2025-06-19 PROCEDURE — 93312 ECHO TRANSESOPHAGEAL: CPT | Mod: 26,,, | Performed by: INTERNAL MEDICINE

## 2025-06-19 PROCEDURE — 93005 ELECTROCARDIOGRAM TRACING: CPT

## 2025-06-19 PROCEDURE — 93312 ECHO TRANSESOPHAGEAL: CPT

## 2025-06-19 PROCEDURE — 25000003 PHARM REV CODE 250

## 2025-06-19 PROCEDURE — 93325 DOPPLER ECHO COLOR FLOW MAPG: CPT | Mod: 26,,, | Performed by: INTERNAL MEDICINE

## 2025-06-19 PROCEDURE — 37000009 HC ANESTHESIA EA ADD 15 MINS

## 2025-06-19 PROCEDURE — 63600175 PHARM REV CODE 636 W HCPCS

## 2025-06-19 PROCEDURE — 37000008 HC ANESTHESIA 1ST 15 MINUTES

## 2025-06-19 RX ORDER — GLUCAGON 1 MG
1 KIT INJECTION
Status: DISCONTINUED | OUTPATIENT
Start: 2025-06-19 | End: 2025-06-19 | Stop reason: HOSPADM

## 2025-06-19 RX ORDER — SODIUM CHLORIDE 0.9 % (FLUSH) 0.9 %
10 SYRINGE (ML) INJECTION
Status: DISCONTINUED | OUTPATIENT
Start: 2025-06-19 | End: 2025-06-19 | Stop reason: HOSPADM

## 2025-06-19 RX ORDER — LIDOCAINE HYDROCHLORIDE 20 MG/ML
INJECTION INTRAVENOUS
Status: DISCONTINUED | OUTPATIENT
Start: 2025-06-19 | End: 2025-06-19

## 2025-06-19 RX ORDER — CLOPIDOGREL BISULFATE 75 MG/1
75 TABLET ORAL DAILY
Qty: 30 TABLET | Refills: 4 | Status: SHIPPED | OUTPATIENT
Start: 2025-06-19 | End: 2025-11-16

## 2025-06-19 RX ORDER — PROPOFOL 10 MG/ML
VIAL (ML) INTRAVENOUS CONTINUOUS PRN
Status: DISCONTINUED | OUTPATIENT
Start: 2025-06-19 | End: 2025-06-19

## 2025-06-19 RX ORDER — PROPOFOL 10 MG/ML
VIAL (ML) INTRAVENOUS
Status: DISCONTINUED | OUTPATIENT
Start: 2025-06-19 | End: 2025-06-19

## 2025-06-19 RX ORDER — UBIDECARENONE 30 MG
30 CAPSULE ORAL 3 TIMES DAILY
COMMUNITY

## 2025-06-19 RX ADMIN — PROPOFOL 60 MG: 10 INJECTION, EMULSION INTRAVENOUS at 01:06

## 2025-06-19 RX ADMIN — LIDOCAINE HYDROCHLORIDE 100 MG: 20 INJECTION INTRAVENOUS at 01:06

## 2025-06-19 RX ADMIN — PROPOFOL 125 MCG/KG/MIN: 10 INJECTION, EMULSION INTRAVENOUS at 01:06

## 2025-06-19 RX ADMIN — SODIUM CHLORIDE: 9 INJECTION, SOLUTION INTRAVENOUS at 01:06

## 2025-06-19 NOTE — H&P
[facility]iac [facility]  Cardiology  History and Physical     Patient Name: Arthur Menjivar  MRN: [id number]  Admission Date: 6/19/2025  Code Status: Prior   Attending Provider: Kain Cortez MD   Primary Care Physician: Demetri Whitley MD  Principal Problem:<principal problem not specified>    Patient information was obtained from patient and past medical records.     Subjective:     Chief Complaint:  S/p Watchman device     HPI:  Mr. Menjivar is a 69 year-old man with hypertension, prior stroke, ESRD on HD, paroxysmal atrial fibrillation, and bladder cancer s/p urostomy. He has had difficulty tolerating anticoagulation due to hematuria. On 5/8/2025, he underwent successful LAAO procedure with 20 mm Watchman FLX Pro device. He was continued on Eliquis and ASA. He presents today for ALISON to evaluate for any duncan-device leaks.     ALISON 5/8/2025    ALISON for assistance with Watchman placement. Successful placement of 20 mm Watchman FLX. No duncan-device leak noted. Compression of 15-25%. Trivial effusion unchanged before and after the procedure.    Left Ventricle: The left ventricle is normal in size. Normal wall thickness. Normal wall motion. There is normal systolic function with a visually estimated ejection fraction of 55 - 60%.    Right Ventricle: The right ventricle is normal in size Wall thickness is normal. Systolic function is normal.    Left Atrium: A small patent foramen ovale is visualized. The left atrial appendage appears normal. Appendage velocity is normal at greater than 40 cm/sec. There is no thrombus in the cavity.    Mitral Valve: There is mild regurgitation.    Anticoagulant/antiplatelets: ASA and Eliquis 5 mg bid   ECG: NSR with RBBB    Dysphagia or odynophagia:  No  Liver Disease, esophageal disease, or known varices:  No  Upper GI Bleeding: No  Snoring:  No  Sleep Apnea:  No  Prior neck surgery or radiation:  No  Able to move neck in all directions:  Yes  History of anesthetic  difficulties:  No  Family history of anesthetic difficulties:  No  Last oral intake: yesterday before midnight  GLP-1 use: No      Past Medical History:   Diagnosis Date    Cancer     bladder ca and prostate ca     CKD (chronic kidney disease), stage IV     Diabetes mellitus     diet controlled    Encounter for blood transfusion     ESRD (end stage renal disease)     dialysis mon and friday    Hodgkin's lymphoma 2008    chemo and radiation    Hypertension     PAF (paroxysmal atrial fibrillation)     Presence of urostomy     Sleep apnea     Stroke     weakness left side    Thyroid disease        Past Surgical History:   Procedure Laterality Date    ABLATION      for atrial fibrilation    AV FISTULA PLACEMENT Left 07/20/2023    Procedure: CREATION, AV FISTULA;  Surgeon: Roque Arcos Jr., MD;  Location: [facility], [place], operated by [facility] OR;  Service: Vascular;  Laterality: Left;   / LEFT UPPER EXTROMITY    CLOSURE OF LEFT ATRIAL APPENDAGE USING DEVICE N/A 5/8/2025    Procedure: Left atrial appendage closure device;  Surgeon: Kain Cortez MD;  Location: [facility] EP LAB;  Service: Cardiology;  Laterality: N/A;  AF, ALISON, Watchman, BSci, Gen, [place], 3 Prep *BSci ILR in situ, LUE AV fistula, urostomy bag rt side*    ECHOCARDIOGRAM,TRANSESOPHAGEAL N/A 5/8/2025    Procedure: Transesophageal echo (ALISON) intra-procedure log documentation;  Surgeon: Jacobo Thomas MD;  Location: [facility] EP LAB;  Service: Cardiology;  Laterality: N/A;    EYE SURGERY      FISTULOGRAM Left 12/12/2024    Procedure: Fistulogram;  Surgeon: Rohan Bright MD;  Location: [facility], [place], operated by [facility] CATH LAB;  Service: Nephrology;  Laterality: Left;    FISTULOGRAM Left 02/13/2025    Procedure: Fistulogram;  Surgeon: Rohan Bright MD;  Location: [facility], [place], operated by [facility] CATH LAB;  Service: Nephrology;  Laterality: Left;    HEMICOLECTOMY W/ OSTOMY      INSERTION OF IMPLANTABLE LOOP RECORDER Left 05/17/2024    Procedure: Insertion, Implantable Loop Recorder;  Surgeon: Kain Cortez MD;  Location: [facility] EP LAB;  Service:  Cardiology;  Laterality: Left;  CVA, AF,  ILR, BSCI, Local, LA, 3 Prep ** HD pt/LUE AV fistula**    INSERTION OF TUNNELED CENTRAL VENOUS HEMODIALYSIS CATHETER Right 06/17/2024    Procedure: INSERTION, CATHETER, CENTRAL VENOUS, HEMODIALYSIS, TUNNELED;  Surgeon: Roque Arcos Jr., MD;  Location: [facility] OR;  Service: Vascular;  Laterality: Right;    PARTIAL SURGICAL REMOVAL OF BLADDER      PORTACATH PLACEMENT Right     chest wall    PROSTATECTOMY      THROMBECTOMY Left 06/17/2024    Procedure: THROMBECTOMY / LEFT;  Surgeon: Roque Arcos Jr., MD;  Location: [facility] OR;  Service: Vascular;  Laterality: Left;    THROMBECTOMY OF HEMODIALYSIS ACCESS SITE Left 06/17/2024    Procedure: THROMBECTOMY, HEMODIALYSIS GRAFT OR FISTULA;  Surgeon: Rohan Bright MD;  Location: [facility] CATH LAB;  Service: Nephrology;  Laterality: Left;    THROMBECTOMY OF HEMODIALYSIS ACCESS SITE Left 12/12/2024    Procedure: THROMBECTOMY, HEMODIALYSIS GRAFT OR FISTULA;  Surgeon: Rohan Bright MD;  Location: [facility] CATH LAB;  Service: Nephrology;  Laterality: Left;  LEFT UPPER ARM    TRANSPOSITION OF BASILIC VEIN Left 07/02/2024    Procedure: TRANSPOSITION, VEIN, BASILIC-AVF/G LEFT UPPER EXTREMITY;  Surgeon: Roque Arcos Jr., MD;  Location: [facility] OR;  Service: General;  Laterality: Left;       Review of patient's allergies indicates:   Allergen Reactions    Ambien [zolpidem] Other (See Comments)     Causes pt to hallucinate      Irbesartan      hyperkalemia       No current facility-administered medications on file prior to encounter.     Current Outpatient Medications on File Prior to Encounter   Medication Sig    albuterol-ipratropium (DUO-NEB) 2.5 mg-0.5 mg/3 mL nebulizer solution Use one vial (3 mL) by nebulization every 4 (four) hours as needed for Wheezing - Rescue    ascorbic acid, vitamin C, (VITAMIN C) 500 MG tablet Take 500 mg by mouth once daily.    cholecalciferol, vitamin D3, 25 mcg (1,000 unit) Chew once daily.    cyanocobalamin (VITAMIN  B-12) 1000 MCG tablet Take 100 mcg by mouth.    diltiaZEM (CARTIA XT) 120 MG Cp24 Take 1 capsule (120 mg total) by mouth once daily.    furosemide (LASIX) 20 MG tablet Take 20 mg by mouth daily as needed (daily except on dialysis days). Pt takes on non dialysis days    heparin sodium,porcine (HEPARIN LOCK FLUSH, PORCINE, IV) Inject into the vein. After HD, 2 times weekly    levothyroxine (SYNTHROID) 75 MCG tablet Take 75 mcg by mouth once daily.    LIDOcaine-prilocaine (EMLA) cream Apply topically as needed. (Patient not taking: Reported on 5/15/2025)    multivitamin (THERAGRAN) per tablet Take 1 tablet by mouth once daily.    nebulizer and compressor Sharon Use as directed (Patient not taking: Reported on 2025)    omega-3 fatty acids 1,000 mg Cap Take 1,200 mg by mouth.    rosuvastatin (CRESTOR) 40 MG Tab Take 10 mg by mouth every evening.    sevelamer carbonate (RENVELA) 800 mg Tab Take 800 mg by mouth.    UNABLE TO FIND Daily. PREVAGEN     Family History    None       Tobacco Use    Smoking status: Former     Current packs/day: 0.00     Types: Cigarettes     Quit date: 1990     Years since quittin.4    Smokeless tobacco: Never   Substance and Sexual Activity    Alcohol use: Never    Drug use: Never    Sexual activity: Yes     Partners: Female     Review of Systems   Constitutional: Negative for diaphoresis, malaise/fatigue, weight gain and weight loss.   HENT:  Negative for nosebleeds.    Eyes:  Negative for vision loss in left eye, vision loss in right eye and visual disturbance.   Cardiovascular:  Negative for chest pain, claudication, dyspnea on exertion, irregular heartbeat, leg swelling, near-syncope, orthopnea, palpitations, paroxysmal nocturnal dyspnea and syncope.   Respiratory:  Negative for cough, shortness of breath, sleep disturbances due to breathing, snoring and wheezing.    Hematologic/Lymphatic: Negative for bleeding problem. Does not bruise/bleed easily.   Skin:  Negative for poor  wound healing and rash.   Musculoskeletal:  Negative for muscle cramps and myalgias.   Gastrointestinal:  Negative for bloating, abdominal pain, diarrhea, heartburn, melena, nausea and vomiting.   Genitourinary:  Negative for hematuria and nocturia.   Neurological:  Negative for brief paralysis, dizziness, headaches, light-headedness, numbness and weakness.   Psychiatric/Behavioral:  Negative for depression.    Allergic/Immunologic: Negative for hives.     Objective:     Vital Signs (Most Recent):    Vital Signs (24h Range):           There is no height or weight on file to calculate BMI.            No intake or output data in the 24 hours ending 06/19/25 1116    Lines/Drains/Airways       Central Venous Catheter Line  Duration                  Hemodialysis Catheter 06/17/24 1700 right internal jugular 366 days              Drain  Duration                  Hemodialysis AV Graft Left upper arm -- days         Urostomy 08/13/20 0955 ureterostomy, right RLQ 1771 days                     Physical Exam  Vitals and nursing note reviewed.   Constitutional:       Appearance: He is well-developed. He is not diaphoretic.   HENT:      Head: Normocephalic and atraumatic.   Eyes:      Conjunctiva/sclera: Conjunctivae normal.   Neck:      Vascular: No carotid bruit or JVD.   Cardiovascular:      Rate and Rhythm: Normal rate and regular rhythm.      Pulses: Normal pulses and intact distal pulses.      Heart sounds: Normal heart sounds. No murmur heard.     No friction rub. No gallop.   Pulmonary:      Effort: Pulmonary effort is normal.      Breath sounds: Normal breath sounds. No rales.   Chest:      Chest wall: No tenderness.   Abdominal:      General: There is no distension.      Palpations: Abdomen is soft. There is no mass.      Tenderness: There is no abdominal tenderness.   Skin:     General: Skin is warm and dry.      Coloration: Skin is not pale.   Neurological:      Mental Status: He is alert and oriented to person,  place, and time.          Significant Labs: All pertinent lab results from the last 24 hours have been reviewed.  Assessment and Plan:     Presence of Watchman left atrial appendage closure device  Patient is 6 weeks post LAAO with Watchman device. Here today for ALISON. If ALISON shows well seated device without DRT and abscence of peridevice leak (or <5 mm) will stop Eliquis and start clopidogrel and Aspirin 81 mg daily   -No absolute contraindications of esophageal stricture, tumor, perforation, laceration,or diverticulum and/or active GI bleed  -The risks, benefits & alternatives of the procedure were explained to the patient.   -The risks of transesophageal echo include but are not limited to:  Dental trauma, esophageal trauma/perforation, bleeding, laryngospasm/brochospasm, aspiration, sore throat/hoarseness, & dislodgement of the endotracheal tube/nasogastric tube (where applicable).    -The risks of ANES monitored sedation include hypotension, respiratory depression, arrhythmias, bronchospasm, & death.    -Informed consent was obtained. The patient is agreeable to proceed with the procedure and all questions and concerns addressed.    Case discussed with an attending in echocardiography lab.    Further recommendations per attending addendum          VTE Risk Mitigation (From admission, onward)      None            Camille Paul PA-C  Cardiology   [facility] - Short Stay Cardiac Unit

## 2025-06-19 NOTE — PLAN OF CARE
Bedrest completed. Patient has ambulated without any problems. Tolerated fluids  without any problems. No complaints.. PIV removed. Telemetry monitor removed. AVS printed and reviewed. Family at bedside. Wheelchair provided for discharge.

## 2025-06-19 NOTE — DISCHARGE INSTRUCTIONS
Medications:  -Continue to take your home medications as listed on your medication list after you are discharged.    New Medications:  - START taking Plavix 75 mg daily. Please start taking these medications today.  - STOP taking Eliquis  - CONTINUE taking Aspirin 81 mg daily     Follow up: in clinic with Dr. Cortez as scheduled.    Diet  -You may resume oral intake after you are discharged, as long you have no swallowing difficulties.    Because you have received sedation for this procedure:  -Limit activity for the remainder of the day.  -Do not smoke for at least 6 hours and until you are fully awake and alert.  -Do not drink alcoholic beverage for 24 hours.  -Do not drive for 24 hours.  -Defer important decision making until the following day.     Go to the Emergency Department if you develop:   -Bleeding  -Weakness or numbness  -Visual, gait or speech disturbance  -New chest pain, palpitations, shortness of breath, rapid heart beat, or fainting  -Fever

## 2025-06-19 NOTE — PLAN OF CARE
Bedside Report received from procedure CRNA and ASAEL Helm . Patient is s/p  ALISON. Patient is sleepy, but arousable.  Bedside monitoring connected. VSS. No complaints at present. See flowsheet charting. Family notified. RN monitoring at bedside.

## 2025-06-19 NOTE — DISCHARGE SUMMARY
[facility]  Cardiology  Discharge Summary      Patient Name: Arthur Menjivar  MRN: [id number]  Admission Date: 6/19/2025  Hospital Length of Stay: 0 days  Discharge Date and Time: 06/19/2025 1:26 PM  Attending Physician: Kain Cortez MD    Discharging Provider: Camille Paul PA-C  Primary Care Physician: Demetri Whitley MD    HPI:   Mr. Menjivar is a 69 year-old man with hypertension, prior stroke, ESRD on HD, paroxysmal atrial fibrillation, and bladder cancer s/p urostomy. He has had difficulty tolerating anticoagulation due to hematuria. On 5/8/2025, he underwent successful LAAO procedure with 20 mm Watchman FLX Pro device. He was continued on Eliquis and ASA. He presents today for ALISON to evaluate for any duncan-device leaks.     ALISON 5/8/2025    ALISON for assistance with Watchman placement. Successful placement of 20 mm Watchman FLX. No duncan-device leak noted. Compression of 15-25%. Trivial effusion unchanged before and after the procedure.    Left Ventricle: The left ventricle is normal in size. Normal wall thickness. Normal wall motion. There is normal systolic function with a visually estimated ejection fraction of 55 - 60%.    Right Ventricle: The right ventricle is normal in size Wall thickness is normal. Systolic function is normal.    Left Atrium: A small patent foramen ovale is visualized. The left atrial appendage appears normal. Appendage velocity is normal at greater than 40 cm/sec. There is no thrombus in the cavity.    Mitral Valve: There is mild regurgitation.    Anticoagulant/antiplatelets: ASA and Eliquis 5 mg bid   ECG: NSR with RBBB    Dysphagia or odynophagia:  No  Liver Disease, esophageal disease, or known varices:  No  Upper GI Bleeding: No  Snoring:  No  Sleep Apnea:  No  Prior neck surgery or radiation:  No  Able to move neck in all directions:  Yes  History of anesthetic difficulties:  No  Family history of anesthetic difficulties:  No  Last oral intake: yesterday  before midnight  GLP-1 use: No      Procedure(s) (LRB):  Transesophageal echo (ALISON) intra-procedure log documentation (N/A)     Indwelling Lines/Drains at time of discharge:  Lines/Drains/Airways       Central Venous Catheter Line  Duration                  Hemodialysis Catheter 06/17/24 1700 right internal jugular 366 days              Drain  Duration                  Hemodialysis AV Graft Left upper arm -- days         Urostomy 08/13/20 0955 ureterostomy, right RLQ 1771 days                    Hospital Course:  Patient underwent ALISON showing Watchman device well seated in ELINOR, without thrombus or significant duncan-device thrombus. Patient tolerated the procedure without any acute complications. Plan to stop Eliquis, and start DAPT with Aspirin 81 mg daily and Plavix 75 mg daily. At 6 months post device implantation, he can stop Plavix and continue on Aspirin monotherapy. Instructed to follow up with Dr. Cortez in clinic as scheduled.   Patient was assessed at bedside prior to discharge, they reported feeling well and denied chest discomfort, shortness of breath, palpitations, lightheadedness, or any other acute symptoms. Discharge instructions were discussed with patient and all questions were answered. Patient was discharged home in stable condition.      Goals of Care Treatment Preferences:  Code Status: Full Code    Significant Diagnostic Studies: ALISON - prelim report- NO duncan device leaks     Pending Diagnostic Studies:       None            Final Active Diagnoses:    Diagnosis Date Noted POA    Presence of Watchman left atrial appendage closure device [Z95.818] 06/19/2025 Yes      Problems Resolved During this Admission:     No new Assessment & Plan notes have been filed under this hospital service since the last note was generated.  Service: Cardiology      Discharged Condition: stable    Disposition: Home or Self Care    Follow Up:   Follow-up Information       Kain Cortez MD. Schedule an appointment as  soon as possible for a visit.    Specialties: Electrophysiology, Cardiology  Contact information:  [address]  [facility] [address]  [phone]                           Patient Instructions:   No discharge procedures on file.  Medications:  Reconciled Home Medications:      Medication List        START taking these medications      clopidogreL 75 mg tablet  Commonly known as: PLAVIX  Take 1 tablet (75 mg total) by mouth once daily.            CONTINUE taking these medications      albuterol-ipratropium 2.5 mg-0.5 mg/3 mL nebulizer solution  Commonly known as: DUO-NEB  Use one vial (3 mL) by nebulization every 4 (four) hours as needed for Wheezing - Rescue     ascorbic acid (vitamin C) 500 MG tablet  Commonly known as: VITAMIN C  Take 500 mg by mouth once daily.     cholecalciferol (vitamin D3) 25 mcg (1,000 unit) Chew  once daily.     co-enzyme Q-10 30 mg capsule  Take 30 mg by mouth 3 (three) times daily.     cyanocobalamin 1000 MCG tablet  Commonly known as: VITAMIN B-12  Take 100 mcg by mouth.     diltiaZEM 120 MG Cp24  Commonly known as: CARTIA XT  Take 1 capsule (120 mg total) by mouth once daily.     furosemide 20 MG tablet  Commonly known as: LASIX  Take 20 mg by mouth daily as needed (daily except on dialysis days). Pt takes on non dialysis days     HEPARIN LOCK FLUSH (PORCINE) IV  Inject into the vein. After HD, 2 times weekly     levothyroxine 75 MCG tablet  Commonly known as: SYNTHROID  Take 75 mcg by mouth once daily.     multivitamin per tablet  Commonly known as: THERAGRAN  Take 1 tablet by mouth once daily.     omega-3 fatty acids 1,000 mg Cap  Take 1,200 mg by mouth.     REPATHA SYRINGE 140 mg/mL Syrg  Generic drug: evolocumab  Inject 140 mg into the skin every 14 (fourteen) days.     rosuvastatin 40 MG Tab  Commonly known as: CRESTOR  Take 10 mg by mouth every evening.     sevelamer carbonate 800 mg Tab  Commonly known as: RENVELA  Take 800 mg by mouth.     UNABLE TO FIND  Daily.  PREVAGEN            STOP taking these medications      apixaban 5 mg Tab  Commonly known as: ELIQUIS     COMP-AIR NEBULIZER COMPRESSOR Sharon  Generic drug: nebulizer and compressor     LIDOcaine-prilocaine cream  Commonly known as: EMLA              Time spent on the discharge of patient: 35 minutes    Camille Paul PA-C  Cardiology  [facility] - Cardiology

## 2025-06-19 NOTE — ASSESSMENT & PLAN NOTE
Patient is 6 weeks post LAAO with Watchman device. Here today for ALISON. If ALISON shows well seated device without DRT and abscence of peridevice leak (or <5 mm) will stop Eliquis and start clopidogrel and Aspirin 81 mg daily   -No absolute contraindications of esophageal stricture, tumor, perforation, laceration,or diverticulum and/or active GI bleed  -The risks, benefits & alternatives of the procedure were explained to the patient.   -The risks of transesophageal echo include but are not limited to:  Dental trauma, esophageal trauma/perforation, bleeding, laryngospasm/brochospasm, aspiration, sore throat/hoarseness, & dislodgement of the endotracheal tube/nasogastric tube (where applicable).    -The risks of ANES monitored sedation include hypotension, respiratory depression, arrhythmias, bronchospasm, & death.    -Informed consent was obtained. The patient is agreeable to proceed with the procedure and all questions and concerns addressed.    Case discussed with an attending in echocardiography lab.    Further recommendations per attending addendum

## 2025-06-19 NOTE — ANESTHESIA PREPROCEDURE EVALUATION
06/19/2025  Arthur Menjivar is a 69 y.o., male with a PMHx that includes Afib (s/p wathman device implantation), CVA, DVT, ESRD (MWF dialysis via LUE AVF), bladder cancer, and ascending aortic aneurysm who presents for ALISON      Pre-op Assessment    I have reviewed the Patient Summary Reports.     I have reviewed the Nursing Notes. I have reviewed the NPO Status.   I have reviewed the Medications.     Review of Systems  Anesthesia Hx:  No problems with previous Anesthesia               Denies Personal Hx of Anesthesia complications.                    Social:  Non-Smoker, No Alcohol Use       Hematology/Oncology:  Hematology Normal   Oncology Normal                                   Cardiovascular:     Hypertension   CAD    Dysrhythmias atrial fibrillation  CHF                                   Pulmonary:        Sleep Apnea                Renal/:  Chronic Renal Disease, ESRD, Dialysis                Hepatic/GI:  Hepatic/GI Normal                    Musculoskeletal:  Musculoskeletal Normal                Neurological:   CVA                                    Endocrine:  Diabetes           Psych:  Psychiatric Normal                    Physical Exam  General: Well nourished, Cooperative, Alert and Oriented    Airway:  Mallampati: I   Mouth Opening: Normal  TM Distance: Normal  Tongue: Normal  Neck ROM: Normal ROM    Dental:  Partial Dentures    Chest/Lungs:  Clear to auscultation, Normal Respiratory Rate    Heart:  Rate: Normal  Rhythm: Regular Rhythm        Anesthesia Plan  Type of Anesthesia, risks & benefits discussed:    Anesthesia Type: Gen ETT, MAC, Gen Natural Airway  Intra-op Monitoring Plan: Standard ASA Monitors  Post Op Pain Control Plan: multimodal analgesia and IV/PO Opioids PRN  Induction:  IV  Airway Plan: Direct, Post-Induction  Informed Consent: Informed consent signed with the Patient and  all parties understand the risks and agree with anesthesia plan.  All questions answered.   ASA Score: 3  Day of Surgery Review of History & Physical: H&P Update referred to the surgeon/provider.    Ready For Surgery From Anesthesia Perspective.     .

## 2025-06-19 NOTE — TRANSFER OF CARE
Anesthesia Transfer of Care Note    Patient: Arthur Menjivar    Procedure(s) Performed: Procedure(s) (LRB):  Transesophageal echo (ALISON) intra-procedure log documentation (N/A)    Patient location: PACU    Anesthesia Type: general    Transport from OR: Transported from OR on 2-3 L/min O2 by NC with adequate spontaneous ventilation    Post pain: adequate analgesia    Post assessment: no apparent anesthetic complications    Post vital signs: stable    Level of consciousness: awake and alert    Nausea/Vomiting: no nausea/vomiting    Complications: none    Transfer of care protocol was followed      Last vitals: Visit Vitals  BP (!) 123/57 (BP Location: Right arm, Patient Position: Sitting)   Pulse 63   Temp 36.4 °C (97.5 °F) (Temporal)   Resp 14   SpO2 95%

## 2025-06-19 NOTE — SUBJECTIVE & OBJECTIVE
Past Medical History:   Diagnosis Date    Cancer     bladder ca and prostate ca     CKD (chronic kidney disease), stage IV     Diabetes mellitus     diet controlled    Encounter for blood transfusion     ESRD (end stage renal disease)     dialysis mon and friday    Hodgkin's lymphoma 2008    chemo and radiation    Hypertension     PAF (paroxysmal atrial fibrillation)     Presence of urostomy     Sleep apnea     Stroke     weakness left side    Thyroid disease        Past Surgical History:   Procedure Laterality Date    ABLATION      for atrial fibrilation    AV FISTULA PLACEMENT Left 07/20/2023    Procedure: CREATION, AV FISTULA;  Surgeon: Roque Arcos Jr., MD;  Location: [facility] OR;  Service: Vascular;  Laterality: Left;   / LEFT UPPER EXTROMITY    CLOSURE OF LEFT ATRIAL APPENDAGE USING DEVICE N/A 5/8/2025    Procedure: Left atrial appendage closure device;  Surgeon: Kain Cortez MD;  Location: [facility] EP LAB;  Service: Cardiology;  Laterality: N/A;  AF, ALISON, Watchman, BSci, Gen, FL, 3 Prep *BSci ILR in situ, LUE AV fistula, urostomy bag rt side*    ECHOCARDIOGRAM,TRANSESOPHAGEAL N/A 5/8/2025    Procedure: Transesophageal echo (ALISON) intra-procedure log documentation;  Surgeon: Jacobo Thomas MD;  Location: [facility] EP LAB;  Service: Cardiology;  Laterality: N/A;    EYE SURGERY      FISTULOGRAM Left 12/12/2024    Procedure: Fistulogram;  Surgeon: Rohan Bright MD;  Location: [facility] CATH LAB;  Service: Nephrology;  Laterality: Left;    FISTULOGRAM Left 02/13/2025    Procedure: Fistulogram;  Surgeon: Rohan Bright MD;  Location: [facility] CATH LAB;  Service: Nephrology;  Laterality: Left;    HEMICOLECTOMY W/ OSTOMY      INSERTION OF IMPLANTABLE LOOP RECORDER Left 05/17/2024    Procedure: Insertion, Implantable Loop Recorder;  Surgeon: Kain Cortez MD;  Location: [facility] EP LAB;  Service: Cardiology;  Laterality: Left;  CVA, AF,  ILR, BSCI, Local, FL, 3 Prep ** HD pt/LUE AV fistula**    INSERTION OF TUNNELED CENTRAL  VENOUS HEMODIALYSIS CATHETER Right 06/17/2024    Procedure: INSERTION, CATHETER, CENTRAL VENOUS, HEMODIALYSIS, TUNNELED;  Surgeon: Roque Arcos Jr., MD;  Location: [facility] OR;  Service: Vascular;  Laterality: Right;    PARTIAL SURGICAL REMOVAL OF BLADDER      PORTACATH PLACEMENT Right     chest wall    PROSTATECTOMY      THROMBECTOMY Left 06/17/2024    Procedure: THROMBECTOMY / LEFT;  Surgeon: Roque Arcos Jr., MD;  Location: [facility] OR;  Service: Vascular;  Laterality: Left;    THROMBECTOMY OF HEMODIALYSIS ACCESS SITE Left 06/17/2024    Procedure: THROMBECTOMY, HEMODIALYSIS GRAFT OR FISTULA;  Surgeon: Rohan Bright MD;  Location: [facility] CATH LAB;  Service: Nephrology;  Laterality: Left;    THROMBECTOMY OF HEMODIALYSIS ACCESS SITE Left 12/12/2024    Procedure: THROMBECTOMY, HEMODIALYSIS GRAFT OR FISTULA;  Surgeon: Rohan Bright MD;  Location: [facility] CATH LAB;  Service: Nephrology;  Laterality: Left;  LEFT UPPER ARM    TRANSPOSITION OF BASILIC VEIN Left 07/02/2024    Procedure: TRANSPOSITION, VEIN, BASILIC-AVF/G LEFT UPPER EXTREMITY;  Surgeon: Roque Arcos Jr., MD;  Location: [facility] OR;  Service: General;  Laterality: Left;       Review of patient's allergies indicates:   Allergen Reactions    Ambien [zolpidem] Other (See Comments)     Causes pt to hallucinate      Irbesartan      hyperkalemia       No current facility-administered medications on file prior to encounter.     Current Outpatient Medications on File Prior to Encounter   Medication Sig    albuterol-ipratropium (DUO-NEB) 2.5 mg-0.5 mg/3 mL nebulizer solution Use one vial (3 mL) by nebulization every 4 (four) hours as needed for Wheezing - Rescue    ascorbic acid, vitamin C, (VITAMIN C) 500 MG tablet Take 500 mg by mouth once daily.    cholecalciferol, vitamin D3, 25 mcg (1,000 unit) Chew once daily.    cyanocobalamin (VITAMIN B-12) 1000 MCG tablet Take 100 mcg by mouth.    diltiaZEM (CARTIA XT) 120 MG Cp24 Take 1 capsule (120 mg total) by mouth once daily.     furosemide (LASIX) 20 MG tablet Take 20 mg by mouth daily as needed (daily except on dialysis days). Pt takes on non dialysis days    heparin sodium,porcine (HEPARIN LOCK FLUSH, PORCINE, IV) Inject into the vein. After HD, 2 times weekly    levothyroxine (SYNTHROID) 75 MCG tablet Take 75 mcg by mouth once daily.    LIDOcaine-prilocaine (EMLA) cream Apply topically as needed. (Patient not taking: Reported on 5/15/2025)    multivitamin (THERAGRAN) per tablet Take 1 tablet by mouth once daily.    nebulizer and compressor Sharon Use as directed (Patient not taking: Reported on 2025)    omega-3 fatty acids 1,000 mg Cap Take 1,200 mg by mouth.    rosuvastatin (CRESTOR) 40 MG Tab Take 10 mg by mouth every evening.    sevelamer carbonate (RENVELA) 800 mg Tab Take 800 mg by mouth.    UNABLE TO FIND Daily. PREVAGEN     Family History    None       Tobacco Use    Smoking status: Former     Current packs/day: 0.00     Types: Cigarettes     Quit date: 1990     Years since quittin.4    Smokeless tobacco: Never   Substance and Sexual Activity    Alcohol use: Never    Drug use: Never    Sexual activity: Yes     Partners: Female     Review of Systems   Constitutional: Negative for diaphoresis, malaise/fatigue, weight gain and weight loss.   HENT:  Negative for nosebleeds.    Eyes:  Negative for vision loss in left eye, vision loss in right eye and visual disturbance.   Cardiovascular:  Negative for chest pain, claudication, dyspnea on exertion, irregular heartbeat, leg swelling, near-syncope, orthopnea, palpitations, paroxysmal nocturnal dyspnea and syncope.   Respiratory:  Negative for cough, shortness of breath, sleep disturbances due to breathing, snoring and wheezing.    Hematologic/Lymphatic: Negative for bleeding problem. Does not bruise/bleed easily.   Skin:  Negative for poor wound healing and rash.   Musculoskeletal:  Negative for muscle cramps and myalgias.   Gastrointestinal:  Negative for bloating,  abdominal pain, diarrhea, heartburn, melena, nausea and vomiting.   Genitourinary:  Negative for hematuria and nocturia.   Neurological:  Negative for brief paralysis, dizziness, headaches, light-headedness, numbness and weakness.   Psychiatric/Behavioral:  Negative for depression.    Allergic/Immunologic: Negative for hives.     Objective:     Vital Signs (Most Recent):    Vital Signs (24h Range):           There is no height or weight on file to calculate BMI.            No intake or output data in the 24 hours ending 06/19/25 1116    Lines/Drains/Airways       Central Venous Catheter Line  Duration                  Hemodialysis Catheter 06/17/24 1700 right internal jugular 366 days              Drain  Duration                  Hemodialysis AV Graft Left upper arm -- days         Urostomy 08/13/20 0955 ureterostomy, right RLQ 1771 days                     Physical Exam  Vitals and nursing note reviewed.   Constitutional:       Appearance: He is well-developed. He is not diaphoretic.   HENT:      Head: Normocephalic and atraumatic.   Eyes:      Conjunctiva/sclera: Conjunctivae normal.   Neck:      Vascular: No carotid bruit or JVD.   Cardiovascular:      Rate and Rhythm: Normal rate and regular rhythm.      Pulses: Normal pulses and intact distal pulses.      Heart sounds: Normal heart sounds. No murmur heard.     No friction rub. No gallop.   Pulmonary:      Effort: Pulmonary effort is normal.      Breath sounds: Normal breath sounds. No rales.   Chest:      Chest wall: No tenderness.   Abdominal:      General: There is no distension.      Palpations: Abdomen is soft. There is no mass.      Tenderness: There is no abdominal tenderness.   Skin:     General: Skin is warm and dry.      Coloration: Skin is not pale.   Neurological:      Mental Status: He is alert and oriented to person, place, and time.          Significant Labs: All pertinent lab results from the last 24 hours have been reviewed.

## 2025-06-19 NOTE — HOSPITAL COURSE
Patient underwent ALISON showing Watchman device well seated in ELINOR, without thrombus or significant duncan-device thrombus. Patient tolerated the procedure without any acute complications. Plan to stop Eliquis, and start DAPT with Aspirin 81 mg daily and Plavix 75 mg daily. At 6 months post device implantation, he can stop Plavix and continue on Aspirin monotherapy. Instructed to follow up with Dr. Cortez in clinic as scheduled.   Patient was assessed at bedside prior to discharge, they reported feeling well and denied chest discomfort, shortness of breath, palpitations, lightheadedness, or any other acute symptoms. Discharge instructions were discussed with patient and all questions were answered. Patient was discharged home in stable condition.

## 2025-06-19 NOTE — ANESTHESIA POSTPROCEDURE EVALUATION
Anesthesia Post Evaluation    Patient: Arthur Menjivar    Procedure(s) Performed: Procedure(s) (LRB):  Transesophageal echo (ALISON) intra-procedure log documentation (N/A)    Final Anesthesia Type: general      Patient location during evaluation: PACU  Patient participation: Yes- Able to Participate  Level of consciousness: awake and alert  Post-procedure vital signs: reviewed and stable  Pain management: adequate  Airway patency: patent    PONV status at discharge: No PONV  Anesthetic complications: no      Cardiovascular status: blood pressure returned to baseline and hemodynamically stable  Respiratory status: spontaneous ventilation  Hydration status: euvolemic  Follow-up not needed.              Vitals Value Taken Time   /76 06/19/25 14:17   Temp 36.5 °C (97.7 °F) 06/19/25 14:15   Pulse 78 06/19/25 14:18   Resp 21 06/19/25 14:18   SpO2 97 % 06/19/25 14:18   Vitals shown include unfiled device data.      No case tracking events are documented in the log.      Pain/Alverto Score: Alverto Score: 10 (6/19/2025  2:00 PM)

## 2025-06-19 NOTE — HPI
Mr. Menjivar is a 69 year-old man with hypertension, prior stroke, ESRD on HD, paroxysmal atrial fibrillation, and bladder cancer s/p urostomy. He has had difficulty tolerating anticoagulation due to hematuria. On 5/8/2025, he underwent successful LAAO procedure with 20 mm Watchman FLX Pro device. He was continued on Eliquis and ASA. He presents today for ALISON to evaluate for any duncan-device leaks.     ALISON 5/8/2025    ALISON for assistance with Watchman placement. Successful placement of 20 mm Watchman FLX. No duncan-device leak noted. Compression of 15-25%. Trivial effusion unchanged before and after the procedure.    Left Ventricle: The left ventricle is normal in size. Normal wall thickness. Normal wall motion. There is normal systolic function with a visually estimated ejection fraction of 55 - 60%.    Right Ventricle: The right ventricle is normal in size Wall thickness is normal. Systolic function is normal.    Left Atrium: A small patent foramen ovale is visualized. The left atrial appendage appears normal. Appendage velocity is normal at greater than 40 cm/sec. There is no thrombus in the cavity.    Mitral Valve: There is mild regurgitation.    Anticoagulant/antiplatelets: ASA and Eliquis 5 mg bid   ECG: NSR with RBBB    Dysphagia or odynophagia:  No  Liver Disease, esophageal disease, or known varices:  No  Upper GI Bleeding: No  Snoring:  No  Sleep Apnea:  No  Prior neck surgery or radiation:  No  Able to move neck in all directions:  Yes  History of anesthetic difficulties:  No  Family history of anesthetic difficulties:  No  Last oral intake: yesterday before midnight  GLP-1 use: No

## 2025-06-28 ENCOUNTER — CLINICAL SUPPORT (OUTPATIENT)
Dept: CARDIOLOGY | Facility: HOSPITAL | Age: 70
End: 2025-06-28
Attending: INTERNAL MEDICINE
Payer: MEDICARE

## 2025-06-28 ENCOUNTER — CLINICAL SUPPORT (OUTPATIENT)
Dept: CARDIOLOGY | Facility: HOSPITAL | Age: 70
End: 2025-06-28
Payer: MEDICARE

## 2025-06-28 DIAGNOSIS — I49.8 OTHER SPECIFIED CARDIAC ARRHYTHMIAS: ICD-10-CM

## 2025-06-28 PROCEDURE — 93298 REM INTERROG DEV EVAL SCRMS: CPT | Mod: 26,,, | Performed by: INTERNAL MEDICINE

## 2025-06-28 PROCEDURE — 93298 REM INTERROG DEV EVAL SCRMS: CPT | Performed by: INTERNAL MEDICINE

## 2025-07-18 LAB
OHS CV AF BURDEN PERCENT: < 1
OHS CV DC REMOTE DEVICE TYPE: NORMAL

## 2025-07-30 ENCOUNTER — CLINICAL SUPPORT (OUTPATIENT)
Dept: CARDIOLOGY | Facility: HOSPITAL | Age: 70
End: 2025-07-30
Attending: INTERNAL MEDICINE
Payer: MEDICARE

## 2025-07-30 ENCOUNTER — CLINICAL SUPPORT (OUTPATIENT)
Dept: CARDIOLOGY | Facility: HOSPITAL | Age: 70
End: 2025-07-30
Payer: MEDICARE

## 2025-07-30 DIAGNOSIS — I49.8 OTHER SPECIFIED CARDIAC ARRHYTHMIAS: ICD-10-CM

## 2025-07-30 PROCEDURE — 93298 REM INTERROG DEV EVAL SCRMS: CPT | Mod: 26,,, | Performed by: INTERNAL MEDICINE

## 2025-07-30 PROCEDURE — 93298 REM INTERROG DEV EVAL SCRMS: CPT | Performed by: INTERNAL MEDICINE

## 2025-07-31 LAB
OHS CV AF BURDEN PERCENT: < 1
OHS CV DC REMOTE DEVICE TYPE: NORMAL

## 2025-08-12 ENCOUNTER — OFFICE VISIT (OUTPATIENT)
Dept: CARDIOLOGY | Facility: CLINIC | Age: 70
End: 2025-08-12
Payer: MEDICARE

## 2025-08-12 VITALS
BODY MASS INDEX: 35.27 KG/M2 | HEART RATE: 76 BPM | DIASTOLIC BLOOD PRESSURE: 80 MMHG | WEIGHT: 252.88 LBS | SYSTOLIC BLOOD PRESSURE: 152 MMHG | OXYGEN SATURATION: 97 %

## 2025-08-12 DIAGNOSIS — I50.32 HYPERTENSIVE HEART DISEASE WITH CHRONIC DIASTOLIC CONGESTIVE HEART FAILURE: Primary | ICD-10-CM

## 2025-08-12 DIAGNOSIS — I71.21 ANEURYSM OF ASCENDING AORTA WITHOUT RUPTURE: ICD-10-CM

## 2025-08-12 DIAGNOSIS — I48.0 PAF (PAROXYSMAL ATRIAL FIBRILLATION): ICD-10-CM

## 2025-08-12 DIAGNOSIS — Z95.818 IMPLANTABLE LOOP RECORDER PRESENT: ICD-10-CM

## 2025-08-12 DIAGNOSIS — Z95.818 PRESENCE OF WATCHMAN LEFT ATRIAL APPENDAGE CLOSURE DEVICE: ICD-10-CM

## 2025-08-12 DIAGNOSIS — I10 ESSENTIAL (PRIMARY) HYPERTENSION: ICD-10-CM

## 2025-08-12 DIAGNOSIS — I11.0 HYPERTENSIVE HEART DISEASE WITH CHRONIC DIASTOLIC CONGESTIVE HEART FAILURE: Primary | ICD-10-CM

## 2025-08-12 DIAGNOSIS — Z29.89 INDICATION PRESENT FOR ENDOCARDITIS PROPHYLAXIS: ICD-10-CM

## 2025-08-12 PROCEDURE — 99213 OFFICE O/P EST LOW 20 MIN: CPT | Mod: PBBFAC,PN | Performed by: INTERNAL MEDICINE

## 2025-08-12 PROCEDURE — 99999 PR PBB SHADOW E&M-EST. PATIENT-LVL III: CPT | Mod: PBBFAC,,, | Performed by: INTERNAL MEDICINE

## 2025-08-12 PROCEDURE — 99214 OFFICE O/P EST MOD 30 MIN: CPT | Mod: S$PBB,,, | Performed by: INTERNAL MEDICINE

## 2025-09-03 ENCOUNTER — CLINICAL SUPPORT (OUTPATIENT)
Dept: CARDIOLOGY | Facility: HOSPITAL | Age: 70
End: 2025-09-03
Payer: MEDICARE

## 2025-09-03 ENCOUNTER — CLINICAL SUPPORT (OUTPATIENT)
Dept: CARDIOLOGY | Facility: HOSPITAL | Age: 70
End: 2025-09-03
Attending: INTERNAL MEDICINE
Payer: MEDICARE

## 2025-09-03 DIAGNOSIS — I49.8 OTHER SPECIFIED CARDIAC ARRHYTHMIAS: ICD-10-CM

## 2025-09-03 PROCEDURE — 93298 REM INTERROG DEV EVAL SCRMS: CPT | Mod: 26,,, | Performed by: INTERNAL MEDICINE

## 2025-09-04 LAB
OHS CV AF BURDEN PERCENT: < 1
OHS CV DC REMOTE DEVICE TYPE: NORMAL

## (undated) DEVICE — DILATOR COONS TAPER 14FR

## (undated) DEVICE — TOWEL OR XRAY BLUE 17X26IN

## (undated) DEVICE — BOOT SUTURE AID

## (undated) DEVICE — KIT PROBE COVER WITH GEL

## (undated) DEVICE — APPLIER CLIP LIAGCLIP 9.375IN

## (undated) DEVICE — DECANTER FLUID TRNSF WHITE 9IN

## (undated) DEVICE — GUIDEWIRE AMPLATZ XSTIFF 260CM

## (undated) DEVICE — SYR 30CC LUER LOCK

## (undated) DEVICE — SUT PERCLOSE PROSTYLE MEDIATE

## (undated) DEVICE — STRIP MEDI WND CLSR 1/2X4IN

## (undated) DEVICE — PAD DEFIB CADENCE ADULT R2

## (undated) DEVICE — SUT POLY CV-6 30 TT-9

## (undated) DEVICE — SYR SLIP TIP 1CC

## (undated) DEVICE — UNDERPAD ULTRASORB 300LB 30X36

## (undated) DEVICE — SOL NORMAL USPCA 0.9%

## (undated) DEVICE — GLOVE SENSICARE PI MICRO 8

## (undated) DEVICE — BANDAGE MATRIX HK LOOP 4IN 5YD

## (undated) DEVICE — SYR 10CC LUER LOCK

## (undated) DEVICE — SET MICROPUNCTUREECHO STIFF

## (undated) DEVICE — SET INTRO PERFRMR SHTH 16FR

## (undated) DEVICE — GLOVE SENSICARE PI MICRO 6.5

## (undated) DEVICE — JELLY SURGILUBE 5GR

## (undated) DEVICE — PRESTO INFLATION DEVICE

## (undated) DEVICE — PACK UPPER EXTREMITY BAPTIST

## (undated) DEVICE — KIT CUSTOM MANIFOLD

## (undated) DEVICE — BANDAGE ROLL COTTN 4.5INX4.1YD

## (undated) DEVICE — TOWEL OR XRAY WHITE 17X26IN

## (undated) DEVICE — SYS WATCHMAN FXD CURVE DBL US

## (undated) DEVICE — SUT VICRYL PLUS 3-0 18IN

## (undated) DEVICE — GUIDEWIRE LAUREATE .035X260CM

## (undated) DEVICE — NDL HYPO REG 25G X 1 1/2

## (undated) DEVICE — SOL POVIDONE SCRUB IODINE 4 OZ

## (undated) DEVICE — TIP YANKAUERS BULB NO VENT

## (undated) DEVICE — LOOP VESSEL BLUE MAXI

## (undated) DEVICE — GLOVE BIOGEL SKINSENSE PI 8.0

## (undated) DEVICE — SUT VICRYL 2-0 36 CT-1

## (undated) DEVICE — NDL NRG TRNSPTAL 71CM

## (undated) DEVICE — SUT POLY CV-6 30 TT-13

## (undated) DEVICE — DIALATOR COONS TAPER 12F 20CM

## (undated) DEVICE — LOOP VES MAXI BLU 2.5X1MM 18IN

## (undated) DEVICE — GUIDEWIRE STD .035X180CM ANG

## (undated) DEVICE — CUP MEDICINE GRAD STRL 2OZ

## (undated) DEVICE — GLOVE BIOGEL SKINSENSE PI 6.0

## (undated) DEVICE — SPONGE LAP 18X18 PREWASHED

## (undated) DEVICE — CATH ANGIO DIAG PIG 6FX110

## (undated) DEVICE — COUNT NDL FOAM MAGNET 40COUNT

## (undated) DEVICE — HEMOSTAT SURGICEL 4X8IN

## (undated) DEVICE — GOWN SMART IMP BREATHABLE XXLG

## (undated) DEVICE — ELECTRODE REM PLYHSV RETURN 9

## (undated) DEVICE — BLADE SURG STAINLESS STEEL #11

## (undated) DEVICE — DISSECTOR LIGASURE EXACT 21CM

## (undated) DEVICE — PAD UNDERPAD 30X30

## (undated) DEVICE — APPLICATOR CHLORAPREP ORN 26ML

## (undated) DEVICE — CATH ANGIO BRAID BERENSTEIN 5F

## (undated) DEVICE — SUT VICRYL PLUS 3-0 SH 18IN

## (undated) DEVICE — LOOP VESSEL BLUE MINI 2/CARD

## (undated) DEVICE — SYR SAF W/ NDL 22GX1.5IN 3ML

## (undated) DEVICE — APPLIER LIGACLIP SM 9.38IN

## (undated) DEVICE — PACK EP DRAPE OMC

## (undated) DEVICE — TRAY ANGIO BAPTIST

## (undated) DEVICE — GLOVE BIOGEL SKINSENSE PI 6.5

## (undated) DEVICE — GUIDE WIRE .018X175CM

## (undated) DEVICE — ADHESIVE DERMABOND ADVANCED

## (undated) DEVICE — Device

## (undated) DEVICE — SHEATH PINNACLE 6FR HIFLO

## (undated) DEVICE — CATH EMBO FOGARTY 5.5FRX80CM

## (undated) DEVICE — DRESSING AQUACEL FOAM 3 X 3

## (undated) DEVICE — CANNULA VSL W/DUCKBILL

## (undated) DEVICE — SYR LL 3ML 23G 1.5IN

## (undated) DEVICE — INTRODUCER HEMOSTASIS 6.5FR

## (undated) DEVICE — CATH FOGARTY EMB 4FR 80CM

## (undated) DEVICE — INTRO FAST-CATH SL1 8.5FR 63CM

## (undated) DEVICE — DRAPE PED LAP SURG 108X77IN

## (undated) DEVICE — TOWEL OR DISP STRL BLUE 4/PK

## (undated) DEVICE — TUBING CONTRAST INJCTN F-M 10

## (undated) DEVICE — LINE PRESSURE MONITORING 96IN

## (undated) DEVICE — SPONGE COTTON TRAY 4X4IN

## (undated) DEVICE — SUT MCRYL PLUS 4-0 PS2 27IN

## (undated) DEVICE — SYS CLSR DERMABOND PRINEO 22CM

## (undated) DEVICE — OMNIPAQUE CONTRAST 300MG/50ML

## (undated) DEVICE — STAPLER SKIN PROXIMATE WIDE

## (undated) DEVICE — CATH FOGARTY ART EMB 3FR 80CM

## (undated) DEVICE — CLOSURE SKIN STERI STRIP 1/2X4

## (undated) DEVICE — SUT PROLENE 3-0 30SH

## (undated) DEVICE — BAG DRAIN ANTI REFLUX 2000ML

## (undated) DEVICE — DRESSING TRANS 4X4 TEGADERM

## (undated) DEVICE — CONTAINER SPEC OR STRL 4.5OZ

## (undated) DEVICE — ELECTRODE BLD EXT INSUL 1

## (undated) DEVICE — SUT ETHILON 3-0 FS-1 30

## (undated) DEVICE — CATH ULTRAVERSE 035 6X80X130

## (undated) DEVICE — SPIKE SHORT LG BORE 1-WAY 2IN

## (undated) DEVICE — SLING ARM LARGE FOAM STRAP